# Patient Record
Sex: FEMALE | Race: WHITE | NOT HISPANIC OR LATINO | Employment: FULL TIME | URBAN - METROPOLITAN AREA
[De-identification: names, ages, dates, MRNs, and addresses within clinical notes are randomized per-mention and may not be internally consistent; named-entity substitution may affect disease eponyms.]

---

## 2017-01-04 ENCOUNTER — ANTICOAGULATION MONITORING (OUTPATIENT)
Dept: VASCULAR LAB | Facility: MEDICAL CENTER | Age: 52
End: 2017-01-04

## 2017-01-04 DIAGNOSIS — I82.401 ACUTE DEEP VEIN THROMBOSIS (DVT) OF RIGHT LOWER EXTREMITY, UNSPECIFIED VEIN (HCC): ICD-10-CM

## 2017-01-04 LAB — INR PPP: 4.2 (ref 2–3.5)

## 2017-01-04 NOTE — PROGRESS NOTES
OP Anticoagulation Service Note    Date: 1/4/2017    Anticoagulation Summary as of 1/4/2017     INR goal 2.0-3.0   Selected INR 4.2! (1/4/2017)   Maintenance plan 5 mg (5 mg x 1) on Wed; 7.5 mg (5 mg x 1.5) all other days   Weekly total 50 mg   Plan last modified Babita ALEGRIA Torsten (10/18/2016)   Next INR check 1/11/2017   Priority Maintenance   Target end date     Indications   Deep vein thrombosis [453.40] [I82.409]         Anticoagulation Episode Summary     INR check location Home Draw    Preferred lab     Send INR reminders to     Nuria MYRICK      Anticoagulation Care Providers     Provider Role Specialty Phone number    Bruna Ureña M.D. Referring Cardiology [phone]    LEON RhodesD Responsible          Anticoagulation Patient Findings      Plan:  INR is supratherapeutic. Left message on patient's answering machine/voicemail. Instructed patient to call back with any concerns regarding any unusual bleeding or bruising, an medication or diet changes or any signs or symptoms of thrombosis. Instructed patient to HOLD x 1 dose then resume medication as outlined above. Patient to follow up in 1 week.         Esther Cottrell, Pharm D

## 2017-01-12 ENCOUNTER — ANTICOAGULATION MONITORING (OUTPATIENT)
Dept: VASCULAR LAB | Facility: MEDICAL CENTER | Age: 52
End: 2017-01-12

## 2017-01-12 DIAGNOSIS — I82.401 ACUTE DEEP VEIN THROMBOSIS (DVT) OF RIGHT LOWER EXTREMITY, UNSPECIFIED VEIN (HCC): ICD-10-CM

## 2017-01-12 LAB — INR PPP: 1.4 (ref 2–3.5)

## 2017-01-12 NOTE — PROGRESS NOTES
Anticoagulation Summary as of 1/12/2017     INR goal 2.0-3.0   Selected INR 1.4! (1/12/2017)   Maintenance plan 5 mg (5 mg x 1) on Wed; 7.5 mg (5 mg x 1.5) all other days   Weekly total 50 mg   Plan last modified Babita Sarmiento (10/18/2016)   Next INR check 1/19/2017   Priority Maintenance   Target end date     Indications   Deep vein thrombosis [453.40] [I82.409]         Anticoagulation Episode Summary     INR check location Home Draw    Preferred lab     Send INR reminders to     Nuria MYRICK      Anticoagulation Care Providers     Provider Role Specialty Phone number    Bruna Ureña M.D. Referring Cardiology [phone]    Kiesha Barr, LEOND Responsible          Anticoagulation Patient Findings    Left voicemail message to report a sub therapeutic INR of 1.4.  Pt to Bolus dose with 10 mg tonight continue with current warfarin dosing regimen. Requested pt contact the clinic for any s/s of unusual bleeding, bruising, clotting or any changes to diet or medication. FU 1 weeks.  Kiesha Barr, PHARMD

## 2017-01-20 ENCOUNTER — ANTICOAGULATION MONITORING (OUTPATIENT)
Dept: VASCULAR LAB | Facility: MEDICAL CENTER | Age: 52
End: 2017-01-20

## 2017-01-20 LAB — INR PPP: 2.4 (ref 2–3.5)

## 2017-01-20 NOTE — PROGRESS NOTES
Anticoagulation Summary as of 1/20/2017     INR goal 2.0-3.0   Selected INR 2.4 (1/18/2017)   Maintenance plan 5 mg (5 mg x 1) on Wed; 7.5 mg (5 mg x 1.5) all other days   Weekly total 50 mg   Plan last modified Babita Gonzalesadiliamonae COLLAZODennise Sarmiento (10/18/2016)   Next INR check 1/25/2017   Priority Maintenance   Target end date     Indications   Deep vein thrombosis [453.40] [I82.409]         Anticoagulation Episode Summary     INR check location Home Draw    Preferred lab     Send INR reminders to     Nuria MYRICK      Anticoagulation Care Providers     Provider Role Specialty Phone number    Bruna Ureña M.D. Referring Cardiology [phone]    LEON RhodesD Responsible          Anticoagulation Patient Findings    Left voicemail message to report a therapeutic INR of 2.4.  Pt to continue with current warfarin dosing regimen. Requested pt contact the clinic for any s/s of unusual bleeding, bruising, clotting or any changes to diet or medication. FU 1 week.    Osiel Pete, PHARMD

## 2017-01-25 LAB — INR PPP: 3.8 (ref 2–3.5)

## 2017-01-26 ENCOUNTER — ANTICOAGULATION MONITORING (OUTPATIENT)
Dept: VASCULAR LAB | Facility: MEDICAL CENTER | Age: 52
End: 2017-01-26

## 2017-01-26 DIAGNOSIS — I82.401 ACUTE DEEP VEIN THROMBOSIS (DVT) OF RIGHT LOWER EXTREMITY, UNSPECIFIED VEIN (HCC): ICD-10-CM

## 2017-01-26 NOTE — PROGRESS NOTES
Anticoagulation Summary as of 1/26/2017     INR goal 2.0-3.0   Selected INR 3.8! (1/25/2017)   Maintenance plan 5 mg (5 mg x 1) on Wed; 7.5 mg (5 mg x 1.5) all other days   Weekly total 50 mg   Plan last modified Babita Sarmiento (10/18/2016)   Next INR check    Priority Maintenance   Target end date     Indications   Deep vein thrombosis [453.40] [I82.409]         Anticoagulation Episode Summary     INR check location Home Draw    Preferred lab     Send INR reminders to     Nuria MYRICK      Anticoagulation Care Providers     Provider Role Specialty Phone number    Bruna Ureña M.D. Referring Cardiology [phone]    Kiesha Barr, LEOND Responsible          Left voicemail message to report a supra therapeutic INR of 3.8.  Pt to HOLD DOSE TODAY, THEN continue with current warfarin dosing regimen. Requested pt contact the clinic for any s/s of unusual bleeding, bruising, clotting or any changes to diet or medication. FU 1 weeks.  Kiesha Barr, PHARMD

## 2017-02-07 ENCOUNTER — TELEPHONE (OUTPATIENT)
Dept: VASCULAR LAB | Facility: MEDICAL CENTER | Age: 52
End: 2017-02-07

## 2017-02-08 LAB — INR PPP: 2.5 (ref 2–3.5)

## 2017-02-09 ENCOUNTER — ANTICOAGULATION MONITORING (OUTPATIENT)
Dept: VASCULAR LAB | Facility: MEDICAL CENTER | Age: 52
End: 2017-02-09

## 2017-02-09 DIAGNOSIS — I82.401 ACUTE DEEP VEIN THROMBOSIS (DVT) OF RIGHT LOWER EXTREMITY, UNSPECIFIED VEIN (HCC): ICD-10-CM

## 2017-02-09 NOTE — PROGRESS NOTES
Anticoagulation Summary as of 2/9/2017     INR goal 2.0-3.0   Selected INR 2.5 (2/8/2017)   Maintenance plan 5 mg (5 mg x 1) on Wed; 7.5 mg (5 mg x 1.5) all other days   Weekly total 50 mg   Plan last modified Babita Sarmiento (10/18/2016)   Next INR check 2/22/2017   Priority Maintenance   Target end date     Indications   Deep vein thrombosis [453.40] [I82.409]         Anticoagulation Episode Summary     INR check location Home Draw    Preferred lab     Send INR reminders to     Nuria MYRICK      Anticoagulation Care Providers     Provider Role Specialty Phone number    Bruna Ureña M.D. Referring Cardiology [phone]    Kiesha Barr, LEOND Responsible          Anticoagulation Patient Findings    Left voicemail message to report a therapeutic INR of 2.5.  Pt to continue with current warfarin dosing regimen. Requested pt contact the clinic for any s/s of unusual bleeding, bruising, clotting or any changes to diet or medication. FU 2 weeks.  Kiesha Barr, PHARMD

## 2017-02-17 ENCOUNTER — TELEPHONE (OUTPATIENT)
Dept: VASCULAR LAB | Facility: MEDICAL CENTER | Age: 52
End: 2017-02-17

## 2017-02-18 NOTE — TELEPHONE ENCOUNTER
matteo called to report that she is unable to acquire testing supplies from [employer]  .aif

## 2017-03-14 ENCOUNTER — TELEPHONE (OUTPATIENT)
Dept: VASCULAR LAB | Facility: MEDICAL CENTER | Age: 52
End: 2017-03-14

## 2017-03-29 ENCOUNTER — ANTICOAGULATION MONITORING (OUTPATIENT)
Dept: VASCULAR LAB | Facility: MEDICAL CENTER | Age: 52
End: 2017-03-29

## 2017-03-29 DIAGNOSIS — I82.401 ACUTE DEEP VEIN THROMBOSIS (DVT) OF RIGHT LOWER EXTREMITY, UNSPECIFIED VEIN (HCC): ICD-10-CM

## 2017-03-29 LAB — INR PPP: 3.2 (ref 2–3.5)

## 2017-03-30 NOTE — PROGRESS NOTES
Anticoagulation Summary as of 3/29/2017     INR goal 2.0-3.0   Selected INR 3.2! (3/29/2017)   Maintenance plan 5 mg (5 mg x 1) on Wed; 7.5 mg (5 mg x 1.5) all other days   Weekly total 50 mg   Plan last modified Babita Margarita VALEDennise Sarmiento (10/18/2016)   Next INR check 4/12/2017   Priority Maintenance   Target end date     Indications   Deep vein thrombosis [453.40] [I82.409]         Anticoagulation Episode Summary     INR check location Home Draw    Preferred lab     Send INR reminders to     Nuria MYRICK      Anticoagulation Care Providers     Provider Role Specialty Phone number    Bruna Ureña M.D. Referring Cardiology [phone]    Kiesha Barr, LEOND Responsible          Anticoagulation Patient Findings    Left voicemail message to report a supratherapeutic INR of 3.2.  Requested pt contact the clinic for any s/s of unusual bleeding, bruising, clotting or any changes to diet or medication.   Instructed patient to take 2.5mg X 1, then resume current warfarin regimen.  FU 2 weeks.  Alexis Garcia, LEOND

## 2017-04-13 LAB — INR PPP: 2.3 (ref 2–3.5)

## 2017-04-14 ENCOUNTER — ANTICOAGULATION MONITORING (OUTPATIENT)
Dept: VASCULAR LAB | Facility: MEDICAL CENTER | Age: 52
End: 2017-04-14

## 2017-04-14 DIAGNOSIS — I82.401 ACUTE DEEP VEIN THROMBOSIS (DVT) OF RIGHT LOWER EXTREMITY, UNSPECIFIED VEIN (HCC): ICD-10-CM

## 2017-04-14 NOTE — PROGRESS NOTES
Anticoagulation Summary as of 4/14/2017     INR goal 2.0-3.0   Selected INR 2.3 (4/13/2017)   Maintenance plan 5 mg (5 mg x 1) on Wed; 7.5 mg (5 mg x 1.5) all other days   Weekly total 50 mg   Plan last modified Babita Sarmiento (10/18/2016)   Next INR check 4/27/2017   Priority Maintenance   Target end date     Indications   Deep vein thrombosis [453.40] [I82.409]         Anticoagulation Episode Summary     INR check location Home Draw    Preferred lab     Send INR reminders to     Nuria MYRICK      Anticoagulation Care Providers     Provider Role Specialty Phone number    Bruna Ureña M.D. Referring Cardiology [phone]    Kiesha Barr, LEOND Responsible          Left voicemail message to report a therapeutic INR of 2.3.  Pt to continue with current warfarin dosing regimen. Requested pt contact the clinic for any s/s of unusual bleeding, bruising, clotting or any changes to diet or medication. FU 2 weeks.  Kiesha Barr, PHARMD

## 2017-04-27 LAB — INR PPP: 2.6 (ref 2–3.5)

## 2017-04-28 ENCOUNTER — ANTICOAGULATION MONITORING (OUTPATIENT)
Dept: VASCULAR LAB | Facility: MEDICAL CENTER | Age: 52
End: 2017-04-28

## 2017-04-28 DIAGNOSIS — I82.401 ACUTE DEEP VEIN THROMBOSIS (DVT) OF RIGHT LOWER EXTREMITY, UNSPECIFIED VEIN (HCC): ICD-10-CM

## 2017-04-28 NOTE — PROGRESS NOTES
Anticoagulation Summary as of 4/28/2017     INR goal 2.0-3.0   Selected INR 2.6 (4/27/2017)   Maintenance plan 5 mg (5 mg x 1) on Wed; 7.5 mg (5 mg x 1.5) all other days   Weekly total 50 mg   Plan last modified Babita Sarmiento (10/18/2016)   Next INR check 5/11/2017   Priority Maintenance   Target end date     Indications   Deep vein thrombosis [453.40] [I82.409]         Anticoagulation Episode Summary     INR check location Home Draw    Preferred lab     Send INR reminders to     Nuria MYRICK      Anticoagulation Care Providers     Provider Role Specialty Phone number    Bruna Ureña M.D. Referring Cardiology [phone]    Kiesha Barr, LEOND Responsible          Left voicemail message to report a therapeutic INR of 2.6.  Pt to continue with current warfarin dosing regimen. Requested pt contact the clinic for any s/s of unusual bleeding, bruising, clotting or any changes to diet or medication. FU 2 weeks.  Kiesha Barr, PHARMD

## 2017-05-11 LAB — INR PPP: 2.1 (ref 2–3.5)

## 2017-05-12 ENCOUNTER — ANTICOAGULATION MONITORING (OUTPATIENT)
Dept: VASCULAR LAB | Facility: MEDICAL CENTER | Age: 52
End: 2017-05-12

## 2017-05-12 DIAGNOSIS — I82.401 ACUTE DEEP VEIN THROMBOSIS (DVT) OF RIGHT LOWER EXTREMITY, UNSPECIFIED VEIN (HCC): ICD-10-CM

## 2017-05-12 NOTE — PROGRESS NOTES
Anticoagulation Summary as of 5/12/2017     INR goal 2.0-3.0   Selected INR 2.1 (5/11/2017)   Maintenance plan 5 mg (5 mg x 1) on Wed; 7.5 mg (5 mg x 1.5) all other days   Weekly total 50 mg   Plan last modified Babita Margarita VALEDennise Sarmiento (10/18/2016)   Next INR check 5/25/2017   Priority Maintenance   Target end date     Indications   Deep vein thrombosis [453.40] [I82.409]         Anticoagulation Episode Summary     INR check location Home Draw    Preferred lab     Send INR reminders to     Nuria MYRICK      Anticoagulation Care Providers     Provider Role Specialty Phone number    Bruna Ureña M.D. Referring Cardiology [phone]    LEON RhodesD Responsible          Anticoagulation Patient Findings    Left voicemail message to report a therapeutic INR of 2.1.  Pt to continue with current warfarin dosing regimen. Requested pt contact the clinic for any s/s of unusual bleeding, bruising, clotting or any changes to diet or medication. FU 2 weeks.  Alexis Garcia, PHARMD

## 2017-05-25 LAB — INR PPP: 2 (ref 2–3.5)

## 2017-05-26 ENCOUNTER — ANTICOAGULATION MONITORING (OUTPATIENT)
Dept: VASCULAR LAB | Facility: MEDICAL CENTER | Age: 52
End: 2017-05-26

## 2017-05-26 DIAGNOSIS — I82.401 ACUTE DEEP VEIN THROMBOSIS (DVT) OF RIGHT LOWER EXTREMITY, UNSPECIFIED VEIN (HCC): ICD-10-CM

## 2017-05-26 NOTE — PROGRESS NOTES
Anticoagulation Summary as of 5/26/2017     INR goal 2.0-3.0   Selected INR    Maintenance plan 5 mg (5 mg x 1) on Wed; 7.5 mg (5 mg x 1.5) all other days   Weekly total 50 mg   Plan last modified Babita Sarmiento (10/18/2016)   Next INR check 6/8/2017   Priority Maintenance   Target end date     Indications   Deep vein thrombosis [453.40] [I82.409]         Anticoagulation Episode Summary     INR check location Home Draw    Preferred lab     Send INR reminders to     Nuria MYRICK      Anticoagulation Care Providers     Provider Role Specialty Phone number    Bruna Ureña M.D. Referring Cardiology [phone]    Kiesha Barr, LEOND Responsible          Anticoagulation Patient Findings    Left voicemail message to report a therapeutic INR of 2.0.  Pt to continue with current warfarin dosing regimen. Requested pt contact the clinic for any s/s of unusual bleeding, bruising, clotting or any changes to diet or medication. FU 2 weeks.  Kiesha Barr, PHARMD

## 2017-06-08 LAB — INR PPP: 3.2 (ref 2–3.5)

## 2017-06-09 ENCOUNTER — ANTICOAGULATION MONITORING (OUTPATIENT)
Dept: VASCULAR LAB | Facility: MEDICAL CENTER | Age: 52
End: 2017-06-09

## 2017-06-09 DIAGNOSIS — I82.401 ACUTE DEEP VEIN THROMBOSIS (DVT) OF RIGHT LOWER EXTREMITY, UNSPECIFIED VEIN (HCC): ICD-10-CM

## 2017-06-21 LAB — INR PPP: 3.9 (ref 2–3.5)

## 2017-06-22 ENCOUNTER — ANTICOAGULATION MONITORING (OUTPATIENT)
Dept: VASCULAR LAB | Facility: MEDICAL CENTER | Age: 52
End: 2017-06-22

## 2017-06-22 DIAGNOSIS — I82.401 ACUTE DEEP VEIN THROMBOSIS (DVT) OF RIGHT LOWER EXTREMITY, UNSPECIFIED VEIN (HCC): ICD-10-CM

## 2017-06-22 NOTE — PROGRESS NOTES
OP Anticoagulation Service Note    Date: 6/22/2017    Anticoagulation Summary as of 6/22/2017     INR goal 2.0-3.0   Selected INR 3.9! (6/21/2017)   Maintenance plan 5 mg (5 mg x 1) on Sun, Wed; 7.5 mg (5 mg x 1.5) all other days   Weekly total 47.5 mg   Plan last modified Esther Cottrell PHARMD (6/22/2017)   Next INR check 6/29/2017   Priority Maintenance   Target end date     Indications   Deep vein thrombosis [453.40] [I82.409]         Anticoagulation Episode Summary     INR check location Home Draw    Preferred lab     Send INR reminders to     Nuria MYRICK      Anticoagulation Care Providers     Provider Role Specialty Phone number    Bruna Ureña M.D. Referring Cardiology [phone]    LEON RhodesD Responsible          Anticoagulation Patient Findings      Plan:  INR is supratherapeutic. Left message on patient's answering machine/voicemail. Instructed patient to call back with any concerns regarding any unusual bleeding or bruising, any medication or diet changes or any signs or symptoms of thrombosis. Instructed patient to HOLD x 1 dose then begin decrease regimen as outlined.  Patient to follow up in 1 week(s).         Esther Cottrell, TODD

## 2017-06-26 DIAGNOSIS — I71.03 DISSECTION OF THORACOABDOMINAL AORTA (HCC): ICD-10-CM

## 2017-06-29 ENCOUNTER — ANTICOAGULATION MONITORING (OUTPATIENT)
Dept: VASCULAR LAB | Facility: MEDICAL CENTER | Age: 52
End: 2017-06-29

## 2017-06-29 DIAGNOSIS — I82.409 DEEP VEIN THROMBOSIS (DVT) OF LOWER EXTREMITY, UNSPECIFIED CHRONICITY, UNSPECIFIED LATERALITY, UNSPECIFIED VEIN (HCC): ICD-10-CM

## 2017-06-29 LAB — INR PPP: 2 (ref 2–3.5)

## 2017-06-29 NOTE — PROGRESS NOTES
Anticoagulation Summary as of 6/29/2017     INR goal 2.0-3.0   Selected INR 2.0 (6/29/2017)   Maintenance plan 5 mg (5 mg x 1) on Sun, Wed; 7.5 mg (5 mg x 1.5) all other days   Weekly total 47.5 mg   Plan last modified Esther Cottrell PHARMD (6/22/2017)   Next INR check 7/13/2017   Priority Maintenance   Target end date     Indications   Deep vein thrombosis [453.40] [I82.409]         Anticoagulation Episode Summary     INR check location Home Draw    Preferred lab     Send INR reminders to     Nuria MYRICK      Anticoagulation Care Providers     Provider Role Specialty Phone number    Bruna Ureña M.D. Referring Cardiology [phone]    Kiesha Barr PHARMD Responsible          Anticoagulation Patient Findings    Left voicemail message to report a therapeutic INR of 2.0.  Pt to continue with current warfarin dosing regimen. Requested pt contact the clinic for any s/s of unusual bleeding, bruising, clotting or any changes to diet or medication. FU 2 weeks.    Kiesha Barr, PHARMD

## 2017-07-13 LAB — INR PPP: 3.2 (ref 2–3.5)

## 2017-07-14 ENCOUNTER — ANTICOAGULATION MONITORING (OUTPATIENT)
Dept: VASCULAR LAB | Facility: MEDICAL CENTER | Age: 52
End: 2017-07-14

## 2017-07-14 DIAGNOSIS — I82.409 DEEP VEIN THROMBOSIS (DVT) OF LOWER EXTREMITY, UNSPECIFIED CHRONICITY, UNSPECIFIED LATERALITY, UNSPECIFIED VEIN (HCC): ICD-10-CM

## 2017-07-14 NOTE — PROGRESS NOTES
Anticoagulation Summary as of 7/14/2017     INR goal 2.0-3.0   Selected INR 3.2! (7/13/2017)   Maintenance plan 5 mg (5 mg x 1) on Sun, Wed; 7.5 mg (5 mg x 1.5) all other days   Weekly total 47.5 mg   Plan last modified Esther Cottrell PHARMD (6/22/2017)   Next INR check 7/27/2017   Priority Maintenance   Target end date     Indications   Deep vein thrombosis [453.40] [I82.409]         Anticoagulation Episode Summary     INR check location Home Draw    Preferred lab     Send INR reminders to     Nuria MYRICK      Anticoagulation Care Providers     Provider Role Specialty Phone number    Bruna Ureña M.D. Referring Cardiology [phone]    LEON RhodesD Responsible          Spoke with Oumar to report a supra therapeutic INR of 3.2.  Will reduce dose tonight to 5mg then resume current dosing regimen.  Pt denies any unusual s/s of bleeding, bruising, clotting or any changes to diet or medications.  Follow up in 2 weeks.    LEON RhodesD

## 2017-07-28 LAB — INR PPP: 2.8 (ref 2–3.5)

## 2017-07-31 ENCOUNTER — ANTICOAGULATION MONITORING (OUTPATIENT)
Dept: VASCULAR LAB | Facility: MEDICAL CENTER | Age: 52
End: 2017-07-31

## 2017-07-31 NOTE — PROGRESS NOTES
Anticoagulation Summary as of 7/31/2017     INR goal 2.0-3.0   Selected INR 2.8 (7/28/2017)   Maintenance plan 5 mg (5 mg x 1) on Sun, Wed; 7.5 mg (5 mg x 1.5) all other days   Weekly total 47.5 mg   Plan last modified Esther Cottrell PHARMD (6/22/2017)   Next INR check 8/11/2017   Priority Maintenance   Target end date Indefinite    Indications   Deep vein thrombosis [453.40] [I82.409]         Anticoagulation Episode Summary     INR check location Home Draw    Preferred lab     Send INR reminders to     Nuria MYRICK      Anticoagulation Care Providers     Provider Role Specialty Phone number    Bruna Ureña M.D. Referring Cardiology [phone]    Kiesha Barr PHARMD Responsible          Anticoagulation Patient Findings    Left VM on home phone.  Pt is therapeutic.  Left instructions to call back if any concerns regarding bleeding, bruising or changes in diet/medications.  Will continue current regimen.  FU in 2 weeks.    Eduardo Hanley, Pharm.D.    I have reviewed and agree with the plan above on 08/10/2017      Kiesha Barr PHARMD

## 2017-08-12 LAB — INR PPP: 2.1 (ref 2–3.5)

## 2017-08-14 ENCOUNTER — ANTICOAGULATION MONITORING (OUTPATIENT)
Dept: VASCULAR LAB | Facility: MEDICAL CENTER | Age: 52
End: 2017-08-14

## 2017-08-14 NOTE — PROGRESS NOTES
Anticoagulation Summary as of 8/14/2017     INR goal 2.0-3.0   Selected INR 2.1 (8/12/2017)   Maintenance plan 5 mg (5 mg x 1) on Sun, Wed; 7.5 mg (5 mg x 1.5) all other days   Weekly total 47.5 mg   Plan last modified LEON AkersD (6/22/2017)   Next INR check 8/28/2017   Priority Maintenance   Target end date Indefinite    Indications   Deep vein thrombosis [453.40] [I82.409]         Anticoagulation Episode Summary     INR check location Home Draw    Preferred lab     Send INR reminders to     Nuria MYRICK      Anticoagulation Care Providers     Provider Role Specialty Phone number    Bruna Ureña M.D. Referring Cardiology [phone]    Kiesha Barr PHARMD Responsible          Anticoagulation Patient Findings    Left VM on home phone.  Pt is therapeutic.  Left instructions to call back if any concerns regarding bleeding, bruising or changes in diet/medications.  Will continue current regimen.  FU in 2 weeks.    Eduardo Hanley, Pharm.D.  Pharmacy Practice Resident, PGY1    I have reviewed and agree with the plan above on 08/15/2017      Kiesha Barr PHARMD

## 2017-08-22 ENCOUNTER — TELEPHONE (OUTPATIENT)
Dept: VASCULAR LAB | Facility: MEDICAL CENTER | Age: 52
End: 2017-08-22

## 2017-08-22 NOTE — TELEPHONE ENCOUNTER
LM on VM for pt to have her CTA of thoracic aorta completed for surveillance. Order sent to [facility]   MAINOR Trimble.

## 2017-08-28 LAB — INR PPP: 2.5 (ref 2–3.5)

## 2017-08-29 ENCOUNTER — ANTICOAGULATION MONITORING (OUTPATIENT)
Dept: VASCULAR LAB | Facility: MEDICAL CENTER | Age: 52
End: 2017-08-29

## 2017-08-29 DIAGNOSIS — I82.401 ACUTE DEEP VEIN THROMBOSIS (DVT) OF RIGHT LOWER EXTREMITY, UNSPECIFIED VEIN (HCC): ICD-10-CM

## 2017-08-29 NOTE — PROGRESS NOTES
Anticoagulation Summary  As of 8/29/2017    INR goal:   2.0-3.0   TTR:   62.1 % (2.3 y)   Today's INR:   2.5 (8/28/2017)   Maintenance plan:   5 mg (5 mg x 1) on Sun, Wed; 7.5 mg (5 mg x 1.5) all other days   Weekly total:   47.5 mg   Plan last modified:   Esther Cottrell PharmD (6/22/2017)   Next INR check:   9/11/2017   Priority:   Maintenance   Target end date:   Indefinite    Indications    Deep vein thrombosis [453.40] [I82.409]             Anticoagulation Episode Summary     INR check location:   Home Draw    Preferred lab:       Send INR reminders to:       Comments:   Eloy MYRICK      Anticoagulation Care Providers     Provider Role Specialty Phone number    Bruna Ureña M.D. Referring Cardiology [phone]    Kiesha Barr, PharmD Responsible          Left voicemail message to report a therapeutic INR of 2.5.  Pt to continue with current warfarin dosing regimen. Requested pt contact the clinic for any s/s of unusual bleeding, bruising, clotting or any changes to diet or medication. FU 2 weeks.  Kiesha Barr, PharmD

## 2017-09-06 ENCOUNTER — TELEPHONE (OUTPATIENT)
Dept: VASCULAR LAB | Facility: MEDICAL CENTER | Age: 52
End: 2017-09-06

## 2017-09-06 NOTE — TELEPHONE ENCOUNTER
LM on  for pt to have CTA thoracic aorta with scheduling and office phone number provided. Looks like she might get things done at [facility]'s order sent to pt's home in case she needs it done elsewhere.   MAINOR Trimble.

## 2017-09-12 ENCOUNTER — ANTICOAGULATION MONITORING (OUTPATIENT)
Dept: VASCULAR LAB | Facility: MEDICAL CENTER | Age: 52
End: 2017-09-12

## 2017-09-12 DIAGNOSIS — I82.401 ACUTE DEEP VEIN THROMBOSIS (DVT) OF RIGHT LOWER EXTREMITY, UNSPECIFIED VEIN (HCC): ICD-10-CM

## 2017-09-12 LAB — INR PPP: 4.2 (ref 2–3.5)

## 2017-09-12 NOTE — PROGRESS NOTES
OP Anticoagulation Service Note    Date: 9/12/2017  There were no vitals filed for this visit.    Anticoagulation Summary  As of 9/12/2017    INR goal:   2.0-3.0   TTR:   61.5 % (2.3 y)   Today's INR:   4.2!   Maintenance plan:   5 mg (5 mg x 1) on Sun, Wed; 7.5 mg (5 mg x 1.5) all other days   Weekly total:   47.5 mg   Plan last modified:   Esther Cottrell PharmD (6/22/2017)   Next INR check:   9/19/2017   Priority:   Maintenance   Target end date:   Indefinite    Indications    Deep vein thrombosis [453.40] [I82.409]             Anticoagulation Episode Summary     INR check location:   Home Draw    Preferred lab:       Send INR reminders to:       Comments:   Eloy MYRICK      Anticoagulation Care Providers     Provider Role Specialty Phone number    Bruna Ureña M.D. Referring Cardiology [phone]    Kiesha Barr PharmD Responsible          Anticoagulation Patient Findings      Plan:  INR is therapeutic. Left message on patient's answering machine/voicemail. Instructed patient to call back with any concerns regarding any unusual bleeding or bruising, any medication or diet changes or any signs or symptoms of thrombosis. Instructed patient to HOLD x 1 dose then resume medication as outlined above. Patient to follow up in 1 week(s).       Esther Cottrell, Claudia D

## 2017-09-19 LAB — INR PPP: 3.1 (ref 2–3.5)

## 2017-09-20 ENCOUNTER — ANTICOAGULATION MONITORING (OUTPATIENT)
Dept: VASCULAR LAB | Facility: MEDICAL CENTER | Age: 52
End: 2017-09-20

## 2017-09-20 DIAGNOSIS — I82.401 ACUTE DEEP VEIN THROMBOSIS (DVT) OF RIGHT LOWER EXTREMITY, UNSPECIFIED VEIN (HCC): ICD-10-CM

## 2017-09-20 NOTE — PROGRESS NOTES
Anticoagulation Summary  As of 9/20/2017    INR goal:   2.0-3.0   TTR:   61.0 % (2.3 y)   Today's INR:   3.1! (9/19/2017)   Maintenance plan:   5 mg (5 mg x 1) on Mon, Wed, Fri; 7.5 mg (5 mg x 1.5) all other days   Weekly total:   45 mg   Plan last modified:   Alexis Garcia PharmD (9/20/2017)   Next INR check:   9/26/2017   Priority:   Maintenance   Target end date:   Indefinite    Indications    Deep vein thrombosis [453.40] [I82.409]             Anticoagulation Episode Summary     INR check location:   Home Draw    Preferred lab:       Send INR reminders to:       Comments:   Eloy MYRICK      Anticoagulation Care Providers     Provider Role Specialty Phone number    Bruna Ureña M.D. Referring Cardiology [phone]    Kiesha Barr PharmD Responsible          Anticoagulation Patient Findings    Left voicemail message to report a supratherapeutic INR of 3.1.  Requested pt contact the clinic for any s/s of unusual bleeding, bruising, clotting or any changes to diet or medication.   Instructed patient to decrease weekly warfarin regimen by ~5% as detailed above.  FU 1 weeks.  Alexis Garcia PharmD

## 2017-09-27 LAB — INR PPP: 3.1 (ref 2–3.5)

## 2017-09-28 ENCOUNTER — TELEPHONE (OUTPATIENT)
Dept: VASCULAR LAB | Facility: MEDICAL CENTER | Age: 52
End: 2017-09-28

## 2017-09-28 ENCOUNTER — ANTICOAGULATION MONITORING (OUTPATIENT)
Dept: VASCULAR LAB | Facility: MEDICAL CENTER | Age: 52
End: 2017-09-28

## 2017-09-28 NOTE — TELEPHONE ENCOUNTER
LM on VM to contact [facility] scheduling for radiology to schedule the CTA thor aorta. Phone number to [facility] and to out office provided. Order fx to [facility] on 9/6. MAINOR Trimble.

## 2017-09-28 NOTE — PROGRESS NOTES
Anticoagulation Summary  As of 9/28/2017    INR goal:   2.0-3.0   TTR:   60.4 % (2.4 y)   Today's INR:   3.1! (9/27/2017)   Maintenance plan:   7.5 mg (5 mg x 1.5) on Mon, Wed, Fri; 5 mg (5 mg x 1) all other days   Weekly total:   42.5 mg   Plan last modified:   Karen Ignacio PharmD (9/28/2017)   Next INR check:   10/4/2017   Priority:   Maintenance   Target end date:   Indefinite    Indications    Deep vein thrombosis [453.40] [I82.409]             Anticoagulation Episode Summary     INR check location:   Home Draw    Preferred lab:       Send INR reminders to:       Comments:   Eloy MYRICK      Anticoagulation Care Providers     Provider Role Specialty Phone number    Bruna Ureña M.D. Referring Cardiology [phone]    Kiesha Barr PharmD Responsible          Anticoagulation Patient Findings      Left voicemail message to report a SUPRA therapeutic INR of 3.1.    Will have pt start a 5% reduced weekly dose.  Requested pt contact the clinic for any s/s of unusual bleeding, bruising, clotting or any changes to diet or medication.    FU INR in 1 weeks.    Karen Ignacio, ClaudiaD

## 2017-10-04 LAB — INR PPP: 3.4 (ref 2–3.5)

## 2017-10-05 ENCOUNTER — ANTICOAGULATION MONITORING (OUTPATIENT)
Dept: VASCULAR LAB | Facility: MEDICAL CENTER | Age: 52
End: 2017-10-05

## 2017-10-05 NOTE — PROGRESS NOTES
Anticoagulation Summary  As of 10/5/2017    INR goal:   2.0-3.0   TTR:   59.9 % (2.4 y)   Today's INR:   3.4! (10/4/2017)   Maintenance plan:   7.5 mg (5 mg x 1.5) on Mon, Fri; 5 mg (5 mg x 1) all other days   Weekly total:   40 mg   Plan last modified:   Osiel Pete, PharmD (10/5/2017)   Next INR check:   10/12/2017   Priority:   Maintenance   Target end date:   Indefinite    Indications    Deep vein thrombosis [453.40] [I82.409]             Anticoagulation Episode Summary     INR check location:   Home Draw    Preferred lab:       Send INR reminders to:       Comments:   Eloy MYRICK      Anticoagulation Care Providers     Provider Role Specialty Phone number    Bruna Ureña M.D. Referring Cardiology [phone]    Claudia RhodesD Responsible          Anticoagulation Patient Findings      HPI:  Oumar Caba, on anticoagulation therapy with warfarin for DVT.  Changes to current medical/health status since last appt: None  Denies signs/symptoms of bleeding and/or thrombosis since the last appt.    Denies any interval changes to diet  Denies any interval changes to medications since last appt.   Denies any complications or cost restrictions with current therapy.     A/P   INR  SUPRA-therapeutic.   Reduce today then begin 5% reduced regimen.     Next INR in 1 week(s).    Osiel Pete, PharmD

## 2017-10-09 ENCOUNTER — TELEPHONE (OUTPATIENT)
Dept: VASCULAR LAB | Facility: MEDICAL CENTER | Age: 52
End: 2017-10-09

## 2017-10-09 NOTE — TELEPHONE ENCOUNTER
LM on VM that CTA order was fx to [facility]'s  Phone number to [facility] scheduling and to our office provided. MAINOR Trimble.

## 2017-10-13 ENCOUNTER — ANTICOAGULATION MONITORING (OUTPATIENT)
Dept: VASCULAR LAB | Facility: MEDICAL CENTER | Age: 52
End: 2017-10-13

## 2017-10-13 DIAGNOSIS — I82.4Y9 DEEP VEIN THROMBOSIS (DVT) OF PROXIMAL LOWER EXTREMITY, UNSPECIFIED CHRONICITY, UNSPECIFIED LATERALITY (HCC): ICD-10-CM

## 2017-10-13 LAB — INR PPP: 2.3 (ref 2–3.5)

## 2017-10-13 NOTE — PROGRESS NOTES
Anticoagulation Summary  As of 10/13/2017    INR goal:   2.0-3.0   TTR:   60.0 % (2.4 y)   Today's INR:   2.3   Maintenance plan:   7.5 mg (5 mg x 1.5) on Mon, Fri; 5 mg (5 mg x 1) all other days   Weekly total:   40 mg   No change documented:   Chauncey Barber, Med Ass't   Plan last modified:   Claudia LozadaD (10/5/2017)   Next INR check:   10/27/2017   Priority:   Maintenance   Target end date:   Indefinite    Indications    Deep vein thrombosis [453.40] [I82.409]             Anticoagulation Episode Summary     INR check location:   Home Draw    Preferred lab:       Send INR reminders to:       Comments:   Eloy MYRICK      Anticoagulation Care Providers     Provider Role Specialty Phone number    Bruna Ureña M.D. Referring Cardiology [phone]    Kiesha Barr PharmD Responsible          Anticoagulation Patient Findings  Patient Findings     Negatives:   Signs/symptoms of thrombosis, Signs/symptoms of bleeding, Laboratory test error suspected, Change in health, Change in alcohol use, Change in activity, Upcoming invasive procedure, Emergency department visit, Upcoming dental procedure, Missed doses, Extra doses, Change in medications, Change in diet/appetite, Hospital admission, Bruising, Other complaints        Left voicemail message to report therapeutic INR of 2.3.  Patient to continue with current warfarin dosing regimen. Requested pt contact the clinic for any change to diet or medication, and to report any signs or symptoms of bleeding, bruising or clotting.  Pt to follow up in 2 weeks.    Chauncey Barber, Med Ass't      I have reviewed and agree with the plan above on 10/20/2017      Kiesha Barr PharmD

## 2017-10-25 ENCOUNTER — TELEPHONE (OUTPATIENT)
Dept: VASCULAR LAB | Facility: MEDICAL CENTER | Age: 52
End: 2017-10-25

## 2017-10-25 NOTE — TELEPHONE ENCOUNTER
LM on pt's VM that CTA thor aorta was ordered and fax to [place]. Please call and schedule the scan. MAINOR Trimble.

## 2017-10-30 ENCOUNTER — TELEPHONE (OUTPATIENT)
Dept: VASCULAR LAB | Facility: MEDICAL CENTER | Age: 52
End: 2017-10-30

## 2017-10-30 NOTE — TELEPHONE ENCOUNTER
CTA from [facility] October 2017 reviewed    Descending thoracic aortic stent graft appears stable  No evidence of residual dissection or dilation    We'll continue with medical management. Patient should follow-up with me as scheduled at [facility]    Anticipate repeat CTA thoracic aorta in 2 years    Michael J. Bloch, MD  Vascular Care    CC:  [facility]

## 2017-10-30 NOTE — TELEPHONE ENCOUNTER
LM on pt's VM that her CTA was fine, she should continue to follow up with Dr. Bloch at the [facility] and her next CTA is due oct 2019. MAINOR Trimble.

## 2017-11-01 ENCOUNTER — ANTICOAGULATION MONITORING (OUTPATIENT)
Dept: VASCULAR LAB | Facility: MEDICAL CENTER | Age: 52
End: 2017-11-01

## 2017-11-01 DIAGNOSIS — I82.4Y9 DEEP VEIN THROMBOSIS (DVT) OF PROXIMAL LOWER EXTREMITY, UNSPECIFIED CHRONICITY, UNSPECIFIED LATERALITY (HCC): ICD-10-CM

## 2017-11-01 LAB — INR PPP: 2.1 (ref 2–3.5)

## 2017-11-01 NOTE — PROGRESS NOTES
OP Anticoagulation Telephone Note    Date: 11/1/2017  Anticoagulation Summary  As of 11/1/2017    INR goal:   2.0-3.0   TTR:   60.5 % (2.4 y)   Today's INR:   2.1 (10/26/2017)   Maintenance plan:   7.5 mg (5 mg x 1.5) on Mon, Fri; 5 mg (5 mg x 1) all other days   Weekly total:   40 mg   No change documented:   Sophia Alonso, Med Ass't   Plan last modified:   Osiel Pete, Josie (10/5/2017)   Next INR check:   11/9/2017   Priority:   Maintenance   Target end date:   Indefinite    Indications    Deep vein thrombosis [453.40] [I82.409]             Anticoagulation Episode Summary     INR check location:   Home Draw    Preferred lab:       Send INR reminders to:       Comments:   Eloy MYRICK      Anticoagulation Care Providers     Provider Role Specialty Phone number    Bruna Ureña M.D. Referring Cardiology [phone]    Claudia RhodesD Responsible          Anticoagulation Patient Findings  Patient Findings     Negatives:   Signs/symptoms of thrombosis, Signs/symptoms of bleeding, Laboratory test error suspected, Change in health, Change in alcohol use, Change in activity, Upcoming invasive procedure, Emergency department visit, Upcoming dental procedure, Missed doses, Extra doses, Change in medications, Change in diet/appetite, Hospital admission, Bruising, Other complaints      Plan:  Left message on patient's answering machine/ voicemail. Instructed patient to call back with any concerns regarding any unusual bleeding or bruising, any medication or diet changes, or any signs or symptoms of thrombosis. Instructed patient to resume medication as outlined above. Patient to follow up in 2 weeks.     Sophia Alonso, Medical Assistant    I have reviewed and agree with the plan above on 11/09/2017      Kiesha Barr, PharmD

## 2017-11-15 ENCOUNTER — ANTICOAGULATION MONITORING (OUTPATIENT)
Dept: VASCULAR LAB | Facility: MEDICAL CENTER | Age: 52
End: 2017-11-15

## 2017-11-15 DIAGNOSIS — I82.4Y9 DEEP VEIN THROMBOSIS (DVT) OF PROXIMAL LOWER EXTREMITY, UNSPECIFIED CHRONICITY, UNSPECIFIED LATERALITY (HCC): ICD-10-CM

## 2017-11-15 LAB — INR PPP: 2 (ref 2–3.5)

## 2017-11-15 NOTE — PROGRESS NOTES
OP Anticoagulation Telephone Note    Date: 11/15/2017  Anticoagulation Summary  As of 11/15/2017    INR goal:   2.0-3.0   TTR:   61.4 % (2.5 y)   Today's INR:   2.0 (11/14/2017)   Maintenance plan:   7.5 mg (5 mg x 1.5) on Mon, Fri; 5 mg (5 mg x 1) all other days   Weekly total:   40 mg   No change documented:   Sophia Alonso, Med Ass't   Plan last modified:   Osiel Pete PharmD (10/5/2017)   Next INR check:   11/28/2017   Priority:   Maintenance   Target end date:   Indefinite    Indications    Deep vein thrombosis [453.40] [I82.409]             Anticoagulation Episode Summary     INR check location:   Home Draw    Preferred lab:       Send INR reminders to:       Comments:   Eloy MYRICK      Anticoagulation Care Providers     Provider Role Specialty Phone number    Bruna Ureña M.D. Referring Cardiology [phone]    Claudia RhodesD Responsible          Anticoagulation Patient Findings  Patient Findings     Negatives:   Signs/symptoms of thrombosis, Signs/symptoms of bleeding, Laboratory test error suspected, Change in health, Change in alcohol use, Change in activity, Upcoming invasive procedure, Emergency department visit, Upcoming dental procedure, Missed doses, Extra doses, Change in medications, Change in diet/appetite, Hospital admission, Bruising, Other complaints      Plan:  Left message on patient's answering machine/ voicemail. Instructed patient to call back with any concerns regarding any unusual bleeding or bruising, any medication or diet changes, or any signs or symptoms of thrombosis. Instructed patient to resume medication as outlined above. Patient to follow up in 2 weeks.     Sophia Alonso, Medical Assistant    I have reviewed and agree with the plan above on 11/15/2017        Kiesha Barr, PharmD

## 2017-12-12 ENCOUNTER — TELEPHONE (OUTPATIENT)
Dept: VASCULAR LAB | Facility: MEDICAL CENTER | Age: 52
End: 2017-12-12

## 2017-12-13 LAB — INR PPP: 1.3 (ref 2–3.5)

## 2017-12-14 ENCOUNTER — ANTICOAGULATION MONITORING (OUTPATIENT)
Dept: VASCULAR LAB | Facility: MEDICAL CENTER | Age: 52
End: 2017-12-14

## 2017-12-14 DIAGNOSIS — I82.401 ACUTE DEEP VEIN THROMBOSIS (DVT) OF RIGHT LOWER EXTREMITY, UNSPECIFIED VEIN (HCC): ICD-10-CM

## 2017-12-14 NOTE — PROGRESS NOTES
Anticoagulation Summary  As of 12/14/2017    INR goal:   2.0-3.0   TTR:   59.5 % (2.6 y)   Today's INR:   1.3! (12/13/2017)   Maintenance plan:   7.5 mg (5 mg x 1.5) on Mon, Fri; 5 mg (5 mg x 1) all other days   Weekly total:   40 mg   Plan last modified:   Claudia LozadaD (10/5/2017)   Next INR check:   12/18/2017   Priority:   Maintenance   Target end date:   Indefinite    Indications    Deep vein thrombosis [453.40] [I82.409]             Anticoagulation Episode Summary     INR check location:   Home Draw    Preferred lab:       Send INR reminders to:       Comments:   Eloy MYRICK      Anticoagulation Care Providers     Provider Role Specialty Phone number    Bruna Ureña M.D. Referring Cardiology [phone]    Kiesha Barr, ClaudiaD Responsible          Anticoagulation Patient Findings    Left voicemail message to report a sub therapeutic INR of 1.3.  Pt to  BOLUS DOSE WITH 10MG PO QHS X2 THEN continue with current warfarin dosing regimen. Requested pt contact the clinic for any s/s of unusual bleeding, bruising, clotting or any changes to diet or medication. FU 4 DAYS.  Claudia RhodesD

## 2017-12-18 LAB — INR PPP: 2 (ref 2–3.5)

## 2017-12-19 ENCOUNTER — ANTICOAGULATION MONITORING (OUTPATIENT)
Dept: VASCULAR LAB | Facility: MEDICAL CENTER | Age: 52
End: 2017-12-19

## 2017-12-19 DIAGNOSIS — I82.401 ACUTE DEEP VEIN THROMBOSIS (DVT) OF RIGHT LOWER EXTREMITY, UNSPECIFIED VEIN (HCC): ICD-10-CM

## 2017-12-19 NOTE — PROGRESS NOTES
Anticoagulation Summary  As of 12/19/2017    INR goal:   2.0-3.0   TTR:   59.2 % (2.6 y)   Today's INR:   2.0 (12/18/2017)   Maintenance plan:   7.5 mg (5 mg x 1.5) on Mon, Fri; 5 mg (5 mg x 1) all other days   Weekly total:   40 mg   Plan last modified:   Osiel Pete, PharmD (10/5/2017)   Next INR check:   1/2/2018   Priority:   Maintenance   Target end date:   Indefinite    Indications    Deep vein thrombosis [453.40] [I82.409]             Anticoagulation Episode Summary     INR check location:   Home Draw    Preferred lab:       Send INR reminders to:       Comments:   Eloy MYRICK      Anticoagulation Care Providers     Provider Role Specialty Phone number    Bruna Ureña M.D. Referring Cardiology [phone]    Kiesha Barr, ClaudiaD Responsible          Anticoagulation Patient Findings    Left voicemail message to report a therapeutic INR of 2.0.  Pt to continue with current warfarin dosing regimen. Requested pt contact the clinic for any s/s of unusual bleeding, bruising, clotting or any changes to diet or medication. FU 2 weeks.  Kiesha Barr, PharmD

## 2018-01-09 LAB — INR PPP: 1.5 (ref 2–3.5)

## 2018-01-10 ENCOUNTER — ANTICOAGULATION MONITORING (OUTPATIENT)
Dept: VASCULAR LAB | Facility: MEDICAL CENTER | Age: 53
End: 2018-01-10

## 2018-01-10 DIAGNOSIS — I82.401 ACUTE DEEP VEIN THROMBOSIS (DVT) OF RIGHT LOWER EXTREMITY, UNSPECIFIED VEIN (HCC): ICD-10-CM

## 2018-01-10 NOTE — PROGRESS NOTES
Anticoagulation Summary  As of 1/10/2018    INR goal:   2.0-3.0   TTR:   57.8 % (2.6 y)   Today's INR:   1.5! (1/9/2018)   Maintenance plan:   7.5 mg (5 mg x 1.5) on Tue, Thu, Sat; 5 mg (5 mg x 1) all other days   Weekly total:   42.5 mg   Plan last modified:   Alexis Garcia PharmD (1/10/2018)   Next INR check:   1/17/2018   Priority:   Maintenance   Target end date:   Indefinite    Indications    Deep vein thrombosis [453.40] [I82.409]             Anticoagulation Episode Summary     INR check location:   Home Draw    Preferred lab:       Send INR reminders to:       Comments:   Eloy MYRICK      Anticoagulation Care Providers     Provider Role Specialty Phone number    Bruna Ureña M.D. Referring Cardiology [phone]    Kiesha Barr PharmD Responsible          Anticoagulation Patient Findings    Left voicemail message to report a subtherapeutic INR of 1.5.  Requested pt contact the clinic for any s/s of unusual bleeding, bruising, clotting or any changes to diet or medication.   Instructed patient to bolus with 10mg X 1, then increase weekly warfarin regimen by ~7% as detailed above.  FU 1 weeks.  Alexis Garcia, Josie

## 2018-01-17 ENCOUNTER — ANTICOAGULATION MONITORING (OUTPATIENT)
Dept: VASCULAR LAB | Facility: MEDICAL CENTER | Age: 53
End: 2018-01-17

## 2018-01-17 LAB — INR PPP: 2.1 (ref 2–3.5)

## 2018-01-17 NOTE — PROGRESS NOTES
Anticoagulation Summary  As of 1/17/2018    INR goal:   2.0-3.0   TTR:   57.5 % (2.7 y)   Today's INR:   2.1   Maintenance plan:   5 mg (5 mg x 1) on Mon, Fri; 7.5 mg (5 mg x 1.5) all other days   Weekly total:   47.5 mg   Plan last modified:   Karen Ignacio, PharmD (1/17/2018)   Next INR check:   1/31/2018   Priority:   Maintenance   Target end date:   Indefinite    Indications    Deep vein thrombosis [453.40] [I82.409]             Anticoagulation Episode Summary     INR check location:   Home Draw    Preferred lab:       Send INR reminders to:       Comments:   Eloy MYRICK      Anticoagulation Care Providers     Provider Role Specialty Phone number    Bruna Ureña M.D. Referring Cardiology [phone]    Kiesha Barr PharmD Responsible          Anticoagulation Patient Findings      Left voicemail message to report a therapeutic INR of 2.1.    Will have pt start a new weekly dose of 7.5mg daily except 5mg MF.   Requested pt contact the clinic for any s/s of unusual bleeding, bruising, clotting or any changes to diet or medication.    FU INR in 2 weeks.    Karen Ignacio, PharmD

## 2018-01-31 LAB — INR PPP: 3.4 (ref 2–3.5)

## 2018-02-01 ENCOUNTER — ANTICOAGULATION MONITORING (OUTPATIENT)
Dept: VASCULAR LAB | Facility: MEDICAL CENTER | Age: 53
End: 2018-02-01

## 2018-02-01 NOTE — PROGRESS NOTES
Anticoagulation Summary  As of 2/1/2018    INR goal:   2.0-3.0   TTR:   57.6 % (2.7 y)   Today's INR:   3.4! (1/31/2018)   Maintenance plan:   5 mg (5 mg x 1) on Mon, Fri; 7.5 mg (5 mg x 1.5) all other days   Weekly total:   47.5 mg   Plan last modified:   Karen Ignacio, PharmD (1/17/2018)   Next INR check:   2/14/2018   Priority:   Maintenance   Target end date:   Indefinite    Indications    Deep vein thrombosis [453.40] [I82.409]             Anticoagulation Episode Summary     INR check location:   Home Draw    Preferred lab:       Send INR reminders to:       Comments:   Eloy MYRICK      Anticoagulation Care Providers     Provider Role Specialty Phone number    Bruna Ureña M.D. Referring Cardiology [phone]    Kiesha Barr, ClaudiaD Responsible          Anticoagulation Patient Findings    Left voicemail message to report a SUPRA therapeutic INR of 3.4.  Pt to hold today then continue with current warfarin dosing regimen. Requested pt contact the clinic for any s/s of unusual bleeding, bruising, clotting or any changes to diet or medication. FU 2 weeks.    Osiel Pete, PharmD

## 2018-02-14 LAB — INR PPP: 2.7 (ref 2–3.5)

## 2018-02-15 ENCOUNTER — ANTICOAGULATION MONITORING (OUTPATIENT)
Dept: VASCULAR LAB | Facility: MEDICAL CENTER | Age: 53
End: 2018-02-15

## 2018-02-15 DIAGNOSIS — I82.401 ACUTE DEEP VEIN THROMBOSIS (DVT) OF RIGHT LOWER EXTREMITY, UNSPECIFIED VEIN (HCC): ICD-10-CM

## 2018-02-15 NOTE — PROGRESS NOTES
OP Anticoagulation Telephone Note    Date: 2/15/2018  Anticoagulation Summary  As of 2/15/2018    INR goal:   2.0-3.0   TTR:   57.4 % (2.7 y)   Today's INR:   2.7 (2/14/2018)   Maintenance plan:   5 mg (5 mg x 1) on Mon, Fri; 7.5 mg (5 mg x 1.5) all other days   Weekly total:   47.5 mg   No change documented:   Sophia Alonso, Med Ass't   Plan last modified:   Karen Ignacio, PharmD (1/17/2018)   Next INR check:   2/28/2018   Priority:   Maintenance   Target end date:   Indefinite    Indications    Deep vein thrombosis [453.40] [I82.409]             Anticoagulation Episode Summary     INR check location:   Home Draw    Preferred lab:       Send INR reminders to:       Comments:   Eloy MYRICK      Anticoagulation Care Providers     Provider Role Specialty Phone number    Bruna Ureña M.D. Referring Cardiology [phone]    Kiesha Barr PharmD Responsible          Anticoagulation Patient Findings  Patient Findings     Negatives:   Signs/symptoms of thrombosis, Signs/symptoms of bleeding, Laboratory test error suspected, Change in health, Change in alcohol use, Change in activity, Upcoming invasive procedure, Emergency department visit, Upcoming dental procedure, Missed doses, Extra doses, Change in medications, Change in diet/appetite, Hospital admission, Bruising, Other complaints      Plan:  Left message on patient's answering machine/ voicemail. Instructed patient to call back with any concerns regarding any unusual bleeding or bruising, any medication or diet changes, or any signs or symptoms of thrombosis. Instructed patient to resume medication as outlined above. Patient to follow up in 2 weeks.     Sophia Alonso, Medical Assistant    I have reviewed and agree with the plan above on 02/15/2018      Kiesha Barr, ClaudiaD

## 2018-03-01 LAB — INR PPP: 3.5 (ref 2–3.5)

## 2018-03-02 ENCOUNTER — ANTICOAGULATION MONITORING (OUTPATIENT)
Dept: VASCULAR LAB | Facility: MEDICAL CENTER | Age: 53
End: 2018-03-02

## 2018-03-02 DIAGNOSIS — I82.499 DEEP VEIN THROMBOSIS (DVT) OF OTHER VEIN OF LOWER EXTREMITY, UNSPECIFIED CHRONICITY, UNSPECIFIED LATERALITY (HCC): ICD-10-CM

## 2018-03-02 NOTE — PROGRESS NOTES
Anticoagulation Summary  As of 3/2/2018    INR goal:   2.0-3.0   TTR:   57.1 % (2.8 y)   Today's INR:   3.5! (3/1/2018)   Maintenance plan:   5 mg (5 mg x 1) on Mon, Wed, Fri; 7.5 mg (5 mg x 1.5) all other days   Weekly total:   45 mg   Plan last modified:   Babita Sarmiento (3/2/2018)   Next INR check:   3/15/2018   Priority:   Maintenance   Target end date:   Indefinite    Indications    Deep vein thrombosis [453.40] [I82.409]             Anticoagulation Episode Summary     INR check location:   Home Draw    Preferred lab:       Send INR reminders to:       Comments:   Eloy MYRICK      Anticoagulation Care Providers     Provider Role Specialty Phone number    Bruna Ureña M.D. Referring Cardiology [phone]    Kiesha Barr, ClaudiaD Responsible          Anticoagulation Patient Findings    Left a message on the patient's voicemail today, informing the patient of a SUPRA-therapeutic INR of 3.5.  Instructed patient to call the clinic with any changes to diet or medications, with any questions/concerns, or with any signs/sx of bleeding or clotting.  Instructed the patient to decrease dose to 2.5mg (TONIGHT ONLY), then to begin decreased weekly regimen of 5mg M,W,F and 7.5mg ROW. Patient asked to follow up again in 2 weeks.    dE TaylorD

## 2018-03-16 LAB — INR PPP: 2.5 (ref 2–3.5)

## 2018-03-19 ENCOUNTER — ANTICOAGULATION MONITORING (OUTPATIENT)
Dept: VASCULAR LAB | Facility: MEDICAL CENTER | Age: 53
End: 2018-03-19

## 2018-03-19 NOTE — PROGRESS NOTES
OP Anticoagulation Telephone Note    Date: 3/19/2018  Anticoagulation Summary  As of 3/19/2018    INR goal:   2.0-3.0   TTR:   57.0 % (2.8 y)   Today's INR:   2.5 (3/16/2018)   Maintenance plan:   5 mg (5 mg x 1) on Mon, Wed, Fri; 7.5 mg (5 mg x 1.5) all other days   Weekly total:   45 mg   No change documented:   Sophia Alonso, Med Ass't   Plan last modified:   Babita Sarmiento (3/2/2018)   Next INR check:   4/2/2018   Priority:   Maintenance   Target end date:   Indefinite    Indications    Deep vein thrombosis [453.40] [I82.409]             Anticoagulation Episode Summary     INR check location:   Home Draw    Preferred lab:       Send INR reminders to:       Comments:   Eloy MYRICK      Anticoagulation Care Providers     Provider Role Specialty Phone number    Bruna Ureña M.D. Referring Cardiology [phone]    Claudia RhodesD Responsible          Anticoagulation Patient Findings  Patient Findings     Negatives:   Signs/symptoms of thrombosis, Signs/symptoms of bleeding, Laboratory test error suspected, Change in health, Change in alcohol use, Change in activity, Upcoming invasive procedure, Emergency department visit, Upcoming dental procedure, Missed doses, Extra doses, Change in medications, Change in diet/appetite, Hospital admission, Bruising, Other complaints      Plan:  Left message on patient's answering machine/ voicemail. Instructed patient to call back with any concerns regarding any unusual bleeding or bruising, any medication or diet changes, or any signs or symptoms of thrombosis. Instructed patient to resume medication as outlined above. Patient to follow up in 2 weeks.     Sophia Alonso, Medical Assistant  I have reviewed and agree with the plan above on  3/19/2018    Esther Cottrell, Claudia D

## 2018-04-03 LAB — INR PPP: 3.5 (ref 2–3.5)

## 2018-04-04 ENCOUNTER — ANTICOAGULATION MONITORING (OUTPATIENT)
Dept: VASCULAR LAB | Facility: MEDICAL CENTER | Age: 53
End: 2018-04-04

## 2018-04-04 DIAGNOSIS — I82.401 ACUTE DEEP VEIN THROMBOSIS (DVT) OF RIGHT LOWER EXTREMITY, UNSPECIFIED VEIN (HCC): ICD-10-CM

## 2018-04-04 NOTE — PROGRESS NOTES
Anticoagulation Summary  As of 4/4/2018    INR goal:   2.0-3.0   TTR:   56.9 % (2.9 y)   Today's INR:   3.5! (4/3/2018)   Maintenance plan:   5 mg (5 mg x 1) on Mon, Wed, Fri; 7.5 mg (5 mg x 1.5) all other days   Weekly total:   45 mg   Plan last modified:   Babita Sarmiento (3/2/2018)   Next INR check:   4/18/2018   Priority:   Maintenance   Target end date:   Indefinite    Indications    Deep vein thrombosis [453.40] [I82.409]             Anticoagulation Episode Summary     INR check location:   Home Draw    Preferred lab:       Send INR reminders to:       Comments:   Eloy MYRICK      Anticoagulation Care Providers     Provider Role Specialty Phone number    Bruna Ureña M.D. Referring Cardiology [phone]    Claudia RhodesD Responsible          Anticoagulation Patient Findings    Left voicemail message to report a supratherapeutic INR of 3.5.  Requested pt contact the clinic for any s/s of unusual bleeding, bruising, clotting or any changes to diet or medication.   Instructed patient to HOLD X 1, then resume current warfarin regimen.  FU 2 weeks.  Alexis Garcia, PharmD

## 2018-04-14 LAB — INR PPP: 3 (ref 2–3.5)

## 2018-04-16 ENCOUNTER — ANTICOAGULATION MONITORING (OUTPATIENT)
Dept: VASCULAR LAB | Facility: MEDICAL CENTER | Age: 53
End: 2018-04-16

## 2018-04-16 DIAGNOSIS — I82.401 ACUTE DEEP VEIN THROMBOSIS (DVT) OF RIGHT LOWER EXTREMITY, UNSPECIFIED VEIN (HCC): ICD-10-CM

## 2018-04-16 NOTE — PROGRESS NOTES
Anticoagulation Summary  As of 4/16/2018    INR goal:   2.0-3.0   TTR:   56.3 % (2.9 y)   Today's INR:   3.0 (4/14/2018)   Maintenance plan:   5 mg (5 mg x 1) on Mon, Wed, Fri; 7.5 mg (5 mg x 1.5) all other days   Weekly total:   45 mg   Plan last modified:   Babita Sarmiento (3/2/2018)   Next INR check:   4/28/2018   Priority:   Maintenance   Target end date:   Indefinite    Indications    Deep vein thrombosis [453.40] [I82.409]             Anticoagulation Episode Summary     INR check location:   Home Draw    Preferred lab:       Send INR reminders to:       Comments:   Eloy MYRICK      Anticoagulation Care Providers     Provider Role Specialty Phone number    Bruna Ureña M.D. Referring Cardiology [phone]    Kiesha Barr, ClaudiaD Responsible          Anticoagulation Patient Findings      Left voicemail message to report a therapeutic INR of 3.0.    Pt to continue with current warfarin dosing regimen. Requested pt contact the clinic for any s/s of unusual bleeding, bruising, clotting or any changes to diet or medication.    FU INR in 2 week(s).    Karen Ignacio, PharmD

## 2018-05-14 ENCOUNTER — ANTICOAGULATION MONITORING (OUTPATIENT)
Dept: MEDICAL GROUP | Facility: PHYSICIAN GROUP | Age: 53
End: 2018-05-14

## 2018-05-14 DIAGNOSIS — I82.401 ACUTE DEEP VEIN THROMBOSIS (DVT) OF RIGHT LOWER EXTREMITY, UNSPECIFIED VEIN (HCC): ICD-10-CM

## 2018-05-14 LAB — INR PPP: 3.2 (ref 2–3.5)

## 2018-05-15 NOTE — PROGRESS NOTES
Anticoagulation Summary  As of 5/14/2018    INR goal:   2.0-3.0   TTR:   54.8 % (3 y)   Today's INR:   3.2!   Warfarin maintenance plan:   5 mg (5 mg x 1) on Mon, Wed, Fri; 7.5 mg (5 mg x 1.5) all other days   Weekly warfarin total:   45 mg   Plan last modified:   Babita Sarmiento (3/2/2018)   Next INR check:   5/28/2018   Priority:   Maintenance   Target end date:   Indefinite    Indications    Deep vein thrombosis [453.40] [I82.409]             Anticoagulation Episode Summary     INR check location:   Home Draw    Preferred lab:       Send INR reminders to:       Comments:   Eloy MYRICK      Anticoagulation Care Providers     Provider Role Specialty Phone number    Bruna Ureña M.D. Referring Cardiology [phone]    Kiesha Barr, PharmD Responsible          Anticoagulation Patient Findings        Spoke with patient by phone. Patient is supra-therapeutic. [place] Val[place] time. Did not eat as many green veg.       Changes to current medical/health status since last appt: none.   Denies signs/symptoms of bleeding and/or thrombosis since the last appt.   Denies any interval changes to diet  Denies any interval changes to medications since last appt.   Denies any complications or cost restrictions with current therapy  Follow up appointment in 2 week(s).      Take 2.5 mg tonight then back to usual dose, increase green veg to 3 days a week.  The patient is on a high risk medication and is supra-therapeudic. This increases the patients risk of bleeding and therefore requires frequent monitoring and follow up.      The patient will go to the ER for falls with a head injury,  blood in urine or stool or any bleeding that last longer than 20 min.           MAINOR Trimble.

## 2018-05-29 ENCOUNTER — ANTICOAGULATION MONITORING (OUTPATIENT)
Dept: VASCULAR LAB | Facility: MEDICAL CENTER | Age: 53
End: 2018-05-29

## 2018-05-29 DIAGNOSIS — I82.401 ACUTE DEEP VEIN THROMBOSIS (DVT) OF RIGHT LOWER EXTREMITY, UNSPECIFIED VEIN (HCC): ICD-10-CM

## 2018-05-29 LAB — INR PPP: 2.2 (ref 2–3.5)

## 2018-05-30 NOTE — PROGRESS NOTES
Anticoagulation Summary  As of 5/29/2018    INR goal:   2.0-3.0   TTR:   55.1 % (3 y)   Today's INR:   2.2   Warfarin maintenance plan:   5 mg (5 mg x 1) on Mon, Wed, Fri; 7.5 mg (5 mg x 1.5) all other days   Weekly warfarin total:   45 mg   Plan last modified:   Babita Sarmiento (3/2/2018)   Next INR check:   6/12/2018   Priority:   Maintenance   Target end date:   Indefinite    Indications    Deep vein thrombosis [453.40] [I82.409]             Anticoagulation Episode Summary     INR check location:   Home Draw    Preferred lab:       Send INR reminders to:       Comments:   Eloy MYRICK      Anticoagulation Care Providers     Provider Role Specialty Phone number    Bruna Ureña M.D. Referring Cardiology [phone]    Kiesha Barr, ClaudiaD Responsible          Anticoagulation Patient Findings    Left voicemail message to report a therapeutic INR of 2.2.  Pt to continue with current warfarin dosing regimen. Requested pt contact the clinic for any s/s of unusual bleeding, bruising, clotting or any changes to diet or medication. FU 2 weeks.  Kiesha Barr, PharmD

## 2018-06-11 LAB — INR PPP: 3.9 (ref 2–3.5)

## 2018-06-12 ENCOUNTER — ANTICOAGULATION MONITORING (OUTPATIENT)
Dept: VASCULAR LAB | Facility: MEDICAL CENTER | Age: 53
End: 2018-06-12

## 2018-06-12 NOTE — PROGRESS NOTES
Anticoagulation Summary  As of 6/12/2018    INR goal:   2.0-3.0   TTR:   55.0 % (3.1 y)   Today's INR:   3.9! (6/11/2018)   Warfarin maintenance plan:   5 mg (5 mg x 1) on Mon, Wed, Fri; 7.5 mg (5 mg x 1.5) all other days   Weekly warfarin total:   45 mg   Plan last modified:   Babita Sarmiento (3/2/2018)   Next INR check:   6/26/2018   Priority:   Maintenance   Target end date:   Indefinite    Indications    Deep vein thrombosis [453.40] [I82.409]             Anticoagulation Episode Summary     INR check location:   Home Draw    Preferred lab:       Send INR reminders to:       Comments:   Eloy MYRICK      Anticoagulation Care Providers     Provider Role Specialty Phone number    Bruna Ureña M.D. Referring Cardiology [phone]    Claudia RhodesD Responsible          Anticoagulation Patient Findings    Left voicemail message to report a SUPRA therapeutic INR of 3.9.  Pt to hold today then continue with current warfarin dosing regimen. Requested pt contact the clinic for any s/s of unusual bleeding, bruising, clotting or any changes to diet or medication. FU 2 weeks.    Osiel Pete, PharmD

## 2018-06-27 LAB — INR PPP: 2.4 (ref 2–3.5)

## 2018-06-28 ENCOUNTER — ANTICOAGULATION MONITORING (OUTPATIENT)
Dept: VASCULAR LAB | Facility: MEDICAL CENTER | Age: 53
End: 2018-06-28

## 2018-06-30 NOTE — PROGRESS NOTES
Anticoagulation Summary  As of 6/28/2018    INR goal:   2.0-3.0   TTR:   54.8 % (3.1 y)   Today's INR:   2.4 (6/27/2018)   Warfarin maintenance plan:   5 mg (5 mg x 1) on Mon, Wed, Fri; 7.5 mg (5 mg x 1.5) all other days   Weekly warfarin total:   45 mg   Plan last modified:   Babita Sarmiento (3/2/2018)   Next INR check:   7/11/2018   Priority:   Maintenance   Target end date:   Indefinite    Indications    Deep vein thrombosis [453.40] [I82.409]             Anticoagulation Episode Summary     INR check location:   Home Draw    Preferred lab:       Send INR reminders to:       Comments:   Eloy MYRICK      Anticoagulation Care Providers     Provider Role Specialty Phone number    Bruna Ureña M.D. Referring Cardiology [phone]    Claudia RhodesD Responsible          Anticoagulation Patient Findings    Left voicemail message to report a therapeutic INR of 2.4.  Pt to continue with current warfarin dosing regimen. Requested pt contact the clinic for any s/s of unusual bleeding, bruising, clotting or any changes to diet or medication. FU 2 weeks.    Osiel Pete, PharmD

## 2018-07-11 LAB — INR PPP: 2.2 (ref 2–3.5)

## 2018-07-12 ENCOUNTER — ANTICOAGULATION MONITORING (OUTPATIENT)
Dept: VASCULAR LAB | Facility: MEDICAL CENTER | Age: 53
End: 2018-07-12

## 2018-07-12 NOTE — PROGRESS NOTES
OP Anticoagulation Telephone Note    Date: 7/12/2018  Anticoagulation Summary  As of 7/12/2018    INR goal:   2.0-3.0   TTR:   55.4 % (3.1 y)   Today's INR:   2.2 (7/11/2018)   Warfarin maintenance plan:   5 mg (5 mg x 1) on Mon, Wed, Fri; 7.5 mg (5 mg x 1.5) all other days   Weekly warfarin total:   45 mg   No change documented:   Sophia Alonso, Med Ass't   Plan last modified:   Babita Sarmiento (3/2/2018)   Next INR check:   7/26/2018   Priority:   Maintenance   Target end date:   Indefinite    Indications    Deep vein thrombosis [453.40] [I82.409]             Anticoagulation Episode Summary     INR check location:   Home Draw    Preferred lab:       Send INR reminders to:       Comments:   Eloy MYRICK      Anticoagulation Care Providers     Provider Role Specialty Phone number    Bruna Ureña M.D. Referring Cardiology [phone]    Claudia RhodesD Responsible          Anticoagulation Patient Findings  Patient Findings     Negatives:   Signs/symptoms of thrombosis, Signs/symptoms of bleeding, Laboratory test error suspected, Change in health, Change in alcohol use, Change in activity, Upcoming invasive procedure, Emergency department visit, Upcoming dental procedure, Missed doses, Extra doses, Change in medications, Change in diet/appetite, Hospital admission, Bruising, Other complaints      Plan:  Spoke with patient on the phone. Patient is therapeutic today. Patient denies any changes in medications or diet. Patient denies any signs or symptoms of bleeding or clotting. Instructed patient to call clinic if any unusual bleeding or bruising occurs. Will continue dosing as outlined above. Will follow-up with patient in 2 weeks.      Sophia Alonso, Medical Assistant    I have reviewed and agree with the plan above on 07/12/2018      Kiesha Barr, ClaudiaD

## 2018-07-30 LAB — INR PPP: 3.1 (ref 2–3.5)

## 2018-07-31 RX ORDER — LEVOTHYROXINE SODIUM 112 UG/1
TABLET ORAL
Qty: 30 TAB | Refills: 5 | Status: SHIPPED | OUTPATIENT
Start: 2018-07-31 | End: 2019-11-20 | Stop reason: SDUPTHER

## 2018-08-02 ENCOUNTER — ANTICOAGULATION MONITORING (OUTPATIENT)
Dept: VASCULAR LAB | Facility: MEDICAL CENTER | Age: 53
End: 2018-08-02

## 2018-08-02 NOTE — PROGRESS NOTES
Anticoagulation Summary  As of 8/2/2018    INR goal:   2.0-3.0   TTR:   55.9 % (3.2 y)   Today's INR:   3.1! (7/30/2018)   Warfarin maintenance plan:   5 mg (5 mg x 1) on Mon, Wed, Fri; 7.5 mg (5 mg x 1.5) all other days   Weekly warfarin total:   45 mg   Plan last modified:   Babita Sarmiento (3/2/2018)   Next INR check:   8/13/2018   Priority:   Maintenance   Target end date:   Indefinite    Indications    Deep vein thrombosis [453.40] [I82.409]             Anticoagulation Episode Summary     INR check location:   Home Draw    Preferred lab:       Send INR reminders to:       Comments:   Eloy MYRICK      Anticoagulation Care Providers     Provider Role Specialty Phone number    Bruna Ureña M.D. Referring Cardiology [phone]    Claudia RhodesD Responsible          Anticoagulation Patient Findings      Spoke with patient.  INR was slightly supra therapeutic on Monday  Pt denies any unusual s/s of bleeding, bruising, clotting or any changes to diet or medications. Denies any etoh, cranberries, supplements, or illness.   Pt verifies warfarin weekly dosing.     Will have pt continue with her current dose at this time as INR is from Monday on which day she takes only 5mg.    Repeat INR in 2 week(s).     Karen Ignacio, PharmD

## 2018-08-16 ENCOUNTER — ANTICOAGULATION MONITORING (OUTPATIENT)
Dept: VASCULAR LAB | Facility: MEDICAL CENTER | Age: 53
End: 2018-08-16

## 2018-08-16 LAB — INR PPP: 3.8 (ref 2–3.5)

## 2018-08-16 NOTE — PROGRESS NOTES
Anticoagulation Summary  As of 8/16/2018    INR goal:   2.0-3.0   TTR:   55.1 % (3.2 y)   Today's INR:   3.8!   Warfarin maintenance plan:   7.5 mg (5 mg x 1.5) on Mon, Wed, Fri; 5 mg (5 mg x 1) all other days   Weekly warfarin total:   42.5 mg   Plan last modified:   Karen Ignacio, PharmD (8/16/2018)   Next INR check:   8/30/2018   Priority:   Maintenance   Target end date:   Indefinite    Indications    Deep vein thrombosis [453.40] [I82.409]             Anticoagulation Episode Summary     INR check location:   Home Draw    Preferred lab:       Send INR reminders to:       Comments:   Eloy MYRICK      Anticoagulation Care Providers     Provider Role Specialty Phone number    Bruna Ureña M.D. Referring Cardiology [phone]    Claudia RhodesD Responsible          Anticoagulation Patient Findings      Left voicemail message to report a SUPRA therapeutic INR of 3.8.    Will have pt HOLD her dose of warfarin today and then have pt start a reduced warfarin dosing regimen. Requested pt contact the clinic for any s/s of unusual bleeding, bruising, clotting or any changes to diet or medication.    FU INR in 2 week(s).    Karen Ignacio, PharmD

## 2018-08-30 ENCOUNTER — ANTICOAGULATION MONITORING (OUTPATIENT)
Dept: VASCULAR LAB | Facility: MEDICAL CENTER | Age: 53
End: 2018-08-30

## 2018-08-30 LAB — INR PPP: 4.7 (ref 2–3.5)

## 2018-08-30 NOTE — PROGRESS NOTES
Anticoagulation Summary  As of 8/30/2018    INR goal:   2.0-3.0   TTR:   54.5 % (3.3 y)   Today's INR:   4.7!   Warfarin maintenance plan:   5 mg (5 mg x 1) every day   Weekly warfarin total:   35 mg   Plan last modified:   Karen Ignacio, PharmD (8/30/2018)   Next INR check:   9/6/2018   Priority:   Maintenance   Target end date:   Indefinite    Indications    Deep vein thrombosis [453.40] [I82.409]             Anticoagulation Episode Summary     INR check location:   Home Draw    Preferred lab:       Send INR reminders to:       Comments:   Eloy MYIRCK      Anticoagulation Care Providers     Provider Role Specialty Phone number    Bruna Ureña M.D. Referring Cardiology [phone]    Babita Sarmiento Responsible      [facility] Responsible  [phone]        Anticoagulation Patient Findings    Left voicemail message to report a SUPRA therapeutic INR of 4.7.    Will have pt HOLD her warfarin dose for 2 days and then start a reduced dose of 5mg.   Requested pt contact the clinic for any s/s of unusual bleeding, bruising, clotting or any changes to diet or medication.    FU INR in 1 week(s).    Karen Ignacio, PharmD

## 2018-09-07 LAB — INR PPP: 3.3 (ref 2–3.5)

## 2018-09-10 ENCOUNTER — ANTICOAGULATION MONITORING (OUTPATIENT)
Dept: VASCULAR LAB | Facility: MEDICAL CENTER | Age: 53
End: 2018-09-10

## 2018-09-10 NOTE — PROGRESS NOTES
Anticoagulation Summary  As of 9/10/2018    INR goal:   2.0-3.0   TTR:   54.1 % (3.3 y)   Today's INR:   3.3! (9/7/2018)   Warfarin maintenance plan:   5 mg (5 mg x 1) every day   Weekly warfarin total:   35 mg   Plan last modified:   Karen Ignacio, PharmD (8/30/2018)   Next INR check:   9/17/2018   Priority:   Maintenance   Target end date:   Indefinite    Indications    Deep vein thrombosis [453.40] [I82.409]             Anticoagulation Episode Summary     INR check location:   Home Draw    Preferred lab:       Send INR reminders to:       Comments:   Eloy MYRICK      Anticoagulation Care Providers     Provider Role Specialty Phone number    Bruna Ureña M.D. Referring Cardiology [phone]    Babita Sarmiento Responsible      [facility] Responsible  [phone]        Anticoagulation Patient Findings    Left a message on the patient's voicemail today, informing the patient of a SUPRA-therapeutic INR of 3.3. Could not confirm current dosing, but instructed patient to call the clinic with any changes to diet or medications, with any signs/sx of bleeding or clotting or with any questions or concerns.     Patient was instructed to decrease dose to 2.5mg TONIGHT ONLY, then to resume her current regimen of 5mg QD. Patient asked to follow up again in 1 week.    Ed TaylorD

## 2018-09-17 LAB — INR PPP: 1.8 (ref 2–3.5)

## 2018-09-18 ENCOUNTER — ANTICOAGULATION MONITORING (OUTPATIENT)
Dept: VASCULAR LAB | Facility: MEDICAL CENTER | Age: 53
End: 2018-09-18

## 2018-09-18 NOTE — PROGRESS NOTES
OP Telephone Anticoagulation Service Note    Date: 9/18/2018      Anticoagulation Summary  As of 9/18/2018    INR goal:   2.0-3.0   TTR:   54.2 % (3.3 y)   Today's INR:   1.8! (9/17/2018)   Warfarin maintenance plan:   5 mg (5 mg x 1) every day   Weekly warfarin total:   35 mg   Plan last modified:   Claudia NguyenD (8/30/2018)   Next INR check:   9/24/2018   Priority:   Maintenance   Target end date:   Indefinite    Indications    Deep vein thrombosis [453.40] [I82.409]             Anticoagulation Episode Summary     INR check location:   Home Draw    Preferred lab:       Send INR reminders to:       Comments:   Eloy MYRICK      Anticoagulation Care Providers     Provider Role Specialty Phone number    Bruna Ureña M.D. Referring Cardiology [phone]    Babita Sarmiento Responsible      [facility] Responsible  [phone]        Anticoagulation Patient Findings        Plan: INR is low. Left message on patient's answering machine/voicemail. Instructed patient to call back with any concerns regarding any unusual bleeding or bruising, any medication or diet changes or any signs or symptoms of thrombosis. Instructed patient to take 7.5 mg then resume medication as outlined above. Patient to follow up in 1 week(s).         Esther Cottrell, PharmD

## 2018-09-25 ENCOUNTER — ANTICOAGULATION MONITORING (OUTPATIENT)
Dept: VASCULAR LAB | Facility: MEDICAL CENTER | Age: 53
End: 2018-09-25

## 2018-09-25 LAB — INR PPP: 3 (ref 2–3.5)

## 2018-09-26 NOTE — PROGRESS NOTES
Anticoagulation Summary  As of 9/25/2018    INR goal:   2.0-3.0   TTR:   54.4 % (3.4 y)   Today's INR:   3.0   Warfarin maintenance plan:   5 mg (5 mg x 1) every day   Weekly warfarin total:   35 mg   Plan last modified:   Karen Ignacio, PharmD (8/30/2018)   Next INR check:      Priority:   Maintenance   Target end date:   Indefinite    Indications    Deep vein thrombosis [453.40] [I82.409]             Anticoagulation Episode Summary     INR check location:   Home Draw    Preferred lab:       Send INR reminders to:       Comments:   Eloy MYRICK      Anticoagulation Care Providers     Provider Role Specialty Phone number    rBuna Ureña M.D. Referring Cardiology [phone]    Babita Sarmiento Responsible      [facility] Responsible  [phone]        Anticoagulation Patient Findings    INR therapeutic at 3.0.  Left message on phone.  Continue current regimen. Read regimen (date, day, strength, tabs) taking.  Check INR in 1 week.  Instructed pt to call clinic if any s/s of bleeding or changes in medications or diet. Instructed to call w/ any upcoming surgeries or procedures.  Instructed pt call clinic at [phone] if there are any questions.    Papo Abraham  2019 Doctor of Pharmacy Candidate

## 2018-10-16 LAB — INR PPP: 2 (ref 2–3.5)

## 2018-10-17 ENCOUNTER — ANTICOAGULATION MONITORING (OUTPATIENT)
Dept: VASCULAR LAB | Facility: MEDICAL CENTER | Age: 53
End: 2018-10-17

## 2018-10-17 NOTE — PROGRESS NOTES
Anticoagulation Summary  As of 10/17/2018    INR goal:   2.0-3.0   TTR:   55.2 % (3.4 y)   Today's INR:   2.0 (10/16/2018)   Warfarin maintenance plan:   5 mg (5 mg x 1) every day   Weekly warfarin total:   35 mg   Plan last modified:   Karen Ignacio, PharmD (8/30/2018)   Next INR check:   10/31/2018   Priority:   Maintenance   Target end date:   Indefinite    Indications    Deep vein thrombosis [453.40] [I82.409]             Anticoagulation Episode Summary     INR check location:   Home Draw    Preferred lab:       Send INR reminders to:       Comments:   Eloy MYRICK      Anticoagulation Care Providers     Provider Role Specialty Phone number    Bruna Ureña M.D. Referring Cardiology [phone]    Babita Sarmiento Responsible      [facility] Responsible  [phone]        Anticoagulation Patient Findings    Left voicemail message to report a therapeutic INR of 2.0.  Pt to continue with current warfarin dosing regimen. Requested pt contact the clinic for any s/s of unusual bleeding, bruising, clotting or any changes to diet or medication. FU 2 weeks.  Alexis Garcia, PharmD

## 2018-10-31 LAB — INR PPP: 2.7 (ref 2–3.5)

## 2018-11-01 ENCOUNTER — ANTICOAGULATION MONITORING (OUTPATIENT)
Dept: VASCULAR LAB | Facility: MEDICAL CENTER | Age: 53
End: 2018-11-01

## 2018-11-17 LAB — INR PPP: 2.5 (ref 2–3.5)

## 2018-11-19 ENCOUNTER — ANTICOAGULATION MONITORING (OUTPATIENT)
Dept: VASCULAR LAB | Facility: MEDICAL CENTER | Age: 53
End: 2018-11-19

## 2018-11-20 NOTE — PROGRESS NOTES
Anticoagulation Summary  As of 11/19/2018    INR goal:   2.0-3.0   TTR:   56.3 % (3.5 y)   Today's INR:   2.5 (11/17/2018)   Warfarin maintenance plan:   5 mg (5 mg x 1) every day   Weekly warfarin total:   35 mg   Plan last modified:   Karen Ignacio, PharmD (8/30/2018)   Next INR check:   12/1/2018   Priority:   Maintenance   Target end date:   Indefinite    Indications    Deep vein thrombosis [453.40] [I82.409]             Anticoagulation Episode Summary     INR check location:   Home Draw    Preferred lab:       Send INR reminders to:       Comments:   Eloy MYRICK      Anticoagulation Care Providers     Provider Role Specialty Phone number    Bruna Ureña M.D. Referring Cardiology [phone]    Babita Sarmiento Responsible      [facility] Responsible  [phone]        Anticoagulation Patient Findings    Left voicemail message to report a therapeutic INR of 2.5.    Pt to continue with current warfarin dosing regimen. Requested pt contact the clinic for any s/s of unusual bleeding, bruising, clotting or any changes to diet or medication.    FU INR in 2 week(s).    Karen Ignacio, PharmD

## 2018-12-03 LAB — INR PPP: 2.5 (ref 2–3.5)

## 2018-12-04 ENCOUNTER — ANTICOAGULATION MONITORING (OUTPATIENT)
Dept: VASCULAR LAB | Facility: MEDICAL CENTER | Age: 53
End: 2018-12-04

## 2018-12-04 NOTE — PROGRESS NOTES
Anticoagulation Summary  As of 12/4/2018    INR goal:   2.0-3.0   TTR:   56.8 % (3.5 y)   Today's INR:   2.5 (12/3/2018)   Warfarin maintenance plan:   5 mg (5 mg x 1) every day   Weekly warfarin total:   35 mg   Plan last modified:   Karen Ignacio, PharmD (8/30/2018)   Next INR check:   1/15/2019   Priority:   Maintenance   Target end date:   Indefinite    Indications    Deep vein thrombosis [453.40] [I82.409]             Anticoagulation Episode Summary     INR check location:   Home Draw    Preferred lab:       Send INR reminders to:       Comments:   Eloy MYRICK      Anticoagulation Care Providers     Provider Role Specialty Phone number    Bruna Ureña M.D. Referring Cardiology [phone]    Babita Sarmiento Responsible      [facility] Responsible  [phone]        Anticoagulation Patient Findings      INR  therapeutic.   Left a voice message for the patient, asked patient to please call the anticoagulation clinic if they have any signs/symptoms of bleeding and/or thrombosis or any changes to diet or medications.    Follow up appointment in 6 week(s)    Continue weekly warfarin dose as noted      Abhi Carlos, PharmD

## 2019-01-12 LAB — INR PPP: 1.8 (ref 2–3.5)

## 2019-01-14 ENCOUNTER — ANTICOAGULATION MONITORING (OUTPATIENT)
Dept: MEDICAL GROUP | Facility: PHYSICIAN GROUP | Age: 54
End: 2019-01-14

## 2019-01-14 NOTE — PROGRESS NOTES
Anticoagulation Summary  As of 1/14/2019    INR goal:   2.0-3.0   TTR:   57.3 % (3.6 y)   Today's INR:   1.8! (1/12/2019)   Warfarin maintenance plan:   5 mg (5 mg x 1) every day   Weekly warfarin total:   35 mg   Plan last modified:   Karen Ignacio, PharmD (8/30/2018)   Next INR check:      Priority:   Maintenance   Target end date:   Indefinite    Indications    Deep vein thrombosis [453.40] [I82.409]             Anticoagulation Episode Summary     INR check location:   Home Draw    Preferred lab:       Send INR reminders to:       Comments:   Eloy MYRICK      Anticoagulation Care Providers     Provider Role Specialty Phone number    Bruna Ureña M.D. Referring Cardiology [phone]    Babita Sarmiento Responsible      [facility] Responsible  [phone]        Anticoagulation Patient Findings      Spoke with patient to report a SUBtherapeutic INR of 1.8.    Pt instructed to take bolus dose today 1/14/19 of 7.5 mg and then continue with current warfarin dosing regimen, confirms dosing.   Pt denies any s/s of bleeding, bruising, clotting or any changes to diet.  Pt reports taking APAP PRN q 3-4 days for back pain, and has been taking Nyquil the last couple days for congestion.    Discussed with Osiel Pete, Pharm D.  Will follow up in 2 weeks.     José Gupta, Pharmacy Intern     I have reviewed and concur with the above plan     Abhi Carlos, PharmD

## 2019-01-29 LAB — INR PPP: 1.6 (ref 2–3.5)

## 2019-01-30 ENCOUNTER — ANTICOAGULATION MONITORING (OUTPATIENT)
Dept: VASCULAR LAB | Facility: MEDICAL CENTER | Age: 54
End: 2019-01-30

## 2019-01-30 NOTE — PROGRESS NOTES
Anticoagulation Summary  As of 2019    INR goal:   2.0-3.0   TTR:   56.5 % (3.7 y)   INR used for dosin.6! (2019)   Warfarin maintenance plan:   7.5 mg (5 mg x 1.5) every Wed; 5 mg (5 mg x 1) all other days   Weekly warfarin total:   37.5 mg   Plan last modified:   Alexis Garcia, PharmD (2019)   Next INR check:   2019   Priority:   Maintenance   Target end date:   Indefinite    Indications    Deep vein thrombosis [453.40] [I82.409]             Anticoagulation Episode Summary     INR check location:   Home Draw    Preferred lab:       Send INR reminders to:       Comments:   Eloy MYRICK      Anticoagulation Care Providers     Provider Role Specialty Phone number    Bruna Ureña M.D. Referring Cardiology [phone]    Babita Sarmiento Responsible      [facility] Responsible  [phone]        Anticoagulation Patient Findings    Left voicemail message to report a subtherapeutic INR of 1.6.  Requested pt contact the clinic for any s/s of unusual bleeding, bruising, clotting or any changes to diet or medication.   Instructed patient to increase weekly warfarin regimen by ~7% as detailed above.  FU 2 weeks.  Alexis Garcia, PharmD

## 2019-02-13 ENCOUNTER — ANTICOAGULATION MONITORING (OUTPATIENT)
Dept: VASCULAR LAB | Facility: MEDICAL CENTER | Age: 54
End: 2019-02-13

## 2019-02-13 LAB — INR PPP: 2.3 (ref 2–3.5)

## 2019-02-13 NOTE — PROGRESS NOTES
Anticoagulation Summary  As of 2019    INR goal:   2.0-3.0   TTR:   56.4 % (3.7 y)   INR used for dosin.3 (2019)   Warfarin maintenance plan:   7.5 mg (5 mg x 1.5) every Wed; 5 mg (5 mg x 1) all other days   Weekly warfarin total:   37.5 mg   Plan last modified:   Alexis Garcia, PharmD (2019)   Next INR check:   2019   Priority:   Maintenance   Target end date:   Indefinite    Indications    Deep vein thrombosis [453.40] [I82.409]             Anticoagulation Episode Summary     INR check location:   Home Draw    Preferred lab:       Send INR reminders to:       Comments:   Eloy MYRICK      Anticoagulation Care Providers     Provider Role Specialty Phone number    Bruna Ureña M.D. Referring Cardiology [phone]    Babita Sarmiento Responsible      [facility] Responsible  [phone]        Anticoagulation Patient Findings      Left voicemail message to patient's inbox to report a therapeutic INR of 2.3.      Patient instructed to continue with current warfarin dosing regimen.     Requested pt contact the clinic for any s/s of unusual bleeding, bruising, clotting or any changes to diet or medication.    Will follow up INR in 2 week(s) on Wednesday,      Varun Scherer, Pharmacy Intern

## 2019-02-19 DIAGNOSIS — I82.409 DVT (DEEP VENOUS THROMBOSIS) (HCC): Chronic | ICD-10-CM

## 2019-02-19 DIAGNOSIS — I08.0 MITRAL VALVE INSUFFICIENCY AND AORTIC VALVE INSUFFICIENCY: Chronic | ICD-10-CM

## 2019-02-19 RX ORDER — WARFARIN SODIUM 5 MG/1
TABLET ORAL
Qty: 60 TAB | Refills: 4 | Status: SHIPPED | OUTPATIENT
Start: 2019-02-19 | End: 2019-05-20 | Stop reason: SDUPTHER

## 2019-02-28 LAB — INR PPP: 1.9 (ref 2–3.5)

## 2019-03-01 ENCOUNTER — ANTICOAGULATION MONITORING (OUTPATIENT)
Dept: VASCULAR LAB | Facility: MEDICAL CENTER | Age: 54
End: 2019-03-01

## 2019-03-01 NOTE — PROGRESS NOTES
Anticoagulation Summary  As of 3/1/2019    INR goal:   2.0-3.0   TTR:   56.6 % (3.8 y)   INR used for dosin.9! (2019)   Warfarin maintenance plan:   7.5 mg (5 mg x 1.5) every Wed; 5 mg (5 mg x 1) all other days   Weekly warfarin total:   37.5 mg   Plan last modified:   Alexis Garcia, PharmD (2019)   Next INR check:   3/15/2019   Priority:   Maintenance   Target end date:   Indefinite    Indications    Deep vein thrombosis [453.40] [I82.409]             Anticoagulation Episode Summary     INR check location:   Home Draw    Preferred lab:       Send INR reminders to:       Comments:   Eloy MYRICK      Anticoagulation Care Providers     Provider Role Specialty Phone number    Bruna Ureña M.D. Referring Cardiology [phone]    Babita Sarmiento Responsible      [facility] Responsible  [phone]        Anticoagulation Patient Findings    Tried calling patient but no response. Left voicemail instructing the patient to call us if any signs of bleeding or bruising or any recent changes in diet or medications. Patient's INR is sub- therapeutic at 1.9.  Patient instructed to continue the current warfarin dosing as shown above. Follow-up in 2 week(s).    Claudia Rosa.D

## 2019-03-28 LAB — INR PPP: 1.8 (ref 2–3.5)

## 2019-03-29 ENCOUNTER — ANTICOAGULATION MONITORING (OUTPATIENT)
Dept: VASCULAR LAB | Facility: MEDICAL CENTER | Age: 54
End: 2019-03-29

## 2019-03-29 NOTE — PROGRESS NOTES
Anticoagulation Summary  As of 3/29/2019    INR goal:   2.0-3.0   TTR:   55.5 % (3.9 y)   INR used for dosin.8! (3/28/2019)   Warfarin maintenance plan:   7.5 mg (5 mg x 1.5) every Wed, Sat; 5 mg (5 mg x 1) all other days   Weekly warfarin total:   40 mg   Plan last modified:   Karen Ignacio, PharmD (3/29/2019)   Next INR check:   2019   Priority:   Maintenance   Target end date:   Indefinite    Indications    Deep vein thrombosis [453.40] [I82.409]             Anticoagulation Episode Summary     INR check location:   Home Draw    Preferred lab:       Send INR reminders to:       Comments:   Eloy MYRICK      Anticoagulation Care Providers     Provider Role Specialty Phone number    Bruna Ureña M.D. Referring Cardiology [phone]    Babita Sarmiento Responsible      [facility] Responsible  [phone]        Anticoagulation Patient Findings      Left voicemail message to report a SUB therapeutic INR of 1.8.    Pt to start a 7% increased warfarin dosing regimen.  Requested pt contact the clinic for any s/s of unusual bleeding, bruising, clotting or any changes to diet or medication.    FU INR in 2 week(s).    Karen Ignacio, PharmD

## 2019-04-12 ENCOUNTER — ANTICOAGULATION MONITORING (OUTPATIENT)
Dept: VASCULAR LAB | Facility: MEDICAL CENTER | Age: 54
End: 2019-04-12

## 2019-04-12 LAB — INR PPP: 2.4 (ref 2–3.5)

## 2019-04-13 NOTE — PROGRESS NOTES
Anticoagulation Summary  As of 2019    INR goal:   2.0-3.0   TTR:   55.6 % (3.9 y)   INR used for dosin.4 (2019)   Warfarin maintenance plan:   7.5 mg (5 mg x 1.5) every Wed, Sat; 5 mg (5 mg x 1) all other days   Weekly warfarin total:   40 mg   Plan last modified:   Karen Ignacio, PharmD (3/29/2019)   Next INR check:   2019   Priority:   Maintenance   Target end date:   Indefinite    Indications    Deep vein thrombosis [453.40] [I82.409]             Anticoagulation Episode Summary     INR check location:   Home Draw    Preferred lab:       Send INR reminders to:       Comments:   Eloy MYRICK      Anticoagulation Care Providers     Provider Role Specialty Phone number    Bruna Ureña M.D. Referring Cardiology [phone]    Babita Sarmiento Responsible      [facility] Responsible  [phone]        Anticoagulation Patient Findings  Patient Findings     Negatives:   Signs/symptoms of thrombosis, Signs/symptoms of bleeding, Laboratory test error suspected, Change in health, Change in alcohol use, Change in activity, Upcoming invasive procedure, Emergency department visit, Upcoming dental procedure, Missed doses, Extra doses, Change in medications, Change in diet/appetite, Hospital admission, Bruising, Other complaints        Spoke with the patient on the phone today, reporting a therapeutic INR of 2.4.  Confirmed the current warfarin dosing regimen and patient compliance. Patient denies any interval changes to diet and/or medications. Patient denies any signs/symptoms of bleeding or clotting.  Patient instructed to continue with the current warfarin dosing regimen, and asked to follow up again in 2 weeks.     Ed TaylorD

## 2019-05-06 ENCOUNTER — ANTICOAGULATION MONITORING (OUTPATIENT)
Dept: MEDICAL GROUP | Facility: MEDICAL CENTER | Age: 54
End: 2019-05-06

## 2019-05-06 DIAGNOSIS — I82.4Y9 DEEP VEIN THROMBOSIS (DVT) OF PROXIMAL LOWER EXTREMITY, UNSPECIFIED CHRONICITY, UNSPECIFIED LATERALITY (HCC): ICD-10-CM

## 2019-05-06 LAB — INR PPP: 2.1 (ref 2–3.5)

## 2019-05-06 NOTE — PROGRESS NOTES
Anticoagulation Summary  As of 2019    INR goal:   2.0-3.0   TTR:   56.3 % (4 y)   INR used for dosin.10 (2019)   Warfarin maintenance plan:   7.5 mg (5 mg x 1.5) every Wed, Sat; 5 mg (5 mg x 1) all other days   Weekly warfarin total:   40 mg   No change documented:   Chauncey HERNANDEZ'Abisai, Med Ass't   Plan last modified:   Karen Ignacio, PharmD (3/29/2019)   Next INR check:   2019   Priority:   Maintenance   Target end date:   Indefinite    Indications    Deep vein thrombosis [453.40] [I82.409]             Anticoagulation Episode Summary     INR check location:   Home Draw    Preferred lab:       Send INR reminders to:       Comments:   Eloy MYRICK      Anticoagulation Care Providers     Provider Role Specialty Phone number    Bruna Ureña M.D. Referring Cardiology [phone]    Babita Sarmiento Responsible      [facility] Responsible  [phone]        Anticoagulation Patient Findings  Patient Findings     Negatives:   Signs/symptoms of thrombosis, Signs/symptoms of bleeding, Laboratory test error suspected, Change in health, Change in alcohol use, Change in activity, Upcoming invasive procedure, Emergency department visit, Upcoming dental procedure, Missed doses, Extra doses, Change in medications, Change in diet/appetite, Hospital admission, Bruising, Other complaints        Left voicemail message to report therapeutic INR of 2.1.  Patient to continue with current warfarin dosing regimen. Requested pt contact the clinic for any change to diet or medication, and to report any signs or symptoms of bleeding, bruising or clotting.  Pt to follow up in 2 weeks.    Chauncey HERNANDEZ'Rayarmand, Med Ass't    I have reviewed and am in agreement with the above stated plan on 19.  Alexis Garcia, PharmD

## 2019-05-20 DIAGNOSIS — I82.409 DVT (DEEP VENOUS THROMBOSIS) (HCC): Chronic | ICD-10-CM

## 2019-05-20 DIAGNOSIS — I08.0 MITRAL VALVE INSUFFICIENCY AND AORTIC VALVE INSUFFICIENCY: Chronic | ICD-10-CM

## 2019-05-20 RX ORDER — WARFARIN SODIUM 5 MG/1
TABLET ORAL
Qty: 135 TAB | Refills: 1 | Status: SHIPPED | OUTPATIENT
Start: 2019-05-20 | End: 2019-11-12 | Stop reason: SDUPTHER

## 2019-05-28 ENCOUNTER — ANTICOAGULATION MONITORING (OUTPATIENT)
Dept: VASCULAR LAB | Facility: MEDICAL CENTER | Age: 54
End: 2019-05-28

## 2019-05-28 DIAGNOSIS — I82.4Y9 DEEP VEIN THROMBOSIS (DVT) OF PROXIMAL LOWER EXTREMITY, UNSPECIFIED CHRONICITY, UNSPECIFIED LATERALITY (HCC): ICD-10-CM

## 2019-05-28 LAB — INR PPP: 2.2 (ref 2–3.5)

## 2019-05-29 NOTE — PROGRESS NOTES
Anticoagulation Summary  As of 2019    INR goal:   2.0-3.0   TTR:   57.0 % (4 y)   INR used for dosin.20 (2019)   Warfarin maintenance plan:   7.5 mg (5 mg x 1.5) every Wed, Sat; 5 mg (5 mg x 1) all other days   Weekly warfarin total:   40 mg   Plan last modified:   Karen Ignacio, PharmD (3/29/2019)   Next INR check:   2019   Priority:   Maintenance   Target end date:   Indefinite    Indications    Deep vein thrombosis [453.40] [I82.409]             Anticoagulation Episode Summary     INR check location:   Home Draw    Preferred lab:       Send INR reminders to:       Comments:   Eloy MYRICK      Anticoagulation Care Providers     Provider Role Specialty Phone number    Bruna Ureña M.D. Referring Cardiology [phone]    Babita Sarmiento Responsible      [facility] Anticoagulation Services Responsible  [phone]        Anticoagulation Patient Findings       Left voicemail message to report a   therapeutic INR of 2.2.  Pt to continue with current warfarin dosing regimen. Requested pt contact the clinic for any s/s of unusual bleeding, bruising, clotting or any changes to diet or medication.  Follow up in 4 weeks, to reduce the risk of adverse events related to this high risk medication, warfarin.    Kiesha Barr, Clinical Pharmacist

## 2019-06-25 LAB — INR PPP: 2.2 (ref 2–3.5)

## 2019-06-26 ENCOUNTER — ANTICOAGULATION MONITORING (OUTPATIENT)
Dept: VASCULAR LAB | Facility: MEDICAL CENTER | Age: 54
End: 2019-06-26

## 2019-06-26 DIAGNOSIS — I82.4Y9 DEEP VEIN THROMBOSIS (DVT) OF PROXIMAL LOWER EXTREMITY, UNSPECIFIED CHRONICITY, UNSPECIFIED LATERALITY (HCC): ICD-10-CM

## 2019-06-26 NOTE — PROGRESS NOTES
Anticoagulation Summary  As of 2019    INR goal:   2.0-3.0   TTR:   57.8 % (4.1 y)   INR used for dosin.20 (2019)   Warfarin maintenance plan:   7.5 mg (5 mg x 1.5) every Wed, Sat; 5 mg (5 mg x 1) all other days   Weekly warfarin total:   40 mg   Plan last modified:   Karen Ignacio, PharmD (3/29/2019)   Next INR check:      Priority:   Maintenance   Target end date:   Indefinite    Indications    Deep vein thrombosis [453.40] [I82.409]             Anticoagulation Episode Summary     INR check location:   Home Draw    Preferred lab:       Send INR reminders to:       Comments:   Eloy MYRICK      Anticoagulation Care Providers     Provider Role Specialty Phone number    Bruna Ureña M.D. Referring Cardiology [phone]    Babita Sarmiento Responsible      [facility] Responsible  [phone]        Anticoagulation Patient Findings    Left message for patient to inform the last INR reading of 2.2. Patient is within goal range of 2.0-3.0 and was told to continue current regimen of 7.5mg on Wednesday and Saturday. Told patient to call back if patient experiences any signs or symptoms of bleeding or starting or stopping any medications. Patient will follow up in 2 weeks for INR and monitoring.     Krystal Jeffrey, Pharmacy Intern

## 2019-07-10 ENCOUNTER — ANTICOAGULATION MONITORING (OUTPATIENT)
Dept: VASCULAR LAB | Facility: MEDICAL CENTER | Age: 54
End: 2019-07-10

## 2019-07-10 DIAGNOSIS — I82.4Y9 DEEP VEIN THROMBOSIS (DVT) OF PROXIMAL LOWER EXTREMITY, UNSPECIFIED CHRONICITY, UNSPECIFIED LATERALITY (HCC): ICD-10-CM

## 2019-07-10 LAB — INR PPP: 3.1 (ref 2–3.5)

## 2019-07-10 NOTE — PROGRESS NOTES
Anticoagulation Summary  As of 7/10/2019    INR goal:   2.0-3.0   TTR:   58.1 % (4.1 y)   INR used for dosing:   3.10! (7/10/2019)   Warfarin maintenance plan:   7.5 mg (5 mg x 1.5) every Wed, Sat; 5 mg (5 mg x 1) all other days   Weekly warfarin total:   40 mg   Plan last modified:   Karen Ignacio, PharmD (3/29/2019)   Next INR check:   7/24/2019   Priority:   Maintenance   Target end date:   Indefinite    Indications    Deep vein thrombosis [453.40] [I82.409]             Anticoagulation Episode Summary     INR check location:   Home Draw    Preferred lab:       Send INR reminders to:       Comments:   Eloy MYRICK      Anticoagulation Care Providers     Provider Role Specialty Phone number    Bruna Ureña M.D. Referring Cardiology [phone]    Babita Sarmiento Responsible      [facility] Anticoagulation Services Responsible  [phone]        Anticoagulation Patient Findings    Left voicemail message to report a supratherapeutic INR of 3.1.  Requested pt contact the clinic for any s/s of unusual bleeding, bruising, clotting or any changes to diet or medication.   Instructed patient to decrease today's dose to 5mg, then resume current warfarin regimen.  FU 2 weeks.  Alexis Garcia, ClaudiaD, BCACP

## 2019-07-30 LAB — INR PPP: 1.8 (ref 2–3.5)

## 2019-07-31 ENCOUNTER — ANTICOAGULATION MONITORING (OUTPATIENT)
Dept: VASCULAR LAB | Facility: MEDICAL CENTER | Age: 54
End: 2019-07-31

## 2019-07-31 DIAGNOSIS — I82.4Y9 DEEP VEIN THROMBOSIS (DVT) OF PROXIMAL LOWER EXTREMITY, UNSPECIFIED CHRONICITY, UNSPECIFIED LATERALITY (HCC): ICD-10-CM

## 2019-07-31 NOTE — PROGRESS NOTES
Anticoagulation Summary  As of 2019    INR goal:   2.0-3.0   TTR:   58.3 % (4.2 y)   INR used for dosin.80! (2019)   Warfarin maintenance plan:   7.5 mg (5 mg x 1.5) every Wed, Sat; 5 mg (5 mg x 1) all other days   Weekly warfarin total:   40 mg   Plan last modified:   Karen Ignacio, PharmD (3/29/2019)   Next INR check:   2019   Priority:   Maintenance   Target end date:   Indefinite    Indications    Deep vein thrombosis [453.40] [I82.409]             Anticoagulation Episode Summary     INR check location:   Home Draw    Preferred lab:       Send INR reminders to:       Comments:   Eloy MYRICK      Anticoagulation Care Providers     Provider Role Specialty Phone number    Bruna Ureña M.D. Referring Cardiology [phone]    Babita Sarmiento Responsible      [facility] Anticoagulation Services Responsible  [phone]        Anticoagulation Patient Findings      Left voicemail message to report a SUB therapeutic INR of 1.8.    Will have pt take boost dose of warfarin today of 10 mg and then   Pt to continue with current warfarin dosing regimen. Requested pt contact the clinic for any s/s of unusual bleeding, bruising, clotting or any changes to diet or medication.    FU INR in 2 week(s).    Karen Ignacio, PharmD

## 2019-08-15 ENCOUNTER — ANTICOAGULATION MONITORING (OUTPATIENT)
Dept: VASCULAR LAB | Facility: MEDICAL CENTER | Age: 54
End: 2019-08-15

## 2019-08-15 DIAGNOSIS — I82.4Y9 DEEP VEIN THROMBOSIS (DVT) OF PROXIMAL LOWER EXTREMITY, UNSPECIFIED CHRONICITY, UNSPECIFIED LATERALITY (HCC): ICD-10-CM

## 2019-08-15 LAB — INR PPP: 2.3 (ref 2–3.5)

## 2019-08-16 NOTE — PROGRESS NOTES
Anticoagulation Summary  As of 8/15/2019    INR goal:   2.0-3.0   TTR:   58.4 % (4.2 y)   INR used for dosin.30 (8/15/2019)   Warfarin maintenance plan:   7.5 mg (5 mg x 1.5) every Wed, Sat; 5 mg (5 mg x 1) all other days   Weekly warfarin total:   40 mg   Plan last modified:   Karen Ignacio, PharmD (3/29/2019)   Next INR check:   2019   Priority:   Maintenance   Target end date:   Indefinite    Indications    Deep vein thrombosis [453.40] [I82.409]             Anticoagulation Episode Summary     INR check location:   Home Draw    Preferred lab:       Send INR reminders to:       Comments:   Eloy MYRICK      Anticoagulation Care Providers     Provider Role Specialty Phone number    Bruna Ureña M.D. Referring Cardiology [phone]    Babita Sarmiento Responsible      [facility] Anticoagulation Services Responsible  [phone]        Anticoagulation Patient Findings      Left voicemail message to report a therapeutic INR of 2.3.    Pt to continue with current warfarin dosing regimen. Requested pt contact the clinic for any s/s of unusual bleeding, bruising, clotting or any changes to diet or medication.    FU INR in 2 week(s).    Karen Ignacio, PharmD

## 2019-08-21 ENCOUNTER — TELEPHONE (OUTPATIENT)
Dept: VASCULAR LAB | Facility: MEDICAL CENTER | Age: 54
End: 2019-08-21

## 2019-08-21 DIAGNOSIS — I71.00 DISSECTING ANEURYSM OF AORTA (HCC): ICD-10-CM

## 2019-09-04 ENCOUNTER — ANTICOAGULATION MONITORING (OUTPATIENT)
Dept: VASCULAR LAB | Facility: MEDICAL CENTER | Age: 54
End: 2019-09-04

## 2019-09-04 DIAGNOSIS — I82.4Y9 DEEP VEIN THROMBOSIS (DVT) OF PROXIMAL LOWER EXTREMITY, UNSPECIFIED CHRONICITY, UNSPECIFIED LATERALITY (HCC): ICD-10-CM

## 2019-09-04 LAB — INR PPP: 2.5 (ref 2–3.5)

## 2019-09-25 ENCOUNTER — ANTICOAGULATION MONITORING (OUTPATIENT)
Dept: VASCULAR LAB | Facility: MEDICAL CENTER | Age: 54
End: 2019-09-25

## 2019-09-25 DIAGNOSIS — I82.4Y9 DEEP VEIN THROMBOSIS (DVT) OF PROXIMAL LOWER EXTREMITY, UNSPECIFIED CHRONICITY, UNSPECIFIED LATERALITY (HCC): ICD-10-CM

## 2019-09-25 LAB — INR PPP: 2.1 (ref 2–3.5)

## 2019-09-25 NOTE — PROGRESS NOTES
Anticoagulation Summary  As of 2019    INR goal:   2.0-3.0   TTR:   59.4 % (4.4 y)   INR used for dosin.10 (2019)   Warfarin maintenance plan:   7.5 mg (5 mg x 1.5) every Wed, Sat; 5 mg (5 mg x 1) all other days   Weekly warfarin total:   40 mg   Plan last modified:   Karen Ignacio, PharmD (3/29/2019)   Next INR check:   10/9/2019   Priority:   Maintenance   Target end date:   Indefinite    Indications    Deep vein thrombosis [453.40] [I82.409]             Anticoagulation Episode Summary     INR check location:   Home Draw    Preferred lab:       Send INR reminders to:       Comments:   Eloy MYRICK      Anticoagulation Care Providers     Provider Role Specialty Phone number    Bruna Ureña M.D. Referring Cardiology [phone]    Babita Sarmiento Responsible      [facility] Anticoagulation Services Responsible  [phone]        Anticoagulation Patient Findings      Left voicemail message to report a therapeutic INR of 2.10.    Pt to continue with current warfarin dosing regimen. Requested pt contact the clinic for any s/s of unusual bleeding, bruising, clotting or any changes to diet or medication.    FU INR in 2 week(s).    Chet Monroe, Pharmacy Intern

## 2019-10-09 ENCOUNTER — TELEPHONE (OUTPATIENT)
Dept: VASCULAR LAB | Facility: MEDICAL CENTER | Age: 54
End: 2019-10-09

## 2019-10-10 NOTE — TELEPHONE ENCOUNTER
LM on  regarding results of the CT of thoracic aorta. Informed that it looks stable. Informed that Dr. Bloch would like to see  Her at either the [place] office or this office. Phone number provided.      Put on [facility] for repeat CTA thoraic aorta in 2 years.     MAINOR Trimble.

## 2019-10-23 ENCOUNTER — ANTICOAGULATION MONITORING (OUTPATIENT)
Dept: VASCULAR LAB | Facility: MEDICAL CENTER | Age: 54
End: 2019-10-23

## 2019-10-23 DIAGNOSIS — I82.4Y9 DEEP VEIN THROMBOSIS (DVT) OF PROXIMAL LOWER EXTREMITY, UNSPECIFIED CHRONICITY, UNSPECIFIED LATERALITY (HCC): ICD-10-CM

## 2019-10-23 LAB — INR PPP: 2.3 (ref 2–3.5)

## 2019-10-24 NOTE — PROGRESS NOTES
Anticoagulation Summary  As of 10/23/2019    INR goal:   2.0-3.0   TTR:   60.1 % (4.4 y)   INR used for dosin.30 (10/23/2019)   Warfarin maintenance plan:   7.5 mg (5 mg x 1.5) every Wed, Sat; 5 mg (5 mg x 1) all other days   Weekly warfarin total:   40 mg   Plan last modified:   Karen Ignacio, PharmD (3/29/2019)   Next INR check:   2019   Priority:   Maintenance   Target end date:   Indefinite    Indications    Deep vein thrombosis [453.40] [I82.409]             Anticoagulation Episode Summary     INR check location:   Home Draw    Preferred lab:       Send INR reminders to:       Comments:   Eloy MYRICK      Anticoagulation Care Providers     Provider Role Specialty Phone number    Bruna Ureña M.D. Referring Cardiology [phone]    Babita Sarmiento Responsible      [facility] Responsible  [phone]        Anticoagulation Patient Findings    Left voicemail message to report a therapeutic INR of 2.3.  Pt to continue with current warfarin dosing regimen. Requested pt contact the clinic for any s/s of unusual bleeding, bruising, clotting or any changes to diet or medication. FU 2 weeks.  Alexis Garcia, ClaudiaD, BCACP

## 2019-11-12 DIAGNOSIS — I82.409 DVT (DEEP VENOUS THROMBOSIS) (HCC): Chronic | ICD-10-CM

## 2019-11-12 DIAGNOSIS — I08.0 MITRAL VALVE INSUFFICIENCY AND AORTIC VALVE INSUFFICIENCY: Chronic | ICD-10-CM

## 2019-11-13 RX ORDER — WARFARIN SODIUM 5 MG/1
TABLET ORAL
Qty: 135 TAB | Refills: 0 | Status: SHIPPED | OUTPATIENT
Start: 2019-11-13 | End: 2020-02-11

## 2019-11-13 NOTE — TELEPHONE ENCOUNTER
[facility]    Left VM with pt to obtain next INR ASAP.  Given how well pt has regularly had follow up with our clinic, will provide warfarin refill today.    Osiel Pete, ClaudiaD

## 2019-11-14 ENCOUNTER — ANTICOAGULATION MONITORING (OUTPATIENT)
Dept: VASCULAR LAB | Facility: MEDICAL CENTER | Age: 54
End: 2019-11-14

## 2019-11-14 DIAGNOSIS — I82.4Y9 DEEP VEIN THROMBOSIS (DVT) OF PROXIMAL LOWER EXTREMITY, UNSPECIFIED CHRONICITY, UNSPECIFIED LATERALITY (HCC): ICD-10-CM

## 2019-11-14 LAB — INR PPP: 2.2 (ref 2–3.5)

## 2019-11-14 NOTE — PROGRESS NOTES
Anticoagulation Summary  As of 2019    INR goal:   2.0-3.0   TTR:   60.7 % (4.5 y)   INR used for dosin.20 (2019)   Warfarin maintenance plan:   7.5 mg (5 mg x 1.5) every Wed, Sat; 5 mg (5 mg x 1) all other days   Weekly warfarin total:   40 mg   Plan last modified:   Karen Ignacio, PharmD (3/29/2019)   Next INR check:   2019   Priority:   Maintenance   Target end date:   Indefinite    Indications    Deep vein thrombosis [453.40] [I82.409]             Anticoagulation Episode Summary     INR check location:   Home Draw    Preferred lab:       Send INR reminders to:       Comments:   Eloy MYRICK      Anticoagulation Care Providers     Provider Role Specialty Phone number    Bruna Ureña M.D. Referring Cardiology [phone]    Babita Sarmiento Responsible      [facility] Responsible  [phone]        Anticoagulation Patient Findings        Left voicemail message to report a therapeutic INR of 2.2.  Pt to continue with current warfarin dosing regimen. Requested pt contact the clinic for any s/s of unusual bleeding, bruising, clotting or any changes to diet or medication. Will follow up in 2 weeks.    Kiley Valencia, Pharmacy Intern

## 2019-12-03 ENCOUNTER — ANTICOAGULATION MONITORING (OUTPATIENT)
Dept: VASCULAR LAB | Facility: MEDICAL CENTER | Age: 54
End: 2019-12-03

## 2019-12-03 DIAGNOSIS — I82.4Y9 DEEP VEIN THROMBOSIS (DVT) OF PROXIMAL LOWER EXTREMITY, UNSPECIFIED CHRONICITY, UNSPECIFIED LATERALITY (HCC): ICD-10-CM

## 2019-12-03 LAB — INR PPP: 1.5 (ref 2–3.5)

## 2019-12-19 LAB — INR PPP: 2.5 (ref 2–3.5)

## 2019-12-20 ENCOUNTER — ANTICOAGULATION MONITORING (OUTPATIENT)
Dept: VASCULAR LAB | Facility: MEDICAL CENTER | Age: 54
End: 2019-12-20

## 2019-12-20 DIAGNOSIS — I82.4Y9 DEEP VEIN THROMBOSIS (DVT) OF PROXIMAL LOWER EXTREMITY, UNSPECIFIED CHRONICITY, UNSPECIFIED LATERALITY (HCC): ICD-10-CM

## 2019-12-20 NOTE — PROGRESS NOTES
Anticoagulation Summary  As of 2019    INR goal:   2.0-3.0   TTR:   60.2 % (4.6 y)   INR used for dosin.50 (2019)   Warfarin maintenance plan:   7.5 mg (5 mg x 1.5) every Wed, Sat; 5 mg (5 mg x 1) all other days   Weekly warfarin total:   40 mg   No change documented:   Chauncey HERNANDEZ'Rayarmand, Med Ass't   Plan last modified:   Karen Ignacio, PharmD (3/29/2019)   Next INR check:   2020   Priority:   Maintenance   Target end date:   Indefinite    Indications    Deep vein thrombosis [453.40] [I82.409]             Anticoagulation Episode Summary     INR check location:   Home Draw    Preferred lab:       Send INR reminders to:       Comments:   Eloy MYRICK      Anticoagulation Care Providers     Provider Role Specialty Phone number    Bruna Ureña M.D. Referring Cardiology [phone]    Babita Sarmiento Responsible      [facility]ticoagulation [facility] Responsible  [phone]        Anticoagulation Patient Findings  Patient Findings     Negatives:   Signs/symptoms of thrombosis, Signs/symptoms of bleeding, Laboratory test error suspected, Change in health, Change in alcohol use, Change in activity, Upcoming invasive procedure, Emergency department visit, Upcoming dental procedure, Missed doses, Extra doses, Change in medications, Change in diet/appetite, Hospital admission, Bruising, Other complaints        Left voicemail message to report therapeutic INR of 2.5.  Patient to continue with current warfarin dosing regimen. Requested pt contact the clinic for any change to diet or medication, and to report any signs or symptoms of bleeding, bruising or clotting.  Pt to follow up in 2 weeks.    Chauncey HERNANDEZ'Rayarmand, Med Ass't     I have reviewed and concur with the above plan on 2019.  Kiesha Barr, Clinical Pharmacist, CDE, CACP

## 2020-01-08 LAB — INR PPP: 2.9 (ref 2–3.5)

## 2020-01-09 ENCOUNTER — ANTICOAGULATION MONITORING (OUTPATIENT)
Dept: VASCULAR LAB | Facility: MEDICAL CENTER | Age: 55
End: 2020-01-09

## 2020-01-09 NOTE — PROGRESS NOTES
OP Telephone Anticoagulation Service Note    Date: 2020      Anticoagulation Summary  As of 2020    INR goal:   2.0-3.0   TTR:   60.7 % (4.6 y)   INR used for dosin.90 (2020)   Warfarin maintenance plan:   7.5 mg (5 mg x 1.5) every Wed, Sat; 5 mg (5 mg x 1) all other days   Weekly warfarin total:   40 mg   Plan last modified:   Karen Ignacio, PharmD (3/29/2019)   Next INR check:   2020   Priority:   Maintenance   Target end date:   Indefinite    Indications    Deep vein thrombosis [453.40] [I82.409]             Anticoagulation Episode Summary     INR check location:   Home Draw    Preferred lab:       Send INR reminders to:       Comments:   Eloy MYRICK      Anticoagulation Care Providers     Provider Role Specialty Phone number    Bruna Ureña M.D. Referring Cardiology [phone]    Babita Sarmiento Responsible      [facility] Responsible  [phone]        Anticoagulation Patient Findings        Plan: In range: 2.9. Left message on patient's answering machine/voicemail. Instructed patient to call back with any concerns regarding any unusual bleeding or bruising, any medication or diet changes or any signs or symptoms of thrombosis. Instructed patient to resume medication as outlined above. Patient to follow up in 2 week(s).         Ashley Kathleen, Pharmacy Intern

## 2020-01-10 ENCOUNTER — TELEPHONE (OUTPATIENT)
Dept: VASCULAR LAB | Facility: MEDICAL CENTER | Age: 55
End: 2020-01-10

## 2020-01-11 NOTE — TELEPHONE ENCOUNTER
[facility]    Pt has upcoming EGD and would like to discuss benefits vs risks of bridging.  Appt made for 1/15/20.    Osiel Pete, PharmD

## 2020-01-13 ENCOUNTER — TELEPHONE (OUTPATIENT)
Dept: VASCULAR LAB | Facility: MEDICAL CENTER | Age: 55
End: 2020-01-13

## 2020-01-13 NOTE — TELEPHONE ENCOUNTER
OLIVIA Liang advising appt with pt on 1/15/20 to discuss upcoming procedure.     Karen Ignacio, PharmD

## 2020-01-13 NOTE — TELEPHONE ENCOUNTER
----- Message from Sarah Camejo sent at 1/13/2020 11:33 AM PST -----  Regarding: Andrea Liang with [facility] lft vm regarding Coumadin Clarification, Patient is scheduled for Surgery on 1/21 please contact Azul at Ph.[phone] Thank you

## 2020-01-15 ENCOUNTER — ANTICOAGULATION VISIT (OUTPATIENT)
Dept: VASCULAR LAB | Facility: MEDICAL CENTER | Age: 55
End: 2020-01-15
Attending: INTERNAL MEDICINE
Payer: COMMERCIAL

## 2020-01-15 DIAGNOSIS — I82.90 DEEP VEIN THROMBOSIS (DVT) OF NON-EXTREMITY VEIN, UNSPECIFIED CHRONICITY: ICD-10-CM

## 2020-01-15 LAB
INR BLD: 2.4 (ref 0.9–1.2)
INR PPP: 2.4 (ref 2–3.5)

## 2020-01-15 PROCEDURE — 85610 PROTHROMBIN TIME: CPT

## 2020-01-15 PROCEDURE — 99212 OFFICE O/P EST SF 10 MIN: CPT | Performed by: NURSE PRACTITIONER

## 2020-01-15 NOTE — TELEPHONE ENCOUNTER
Pt to have procedure on 1/21/20. Due to Pt history lovenox bridging is indicated.   Pt to follow instructions as below, after procedure to ask operating MD when safe to resume anticoagulation, if no instructions given, pt will follow as below.     1.5mg/kg lovenox injected every 24 hours, Current weight 60.8kg, round up to lovenox 100mg.        Date  Warfarin dosing PM Lovenox   5 Days before procedure 1/16/20 0mg NONE   4 Days before procedure 1/17/20 0mg Lovenox injection   3 Days before procedure 1/18/20 0mg Lovenox injection   2 Days before procedure 1/19/20 0mg Lovenox injection   1 Days before procedure 1/20/20 0mg NONE   PROCEDURE 1/21/20 10mg NONE   1 Day after procedure 1/22/20 10mg Restart Lovenox injections      2 Days after procedure 1/23/20 5mg Lovenox injection   3 Days after procedure 1/24/20 5mg GET INR checked     Karen Ignacio, ClaudiaD

## 2020-01-15 NOTE — PROGRESS NOTES
Anticoagulation Summary  As of 1/15/2020    INR goal:   2.0-3.0   TTR:   60.8 % (4.7 y)   INR used for dosin.40 (1/15/2020)   Warfarin maintenance plan:   7.5 mg (5 mg x 1.5) every Wed, Sat; 5 mg (5 mg x 1) all other days   Weekly warfarin total:   40 mg   Plan last modified:   Karen Ignacio, PharmD (3/29/2019)   Next INR check:   2020   Priority:   Maintenance   Target end date:   Indefinite    Indications    Deep vein thrombosis [453.40] [I82.409]             Anticoagulation Episode Summary     INR check location:   Home Draw    Preferred lab:       Send INR reminders to:       Comments:   Eloy MYRICK      Anticoagulation Care Providers     Provider Role Specialty Phone number    Bruna Ureña M.D. Referring Cardiology [phone]    Babita Sarmiento Responsible      [facility] Responsible  [phone]        Anticoagulation Patient Findings      HPI:  Oumar Fisher seen in clinic today for follow up on anticoagulation therapy in the presence of DVT hx.   Pt scheduled for endoscopy and colonoscopy on 20 with Dr Amaro. She states she needs to stop warfarin 7 days prior. She has used Lovenox injection in the past and feels comfortable administering them to her abd.  Denies any medication or diet changes.   No current symptoms of bleeding or thrombosis reported.  Confirmed her dose. No missed doses.    A/P:   INR therapeutic. 1.5mg/kg lovenox injected every 24 hours, Current weight 60.8kg, round up to lovenox 100mg. Rx for Lovenox 100 mg (#10) sent to [facility].  Bridging instructions as follows:        Date  Warfarin dosing PM Lovenox   6 Days before procedure 1/15/20 0mg NONE   5 Days before procedure 20 0mg Lovenox injection   4 Days before procedure 20 0mg Lovenox injection   3 Days before procedure 20 0mg Lovenox injection   2 Days before procedure 20 0mg Lovenox injection   1 Days before procedure 20 0mg NONE   PROCEDURE 20 10mg  NONE   1 Day after procedure 1/22/20 10mg Restart Lovenox injections      2 Days after procedure 1/23/20 5mg Lovenox injection   3 Days after procedure 1/24/20 5mg GET INR checked     BP recorded in vitals.    Follow up appointment on Monday for pre-procedure INR.    Next Appointment: Monday, Jan 20, 2020 at 8:45 am.    Caroline SHEIKH

## 2020-01-15 NOTE — PATIENT INSTRUCTIONS
Date  Warfarin dosing PM Lovenox   6 Days before procedure 1/15/20 0mg NONE   5 Days before procedure 1/16/20 0mg Lovenox injection   4 Days before procedure 1/17/20 0mg Lovenox injection   3 Days before procedure 1/18/20 0mg Lovenox injection   2 Days before procedure 1/19/20 0mg Lovenox injection   1 Days before procedure 1/20/20 0mg NONE   PROCEDURE 1/21/20 10mg NONE   1 Day after procedure 1/22/20 10mg Restart Lovenox injections      2 Days after procedure 1/23/20 5mg Lovenox injection   3 Days after procedure 1/24/20 5mg GET INR checked

## 2020-01-29 ENCOUNTER — TELEPHONE (OUTPATIENT)
Dept: VASCULAR LAB | Facility: MEDICAL CENTER | Age: 55
End: 2020-01-29

## 2020-01-31 ENCOUNTER — ANTICOAGULATION MONITORING (OUTPATIENT)
Dept: VASCULAR LAB | Facility: MEDICAL CENTER | Age: 55
End: 2020-01-31

## 2020-01-31 DIAGNOSIS — I82.90 DEEP VEIN THROMBOSIS (DVT) OF NON-EXTREMITY VEIN, UNSPECIFIED CHRONICITY: ICD-10-CM

## 2020-01-31 LAB — INR PPP: 1.4 (ref 2–3.5)

## 2020-02-01 NOTE — PROGRESS NOTES
Anticoagulation Summary  As of 2020    INR goal:   2.0-3.0   TTR:   60.6 % (4.7 y)   INR used for dosin.40! (2020)   Warfarin maintenance plan:   7.5 mg (5 mg x 1.5) every Wed, Sat; 5 mg (5 mg x 1) all other days   Weekly warfarin total:   40 mg   Plan last modified:   Karen Ignacio, PharmD (3/29/2019)   Next INR check:   2020   Priority:   Maintenance   Target end date:   Indefinite    Indications    Deep vein thrombosis [453.40] [I82.409]             Anticoagulation Episode Summary     INR check location:   Home Draw    Preferred lab:       Send INR reminders to:       Comments:   Eloy MYRICK      Anticoagulation Care Providers     Provider Role Specialty Phone number    Bruna Ureña M.D. Referring Cardiology [phone]    Babita Sarmiento Responsible      [facility] Anticoagulation Services Responsible  [phone]        Anticoagulation Patient Findings    Left a message on the patient's voicemail today, informing the patient of a SUB-therapeutic INR of 1.4.  Instructed patient to call the clinic with any changes to diet or medications, with any signs/sx of bleeding or clotting or with any questions or concerns.     Patient instructed to bolus with 10mg x 2 days, then to resume her current dosing regimen. Patient asked to retest again in 1 week.     Ed Sarmiento  PharmD

## 2020-02-07 LAB
INR PPP: 2.6 (ref 2–3.5)
INR PPP: 2.6 (ref 2–3.5)

## 2020-02-10 ENCOUNTER — ANTICOAGULATION MONITORING (OUTPATIENT)
Dept: VASCULAR LAB | Facility: MEDICAL CENTER | Age: 55
End: 2020-02-10

## 2020-02-10 DIAGNOSIS — I82.90 DEEP VEIN THROMBOSIS (DVT) OF NON-EXTREMITY VEIN, UNSPECIFIED CHRONICITY: ICD-10-CM

## 2020-02-10 NOTE — PROGRESS NOTES
Anticoagulation Summary  As of 2/10/2020    INR goal:   2.0-3.0   TTR:   60.6 % (4.7 y)   INR used for dosing:      Warfarin maintenance plan:   7.5 mg (5 mg x 1.5) every Wed, Sat; 5 mg (5 mg x 1) all other days   Weekly warfarin total:   40 mg   Plan last modified:   Karen Ignacio, PharmD (3/29/2019)   Next INR check:   2/21/2020   Priority:   Maintenance   Target end date:   Indefinite    Indications    Deep vein thrombosis [453.40] [I82.409]             Anticoagulation Episode Summary     INR check location:   Home Draw    Preferred lab:       Send INR reminders to:       Comments:   Eloy MYRICK      Anticoagulation Care Providers     Provider Role Specialty Phone number    Bruna Ureña M.D. Referring Cardiology [phone]    Babita Sarmiento Responsible      [facility] Anticoagulation Services Responsible  [phone]        Anticoagulation Patient Findings    Left a message on the patient's voicemail today, informing the patient of a therapeutic INR of 2.6.  Instructed patient to call the clinic with any changes to diet or medications, with any signs/sx of bleeding or clotting or with any questions or concerns.     Patient instructed to continue with the current warfarin dosing regimen, and asked to follow up again in 2 weeks.     Ed Sarmiento  PharmD

## 2020-02-11 DIAGNOSIS — I08.0 MITRAL VALVE INSUFFICIENCY AND AORTIC VALVE INSUFFICIENCY: Chronic | ICD-10-CM

## 2020-02-11 DIAGNOSIS — I82.409 DVT (DEEP VENOUS THROMBOSIS) (HCC): Chronic | ICD-10-CM

## 2020-02-11 RX ORDER — WARFARIN SODIUM 5 MG/1
TABLET ORAL
Qty: 135 TAB | Refills: 1 | Status: SHIPPED | OUTPATIENT
Start: 2020-02-11 | End: 2020-08-11

## 2020-02-22 LAB — INR PPP: 2 (ref 2–3.5)

## 2020-02-24 ENCOUNTER — ANTICOAGULATION MONITORING (OUTPATIENT)
Dept: VASCULAR LAB | Facility: MEDICAL CENTER | Age: 55
End: 2020-02-24

## 2020-02-24 DIAGNOSIS — I82.90 DEEP VEIN THROMBOSIS (DVT) OF NON-EXTREMITY VEIN, UNSPECIFIED CHRONICITY: ICD-10-CM

## 2020-02-24 NOTE — PROGRESS NOTES
OP Telephone Anticoagulation Service Note    Date: 2020      Anticoagulation Summary  As of 2020    INR goal:   2.0-3.0   TTR:   60.9 % (4.8 y)   INR used for dosin.00 (2020)   Warfarin maintenance plan:   7.5 mg (5 mg x 1.5) every Wed, Sat; 5 mg (5 mg x 1) all other days   Weekly warfarin total:   40 mg   Plan last modified:   Karen Ignacio, PharmD (3/29/2019)   Next INR check:   3/7/2020   Priority:   Maintenance   Target end date:   Indefinite    Indications    Deep vein thrombosis [453.40] [I82.409]             Anticoagulation Episode Summary     INR check location:   Home Draw    Preferred lab:       Send INR reminders to:       Comments:   Eloy MYRICK      Anticoagulation Care Providers     Provider Role Specialty Phone number    Bruna Ureña M.D. Referring Cardiology [phone]    Babita Sarmiento Responsible      [facility] Responsible  [phone]        Anticoagulation Patient Findings        Plan: INR is in range. Left message on patient's answering machine/voicemail. Instructed patient to call back with any concerns regarding any unusual bleeding or bruising, any medication or diet changes or any signs or symptoms of thrombosis. Instructed patient to resume medication as outlined above. Patient to follow up in 2 week(s).             Esther A Gurries, PharmD

## 2020-03-23 ENCOUNTER — ANTICOAGULATION MONITORING (OUTPATIENT)
Dept: VASCULAR LAB | Facility: MEDICAL CENTER | Age: 55
End: 2020-03-23

## 2020-03-23 DIAGNOSIS — I82.90 DEEP VEIN THROMBOSIS (DVT) OF NON-EXTREMITY VEIN, UNSPECIFIED CHRONICITY: ICD-10-CM

## 2020-03-23 LAB — INR PPP: 2.4 (ref 2–3.5)

## 2020-03-23 NOTE — PROGRESS NOTES
Anticoagulation Summary  As of 3/23/2020    INR goal:   2.0-3.0   TTR:   61.6 % (4.8 y)   INR used for dosin.40 (3/23/2020)   Warfarin maintenance plan:   7.5 mg (5 mg x 1.5) every Wed, Sat; 5 mg (5 mg x 1) all other days   Weekly warfarin total:   40 mg   Plan last modified:   Karen Ignacio, PharmD (3/29/2019)   Next INR check:   2020   Priority:   Maintenance   Target end date:   Indefinite    Indications    Deep vein thrombosis [453.40] [I82.409]             Anticoagulation Episode Summary     INR check location:   Home Draw    Preferred lab:       Send INR reminders to:       Comments:   Eloy MYRICK      Anticoagulation Care Providers     Provider Role Specialty Phone number    Bruna Ureña M.D. Referring Cardiology [phone]    Babita Sarmiento Responsible      [facility] Anticoagulation Services Responsible  [phone]        Anticoagulation Patient Findings      INR  therapeutic.   Left a voice message for the patient, asked patient to please call the anticoagulation clinic if they have any signs/symptoms of bleeding and/or thrombosis or any changes to diet or medications.    Follow up appointment in 2 week(s)    Continue weekly warfarin dose as noted      Abhi Carlos, PharmD, MS, BCACP, LCC    This note was created using voice recognition software (Dragon). The accuracy of the dictation is limited by the abilities of the software. I have reviewed the note prior to signing, however some errors in grammar and context are still possible. If you have any questions related to this note please do not hesitate to contact our office.

## 2020-04-06 ENCOUNTER — ANTICOAGULATION MONITORING (OUTPATIENT)
Dept: VASCULAR LAB | Facility: MEDICAL CENTER | Age: 55
End: 2020-04-06

## 2020-04-06 DIAGNOSIS — I82.90 DEEP VEIN THROMBOSIS (DVT) OF NON-EXTREMITY VEIN, UNSPECIFIED CHRONICITY: ICD-10-CM

## 2020-04-06 LAB — INR PPP: 2.3 (ref 2–3.5)

## 2020-04-06 NOTE — PROGRESS NOTES
Anticoagulation Summary  As of 2020    INR goal:   2.0-3.0   TTR:   61.9 % (4.9 y)   INR used for dosin.30 (2020)   Warfarin maintenance plan:   7.5 mg (5 mg x 1.5) every Wed, Sat; 5 mg (5 mg x 1) all other days   Weekly warfarin total:   40 mg   Plan last modified:   Karen Ignacio, PharmD (3/29/2019)   Next INR check:   2020   Priority:   Maintenance   Target end date:   Indefinite    Indications    Deep vein thrombosis [453.40] [I82.409]             Anticoagulation Episode Summary     INR check location:   Home Draw    Preferred lab:       Send INR reminders to:       Comments:   Eloy MYRICK      Anticoagulation Care Providers     Provider Role Specialty Phone number    Bruna Ureña M.D. Referring Cardiology [phone]    Babita Sarmiento Responsible      [facility]ticoagulation Services Responsible  [phone]        Anticoagulation Patient Findings      INR  therapeutic.   Left a voice message for the patient, asked patient to please call the anticoagulation clinic if they have any signs/symptoms of bleeding and/or thrombosis or any changes to diet or medications.    Follow up appointment in 2 week(s)    Continue weekly warfarin dose as noted      Abhi Carlos, PharmD, MS, BCACP, LCC    This note was created using voice recognition software (Dragon). The accuracy of the dictation is limited by the abilities of the software. I have reviewed the note prior to signing, however some errors in grammar and context are still possible. If you have any questions related to this note please do not hesitate to contact our office.

## 2020-05-04 ENCOUNTER — ANTICOAGULATION MONITORING (OUTPATIENT)
Dept: VASCULAR LAB | Facility: MEDICAL CENTER | Age: 55
End: 2020-05-04

## 2020-05-04 DIAGNOSIS — I82.90 DEEP VEIN THROMBOSIS (DVT) OF NON-EXTREMITY VEIN, UNSPECIFIED CHRONICITY: ICD-10-CM

## 2020-05-04 LAB — INR PPP: 2.1 (ref 2–3.5)

## 2020-05-04 NOTE — PROGRESS NOTES
Anticoagulation Summary  As of 2020    INR goal:   2.0-3.0   TTR:   62.5 % (5 y)   INR used for dosin.10 (2020)   Warfarin maintenance plan:   7.5 mg (5 mg x 1.5) every Wed, Sat; 5 mg (5 mg x 1) all other days   Weekly warfarin total:   40 mg   Plan last modified:   Karen Ignacio, PharmD (3/29/2019)   Next INR check:   2020   Priority:   Maintenance   Target end date:   Indefinite    Indications    Deep vein thrombosis [453.40] [I82.409]             Anticoagulation Episode Summary     INR check location:   Home Draw    Preferred lab:       Send INR reminders to:       Comments:   Eloy MYRICK      Anticoagulation Care Providers     Provider Role Specialty Phone number    Bruna Ureña M.D. Referring Cardiology [phone]    Babita Sarmiento Responsible      [facility] Responsible  [phone]        Anticoagulation Patient Findings      INR  therapeutic.   Left a voice message for the patient, asked patient to please call the anticoagulation clinic if they have any signs/symptoms of bleeding and/or thrombosis or any changes to diet or medications.    Follow up appointment in 2 week(s)    Continue weekly warfarin dose as noted      Abhi Carlos, PharmD, MS, BCACP, LCC    This note was created using voice recognition software (Dragon). The accuracy of the dictation is limited by the abilities of the software. I have reviewed the note prior to signing, however some errors in grammar and context are still possible. If you have any questions related to this note please do not hesitate to contact our office.

## 2020-06-02 ENCOUNTER — ANTICOAGULATION MONITORING (OUTPATIENT)
Dept: VASCULAR LAB | Facility: MEDICAL CENTER | Age: 55
End: 2020-06-02

## 2020-06-02 DIAGNOSIS — I82.90 DEEP VEIN THROMBOSIS (DVT) OF NON-EXTREMITY VEIN, UNSPECIFIED CHRONICITY: ICD-10-CM

## 2020-06-02 LAB — INR PPP: 1.8 (ref 2–3.5)

## 2020-06-02 NOTE — PROGRESS NOTES
OP   Telephone Anticoagulation Service Note      Anticoagulation Summary  As of 2020    INR goal:   2.0-3.0   TTR:   62.0 % (5 y)   INR used for dosin.80! (2020)   Warfarin maintenance plan:   7.5 mg (5 mg x 1.5) every Wed, Sat; 5 mg (5 mg x 1) all other days   Weekly warfarin total:   40 mg   Plan last modified:   Karen Ignacio, PharmD (3/29/2019)   Next INR check:   2020   Priority:   Maintenance   Target end date:   Indefinite    Indications    Deep vein thrombosis [453.40] [I82.409]             Anticoagulation Episode Summary     INR check location:   Home Draw    Preferred lab:       Send INR reminders to:       Comments:   Eloy MYRICK      Anticoagulation Care Providers     Provider Role Specialty Phone number    Bruna Ureña M.D. Referring Cardiology [phone]    Babita Sarmiento Responsible      [facility] Responsible  [phone]        Anticoagulation Patient Findings    Left a message on the patient's voicemail today, informing the patient of a SUB-therapeutic INR of 1.8. Instructed the patient to call the clinic with any changes to diet or medications, with any signs/sx of bleeding or clotting or with any questions or concerns.     Patient instructed to bolus with 7.5mg TONIGHT ONLY, as a one time boost dose, then to continue with the current dosing regimen. Patient asked to retest again in 2 weeks.     Ed TaylorD

## 2020-06-10 ENCOUNTER — OFFICE VISIT (OUTPATIENT)
Dept: VASCULAR LAB | Facility: MEDICAL CENTER | Age: 55
End: 2020-06-10
Attending: INTERNAL MEDICINE
Payer: COMMERCIAL

## 2020-06-10 VITALS
SYSTOLIC BLOOD PRESSURE: 128 MMHG | WEIGHT: 147.6 LBS | HEIGHT: 67 IN | BODY MASS INDEX: 23.17 KG/M2 | DIASTOLIC BLOOD PRESSURE: 75 MMHG | HEART RATE: 81 BPM

## 2020-06-10 DIAGNOSIS — E03.9 HYPOTHYROIDISM, UNSPECIFIED TYPE: ICD-10-CM

## 2020-06-10 DIAGNOSIS — I10 ESSENTIAL HYPERTENSION: ICD-10-CM

## 2020-06-10 DIAGNOSIS — E78.5 DYSLIPIDEMIA: ICD-10-CM

## 2020-06-10 DIAGNOSIS — I71.03 DISSECTION OF THORACOABDOMINAL AORTA (HCC): ICD-10-CM

## 2020-06-10 DIAGNOSIS — F17.200 SMOKING: ICD-10-CM

## 2020-06-10 DIAGNOSIS — I82.90 DEEP VEIN THROMBOSIS (DVT) OF NON-EXTREMITY VEIN, UNSPECIFIED CHRONICITY: ICD-10-CM

## 2020-06-10 DIAGNOSIS — I38 VALVULAR HEART DISEASE: ICD-10-CM

## 2020-06-10 PROCEDURE — 99214 OFFICE O/P EST MOD 30 MIN: CPT | Performed by: INTERNAL MEDICINE

## 2020-06-10 PROCEDURE — 99212 OFFICE O/P EST SF 10 MIN: CPT

## 2020-06-10 RX ORDER — ROSUVASTATIN CALCIUM 20 MG/1
20 TABLET, COATED ORAL EVERY EVENING
Qty: 30 TAB | Refills: 11 | Status: SHIPPED
Start: 2020-06-10 | End: 2021-08-30

## 2020-06-10 ASSESSMENT — ENCOUNTER SYMPTOMS
FOCAL WEAKNESS: 0
SENSORY CHANGE: 0
DIZZINESS: 0
WEIGHT LOSS: 0
MYALGIAS: 0
FALLS: 0
COUGH: 0
DEPRESSION: 0
SHORTNESS OF BREATH: 0
SPEECH CHANGE: 0
PALPITATIONS: 0
NERVOUS/ANXIOUS: 1

## 2020-06-10 NOTE — PROGRESS NOTES
"VASCULAR FOLLOW UP VISIT  Subjective:   Oumar Fisher is a 55 y.o. female who presents today 6/10/2020 for   Chief Complaint   Patient presents with   • Follow-Up       HPI:  Here for f/u of aortic dissection, dyslipidemia, htn.   BPs in 130s/70s  Daughter was diagnosed with moyamoya - had brain surgery in [place]  1/2 ppd - plans on continuing to taper down.  Doesn't want quit date  Doesn't want ntg or meds  No change in bp meds  No myalgias on statin  No chest, back or abd pain.   Has missed medications on occasion    Social History     Tobacco Use   • Smoking status: Current Every Day Smoker   Substance Use Topics   • Alcohol use: Not on file   • Drug use: Not on file     DIET AND EXERCISE:  Weight Change: up about 8 pounds  Diet: common adult  Exercise: minimal exercise   Smoking - 1/2 ppd     Review of Systems   Constitutional: Negative for malaise/fatigue and weight loss.   Respiratory: Negative for cough and shortness of breath.    Cardiovascular: Negative for chest pain, palpitations and leg swelling.   Musculoskeletal: Negative for falls and myalgias.   Neurological: Negative for dizziness, sensory change, speech change and focal weakness.   Psychiatric/Behavioral: Negative for depression. The patient is nervous/anxious.       Objective:     Vitals:    06/10/20 1353 06/10/20 1359   BP: 134/64 128/75   BP Location: Left arm Left arm   Patient Position: Sitting Sitting   BP Cuff Size: Small adult Small adult   Pulse: 81 81   Weight: 67 kg (147 lb 9.6 oz)    Height: 1.69 m (5' 6.54\")       Body mass index is 23.44 kg/m².  Physical Exam  Vitals signs reviewed.   Constitutional:       General: She is not in acute distress.     Appearance: She is well-developed. She is not diaphoretic.   Cardiovascular:      Rate and Rhythm: Normal rate and regular rhythm.      Heart sounds: Normal heart sounds. No murmur. No friction rub. No gallop.    Pulmonary:      Effort: No respiratory distress.      Breath sounds: No " wheezing or rales.   Neurological:      General: No focal deficit present.      Mental Status: She is oriented to person, place, and time.      Cranial Nerves: No cranial nerve deficit.      Gait: Gait normal.   Psychiatric:         Mood and Affect: Mood normal.         Behavior: Behavior normal.     Data review      Lab Results   Component Value Date    INR 1.80 06/02/2020         Blood work May 2020 from lab core  Glucose 90  GFR 70  Total cholesterol 183, triglycerides 259, HDL 60, LDL 71,  TSH 5.29  Urine for albumin less than 3    CTA October 2019  Endovascular intervention stable  No endoleak             Medical Decision Making:  Today's Assessment / Status / Plan:     1. Deep vein thrombosis (DVT) of non-extremity vein, unspecified chronicity     2. Essential hypertension     3. Dyslipidemia     4. Dissection of thoracoabdominal aorta (HCC)     5. Smoking       Patient Type: Secondary Prevention    Etiology of Established CVD if Present:   1) Aortic dissection status post graft  2) valvular heart disease without previous intervention  3) DVT    Lipid Management: Qualifies for Statin Therapy   Currently on Statin: Yes  Would like to see LDL less than 79 HDL less than 100  Above goal  She has a lot of pravastatin at home  Plan:  -Increase pravastatin to 80 mg daily  -Fill prescription for rosuvastatin 20 mg today once she runs out of pravastatin  -Repeat lipid panel prior to next visit    Blood Pressure Management:  ACC-AHA blood pressure goal less than 130/80  Blood pressure bit above goal at times at home  At goal in office today  Has gained some weight which may have led to BP increase  GFR normal and no albuminuria  Plan:  -Intensify therapy lifestyle change  -Continue metoprolol at current dose  -Continue losartan at current dose  -Continue spironolactone at current dose  -Continue to follow blood pressures at home  -Intensify therapy if above goal next visit    Glycemic Status: Normal  -Recheck fasting  glucose prior to next visit    Anti-Platelet/Anti-Coagulant Tx: yes  -Continue indefinite anticoagulation  -Follow-up in anticoagulation clinic    Smoking: Continues smoke half pack per day  Not interested in picking quit date  Uninterested medications or NRT  -Recommended complete cessation prior to next visit   -continue to taper down  -Continue to address at every visit    Physical Activity: Recommended more walking    Weight Management and Nutrition: More attention to diet with slow steady weight loss    Other:     1) Aortic dissection status post graft -stable on follow-up imaging.  Continue medical management.  Recheck CTA at 2-year intervals    2) valvular heart disease without previous intervention -remains asymptomatic.  Recheck echocardiogram    3) DVT -continue indefinite anticoagulation as above.  Quit smoking    4) hypothyroidism -asymptomatic currently.  TSH elevated consistent with under treatment.  Has not been completely adherent with medications.  For now continue with same dosage but take every day.  Recheck TSH and T4 and adjust as needed    Instructed to follow-up with PCP for remainder of adult medical needs: yes  We will partner with other providers in the management of established vascular disease and cardiometabolic risk factors.    Studies to Be Obtained:   1) echocardiogram ordered today  2) CTA thoracoabdominal aorta November 2021    Labs to Be Obtained: As above prior to next visit    Follow up in: 3 months    Michael J Bloch, M.D.     CC: Jaquan Horne

## 2020-06-15 ENCOUNTER — ANTICOAGULATION MONITORING (OUTPATIENT)
Dept: VASCULAR LAB | Facility: MEDICAL CENTER | Age: 55
End: 2020-06-15

## 2020-06-15 DIAGNOSIS — I82.90 DEEP VEIN THROMBOSIS (DVT) OF NON-EXTREMITY VEIN, UNSPECIFIED CHRONICITY: ICD-10-CM

## 2020-06-15 LAB — INR PPP: 2 (ref 2–3.5)

## 2020-06-15 NOTE — PROGRESS NOTES
Anticoagulation Summary  As of 6/15/2020    INR goal:   2.0-3.0   TTR:   61.6 % (5.1 y)   INR used for dosin.00 (6/15/2020)   Warfarin maintenance plan:   7.5 mg (5 mg x 1.5) every Wed, Sat; 5 mg (5 mg x 1) all other days   Weekly warfarin total:   40 mg   Plan last modified:   Karen Ignacio, PharmD (3/29/2019)   Next INR check:   2020   Priority:   Maintenance   Target end date:   Indefinite    Indications    Deep vein thrombosis [453.40] [I82.409]             Anticoagulation Episode Summary     INR check location:   Home Draw    Preferred lab:       Send INR reminders to:       Comments:   Eloy MYRICK      Anticoagulation Care Providers     Provider Role Specialty Phone number    Bruna Ureña M.D. Referring Cardiology [phone]    Babita Sarmiento Responsible      [facility] Responsible  [phone]        Anticoagulation Patient Findings    Left voicemail message to report a therapeutic INR of 2.0.  Pt to continue with current warfarin dosing regimen. Requested pt contact the clinic for any s/s of unusual bleeding, bruising, clotting or any changes to diet or medication. FU 2 weeks.  Alexis Garcia, PharmD, BCACP

## 2020-06-30 ENCOUNTER — ANTICOAGULATION MONITORING (OUTPATIENT)
Dept: VASCULAR LAB | Facility: MEDICAL CENTER | Age: 55
End: 2020-06-30

## 2020-06-30 DIAGNOSIS — I82.90 DEEP VEIN THROMBOSIS (DVT) OF NON-EXTREMITY VEIN, UNSPECIFIED CHRONICITY: ICD-10-CM

## 2020-06-30 LAB — INR PPP: 2.2 (ref 2–3.5)

## 2020-06-30 NOTE — PROGRESS NOTES
OP   Telephone Anticoagulation Service Note      Anticoagulation Summary  As of 2020    INR goal:   2.0-3.0   TTR:   61.9 % (5.1 y)   INR used for dosin.20 (2020)   Warfarin maintenance plan:   7.5 mg (5 mg x 1.5) every Wed, Sat; 5 mg (5 mg x 1) all other days   Weekly warfarin total:   40 mg   No change documented:   Babita Sarmiento   Plan last modified:   Karen Ignacio, PharmD (3/29/2019)   Next INR check:   2020   Priority:   Maintenance   Target end date:   Indefinite    Indications    Deep vein thrombosis [453.40] [I82.409]             Anticoagulation Episode Summary     INR check location:   Home Draw    Preferred lab:       Send INR reminders to:       Comments:   Eloy MYRICK      Anticoagulation Care Providers     Provider Role Specialty Phone number    Bruna Ureña M.D. Referring Cardiology [phone]    Babita Sarmiento Responsible      [facility] Responsible  [phone]        Anticoagulation Patient Findings     Left a message on the patient's voicemail today, informing the patient of a therapeutic INR of 2.2. Instructed the patient to call the clinic with any changes to diet or medications, with any signs/sx of bleeding or clotting or with any questions or concerns.     Patient instructed to continue with the current warfarin dosing regimen, and asked to follow up again in 2 weeks.     Ed TaylorD

## 2020-07-02 ENCOUNTER — TELEPHONE (OUTPATIENT)
Dept: VASCULAR LAB | Facility: MEDICAL CENTER | Age: 55
End: 2020-07-02

## 2020-07-02 NOTE — TELEPHONE ENCOUNTER
Called pt to remind that the  echo  is due for surveillance. Scheduling office and our office phone numbers provided. MA[place]OR [place].

## 2020-07-14 ENCOUNTER — ANTICOAGULATION MONITORING (OUTPATIENT)
Dept: VASCULAR LAB | Facility: MEDICAL CENTER | Age: 55
End: 2020-07-14

## 2020-07-14 ENCOUNTER — TELEPHONE (OUTPATIENT)
Dept: VASCULAR LAB | Facility: MEDICAL CENTER | Age: 55
End: 2020-07-14

## 2020-07-14 DIAGNOSIS — I82.90 DEEP VEIN THROMBOSIS (DVT) OF NON-EXTREMITY VEIN, UNSPECIFIED CHRONICITY: ICD-10-CM

## 2020-07-14 LAB — INR PPP: 2.4 (ref 2–3.5)

## 2020-07-14 NOTE — PROGRESS NOTES
OP   Telephone Anticoagulation Service Note      Anticoagulation Summary  As of 2020    INR goal:   2.0-3.0   TTR:   62.2 % (5.2 y)   INR used for dosin.40 (2020)   Warfarin maintenance plan:   7.5 mg (5 mg x 1.5) every Wed, Sat; 5 mg (5 mg x 1) all other days   Weekly warfarin total:   40 mg   No change documented:   Babita Sarmiento   Plan last modified:   Karen Ignacio, PharmD (3/29/2019)   Next INR check:   2020   Priority:   Maintenance   Target end date:   Indefinite    Indications    Deep vein thrombosis [453.40] [I82.409]             Anticoagulation Episode Summary     INR check location:   Home Draw    Preferred lab:       Send INR reminders to:       Comments:   Eloy MYRICK      Anticoagulation Care Providers     Provider Role Specialty Phone number    Bruna Ureña M.D. Referring Cardiology [phone]    Babita Sarmiento Responsible      [facility] Responsible  [phone]        Anticoagulation Patient Findings     Left a message on the patient's voicemail today, informing the patient of a therapeutic INR of 2.4. Instructed the patient to call the clinic with any changes to diet or medications, with any signs/sx of bleeding or clotting or with any questions or concerns.     Patient instructed to continue with the current warfarin dosing regimen, and asked to follow up again in 2 weeks.     Ed Sarmiento  PharmD

## 2020-07-29 ENCOUNTER — ANTICOAGULATION MONITORING (OUTPATIENT)
Dept: VASCULAR LAB | Facility: MEDICAL CENTER | Age: 55
End: 2020-07-29

## 2020-07-29 DIAGNOSIS — I82.90 DEEP VEIN THROMBOSIS (DVT) OF NON-EXTREMITY VEIN, UNSPECIFIED CHRONICITY: ICD-10-CM

## 2020-07-29 LAB — INR PPP: 2.7 (ref 2–3.5)

## 2020-07-29 NOTE — PROGRESS NOTES
Anticoagulation Summary  As of 2020    INR goal:   2.0-3.0   TTR:   62.5 % (5.2 y)   INR used for dosin.70 (2020)   Warfarin maintenance plan:   7.5 mg (5 mg x 1.5) every Wed, Sat; 5 mg (5 mg x 1) all other days   Weekly warfarin total:   40 mg   Plan last modified:   Karen Ignacio, PharmD (3/29/2019)   Next INR check:   2020   Priority:   Maintenance   Target end date:   Indefinite    Indications    Deep vein thrombosis [453.40] [I82.409]             Anticoagulation Episode Summary     INR check location:   Home Draw    Preferred lab:       Send INR reminders to:       Comments:   Eloy MYRICK      Anticoagulation Care Providers     Provider Role Specialty Phone number    Bruna Ureña M.D. Referring Cardiology [phone]    Babita Sarmiento Responsible      [facility] Anticoagulation Services Responsible  [phone]        Anticoagulation Patient Findings      Left voicemail message to report a therapeutic INR of 2.70.    Pt to continue with current warfarin dosing regimen. Requested pt contact the clinic for any s/s of unusual bleeding, bruising, clotting or any changes to diet or medication.    FU INR in 2 week(s).    Jorge Huang, Pharmacy Intern

## 2020-08-05 ENCOUNTER — TELEPHONE (OUTPATIENT)
Dept: VASCULAR LAB | Facility: MEDICAL CENTER | Age: 55
End: 2020-08-05

## 2020-08-05 NOTE — TELEPHONE ENCOUNTER
Pt called requesting that her echo be sent to [facility] due to insurance coverage. MA[place]OR [place].

## 2020-08-11 DIAGNOSIS — I08.0 MITRAL VALVE INSUFFICIENCY AND AORTIC VALVE INSUFFICIENCY: Chronic | ICD-10-CM

## 2020-08-11 DIAGNOSIS — I82.409 DVT (DEEP VENOUS THROMBOSIS) (HCC): Chronic | ICD-10-CM

## 2020-08-11 RX ORDER — WARFARIN SODIUM 5 MG/1
TABLET ORAL
Qty: 135 TAB | Refills: 1 | Status: SHIPPED | OUTPATIENT
Start: 2020-08-11 | End: 2021-02-12

## 2020-08-12 ENCOUNTER — ANTICOAGULATION MONITORING (OUTPATIENT)
Dept: VASCULAR LAB | Facility: MEDICAL CENTER | Age: 55
End: 2020-08-12

## 2020-08-12 DIAGNOSIS — I82.90 DEEP VEIN THROMBOSIS (DVT) OF NON-EXTREMITY VEIN, UNSPECIFIED CHRONICITY: ICD-10-CM

## 2020-08-12 LAB — INR PPP: 1.7 (ref 2–3.5)

## 2020-08-12 NOTE — PROGRESS NOTES
Anticoagulation Summary  As of 2020    INR goal:  2.0-3.0   TTR:  62.5 % (5.2 y)   INR used for dosin.70 (2020)   Warfarin maintenance plan:  7.5 mg (5 mg x 1.5) every Wed, Sat; 5 mg (5 mg x 1) all other days   Weekly warfarin total:  40 mg   Plan last modified:  Karen Ignacio, PharmD (3/29/2019)   Next INR check:  2020   Priority:  Maintenance   Target end date:  Indefinite    Indications    Deep vein thrombosis [453.40] [I82.409]             Anticoagulation Episode Summary     INR check location:  Home Draw    Preferred lab:      Send INR reminders to:      Comments:  Eloy MYRICK      Anticoagulation Care Providers     Provider Role Specialty Phone number    Bruna Ureña M.D. Referring Cardiology [phone]    Babita Sarmiento Responsible      [facility] Anticoagulation Services Responsible  [phone]        Anticoagulation Patient Findings    Left voicemail message to report a subtherapeutic INR of 1.7.  Requested pt contact the clinic for any s/s of unusual bleeding, bruising, clotting or any changes to diet or medication.   Instructed patient to bolus with 10mg X 1, then resume current warfarin regimen.  FU 2 weeks.  Alexis Garcia, PharmD, BCACP

## 2020-08-20 ENCOUNTER — TELEPHONE (OUTPATIENT)
Dept: VASCULAR LAB | Facility: MEDICAL CENTER | Age: 55
End: 2020-08-20

## 2020-08-21 NOTE — TELEPHONE ENCOUNTER
Echo reviewed.   Mild valvular disease - probably does not need f/u echo for a few years  Will d/w patient at follow up.  Repeat CTA as per previous.    Michael Bloch, MD  [facility]

## 2020-08-26 ENCOUNTER — ANTICOAGULATION MONITORING (OUTPATIENT)
Dept: VASCULAR LAB | Facility: MEDICAL CENTER | Age: 55
End: 2020-08-26

## 2020-08-26 DIAGNOSIS — I82.90 DEEP VEIN THROMBOSIS (DVT) OF NON-EXTREMITY VEIN, UNSPECIFIED CHRONICITY: ICD-10-CM

## 2020-08-26 LAB — INR PPP: 2.1 (ref 2–3.5)

## 2020-08-27 NOTE — PROGRESS NOTES
Anticoagulation Summary  As of 2020    INR goal:  2.0-3.0   TTR:  62.3 % (5.3 y)   INR used for dosin.10 (2020)   Warfarin maintenance plan:  7.5 mg (5 mg x 1.5) every Wed, Sat; 5 mg (5 mg x 1) all other days   Weekly warfarin total:  40 mg   Plan last modified:  Karen Ignacio, PharmD (3/29/2019)   Next INR check:  9/10/2020   Priority:  Maintenance   Target end date:  Indefinite    Indications    Deep vein thrombosis [453.40] [I82.409]             Anticoagulation Episode Summary     INR check location:  Home Draw    Preferred lab:      Send INR reminders to:      Comments:  Eloy MYRICK      Anticoagulation Care Providers     Provider Role Specialty Phone number    Bruna Ureña M.D. Referring Cardiology [phone]    Babita Sarmiento Responsible      [facility] Responsible  [phone]        Anticoagulation Patient Findings      INR  therapeutic.   Left a voice message for the patient, asked patient to please call the anticoagulation clinic if they have any signs/symptoms of bleeding and/or thrombosis or any changes to diet or medications.    Follow up appointment in 2 week(s)    Continue weekly warfarin dose as noted      Abhi Carlos, PharmD, MS, BCACP, LCC    This note was created using voice recognition software (Dragon). The accuracy of the dictation is limited by the abilities of the software. I have reviewed the note prior to signing, however some errors in grammar and context are still possible. If you have any questions related to this note please do not hesitate to contact our office.

## 2020-09-11 ENCOUNTER — ANTICOAGULATION MONITORING (OUTPATIENT)
Dept: VASCULAR LAB | Facility: MEDICAL CENTER | Age: 55
End: 2020-09-11

## 2020-09-11 DIAGNOSIS — I82.90 DEEP VEIN THROMBOSIS (DVT) OF NON-EXTREMITY VEIN, UNSPECIFIED CHRONICITY: ICD-10-CM

## 2020-09-11 LAB — INR PPP: 2.9 (ref 2–3.5)

## 2020-09-11 NOTE — PROGRESS NOTES
OP   Telephone Anticoagulation Service Note      Anticoagulation Summary  As of 2020    INR goal:  2.0-3.0   TTR:  62.6 % (5.3 y)   INR used for dosin.90 (2020)   Warfarin maintenance plan:  7.5 mg (5 mg x 1.5) every Wed, Sat; 5 mg (5 mg x 1) all other days   Weekly warfarin total:  40 mg   No change documented:  Babita Sarmiento   Plan last modified:  Karen Ignacio, PharmD (3/29/2019)   Next INR check:  2020   Priority:  Maintenance   Target end date:  Indefinite    Indications    Deep vein thrombosis [453.40] [I82.409]             Anticoagulation Episode Summary     INR check location:  Home Draw    Preferred lab:      Send INR reminders to:      Comments:  Eloy MYRICK      Anticoagulation Care Providers     Provider Role Specialty Phone number    Bruna Ureña M.D. Referring Cardiology [phone]    Babita Sarmiento Responsible      [facility]tico[facility] Responsible  [phone]        Anticoagulation Patient Findings     Left a message on the patient's voicemail today, informing the patient of a therapeutic INR of 2.9. Instructed the patient to call the clinic with any changes to diet or medications, with any signs/sx of bleeding or clotting or with any questions or concerns.     Patient instructed to continue with the current warfarin dosing regimen, and asked to follow up again in 2 weeks.     Ed TaylorD

## 2020-09-14 LAB
25(OH)D3+25(OH)D2 SERPL-MCNC: 62.5 NG/ML (ref 30–100)
ALBUMIN SERPL-MCNC: 4.6 G/DL (ref 3.8–4.9)
ALBUMIN/GLOB SERPL: 1.5 {RATIO} (ref 1.2–2.2)
ALP SERPL-CCNC: 63 IU/L (ref 39–117)
ALT SERPL-CCNC: 22 IU/L (ref 0–32)
AMBIG ABBREV CMP14 DFLT   977206: NORMAL
AMBIG ABBREV LP  977212: NORMAL
AST SERPL-CCNC: 30 IU/L (ref 0–40)
BASOPHILS # BLD AUTO: 0.1 X10E3/UL (ref 0–0.2)
BASOPHILS NFR BLD AUTO: 1 %
BILIRUB SERPL-MCNC: 0.3 MG/DL (ref 0–1.2)
BUN SERPL-MCNC: 15 MG/DL (ref 6–24)
BUN/CREAT SERPL: 16 (ref 9–23)
CALCIUM SERPL-MCNC: 9.5 MG/DL (ref 8.7–10.2)
CHLORIDE SERPL-SCNC: 101 MMOL/L (ref 96–106)
CHOLEST SERPL-MCNC: 137 MG/DL (ref 100–199)
CO2 SERPL-SCNC: 21 MMOL/L (ref 20–29)
CREAT SERPL-MCNC: 0.92 MG/DL (ref 0.57–1)
EOSINOPHIL # BLD AUTO: 0.2 X10E3/UL (ref 0–0.4)
EOSINOPHIL NFR BLD AUTO: 2 %
ERYTHROCYTE [DISTWIDTH] IN BLOOD BY AUTOMATED COUNT: 13.1 % (ref 11.7–15.4)
GLOBULIN SER CALC-MCNC: 3 G/DL (ref 1.5–4.5)
GLUCOSE SERPL-MCNC: 90 MG/DL (ref 65–99)
HCT VFR BLD AUTO: 44.9 % (ref 34–46.6)
HDLC SERPL-MCNC: 56 MG/DL
HGB BLD-MCNC: 15.4 G/DL (ref 11.1–15.9)
IMM GRANULOCYTES # BLD AUTO: 0 X10E3/UL (ref 0–0.1)
IMM GRANULOCYTES NFR BLD AUTO: 0 %
IMMATURE CELLS  115398: NORMAL
LABORATORY COMMENT REPORT: ABNORMAL
LDLC SERPL CALC-MCNC: 51 MG/DL (ref 0–99)
LYMPHOCYTES # BLD AUTO: 3.1 X10E3/UL (ref 0.7–3.1)
LYMPHOCYTES NFR BLD AUTO: 36 %
MCH RBC QN AUTO: 32.7 PG (ref 26.6–33)
MCHC RBC AUTO-ENTMCNC: 34.3 G/DL (ref 31.5–35.7)
MCV RBC AUTO: 95 FL (ref 79–97)
MONOCYTES # BLD AUTO: 0.6 X10E3/UL (ref 0.1–0.9)
MONOCYTES NFR BLD AUTO: 7 %
MORPHOLOGY BLD-IMP: NORMAL
NEUTROPHILS # BLD AUTO: 4.6 X10E3/UL (ref 1.4–7)
NEUTROPHILS NFR BLD AUTO: 54 %
NRBC BLD AUTO-RTO: NORMAL %
PLATELET # BLD AUTO: 265 X10E3/UL (ref 150–450)
POTASSIUM SERPL-SCNC: 4.6 MMOL/L (ref 3.5–5.2)
PROT SERPL-MCNC: 7.6 G/DL (ref 6–8.5)
RBC # BLD AUTO: 4.71 X10E6/UL (ref 3.77–5.28)
SODIUM SERPL-SCNC: 137 MMOL/L (ref 134–144)
T3FREE SERPL-MCNC: 2.8 PG/ML (ref 2–4.4)
T4 SERPL-MCNC: 8.5 UG/DL (ref 4.5–12)
THYROGLOB AB SERPL-ACNC: 113.8 IU/ML (ref 0–0.9)
THYROPEROXIDASE AB SERPL-ACNC: 275 IU/ML (ref 0–34)
TRIGL SERPL-MCNC: 186 MG/DL (ref 0–149)
TSH SERPL DL<=0.005 MIU/L-ACNC: 3.31 UIU/ML (ref 0.45–4.5)
VLDLC SERPL CALC-MCNC: 30 MG/DL (ref 5–40)
WBC # BLD AUTO: 8.7 X10E3/UL (ref 3.4–10.8)

## 2020-09-15 ENCOUNTER — OFFICE VISIT (OUTPATIENT)
Dept: VASCULAR LAB | Facility: MEDICAL CENTER | Age: 55
End: 2020-09-15
Attending: INTERNAL MEDICINE
Payer: COMMERCIAL

## 2020-09-15 VITALS
SYSTOLIC BLOOD PRESSURE: 137 MMHG | BODY MASS INDEX: 23.1 KG/M2 | HEART RATE: 68 BPM | HEIGHT: 67 IN | WEIGHT: 147.2 LBS | DIASTOLIC BLOOD PRESSURE: 66 MMHG

## 2020-09-15 DIAGNOSIS — E78.5 DYSLIPIDEMIA: ICD-10-CM

## 2020-09-15 DIAGNOSIS — E03.9 HYPOTHYROIDISM, UNSPECIFIED TYPE: ICD-10-CM

## 2020-09-15 DIAGNOSIS — F17.200 SMOKING: ICD-10-CM

## 2020-09-15 DIAGNOSIS — I10 ESSENTIAL HYPERTENSION: ICD-10-CM

## 2020-09-15 DIAGNOSIS — I82.90 DEEP VEIN THROMBOSIS (DVT) OF NON-EXTREMITY VEIN, UNSPECIFIED CHRONICITY: ICD-10-CM

## 2020-09-15 DIAGNOSIS — I71.03 DISSECTION OF THORACOABDOMINAL AORTA (HCC): ICD-10-CM

## 2020-09-15 PROCEDURE — 99212 OFFICE O/P EST SF 10 MIN: CPT

## 2020-09-15 PROCEDURE — 99214 OFFICE O/P EST MOD 30 MIN: CPT | Performed by: INTERNAL MEDICINE

## 2020-09-15 RX ORDER — LEVOTHYROXINE SODIUM 112 UG/1
112 TABLET ORAL
Qty: 90 TAB | Refills: 0
Start: 2020-09-15 | End: 2021-02-18 | Stop reason: SDUPTHER

## 2020-09-15 ASSESSMENT — ENCOUNTER SYMPTOMS
FOCAL WEAKNESS: 0
PALPITATIONS: 0
MYALGIAS: 0
DIZZINESS: 0
SPEECH CHANGE: 0
NERVOUS/ANXIOUS: 1
BACK PAIN: 1
FALLS: 0
COUGH: 0
SENSORY CHANGE: 0
DEPRESSION: 0
WEIGHT LOSS: 0
SHORTNESS OF BREATH: 0

## 2020-09-15 ASSESSMENT — FIBROSIS 4 INDEX: FIB4 SCORE: 1.279204298133662606

## 2020-09-15 NOTE — PROGRESS NOTES
"VASCULAR FOLLOW UP VISIT  Subjective:   Oumar Fisher is a 55 y.o. female who presents today 9/15/2020 for   Chief Complaint   Patient presents with   • Follow-Up     HPI:  Here for f/u of aortic dissection, dyslipidemia, htn.   BPs in 120s/70s  Daughter was diagnosed with moyamoya - had brain surgery in [place]  Quit aug 1 - no tobacco since  Doesn't want quit date  Doesn't want ntg or meds  No change in bp meds  No myalgias on statin - changed to rosuvastatin  No chest, back or abd pain.   Has missed medications on occasion  No bleeding on warfarin    Social History     Tobacco Use   • Smoking status: Current Every Day Smoker   Substance Use Topics   • Alcohol use: Not on file   • Drug use: Not on file     DIET AND EXERCISE:  Weight Change: up about 8 pounds and stable  Diet: common adult  Exercise: minimal exercise   Smoking - quit     Review of Systems   Constitutional: Negative for malaise/fatigue and weight loss.   Respiratory: Negative for cough and shortness of breath.    Cardiovascular: Negative for chest pain, palpitations and leg swelling.   Musculoskeletal: Positive for back pain. Negative for falls and myalgias.   Neurological: Negative for dizziness, sensory change, speech change and focal weakness.   Psychiatric/Behavioral: Negative for depression. The patient is nervous/anxious.       Objective:     Vitals:    09/15/20 1042 09/15/20 1053   BP: 131/65 137/66   BP Location: Left arm Left arm   Patient Position: Sitting Sitting   BP Cuff Size: Adult Small adult   Pulse: 70 68   Weight: 66.8 kg (147 lb 3.2 oz)    Height: 1.69 m (5' 6.54\")       Body mass index is 23.38 kg/m².  Physical Exam  Vitals signs reviewed.   Constitutional:       General: She is not in acute distress.     Appearance: She is well-developed. She is not diaphoretic.   Cardiovascular:      Rate and Rhythm: Normal rate and regular rhythm.      Heart sounds: Normal heart sounds. No murmur. No friction rub. No gallop.  "   Pulmonary:      Effort: No respiratory distress.      Breath sounds: No wheezing or rales.   Neurological:      General: No focal deficit present.      Mental Status: She is oriented to person, place, and time.      Cranial Nerves: No cranial nerve deficit.      Gait: Gait normal.   Psychiatric:         Mood and Affect: Mood normal.         Behavior: Behavior normal.     Data review  Lab Results   Component Value Date    CHOLSTRLTOT 137 09/10/2020    HDL 56 09/10/2020    TRIGLYCERIDE 186 (H) 09/10/2020      Lab Results   Component Value Date    INR 2.90 09/11/2020         Blood work May 2020 from lab core  Glucose 90  GFR 70  Total cholesterol 183, triglycerides 259, HDL 60, LDL 71,  TSH 5.29  Urine for albumin less than 3    CTA October 2019  Endovascular intervention stable  No endoleak     Echo RDC aug 2020  Normal ef  Mild AI and MR    Lab Results   Component Value Date    SODIUM 137 09/10/2020    POTASSIUM 4.6 09/10/2020    CHLORIDE 101 09/10/2020    CO2 21 09/10/2020    GLUCOSE 90 09/10/2020    BUN 15 09/10/2020    CREATININE 0.92 09/10/2020    BUNCREATRAT 16 09/10/2020    IFAFRICA 82 09/10/2020    IFNOTAFR 71 09/10/2020      Lab Results   Component Value Date    TSH 3.310 09/10/2020      Lab Results   Component Value Date    WBC 8.7 09/10/2020    RBC 4.71 09/10/2020    HEMOGLOBIN 15.4 09/10/2020    HEMATOCRIT 44.9 09/10/2020    MCV 95 09/10/2020    MCH 32.7 09/10/2020    MCHC 34.3 09/10/2020      Medical Decision Making:  Today's Assessment / Status / Plan:     1. Deep vein thrombosis (DVT) of non-extremity vein, unspecified chronicity     2. Essential hypertension  Comp Metabolic Panel    MICROALBUMIN CREAT RATIO URINE   3. Dyslipidemia  Comp Metabolic Panel    Lipid Profile   4. Dissection of thoracoabdominal aorta (HCC)     5. Smoking     6. Hypothyroidism, unspecified type  levothyroxine (SYNTHROID) 112 MCG Tab    TSH     Patient Type: Secondary Prevention    Etiology of Established CVD if Present:   1)  Aortic dissection status post graft  2) valvular heart disease without previous intervention  3) DVT    Lipid Management: Qualifies for Statin Therapy   Currently on Statin: Yes  Would like to see LDL less than 70 and non HDL less than 100  At goal since change to rosuvastatic  Plan:  -continue rosuvastatin 20 mg today once she runs out of pravastatin  -Repeat lipid panel 6-12 mo    Blood Pressure Management:  ACC-AHA blood pressure goal less than 130/80  Home bp under reasonable control per her report  A bit elevated in office today  Has gained some weight which may have led to BP increase  GFR normal and no albuminuria  Plan:  -Intensify therapy lifestyle change  -Continue metoprolol at current dose  -Continue losartan at current dose  -Continue spironolactone at current dose  -Continue to follow blood pressures at home  -Intensify therapy if above goal next visit    Glycemic Status: Normal  -Recheck fasting glucose prior to next visit    Anti-Platelet/Anti-Coagulant Tx: yes  -Continue indefinite anticoagulation  -Follow-up in anticoagulation clinic    Smoking: Quit recently  Uninterested medications or NRT  -continue  Complete cessation  -Continue to address at every visit    Physical Activity: Recommended more walking    Weight Management and Nutrition: More attention to diet with slow steady weight loss    Other:     1) Aortic dissection status post graft -stable on follow-up imaging.  Continue medical management.  Recheck CTA at 2-year intervals    2) valvular heart disease without previous intervention -remains asymptomatic. Limited on echo. Does not need regular follow up echo    3) DVT -continue indefinite anticoagulation as above.  Quit smoking    4) hypothyroidism - perhaps a bit symptomatic..  TSH elevated consistent with under treatment.  Has not been completely adherent with medications. Increase levo to 8 pills per day. Recheck tsh before next visit and continue to adjust as needed. F/u with  pcp    Instructed to follow-up with PCP for remainder of adult medical needs: yes  We will partner with other providers in the management of established vascular disease and cardiometabolic risk factors.    Studies to Be Obtained: CTA thoracoabdominal aorta November 2021    Labs to Be Obtained: As above prior to next visit    Follow up in: 6 months    Michael J Bloch, M.D.     CC: Jaquan Horne

## 2020-09-25 ENCOUNTER — ANTICOAGULATION MONITORING (OUTPATIENT)
Dept: VASCULAR LAB | Facility: MEDICAL CENTER | Age: 55
End: 2020-09-25

## 2020-09-25 DIAGNOSIS — I82.90 DEEP VEIN THROMBOSIS (DVT) OF NON-EXTREMITY VEIN, UNSPECIFIED CHRONICITY: ICD-10-CM

## 2020-09-25 LAB — INR PPP: 1.7 (ref 2–3.5)

## 2020-09-25 NOTE — PROGRESS NOTES
Anticoagulation Summary  As of 2020    INR goal:  2.0-3.0   TTR:  62.7 % (5.4 y)   INR used for dosin.70 (2020)   Warfarin maintenance plan:  7.5 mg (5 mg x 1.5) every Wed, Sat; 5 mg (5 mg x 1) all other days   Weekly warfarin total:  40 mg   Plan last modified:  Karen Ignacio, PharmD (3/29/2019)   Next INR check:     Priority:  Maintenance   Target end date:  Indefinite    Indications    Deep vein thrombosis [453.40] [I82.409]             Anticoagulation Episode Summary     INR check location:  Home Draw    Preferred lab:      Send INR reminders to:      Comments:  Eloy MYRICK      Anticoagulation Care Providers     Provider Role Specialty Phone number    Bruna Ureña M.D. Referring Cardiology [phone]    Babita Sarmiento Responsible      [facility] Anticoagulation Services Responsible  [phone]        Anticoagulation Patient Findings          Left voicemail message to report a sub  therapeutic INR of 1.7.  Pt to BOLUS WITH 7.5mg tonight, then continue with current warfarin dosing regimen. Requested pt contact the clinic for any s/s of unusual bleeding, bruising, clotting or any changes to diet or medication.  Follow up in 2 weeks, to reduce the risk of adverse events related to this high risk medication, warfarin.    Kiesha Barr, Clinical Pharmacist

## 2020-10-09 ENCOUNTER — TELEPHONE (OUTPATIENT)
Dept: VASCULAR LAB | Facility: MEDICAL CENTER | Age: 55
End: 2020-10-09

## 2020-10-09 ENCOUNTER — ANTICOAGULATION MONITORING (OUTPATIENT)
Dept: VASCULAR LAB | Facility: MEDICAL CENTER | Age: 55
End: 2020-10-09

## 2020-10-09 DIAGNOSIS — I82.90 DEEP VEIN THROMBOSIS (DVT) OF NON-EXTREMITY VEIN, UNSPECIFIED CHRONICITY: ICD-10-CM

## 2020-10-09 LAB — INR PPP: 1.8 (ref 2–3.5)

## 2020-10-09 NOTE — PROGRESS NOTES
Anticoagulation Summary  As of 10/9/2020    INR goal:  2.0-3.0   TTR:  62.2 % (5.4 y)   INR used for dosin.80 (10/9/2020)   Warfarin maintenance plan:  7.5 mg (5 mg x 1.5) every Mon, Wed, Fri; 5 mg (5 mg x 1) all other days   Weekly warfarin total:  42.5 mg   Plan last modified:  Abhi Carlos PharmD (10/9/2020)   Next INR check:  10/23/2020   Priority:  Maintenance   Target end date:  Indefinite    Indications    Deep vein thrombosis [453.40] [I82.409]             Anticoagulation Episode Summary     INR check location:  Home Draw    Preferred lab:      Send INR reminders to:      Comments:  Eloy MYRICK      Anticoagulation Care Providers     Provider Role Specialty Phone number    Bruna Ureña M.D. Referring Cardiology [phone]    [facility] Responsible  [phone]    Jaquan Horne M.D.  Family Medicine [phone]        Anticoagulation Patient Findings      INR  sub-therapeutic.   Left a voice message for the patient, asked patient to please call the anticoagulation clinic if they have any signs/symptoms of bleeding and/or thrombosis or any changes to diet or medications.    Follow up appointment in 2 week(s)    Increase weekly warfarin dose as noted      Abhi Carlos PharmD, MS, BCACP, LCC    This note was created using voice recognition software (Dragon). The accuracy of the dictation is limited by the abilities of the software. I have reviewed the note prior to signing, however some errors in grammar and context are still possible. If you have any questions related to this note please do not hesitate to contact our office.

## 2020-10-26 ENCOUNTER — ANTICOAGULATION MONITORING (OUTPATIENT)
Dept: VASCULAR LAB | Facility: MEDICAL CENTER | Age: 55
End: 2020-10-26

## 2020-10-26 DIAGNOSIS — I82.90 DEEP VEIN THROMBOSIS (DVT) OF NON-EXTREMITY VEIN, UNSPECIFIED CHRONICITY: ICD-10-CM

## 2020-10-26 LAB — INR PPP: 2.7 (ref 2–3.5)

## 2020-10-27 NOTE — PROGRESS NOTES
OP Telephone Anticoagulation Service Note    Date: 10/26/2020      Anticoagulation Summary  As of 10/26/2020    INR goal:  2.0-3.0   TTR:  62.4 % (5.4 y)   INR used for dosin.70 (10/26/2020)   Warfarin maintenance plan:  7.5 mg (5 mg x 1.5) every Mon, Wed, Fri; 5 mg (5 mg x 1) all other days   Weekly warfarin total:  42.5 mg   Plan last modified:  Claudia EagleD (10/9/2020)   Next INR check:  2020   Priority:  Maintenance   Target end date:  Indefinite    Indications    Deep vein thrombosis [453.40] [I82.409]             Anticoagulation Episode Summary     INR check location:  Home Draw    Preferred lab:      Send INR reminders to:      Comments:  Eloy MYRICK      Anticoagulation Care Providers     Provider Role Specialty Phone number    Bruna Ureña M.D. [facility] Cardiology [phone]    [facility] Anticoagulation Services Responsible  [phone]    Jaquan Horne M.D.  Family Medicine [phone]        Anticoagulation Patient Findings        Plan: INR is in range. Left message on patient's answering machine/voicemail. Instructed patient to call back with any concerns regarding any unusual bleeding or bruising, any medication or diet changes or any signs or symptoms of thrombosis. Instructed patient to resume medication as outlined above. Patient to follow up in 2 week(s).             Esther Cottrell, PharmD

## 2020-11-09 ENCOUNTER — ANTICOAGULATION MONITORING (OUTPATIENT)
Dept: VASCULAR LAB | Facility: MEDICAL CENTER | Age: 55
End: 2020-11-09

## 2020-11-09 DIAGNOSIS — I82.409 DEEP VEIN THROMBOSIS (DVT) OF LOWER EXTREMITY, UNSPECIFIED CHRONICITY, UNSPECIFIED LATERALITY, UNSPECIFIED VEIN (HCC): ICD-10-CM

## 2020-11-09 LAB — INR PPP: 2.9 (ref 2–3.5)

## 2020-11-10 NOTE — PROGRESS NOTES
OP   Telephone Anticoagulation Service Note      Anticoagulation Summary  As of 2020    INR goal:  2.0-3.0   TTR:  62.6 % (5.5 y)   INR used for dosin.90 (2020)   Warfarin maintenance plan:  7.5 mg (5 mg x 1.5) every Mon, Wed, Fri; 5 mg (5 mg x 1) all other days   Weekly warfarin total:  42.5 mg   Plan last modified:  Claudia EagleD (10/9/2020)   Next INR check:  2020   Priority:  Maintenance   Target end date:  Indefinite    Indications    Deep vein thrombosis [453.40] [I82.409]             Anticoagulation Episode Summary     INR check location:  Home Draw    Preferred lab:      Send INR reminders to:      Comments:  Eloy MYRICK      Anticoagulation Care Providers     Provider Role Specialty Phone number    Bruna Ureña M.D. Referring Cardiology [phone]    [facility] Anticoagulation Services Responsible  [phone]    Jaquan Horne M.D.  Family Medicine [phone]        Anticoagulation Patient Findings    Left a message on the patient's voicemail today, informing the patient of a therapeutic INR of 2.9.   Instructed the patient to call the clinic with any changes to diet or medications, with any signs/sx of bleeding or clotting or with any questions or concerns.     Patient instructed to continue with the current warfarin dosing regimen, and asked to follow up again in 2 weeks.     Ed TaylorD

## 2020-11-15 DIAGNOSIS — I71.20 ANEURYSM OF THORACIC AORTA (HCC): ICD-10-CM

## 2020-11-15 DIAGNOSIS — I71.00 DISSECTING ANEURYSM OF AORTA (HCC): ICD-10-CM

## 2020-11-15 DIAGNOSIS — I10 ESSENTIAL HYPERTENSION: ICD-10-CM

## 2020-11-16 DIAGNOSIS — I71.00 DISSECTING ANEURYSM OF AORTA (HCC): ICD-10-CM

## 2020-11-16 DIAGNOSIS — I10 ESSENTIAL HYPERTENSION: ICD-10-CM

## 2020-11-16 DIAGNOSIS — I71.20 ANEURYSM OF THORACIC AORTA (HCC): ICD-10-CM

## 2020-11-16 RX ORDER — SPIRONOLACTONE 25 MG/1
TABLET ORAL
Qty: 90 TAB | Refills: 3 | OUTPATIENT
Start: 2020-11-16

## 2020-11-16 RX ORDER — SPIRONOLACTONE 25 MG/1
25 TABLET ORAL DAILY
Qty: 90 TAB | Refills: 3 | Status: SHIPPED | OUTPATIENT
Start: 2020-11-16 | End: 2021-08-25 | Stop reason: SDUPTHER

## 2020-11-16 RX ORDER — LOSARTAN POTASSIUM 100 MG/1
TABLET ORAL
Qty: 90 TAB | Refills: 3 | OUTPATIENT
Start: 2020-11-16

## 2020-11-16 RX ORDER — LOSARTAN POTASSIUM 100 MG/1
100 TABLET ORAL DAILY
Qty: 90 TAB | Refills: 3 | Status: SHIPPED | OUTPATIENT
Start: 2020-11-16 | End: 2021-08-25 | Stop reason: SDUPTHER

## 2020-11-16 RX ORDER — METOPROLOL SUCCINATE 100 MG/1
TABLET, EXTENDED RELEASE ORAL
Qty: 90 TAB | Refills: 3 | OUTPATIENT
Start: 2020-11-16

## 2020-11-16 RX ORDER — METOPROLOL SUCCINATE 100 MG/1
100 TABLET, EXTENDED RELEASE ORAL DAILY
Qty: 90 TAB | Refills: 3 | Status: SHIPPED | OUTPATIENT
Start: 2020-11-16 | End: 2021-08-25 | Stop reason: SDUPTHER

## 2020-11-17 DIAGNOSIS — I71.20 ANEURYSM OF THORACIC AORTA (HCC): ICD-10-CM

## 2020-11-17 DIAGNOSIS — I71.00 DISSECTING ANEURYSM OF AORTA (HCC): ICD-10-CM

## 2020-11-17 DIAGNOSIS — I10 ESSENTIAL HYPERTENSION: ICD-10-CM

## 2020-11-17 RX ORDER — SPIRONOLACTONE 25 MG/1
25 TABLET ORAL DAILY
Qty: 90 TAB | Refills: 3 | OUTPATIENT
Start: 2020-11-17

## 2020-11-17 RX ORDER — LOSARTAN POTASSIUM 100 MG/1
100 TABLET ORAL DAILY
Qty: 90 TAB | Refills: 3 | OUTPATIENT
Start: 2020-11-17

## 2020-11-17 RX ORDER — METOPROLOL SUCCINATE 100 MG/1
100 TABLET, EXTENDED RELEASE ORAL DAILY
Qty: 90 TAB | Refills: 3 | OUTPATIENT
Start: 2020-11-17

## 2020-11-24 ENCOUNTER — ANTICOAGULATION MONITORING (OUTPATIENT)
Dept: CARDIOLOGY | Facility: MEDICAL CENTER | Age: 55
End: 2020-11-24

## 2020-11-24 LAB — INR PPP: 2.7 (ref 2–3.5)

## 2020-11-25 NOTE — PROGRESS NOTES
Anticoagulation Summary  As of 2020    INR goal:  2.0-3.0   TTR:  62.9 % (5.5 y)   INR used for dosin.70 (2020)   Warfarin maintenance plan:  7.5 mg (5 mg x 1.5) every Mon, Wed, Fri; 5 mg (5 mg x 1) all other days   Weekly warfarin total:  42.5 mg   Plan last modified:  Abhi Carlos PharmD (10/9/2020)   Next INR check:  2020   Priority:  Maintenance   Target end date:  Indefinite    Indications    Deep vein thrombosis [453.40] [I82.409]             Anticoagulation Episode Summary     INR check location:  Home Draw    Preferred lab:      Send INR reminders to:      Comments:  Eloy MYRICK      Anticoagulation Care Providers     Provider Role Specialty Phone number    Bruna Ureña M.D. Referring Cardiology [phone]    [facility] Responsible  [phone]    Jaquan Horne M.D.  Family Medicine [phone]        Anticoagulation Patient Findings      INR  therapeutic.   Left a voice message for the patient, asked patient to please call the anticoagulation clinic if they have any signs/symptoms of bleeding and/or thrombosis or any changes to diet or medications.    Follow up appointment in 2 week(s)    Continue the same warfarin dose, as noted above.       Abhi Carlos PharmD, MS, BCACP, LCC    This note was created using voice recognition software (Dragon). The accuracy of the dictation is limited by the abilities of the software. I have reviewed the note prior to signing, however some errors in grammar and context are still possible. If you have any questions related to this note please do not hesitate to contact our office.

## 2020-12-10 ENCOUNTER — ANTICOAGULATION MONITORING (OUTPATIENT)
Dept: VASCULAR LAB | Facility: MEDICAL CENTER | Age: 55
End: 2020-12-10

## 2020-12-10 LAB — INR PPP: 3.1 (ref 2–3.5)

## 2020-12-10 NOTE — PROGRESS NOTES
Anticoagulation Summary  As of 12/10/2020    INR goal:  2.0-3.0   TTR:  63.0 % (5.6 y)   INR used for dosing:  3.10 (12/10/2020)   Warfarin maintenance plan:  7.5 mg (5 mg x 1.5) every Mon, Wed, Fri; 5 mg (5 mg x 1) all other days   Weekly warfarin total:  42.5 mg   Plan last modified:  Abhi Carlos PharmD (10/9/2020)   Next INR check:  12/24/2020   Priority:  Maintenance   Target end date:  Indefinite    Indications    Deep vein thrombosis [453.40] [I82.409]             Anticoagulation Episode Summary     INR check location:  Home Draw    Preferred lab:      Send INR reminders to:      Comments:  Eloy MYRICK      Anticoagulation Care Providers     Provider Role Specialty Phone number    Bruna Ureña M.D. Referring Cardiology [phone]    [facility] Anticoagulation [facility] Responsible  [phone]    Jaquan Horne M.D.  Family Medicine [phone]        Anticoagulation Patient Findings        Spoke to patient on the phone.   INR  supra-therapeutic.   Denies signs/symptoms of bleeding and/or thrombosis.   Denies changes to diet or medications.   Follow up appointment in 2 week(s).    2.5mg today then continue the same warfarin dose, as noted above.       Abhi Carlos PharmD, MS, BCACP, LCC    This note was created using voice recognition software (Dragon). The accuracy of the dictation is limited by the abilities of the software. I have reviewed the note prior to signing, however some errors in grammar and context are still possible. If you have any questions related to this note please do not hesitate to contact our office.

## 2020-12-28 LAB — INR PPP: 1.5 (ref 2–3.5)

## 2020-12-29 ENCOUNTER — ANTICOAGULATION MONITORING (OUTPATIENT)
Dept: VASCULAR LAB | Facility: MEDICAL CENTER | Age: 55
End: 2020-12-29

## 2020-12-29 DIAGNOSIS — I82.409 DEEP VEIN THROMBOSIS (DVT) OF LOWER EXTREMITY, UNSPECIFIED CHRONICITY, UNSPECIFIED LATERALITY, UNSPECIFIED VEIN (HCC): ICD-10-CM

## 2020-12-29 NOTE — PROGRESS NOTES
OP   Telephone Anticoagulation Service Note      Anticoagulation Summary  As of 2020    INR goal:  2.0-3.0   TTR:  63.0 % (5.6 y)   INR used for dosin.50 (2020)   Warfarin maintenance plan:  7.5 mg (5 mg x 1.5) every Mon, Wed, Fri; 5 mg (5 mg x 1) all other days   Weekly warfarin total:  42.5 mg   Plan last modified:  Abhi Carlos PharmD (10/9/2020)   Next INR check:  2021   Priority:  Maintenance   Target end date:  Indefinite    Indications    Deep vein thrombosis [453.40] [I82.409]             Anticoagulation Episode Summary     INR check location:  Home Draw    Preferred lab:      Send INR reminders to:      Comments:  Eloy MYRICK      Anticoagulation Care Providers     Provider Role Specialty Phone number    Bruna Ureña M.D. Referring Cardiology [phone]    [facility] Anticoagulation Services Responsible  [phone]    Jaquan Horne M.D.  Family Medicine [phone]        Anticoagulation Patient Findings    Left a message on the patient's voicemail today, informing the patient of a SUB-therapeutic INR of 1.5.  Unable to verify dosing or any interval changes, but instructed the patient to call the clinic with any changes to diet or medications, with any signs/sx of bleeding or clotting or with any questions or concerns.     Patient instructed to bolus with 10mg TONIGHT ONLY, as a one time boost dose, then to resume her current dosing regimen.   Patient asked to retest again in 2 weeks.     Ed TaylorD

## 2021-01-12 DIAGNOSIS — Z79.01 CHRONIC ANTICOAGULATION: ICD-10-CM

## 2021-01-21 ENCOUNTER — TELEPHONE (OUTPATIENT)
Dept: VASCULAR LAB | Facility: MEDICAL CENTER | Age: 56
End: 2021-01-21

## 2021-01-21 NOTE — TELEPHONE ENCOUNTER
Pt called to report that she will NOT test for the month of January.  Pt does not have insurance.  She does have test strips and a home meter.  Kiesha Barr, Clinical Pharmacist, CDE, CACP

## 2021-02-06 ENCOUNTER — APPOINTMENT (OUTPATIENT)
Dept: RADIOLOGY | Facility: MEDICAL CENTER | Age: 56
DRG: 543 | End: 2021-02-06
Attending: EMERGENCY MEDICINE
Payer: COMMERCIAL

## 2021-02-06 ENCOUNTER — OFFICE VISIT (OUTPATIENT)
Dept: URGENT CARE | Facility: CLINIC | Age: 56
End: 2021-02-06
Payer: COMMERCIAL

## 2021-02-06 ENCOUNTER — HOSPITAL ENCOUNTER (INPATIENT)
Facility: MEDICAL CENTER | Age: 56
LOS: 4 days | DRG: 543 | End: 2021-02-10
Attending: EMERGENCY MEDICINE | Admitting: STUDENT IN AN ORGANIZED HEALTH CARE EDUCATION/TRAINING PROGRAM
Payer: COMMERCIAL

## 2021-02-06 VITALS
BODY MASS INDEX: 21.97 KG/M2 | RESPIRATION RATE: 18 BRPM | TEMPERATURE: 98.5 F | HEART RATE: 79 BPM | HEIGHT: 67 IN | WEIGHT: 140 LBS | SYSTOLIC BLOOD PRESSURE: 168 MMHG | DIASTOLIC BLOOD PRESSURE: 82 MMHG | OXYGEN SATURATION: 98 %

## 2021-02-06 DIAGNOSIS — M48.54XA NONTRAUMATIC COMPRESSION FRACTURE OF T11 VERTEBRA, INITIAL ENCOUNTER (HCC): ICD-10-CM

## 2021-02-06 DIAGNOSIS — M54.9 SEVERE BACK PAIN: ICD-10-CM

## 2021-02-06 DIAGNOSIS — S32.10XA CLOSED FRACTURE OF SACRUM, UNSPECIFIED PORTION OF SACRUM, INITIAL ENCOUNTER (HCC): ICD-10-CM

## 2021-02-06 DIAGNOSIS — S22.080A CLOSED WEDGE COMPRESSION FRACTURE OF T11 VERTEBRA, INITIAL ENCOUNTER (HCC): ICD-10-CM

## 2021-02-06 DIAGNOSIS — I71.20 THORACIC AORTIC ANEURYSM WITHOUT RUPTURE (HCC): ICD-10-CM

## 2021-02-06 DIAGNOSIS — T14.8XXD WOUND HEALING, DELAYED: ICD-10-CM

## 2021-02-06 DIAGNOSIS — R79.1 SUPRATHERAPEUTIC INR: ICD-10-CM

## 2021-02-06 DIAGNOSIS — S32.10XA CLOSED FRACTURE OF SACRUM, UNSPECIFIED FRACTURE MORPHOLOGY, INITIAL ENCOUNTER (HCC): ICD-10-CM

## 2021-02-06 DIAGNOSIS — I10 HYPERTENSION, UNSPECIFIED TYPE: ICD-10-CM

## 2021-02-06 DIAGNOSIS — R15.9 FULL INCONTINENCE OF FECES: ICD-10-CM

## 2021-02-06 DIAGNOSIS — R20.0 NUMBNESS OF ANTERIOR THIGH: ICD-10-CM

## 2021-02-06 PROBLEM — G89.29 CHRONIC LOW BACK PAIN: Status: ACTIVE | Noted: 2021-02-06

## 2021-02-06 PROBLEM — M54.50 CHRONIC LOW BACK PAIN: Status: ACTIVE | Noted: 2021-02-06

## 2021-02-06 LAB
25(OH)D3 SERPL-MCNC: 70 NG/ML (ref 30–100)
ALBUMIN SERPL BCP-MCNC: 3.9 G/DL (ref 3.2–4.9)
ALBUMIN/GLOB SERPL: 1.1 G/DL
ALP SERPL-CCNC: 92 U/L (ref 30–99)
ALT SERPL-CCNC: 12 U/L (ref 2–50)
ANION GAP SERPL CALC-SCNC: 14 MMOL/L (ref 7–16)
APAP SERPL-MCNC: <5 UG/ML (ref 10–30)
APPEARANCE UR: CLEAR
APTT PPP: 66.2 SEC (ref 24.7–36)
AST SERPL-CCNC: 19 U/L (ref 12–45)
BACTERIA #/AREA URNS HPF: ABNORMAL /HPF
BASOPHILS # BLD AUTO: 0.6 % (ref 0–1.8)
BASOPHILS # BLD: 0.04 K/UL (ref 0–0.12)
BILIRUB SERPL-MCNC: 0.5 MG/DL (ref 0.1–1.5)
BILIRUB UR QL STRIP.AUTO: NEGATIVE
BUN SERPL-MCNC: 14 MG/DL (ref 8–22)
CALCIUM SERPL-MCNC: 9.7 MG/DL (ref 8.5–10.5)
CHLORIDE SERPL-SCNC: 106 MMOL/L (ref 96–112)
CO2 SERPL-SCNC: 18 MMOL/L (ref 20–33)
COLOR UR: YELLOW
CREAT SERPL-MCNC: 0.74 MG/DL (ref 0.5–1.4)
EOSINOPHIL # BLD AUTO: 0.13 K/UL (ref 0–0.51)
EOSINOPHIL NFR BLD: 1.9 % (ref 0–6.9)
EPI CELLS #/AREA URNS HPF: ABNORMAL /HPF
ERYTHROCYTE [DISTWIDTH] IN BLOOD BY AUTOMATED COUNT: 46.2 FL (ref 35.9–50)
EST. AVERAGE GLUCOSE BLD GHB EST-MCNC: 114 MG/DL
GLOBULIN SER CALC-MCNC: 3.7 G/DL (ref 1.9–3.5)
GLUCOSE SERPL-MCNC: 70 MG/DL (ref 65–99)
GLUCOSE UR STRIP.AUTO-MCNC: NEGATIVE MG/DL
HBA1C MFR BLD: 5.6 % (ref 0–5.6)
HCT VFR BLD AUTO: 40.2 % (ref 37–47)
HGB BLD-MCNC: 13.6 G/DL (ref 12–16)
HYALINE CASTS #/AREA URNS LPF: ABNORMAL /LPF
IMM GRANULOCYTES # BLD AUTO: 0.02 K/UL (ref 0–0.11)
IMM GRANULOCYTES NFR BLD AUTO: 0.3 % (ref 0–0.9)
INR PPP: 3.75 (ref 0.87–1.13)
KETONES UR STRIP.AUTO-MCNC: ABNORMAL MG/DL
LACTATE BLD-SCNC: 1.4 MMOL/L (ref 0.5–2)
LEUKOCYTE ESTERASE UR QL STRIP.AUTO: NEGATIVE
LYMPHOCYTES # BLD AUTO: 2.28 K/UL (ref 1–4.8)
LYMPHOCYTES NFR BLD: 32.5 % (ref 22–41)
MCH RBC QN AUTO: 31.6 PG (ref 27–33)
MCHC RBC AUTO-ENTMCNC: 33.8 G/DL (ref 33.6–35)
MCV RBC AUTO: 93.5 FL (ref 81.4–97.8)
MICRO URNS: ABNORMAL
MONOCYTES # BLD AUTO: 0.47 K/UL (ref 0–0.85)
MONOCYTES NFR BLD AUTO: 6.7 % (ref 0–13.4)
NEUTROPHILS # BLD AUTO: 4.07 K/UL (ref 2–7.15)
NEUTROPHILS NFR BLD: 58 % (ref 44–72)
NITRITE UR QL STRIP.AUTO: NEGATIVE
NRBC # BLD AUTO: 0 K/UL
NRBC BLD-RTO: 0 /100 WBC
PH UR STRIP.AUTO: 5 [PH] (ref 5–8)
PLATELET # BLD AUTO: 306 K/UL (ref 164–446)
PMV BLD AUTO: 10.4 FL (ref 9–12.9)
POTASSIUM SERPL-SCNC: 4.3 MMOL/L (ref 3.6–5.5)
PROT SERPL-MCNC: 7.6 G/DL (ref 6–8.2)
PROT UR QL STRIP: NEGATIVE MG/DL
PROTHROMBIN TIME: 38.3 SEC (ref 12–14.6)
RBC # BLD AUTO: 4.3 M/UL (ref 4.2–5.4)
RBC # URNS HPF: ABNORMAL /HPF
RBC UR QL AUTO: ABNORMAL
SARS-COV-2 RNA RESP QL NAA+PROBE: NOTDETECTED
SODIUM SERPL-SCNC: 138 MMOL/L (ref 135–145)
SP GR UR STRIP.AUTO: >=1.045
SPECIMEN SOURCE: NORMAL
UROBILINOGEN UR STRIP.AUTO-MCNC: 0.2 MG/DL
WBC # BLD AUTO: 7 K/UL (ref 4.8–10.8)
WBC #/AREA URNS HPF: ABNORMAL /HPF

## 2021-02-06 PROCEDURE — 87040 BLOOD CULTURE FOR BACTERIA: CPT

## 2021-02-06 PROCEDURE — 99221 1ST HOSP IP/OBS SF/LOW 40: CPT | Performed by: STUDENT IN AN ORGANIZED HEALTH CARE EDUCATION/TRAINING PROGRAM

## 2021-02-06 PROCEDURE — 80143 DRUG ASSAY ACETAMINOPHEN: CPT

## 2021-02-06 PROCEDURE — A9270 NON-COVERED ITEM OR SERVICE: HCPCS | Performed by: STUDENT IN AN ORGANIZED HEALTH CARE EDUCATION/TRAINING PROGRAM

## 2021-02-06 PROCEDURE — 83036 HEMOGLOBIN GLYCOSYLATED A1C: CPT

## 2021-02-06 PROCEDURE — U0003 INFECTIOUS AGENT DETECTION BY NUCLEIC ACID (DNA OR RNA); SEVERE ACUTE RESPIRATORY SYNDROME CORONAVIRUS 2 (SARS-COV-2) (CORONAVIRUS DISEASE [COVID-19]), AMPLIFIED PROBE TECHNIQUE, MAKING USE OF HIGH THROUGHPUT TECHNOLOGIES AS DESCRIBED BY CMS-2020-01-R: HCPCS

## 2021-02-06 PROCEDURE — L0464 TLSO 4MOD SACRO-SCAP PRE: HCPCS

## 2021-02-06 PROCEDURE — 82306 VITAMIN D 25 HYDROXY: CPT

## 2021-02-06 PROCEDURE — 85730 THROMBOPLASTIN TIME PARTIAL: CPT

## 2021-02-06 PROCEDURE — 96375 TX/PRO/DX INJ NEW DRUG ADDON: CPT

## 2021-02-06 PROCEDURE — 2W35X3Z IMMOBILIZATION OF BACK USING BRACE: ICD-10-PCS | Performed by: EMERGENCY MEDICINE

## 2021-02-06 PROCEDURE — 700117 HCHG RX CONTRAST REV CODE 255: Performed by: EMERGENCY MEDICINE

## 2021-02-06 PROCEDURE — 700111 HCHG RX REV CODE 636 W/ 250 OVERRIDE (IP): Performed by: EMERGENCY MEDICINE

## 2021-02-06 PROCEDURE — 700111 HCHG RX REV CODE 636 W/ 250 OVERRIDE (IP): Performed by: STUDENT IN AN ORGANIZED HEALTH CARE EDUCATION/TRAINING PROGRAM

## 2021-02-06 PROCEDURE — 85610 PROTHROMBIN TIME: CPT

## 2021-02-06 PROCEDURE — 36415 COLL VENOUS BLD VENIPUNCTURE: CPT

## 2021-02-06 PROCEDURE — 96376 TX/PRO/DX INJ SAME DRUG ADON: CPT

## 2021-02-06 PROCEDURE — 74175 CTA ABDOMEN W/CONTRAST: CPT

## 2021-02-06 PROCEDURE — 700102 HCHG RX REV CODE 250 W/ 637 OVERRIDE(OP): Performed by: STUDENT IN AN ORGANIZED HEALTH CARE EDUCATION/TRAINING PROGRAM

## 2021-02-06 PROCEDURE — 85025 COMPLETE CBC W/AUTO DIFF WBC: CPT

## 2021-02-06 PROCEDURE — 83605 ASSAY OF LACTIC ACID: CPT

## 2021-02-06 PROCEDURE — 99205 OFFICE O/P NEW HI 60 MIN: CPT | Performed by: PHYSICIAN ASSISTANT

## 2021-02-06 PROCEDURE — 81001 URINALYSIS AUTO W/SCOPE: CPT

## 2021-02-06 PROCEDURE — 700111 HCHG RX REV CODE 636 W/ 250 OVERRIDE (IP)

## 2021-02-06 PROCEDURE — 96374 THER/PROPH/DIAG INJ IV PUSH: CPT

## 2021-02-06 PROCEDURE — U0005 INFEC AGEN DETEC AMPLI PROBE: HCPCS

## 2021-02-06 PROCEDURE — 72148 MRI LUMBAR SPINE W/O DYE: CPT

## 2021-02-06 PROCEDURE — 99285 EMERGENCY DEPT VISIT HI MDM: CPT

## 2021-02-06 PROCEDURE — L0458 TLSO 2MOD SYMPHIS-XIPHO PRE: HCPCS

## 2021-02-06 PROCEDURE — 770006 HCHG ROOM/CARE - MED/SURG/GYN SEMI*

## 2021-02-06 PROCEDURE — 80053 COMPREHEN METABOLIC PANEL: CPT

## 2021-02-06 RX ORDER — ACETAMINOPHEN 325 MG/1
650 TABLET ORAL EVERY 4 HOURS PRN
Status: DISCONTINUED | OUTPATIENT
Start: 2021-02-06 | End: 2021-02-06

## 2021-02-06 RX ORDER — LEVOTHYROXINE SODIUM 112 UG/1
112 TABLET ORAL
Status: DISCONTINUED | OUTPATIENT
Start: 2021-02-07 | End: 2021-02-10 | Stop reason: HOSPADM

## 2021-02-06 RX ORDER — METOPROLOL SUCCINATE 100 MG/1
100 TABLET, EXTENDED RELEASE ORAL
Status: DISCONTINUED | OUTPATIENT
Start: 2021-02-07 | End: 2021-02-10 | Stop reason: HOSPADM

## 2021-02-06 RX ORDER — HYDROMORPHONE HYDROCHLORIDE 1 MG/ML
0.5 INJECTION, SOLUTION INTRAMUSCULAR; INTRAVENOUS; SUBCUTANEOUS ONCE
Status: DISCONTINUED | OUTPATIENT
Start: 2021-02-06 | End: 2021-02-06

## 2021-02-06 RX ORDER — ROSUVASTATIN CALCIUM 20 MG/1
20 TABLET, COATED ORAL ONCE
Status: COMPLETED | OUTPATIENT
Start: 2021-02-06 | End: 2021-02-06

## 2021-02-06 RX ORDER — AMOXICILLIN 250 MG
1 CAPSULE ORAL DAILY
Status: DISCONTINUED | OUTPATIENT
Start: 2021-02-07 | End: 2021-02-10 | Stop reason: HOSPADM

## 2021-02-06 RX ORDER — MORPHINE SULFATE 4 MG/ML
4 INJECTION, SOLUTION INTRAMUSCULAR; INTRAVENOUS ONCE
Status: COMPLETED | OUTPATIENT
Start: 2021-02-06 | End: 2021-02-06

## 2021-02-06 RX ORDER — ROSUVASTATIN CALCIUM 20 MG/1
20 TABLET, COATED ORAL EVERY EVENING
Status: DISCONTINUED | OUTPATIENT
Start: 2021-02-07 | End: 2021-02-10 | Stop reason: HOSPADM

## 2021-02-06 RX ORDER — LOSARTAN POTASSIUM 50 MG/1
100 TABLET ORAL
Status: DISCONTINUED | OUTPATIENT
Start: 2021-02-07 | End: 2021-02-10 | Stop reason: HOSPADM

## 2021-02-06 RX ORDER — HYDROMORPHONE HYDROCHLORIDE 1 MG/ML
INJECTION, SOLUTION INTRAMUSCULAR; INTRAVENOUS; SUBCUTANEOUS
Status: COMPLETED
Start: 2021-02-06 | End: 2021-02-06

## 2021-02-06 RX ORDER — SPIRONOLACTONE 25 MG/1
25 TABLET ORAL
Status: DISCONTINUED | OUTPATIENT
Start: 2021-02-07 | End: 2021-02-10 | Stop reason: HOSPADM

## 2021-02-06 RX ORDER — WARFARIN SODIUM 7.5 MG/1
7.5 TABLET ORAL
Status: DISCONTINUED | OUTPATIENT
Start: 2021-02-08 | End: 2021-02-07

## 2021-02-06 RX ORDER — ONDANSETRON 2 MG/ML
4 INJECTION INTRAMUSCULAR; INTRAVENOUS ONCE
Status: COMPLETED | OUTPATIENT
Start: 2021-02-06 | End: 2021-02-06

## 2021-02-06 RX ORDER — MORPHINE SULFATE 4 MG/ML
2 INJECTION, SOLUTION INTRAMUSCULAR; INTRAVENOUS EVERY 4 HOURS PRN
Status: DISCONTINUED | OUTPATIENT
Start: 2021-02-06 | End: 2021-02-06

## 2021-02-06 RX ORDER — OXYCODONE HYDROCHLORIDE AND ACETAMINOPHEN 5; 325 MG/1; MG/1
1 TABLET ORAL EVERY 4 HOURS PRN
Status: DISCONTINUED | OUTPATIENT
Start: 2021-02-06 | End: 2021-02-07

## 2021-02-06 RX ORDER — WARFARIN SODIUM 5 MG/1
5 TABLET ORAL
Status: DISCONTINUED | OUTPATIENT
Start: 2021-02-07 | End: 2021-02-07

## 2021-02-06 RX ORDER — ONDANSETRON 2 MG/ML
4 INJECTION INTRAMUSCULAR; INTRAVENOUS EVERY 4 HOURS PRN
Status: DISCONTINUED | OUTPATIENT
Start: 2021-02-06 | End: 2021-02-10 | Stop reason: HOSPADM

## 2021-02-06 RX ORDER — ACETAMINOPHEN 500 MG
2000 TABLET ORAL EVERY 4 HOURS PRN
Status: ON HOLD | COMMUNITY
End: 2021-02-10

## 2021-02-06 RX ORDER — HYDROMORPHONE HYDROCHLORIDE 1 MG/ML
1 INJECTION, SOLUTION INTRAMUSCULAR; INTRAVENOUS; SUBCUTANEOUS
Status: DISCONTINUED | OUTPATIENT
Start: 2021-02-06 | End: 2021-02-07

## 2021-02-06 RX ADMIN — HYDROMORPHONE HYDROCHLORIDE 1 MG: 1 INJECTION, SOLUTION INTRAMUSCULAR; INTRAVENOUS; SUBCUTANEOUS at 18:32

## 2021-02-06 RX ADMIN — ROSUVASTATIN CALCIUM 20 MG: 20 TABLET, FILM COATED ORAL at 22:31

## 2021-02-06 RX ADMIN — OXYCODONE HYDROCHLORIDE AND ACETAMINOPHEN 1 TABLET: 5; 325 TABLET ORAL at 21:48

## 2021-02-06 RX ADMIN — MORPHINE SULFATE 4 MG: 4 INJECTION INTRAVENOUS at 15:47

## 2021-02-06 RX ADMIN — MORPHINE SULFATE 4 MG: 4 INJECTION INTRAVENOUS at 13:45

## 2021-02-06 RX ADMIN — FAMOTIDINE 20 MG: 10 INJECTION INTRAVENOUS at 21:48

## 2021-02-06 RX ADMIN — IOHEXOL 100 ML: 350 INJECTION, SOLUTION INTRAVENOUS at 14:58

## 2021-02-06 RX ADMIN — ONDANSETRON 4 MG: 2 INJECTION INTRAMUSCULAR; INTRAVENOUS at 13:32

## 2021-02-06 RX ADMIN — HYDROMORPHONE HYDROCHLORIDE 1 MG: 1 INJECTION, SOLUTION INTRAMUSCULAR; INTRAVENOUS; SUBCUTANEOUS at 23:10

## 2021-02-06 RX ADMIN — MORPHINE SULFATE 4 MG: 4 INJECTION INTRAVENOUS at 14:45

## 2021-02-06 SDOH — ECONOMIC STABILITY: FOOD INSECURITY: WITHIN THE PAST 12 MONTHS, THE FOOD YOU BOUGHT JUST DIDN'T LAST AND YOU DIDN'T HAVE MONEY TO GET MORE.: NEVER TRUE

## 2021-02-06 SDOH — ECONOMIC STABILITY: FOOD INSECURITY: WITHIN THE PAST 12 MONTHS, YOU WORRIED THAT YOUR FOOD WOULD RUN OUT BEFORE YOU GOT MONEY TO BUY MORE.: NEVER TRUE

## 2021-02-06 SDOH — ECONOMIC STABILITY: TRANSPORTATION INSECURITY
IN THE PAST 12 MONTHS, HAS THE LACK OF TRANSPORTATION KEPT YOU FROM MEDICAL APPOINTMENTS OR FROM GETTING MEDICATIONS?: NO

## 2021-02-06 SDOH — ECONOMIC STABILITY: TRANSPORTATION INSECURITY
IN THE PAST 12 MONTHS, HAS LACK OF TRANSPORTATION KEPT YOU FROM MEETINGS, WORK, OR FROM GETTING THINGS NEEDED FOR DAILY LIVING?: NO

## 2021-02-06 SDOH — HEALTH STABILITY: MENTAL HEALTH: HOW OFTEN DO YOU HAVE A DRINK CONTAINING ALCOHOL?: NEVER

## 2021-02-06 ASSESSMENT — FIBROSIS 4 INDEX
FIB4 SCORE: 1.279204298133662606
FIB4 SCORE: 1.279204298133662606

## 2021-02-06 ASSESSMENT — COGNITIVE AND FUNCTIONAL STATUS - GENERAL
SUGGESTED CMS G CODE MODIFIER DAILY ACTIVITY: CH
SUGGESTED CMS G CODE MODIFIER MOBILITY: CJ
MOVING FROM LYING ON BACK TO SITTING ON SIDE OF FLAT BED: A LITTLE
CLIMB 3 TO 5 STEPS WITH RAILING: A LITTLE
MOBILITY SCORE: 21
WALKING IN HOSPITAL ROOM: A LITTLE
DAILY ACTIVITIY SCORE: 24

## 2021-02-06 ASSESSMENT — ENCOUNTER SYMPTOMS
SHORTNESS OF BREATH: 0
DIARRHEA: 0
NEUROLOGICAL NEGATIVE: 1
TINGLING: 1
BACK PAIN: 1
WEAKNESS: 0
FEVER: 0
COUGH: 0
RESPIRATORY NEGATIVE: 1
GASTROINTESTINAL NEGATIVE: 1
EYES NEGATIVE: 1
NAUSEA: 0
PALPITATIONS: 0
ABDOMINAL PAIN: 0
MYALGIAS: 1
VOMITING: 0
FOCAL WEAKNESS: 0
CHILLS: 0
BACK PAIN: 1
SENSORY CHANGE: 1
PSYCHIATRIC NEGATIVE: 1
CARDIOVASCULAR NEGATIVE: 1

## 2021-02-06 ASSESSMENT — LIFESTYLE VARIABLES
CONSUMPTION TOTAL: NEGATIVE
HAVE PEOPLE ANNOYED YOU BY CRITICIZING YOUR DRINKING: NO
TOTAL SCORE: 0
AVERAGE NUMBER OF DAYS PER WEEK YOU HAVE A DRINK CONTAINING ALCOHOL: 0
HAVE YOU EVER FELT YOU SHOULD CUT DOWN ON YOUR DRINKING: NO
TOTAL SCORE: 0
ON A TYPICAL DAY WHEN YOU DRINK ALCOHOL HOW MANY DRINKS DO YOU HAVE: 0
ALCOHOL_USE: NO
HOW MANY TIMES IN THE PAST YEAR HAVE YOU HAD 5 OR MORE DRINKS IN A DAY: 0
TOTAL SCORE: 0
EVER FELT BAD OR GUILTY ABOUT YOUR DRINKING: NO
EVER HAD A DRINK FIRST THING IN THE MORNING TO STEADY YOUR NERVES TO GET RID OF A HANGOVER: NO

## 2021-02-06 ASSESSMENT — PATIENT HEALTH QUESTIONNAIRE - PHQ9
SUM OF ALL RESPONSES TO PHQ9 QUESTIONS 1 AND 2: 0
2. FEELING DOWN, DEPRESSED, IRRITABLE, OR HOPELESS: NOT AT ALL
1. LITTLE INTEREST OR PLEASURE IN DOING THINGS: NOT AT ALL

## 2021-02-06 ASSESSMENT — PAIN DESCRIPTION - PAIN TYPE
TYPE: ACUTE PAIN;CHRONIC PAIN
TYPE: ACUTE PAIN;CHRONIC PAIN

## 2021-02-06 NOTE — PROGRESS NOTES
Subjective:      Oumar Fisher is a 55 y.o. female who presents with Low Back Pain (x2 days )            The patient is here today with acute onset of severe 10/10 lumbar back pain. Her pain started abruptly 3 days ago. She denies any specific incident or injury. She complains of right thigh numbness bilaterally and had an episode of fecal incontinence yesterday--- She states she sat on the toilet and thought she was urinating but had diarrhea and did not even feel the bowel movement. She denies anal numbness. She has been taking Tylenol 4 tabs every 4 hours. No known history of back issues. She also has a history of Thoracic Aortic Aneurysm and stats she has not been able to keep her BP well controlled. She denies chest pain or shortness of breath. She states she has intermittent severe back spasms.       Past Medical History:   Diagnosis Date   • Anticoagulated 9/21/2010   • Anxiety 9/21/2010   • ASTHMA 9/21/2010   • Dissecting aneurysm of aorta ([facility]) 9/21/2010   • Dissection of aorta, thoracic ([facility]) 3/10/2010   • DVT (deep venous thrombosis) ([facility]) 11/2/2009   • Grave's disease 9/21/2010   • HTN (hypertension) 8/12/2010   • Leiomyoma of uterus, unspecified 9/30/2007   • Long term (current) use of anticoagulants 8/19/2011   • Mitral valve insufficiency and aortic valve insufficiency 6/25/2010   • Murmur 2/18/2009   • Status post insertion of inferior vena caval filter 9/21/2010   • Thoracic aneurysm without mention of rupture 8/12/2010       History reviewed. No pertinent surgical history.    History reviewed. No pertinent family history.    No Known Allergies    Medications, Allergies, and current problem list reviewed today in Epic        Review of Systems   Constitutional: Negative for chills, fever and malaise/fatigue.   Respiratory: Negative for cough and shortness of breath.    Cardiovascular: Negative for chest pain, palpitations and leg swelling.   Gastrointestinal: Negative for abdominal pain,  "diarrhea, nausea and vomiting.   Genitourinary: Negative for dysuria, frequency and urgency.        Episode of bowel incontinence    Musculoskeletal: Positive for back pain and myalgias.   Neurological: Positive for tingling and sensory change. Negative for focal weakness and weakness.     All other systems reviewed and are negative.        Objective:     BP (!) 168/82   Pulse 79   Temp 36.9 °C (98.5 °F) (Temporal)   Resp 18   Ht 1.702 m (5' 7\")   Wt 63.5 kg (140 lb)   LMP 06/27/2011   SpO2 98%   Breastfeeding No   BMI 21.93 kg/m²      Physical Exam  Constitutional:       General: She is in acute distress.      Appearance: She is not ill-appearing.   HENT:      Head: Normocephalic and atraumatic.   Eyes:      Conjunctiva/sclera: Conjunctivae normal.   Cardiovascular:      Rate and Rhythm: Normal rate.   Pulmonary:      Effort: Pulmonary effort is normal. No respiratory distress.      Breath sounds: No stridor. No wheezing.   Musculoskeletal:      Lumbar back: She exhibits decreased range of motion, tenderness and bony tenderness. She exhibits no swelling and no edema.        Back:    Skin:     General: Skin is warm and dry.   Neurological:      General: No focal deficit present.      Mental Status: She is alert and oriented to person, place, and time.      Sensory: Sensory deficit (decreased sensation of anterior thighs bilaterally ) present.   Psychiatric:         Mood and Affect: Mood normal.         Behavior: Behavior normal.         Thought Content: Thought content normal.         Judgment: Judgment normal.                 Assessment/Plan:        1. Severe back pain     2. Numbness of anterior thigh     3. Full incontinence of feces     4. Hypertension, unspecified type     5. Thoracic aortic aneurysm without rupture (HCC)         At this time, I feel the patient requires a higher level of care including closer monitoring, stat lab work and/or imaging for further evaluation for complaints of Severe back " pain with neurological deficits mentioned above.  This has been discussed with the patient and they state agreement and understanding.  I offered the patient an ambulance ride and  the patient is declining at this time. The patient is stable upon clinic departure and will go directly to ED without delay.   Partner will drive her.    The patient demonstrated a good understanding and agreed with the treatment plan.    Yvette Devine P.A.-C.

## 2021-02-06 NOTE — ED PROVIDER NOTES
"ED Provider Note    Scribed for Babita Merino M.D. by Zuleyma Squires. 2/6/2021  12:54 PM    Primary care provider: Jaquan Horne M.D.  Means of arrival: Walk In  History obtained from: Patient  History limited by: None    CHIEF COMPLAINT  Chief Complaint   Patient presents with   • Low Back Pain     x 3 weeks.has hx w/herniated disc and compressed disc.   • Difficulty Walking     x 3 weeks.    • Buttocks Pain     x 3 weeks.       HPI  Oumar Fisher is a 55 y.o. female with a history of deep vein thrombosis, hypertension, and aortic dissection who presents to the [facility] ED for left buttock pain and tailbone pain onset 3 weeks ago. The patient states she developed a \"sharp\" pain in her left buttock 3 weeks ago while walking down a hallway. She reports palpating her left buttock after the sensation started and says she felt a \"knot the size of my finger\" and noticed a bruise. The patient says she started to experience difficulty walking after the pain worsened, and reports her tailbone started seizing and producing a \"sharp\" pain that \"takes my breath away\" whenever she coughed or moved her arm. She reports she has also developed numbness and tingling from her knees up to her hips over the last 3 days. Patient says the last time she was in this much pain was when she had herniated and compressed discs. She reports the pain she is experiencing currently feels different than her chronic back pain, and denies her back pain radiating elsewhere in her body. Patient adds that she is currently following up with wound care due to developing a wound on her left ankle. After the patient's symptoms failed to resolve she was prompted to go to Urgent Care to have her symptoms evaluated. The patient was then encouraged to come into the [facility] ED during her visit for MRI's and further evaluation. Four tablets of Tylenol were taken every 4 hours to alleviate the patient, and no exacerbating factors were noted. Patient has " associated lower back pain, difficulty walking, thigh tingling,  leg weakness due to pain, but denies urinary retention, fecal retention, fecal incontinence, urinary incontinence, abdominal pain, fever, chills, cough, or shortness of breath. She also denies coming into contact with anyone who has tested positive for Covid-19 or taking any recent travels outside of the country. Patient smokes cigarettes daily, but does not drink alcohol or use drugs. She has no known allergies and takes Coumadin and Metoprolol. Patient does not have a history of diabetes. Her PCP is Dr. Horne.     REVIEW OF SYSTEMS  See HPI for further details. All other systems are negative.    Pertinent positives include: Left buttock pain, lower back pain, difficulty walking, leg numbness, leg tingling, and leg weakness  Pertinent negatives include: urinary retention, fecal retention, fecal incontinence, urinary incontinence, abdominal pain, fever, chills, cough, or shortness of breath    PAST MEDICAL HISTORY  Past Medical History:   Diagnosis Date   • Anticoagulated 9/21/2010   • Anxiety 9/21/2010   • ASTHMA 9/21/2010   • Dissecting aneurysm of aorta ([facility]) 9/21/2010   • Dissection of aorta, thoracic ([facility]) 3/10/2010   • DVT (deep venous thrombosis) ([facility]) 11/2/2009   • Grave's disease 9/21/2010   • HTN (hypertension) 8/12/2010   • Leiomyoma of uterus, unspecified 9/30/2007   • Long term (current) use of anticoagulants 8/19/2011   • Mitral valve insufficiency and aortic valve insufficiency 6/25/2010   • Murmur 2/18/2009   • Status post insertion of inferior vena caval filter 9/21/2010   • Thoracic aneurysm without mention of rupture 8/12/2010       FAMILY HISTORY  No family history noted    SOCIAL HISTORY  Social History     Socioeconomic History   • Marital status:      Spouse name: None noted   • Number of children: None noted   • Years of education: None noted   • Highest education level: None noted   Occupational History   • None noted  "  Social Needs   • Financial resource strain: None noted   • Food insecurity     Worry: None noted     Inability: None noted   • Transportation needs     Medical: None noted     Non-medical: None noted   Tobacco Use   • Smoking status: Current Every Day Smoker   • Smokeless tobacco: Never Used   Substance and Sexual Activity   • Alcohol use: Never     Frequency: Never   • Drug use: Never   • Sexual activity: None noted   Lifestyle   • Physical activity     Days per week: None noted     Minutes per session: None noted   • Stress: None noted   Relationships   • Social connections     Talks on phone: None noted     Gets together: None noted     Attends [employer] service: None noted     Active member of club or organization: None noted     Attends meetings of clubs or organizations: None noted     Relationship status: None noted   • Intimate partner violence     Fear of current or ex partner: None noted     Emotionally abused: None noted     Physically abused: None noted     Forced sexual activity: None noted   Other Topics Concern   • None noted   Social History Narrative   • None noted       SURGICAL HISTORY  No past surgical history noted.    CURRENT MEDICATIONS  Home Medications     Reviewed by Yoselin Rivero PhT (Pharmacy Tech) on 02/06/21 at 1822  Med List Status: Complete   Medication Last Dose Status   acetaminophen (TYLENOL) 500 MG Tab 2/5/2021 Active   levothyroxine (SYNTHROID) 112 MCG Tab 2/6/2021 Active   losartan (COZAAR) 100 MG Tab 2/5/2021 Active   metoprolol SR (TOPROL XL) 100 MG TABLET SR 24 HR 2/5/2021 Active   NON SPECIFIED 2/6/2021 Active   rosuvastatin (CRESTOR) 20 MG Tab 2/5/2021 Active   spironolactone (ALDACTONE) 25 MG Tab 2/5/2021 Active   VITAMIN D PO 2/6/2021 Active   warfarin (COUMADIN) 5 MG Tab 2/5/2021 Active                ALLERGIES  No Known Allergies    PHYSICAL EXAM  VITAL SIGNS: /75   Pulse 75   Temp 36.5 °C (97.7 °F) (Temporal)   Resp 20   Ht 1.702 m (5' 7\")   " Wt 63.5 kg (140 lb)   LMP 06/27/2011   SpO2 97%   BMI 21.93 kg/m²      Constitutional: Any attempts to change position on husseinrney causes pain, Tearful and crying, Well developed, well nourished; Moderate to severe distress, Non-toxic appearance.   HENT: Normocephalic, atraumatic; Bilateral external ears normal; oropharyngeal examination deferred due to COVID-19 outbreak and lack of oral pharyngeal complaints  Eyes: PERRL, EOMI, Conjunctiva normal. No discharge.   Neck:  Supple, nontender midline; No stridor; No nuchal rigidity.   Lymphatic: No cervical lymphadenopathy noted.   Cardiovascular: Regular rate and rhythm without murmurs, rubs, or gallop.   Thorax & Lungs: Scattered high pitched wheezes throughout, No respiratory distress, rales or rhonchi. Nontender chest wall. No crepitus or subcutaneous air  Abdomen: Slight distended, Soft, nontender, bowel sounds normal. No obvious masses;  no rebound, guarding, or peritoneal signs.  No bruit or pulsatile masses noted  Skin: Good color; warm and dry without rash or petechia.  Back: There is some tenderness in the left sacrum as well as the low lumbar spine midline.  Extremities: Has changes of chronic venous statis with some healing wounds and healed wounds to the ankles bilaterally, Has 2+ PT pulses equal bilaterally, Faint DP pulses, No swelling to the bilateral lower extremities, Distal radial, dorsalis pedis, posterior tibial pulses are equal bilaterally; No edema; Nontender calves or saphenous, No cyanosis, No clubbing.   Musculoskeletal: Good range of motion in all major joints. No tenderness to palpation or major deformities noted.   Neurologic:  Alert & oriented x 4, clear speech, difficulty elevating bilateral legs of both of the bed secondary to pain versus weakness.  Patient able to raise the right leg above the bed more so than the left.  Patient has 4-5 strength with testing of dorsiflexors and plantar flexors, again possibly limited by pain.  Patient  describes a decrease sensation to light touch over bilateral thighs.    LABS/RADIOLOGY/PROCEDURES  Results for orders placed or performed during the hospital encounter of 02/06/21   CBC WITH DIFFERENTIAL   Result Value Ref Range    WBC 7.0 4.8 - 10.8 K/uL    RBC 4.30 4.20 - 5.40 M/uL    Hemoglobin 13.6 12.0 - 16.0 g/dL    Hematocrit 40.2 37.0 - 47.0 %    MCV 93.5 81.4 - 97.8 fL    MCH 31.6 27.0 - 33.0 pg    MCHC 33.8 33.6 - 35.0 g/dL    RDW 46.2 35.9 - 50.0 fL    Platelet Count 306 164 - 446 K/uL    MPV 10.4 9.0 - 12.9 fL    Neutrophils-Polys 58.00 44.00 - 72.00 %    Lymphocytes 32.50 22.00 - 41.00 %    Monocytes 6.70 0.00 - 13.40 %    Eosinophils 1.90 0.00 - 6.90 %    Basophils 0.60 0.00 - 1.80 %    Immature Granulocytes 0.30 0.00 - 0.90 %    Nucleated RBC 0.00 /100 WBC    Neutrophils (Absolute) 4.07 2.00 - 7.15 K/uL    Lymphs (Absolute) 2.28 1.00 - 4.80 K/uL    Monos (Absolute) 0.47 0.00 - 0.85 K/uL    Eos (Absolute) 0.13 0.00 - 0.51 K/uL    Baso (Absolute) 0.04 0.00 - 0.12 K/uL    Immature Granulocytes (abs) 0.02 0.00 - 0.11 K/uL    NRBC (Absolute) 0.00 K/uL   COMP METABOLIC PANEL   Result Value Ref Range    Sodium 138 135 - 145 mmol/L    Potassium 4.3 3.6 - 5.5 mmol/L    Chloride 106 96 - 112 mmol/L    Co2 18 (L) 20 - 33 mmol/L    Anion Gap 14.0 7.0 - 16.0    Glucose 70 65 - 99 mg/dL    Bun 14 8 - 22 mg/dL    Creatinine 0.74 0.50 - 1.40 mg/dL    Calcium 9.7 8.5 - 10.5 mg/dL    AST(SGOT) 19 12 - 45 U/L    ALT(SGPT) 12 2 - 50 U/L    Alkaline Phosphatase 92 30 - 99 U/L    Total Bilirubin 0.5 0.1 - 1.5 mg/dL    Albumin 3.9 3.2 - 4.9 g/dL    Total Protein 7.6 6.0 - 8.2 g/dL    Globulin 3.7 (H) 1.9 - 3.5 g/dL    A-G Ratio 1.1 g/dL   URINALYSIS (UA)    Specimen: Urine, Clean Catch   Result Value Ref Range    Color Yellow     Character Clear     Specific Gravity >=1.045 (A) <1.035    Ph 5.0 5.0 - 8.0    Glucose Negative Negative mg/dL    Ketones Trace (A) Negative mg/dL    Protein Negative Negative mg/dL    Bilirubin  Negative Negative    Urobilinogen, Urine 0.2 Negative    Nitrite Negative Negative    Leukocyte Esterase Negative Negative    Occult Blood Trace (A) Negative    Micro Urine Req Microscopic    PROTHROMBIN TIME (INR)   Result Value Ref Range    PT 38.3 (H) 12.0 - 14.6 sec    INR 3.75 (H) 0.87 - 1.13   APTT   Result Value Ref Range    APTT 66.2 (H) 24.7 - 36.0 sec   LACTIC ACID   Result Value Ref Range    Lactic Acid 1.4 0.5 - 2.0 mmol/L   ESTIMATED GFR   Result Value Ref Range    GFR If African American >60 >60 mL/min/1.73 m 2    GFR If Non African American >60 >60 mL/min/1.73 m 2   URINE MICROSCOPIC (W/UA)   Result Value Ref Range    WBC 2-5 /hpf    RBC 2-5 (A) /hpf    Bacteria Few (A) None /hpf    Epithelial Cells Moderate (A) /hpf    Hyaline Cast 3-5 (A) /lpf   SARS-CoV-2 PCR (24 hour In-House): Collect NP swab in VTM    Specimen: Respirate   Result Value Ref Range    SARS-CoV-2 Source NP Swab           MR-LUMBAR SPINE-W/O   Final Result      1.  Marrow edema with incompletely visualized bilateral sacral insufficiency fractures which are acute or subacute in nature.      2.  T11 compression fracture with 50% height loss superiorly. No significant posterior bony retropulsion. No evidence of marrow edema consistent with chronic process.      3.  Minimal chronic compression fracture of T12.      4.  Minimal multilevel degenerative disc disease and facet degeneration. No significant central canal or neuroforaminal narrowing.      CT-CTA COMPLETE THORACOABDOMINAL AORTA   Final Result   Addendum 1 of 1   There are incompletely visualized nondisplaced bilateral sacral    insufficiency fractures.      Final          COURSE & MEDICAL DECISION MAKING  Pertinent Labs & Imaging studies reviewed. (See chart for details)    Reviewed patient's old medical records which showed that the patient has a surgical history of aortic dissection that was performed in 2009 where the patient had a filter and two stents placed in her aorta.  Records show that the patient's hypertension doctor recommends evaluation of the patient's graft with a CTA every 2 years. Patient was placed on Coumadin for DVT and did not have her INR checked this month due to lack of insurance. Patient's first DVT was in 1988 and her second DVT was in 1991.     12:54 PM - Patient seen and examined at bedside. Discussed plan of care with patient. I informed them that labs and imaging will be ordered to evaluate symptoms. The patient is understanding and agreeable with plan of care. The patient will be medicated with 4 mg Morphine and 4 mg Zofran.     2:17 PM Patient will be treated with 4 mg Morphine.     3:45 PM Patient will be treated with 4 mg Morphine.     4:55 PM Patient was reevaluated at bedside. She reports that while in the CT bay she experienced sacral seizing and lost control of her bladder. Patient denies developing any genital numbness. Discussed lab and radiology results with the patient and informed them that there were abnormal findings as noted above. The plan of care was discussed with the patient. She was told that she will need to be monitored over night due to the amount of pain she is in and to check for a spinal bleed. The patient is understanding and agreeable to the plan of care. The patient had the opportunity to ask any questions. The plan for hospitalization was discussed with the patient due to their current condition. Patient is understanding and agreeable to the plan for hospitalization.      5:57 PM Paged Hospitalist.     6:02 PM I discussed the patient's case and the above findings with Dr. Luciano (Spine) who states the patient should get an off the shelf TLSO and says he will see the patient in his office as an outpatient. Dr. Luciano is unsure how to treat the patient's sacral abnormalities, but recommends consulting ortho for the sacral fractures    6:11 PM I discussed the patient's case and the above findings with Dr. Yan (Hospitalist) who  "agrees to the plan of care and agrees to hospitalize the patient due to her current condition.      6:14 PM I discussed the patient's case and the above findings with Dr. Hickey (Ortho) who agrees to evaluate the patient and states the only way to treat the sacral fractures is through pain control.        Patient presents to the ER with complaint of a sudden onset of pain in her left buttock area and low back which occurred several weeks ago after she \"stepped wrong.\"  Movements definitely exacerbate her pain.  Over the last 3 days she has noticed some numbness and tingling into her bilateral thighs.  The tingling does not extend below the knees.  She is not sure if she feels weak.  She definitely has some limitation on strength testing, but I think this may be somewhat pain relating as every time the patient attempts to lift her legs, perform strength tests, etc., she cries out in pain.  She denies any saddle anesthesia.  She says she has not had any incontinence of bowel or bladder at home.  However, after getting back from the MRI scanner, she said she did wet herself.  She says every time she coughs or sneezes the pain in her buttock and low back gets significantly worse.  She is having a hard time finding comfortable position on the gurney.  Patient has a history of aortic dissection over 10 years ago.  She also has history of protein S deficiency and multiple DVTs.  She is on lifelong anticoagulation with Coumadin.  Her INR is supratherapeutic today at 3.75.  Given her complaint of back pain, tingling in her legs, and history of dissection, I sent her to the CAT scanner for evaluation of her aorta.  There is no aortic dissection.  Her graft is in good position.  No retroperitoneal bleeding.  Patient has not had fevers or chills.  White count is normal.  Lactic acid levels normal.  At this time I do not have any significant concerns for infection.  Urine is negative.  There is no CVA tenderness.  I do not think " this is pyelonephritis.  I was concerned about the possibility of epidural hematoma as well given patient's supratherapeutic INR on Coumadin.  For this reason she underwent an MRI scan of her back.  There is no epidural hematoma.  No bulging disc.  No evidence of cord compression or cauda equina syndrome.  She does, however, have sacral fractures as well as a T11 compression fracture of 50% without retropulsion.  I spoke with Dr. Luciano, neurosurgeon on call regarding the T11 compression fracture.  He will place a note in the chart but says the patient can have a off-the-shelf TLSO and follow-up with him in the clinic.  With respect to the sacral fractures, I spoke with Dr. Hickey orthopedic surgeon on-call.  He will kindly evaluate the patient for her sacral fractures.  She has no saddle anesthesia.  It is possible that the bit of urinary incontinence she had when getting back for MRI scan today could have been some stress incontinence from the pain.  She did cough a few times here in the ER and had significant pain in her tailbone and sacral area when she coughed.  She is required multiple doses of pain medicine here in the ER.  I think she is going to need to be admitted for intractable pain secondary to sacral fractures and a T11 compression fracture.  The question is how she got these fractures.  There is not been any falls or injury.  I spoke with Dr. Negrete, hospitalist on-call, he will kindly evaluate the patient for hospitalization, try to figure out why she has nontraumatic fractures, and treat her for her intractable pain.    PPE Note: I personally donned full PPE for all patient encounters during this visit, including wearing an N95 respirator mask, gloves, and eye protection. Scribe remained outside the patient's room and did not have any contact with the patient for the duration of patient encounter.       DISPOSITION:  Patient will be hospitalized by Dr. Yan in guarded condition.     FINAL  IMPRESSION  1. Closed fracture of sacrum, unspecified portion of sacrum, initial encounter (HCC) Acute   2. Closed wedge compression fracture of T11 vertebra, initial encounter (HCC) Acute   3. Supratherapeutic INR Acute        This dictation has been created using voice recognition software. The accuracy of the dictation is limited by the abilities of the software. I expect there may be some errors of grammar and possibly content. I made every attempt to manually correct the errors within my dictation. However, errors related to voice recognition software may still exist and should be interpreted within the appropriate context.     Zuleyma PEREIRA (Eboniibgeno), am scribing for, and in the presence of, Babita Merino M.D..    Electronically signed by: Zuleyma Squires (Javi), 2/6/2021    Babita PEREIRA M.D. personally performed the services described in this documentation, as scribed by Zuleyma Squires in my presence, and it is both accurate and complete. C    The note accurately reflects work and decisions made by me.  Babita Merino M.D.  2/6/2021  7:36 PM

## 2021-02-06 NOTE — ED TRIAGE NOTES
Chief Complaint   Patient presents with   • Low Back Pain     x 3 weeks.has hx w/herniated disc and compressed disc.   • Difficulty Walking     x 3 weeks.    • Buttocks Pain     x 3 weeks.     Has intermittent bowel incontinence x 1 month. Has numbness in hip all the way down to knee x3 weeks. Educated on triage process. Instructed to notify staff for any worsening symptoms. Denies any recent travel. Denies exposure to known covid positive patients. Denies any respiratory symptoms. Sent here from Urgent care for MRI and further testing.

## 2021-02-07 ENCOUNTER — APPOINTMENT (OUTPATIENT)
Dept: RADIOLOGY | Facility: MEDICAL CENTER | Age: 56
DRG: 543 | End: 2021-02-07
Attending: PHYSICIAN ASSISTANT
Payer: COMMERCIAL

## 2021-02-07 PROBLEM — E03.9 HYPOTHYROID: Status: ACTIVE | Noted: 2021-02-07

## 2021-02-07 PROBLEM — R79.1 SUPRATHERAPEUTIC INR: Status: ACTIVE | Noted: 2021-02-07

## 2021-02-07 PROBLEM — S32.10XA SACRAL FRACTURE, CLOSED (HCC): Status: ACTIVE | Noted: 2021-02-07

## 2021-02-07 PROBLEM — M48.54XA COMPRESSION FRACTURE OF THORACIC SPINE, NON-TRAUMATIC (HCC): Status: ACTIVE | Noted: 2021-02-06

## 2021-02-07 LAB
ALBUMIN SERPL BCP-MCNC: 3.7 G/DL (ref 3.2–4.9)
ALBUMIN/GLOB SERPL: 1 G/DL
ALP SERPL-CCNC: 94 U/L (ref 30–99)
ALT SERPL-CCNC: 11 U/L (ref 2–50)
ANION GAP SERPL CALC-SCNC: 10 MMOL/L (ref 7–16)
AST SERPL-CCNC: 18 U/L (ref 12–45)
BILIRUB SERPL-MCNC: 0.4 MG/DL (ref 0.1–1.5)
BUN SERPL-MCNC: 16 MG/DL (ref 8–22)
CALCIUM SERPL-MCNC: 9 MG/DL (ref 8.5–10.5)
CHLORIDE SERPL-SCNC: 100 MMOL/L (ref 96–112)
CHOLEST SERPL-MCNC: 94 MG/DL (ref 100–199)
CO2 SERPL-SCNC: 22 MMOL/L (ref 20–33)
CREAT SERPL-MCNC: 0.83 MG/DL (ref 0.5–1.4)
ERYTHROCYTE [DISTWIDTH] IN BLOOD BY AUTOMATED COUNT: 50 FL (ref 35.9–50)
GLOBULIN SER CALC-MCNC: 3.7 G/DL (ref 1.9–3.5)
GLUCOSE SERPL-MCNC: 87 MG/DL (ref 65–99)
HCT VFR BLD AUTO: 42.1 % (ref 37–47)
HDLC SERPL-MCNC: 39 MG/DL
HGB BLD-MCNC: 13.4 G/DL (ref 12–16)
INR PPP: 3.59 (ref 0.87–1.13)
LDLC SERPL CALC-MCNC: 26 MG/DL
MCH RBC QN AUTO: 31.3 PG (ref 27–33)
MCHC RBC AUTO-ENTMCNC: 31.8 G/DL (ref 33.6–35)
MCV RBC AUTO: 98.4 FL (ref 81.4–97.8)
PLATELET # BLD AUTO: 252 K/UL (ref 164–446)
PMV BLD AUTO: 10.4 FL (ref 9–12.9)
POTASSIUM SERPL-SCNC: 3.6 MMOL/L (ref 3.6–5.5)
PROT SERPL-MCNC: 7.4 G/DL (ref 6–8.2)
PROTHROMBIN TIME: 37 SEC (ref 12–14.6)
RBC # BLD AUTO: 4.28 M/UL (ref 4.2–5.4)
SODIUM SERPL-SCNC: 132 MMOL/L (ref 135–145)
TRIGL SERPL-MCNC: 147 MG/DL (ref 0–149)
WBC # BLD AUTO: 6.2 K/UL (ref 4.8–10.8)

## 2021-02-07 PROCEDURE — 85027 COMPLETE CBC AUTOMATED: CPT

## 2021-02-07 PROCEDURE — 80061 LIPID PANEL: CPT

## 2021-02-07 PROCEDURE — 85610 PROTHROMBIN TIME: CPT

## 2021-02-07 PROCEDURE — 700102 HCHG RX REV CODE 250 W/ 637 OVERRIDE(OP): Performed by: FAMILY MEDICINE

## 2021-02-07 PROCEDURE — A9270 NON-COVERED ITEM OR SERVICE: HCPCS | Performed by: FAMILY MEDICINE

## 2021-02-07 PROCEDURE — A9270 NON-COVERED ITEM OR SERVICE: HCPCS | Performed by: STUDENT IN AN ORGANIZED HEALTH CARE EDUCATION/TRAINING PROGRAM

## 2021-02-07 PROCEDURE — 36415 COLL VENOUS BLD VENIPUNCTURE: CPT

## 2021-02-07 PROCEDURE — 99232 SBSQ HOSP IP/OBS MODERATE 35: CPT | Performed by: FAMILY MEDICINE

## 2021-02-07 PROCEDURE — 770006 HCHG ROOM/CARE - MED/SURG/GYN SEMI*

## 2021-02-07 PROCEDURE — 72080 X-RAY EXAM THORACOLMB 2/> VW: CPT

## 2021-02-07 PROCEDURE — 700111 HCHG RX REV CODE 636 W/ 250 OVERRIDE (IP): Performed by: STUDENT IN AN ORGANIZED HEALTH CARE EDUCATION/TRAINING PROGRAM

## 2021-02-07 PROCEDURE — 700102 HCHG RX REV CODE 250 W/ 637 OVERRIDE(OP): Performed by: STUDENT IN AN ORGANIZED HEALTH CARE EDUCATION/TRAINING PROGRAM

## 2021-02-07 PROCEDURE — 80053 COMPREHEN METABOLIC PANEL: CPT

## 2021-02-07 PROCEDURE — 700111 HCHG RX REV CODE 636 W/ 250 OVERRIDE (IP): Performed by: FAMILY MEDICINE

## 2021-02-07 RX ORDER — OXYCODONE HYDROCHLORIDE 5 MG/1
5-10 TABLET ORAL EVERY 4 HOURS PRN
Status: DISCONTINUED | OUTPATIENT
Start: 2021-02-07 | End: 2021-02-10 | Stop reason: HOSPADM

## 2021-02-07 RX ORDER — WARFARIN SODIUM 5 MG/1
5 TABLET ORAL
Status: DISCONTINUED | OUTPATIENT
Start: 2021-02-08 | End: 2021-02-08

## 2021-02-07 RX ORDER — WARFARIN SODIUM 7.5 MG/1
7.5 TABLET ORAL
Status: DISCONTINUED | OUTPATIENT
Start: 2021-02-08 | End: 2021-02-08

## 2021-02-07 RX ORDER — HYDROMORPHONE HYDROCHLORIDE 1 MG/ML
1 INJECTION, SOLUTION INTRAMUSCULAR; INTRAVENOUS; SUBCUTANEOUS
Status: DISCONTINUED | OUTPATIENT
Start: 2021-02-07 | End: 2021-02-10 | Stop reason: HOSPADM

## 2021-02-07 RX ADMIN — HYDROMORPHONE HYDROCHLORIDE 1 MG: 1 INJECTION, SOLUTION INTRAMUSCULAR; INTRAVENOUS; SUBCUTANEOUS at 17:14

## 2021-02-07 RX ADMIN — HYDROMORPHONE HYDROCHLORIDE 1 MG: 1 INJECTION, SOLUTION INTRAMUSCULAR; INTRAVENOUS; SUBCUTANEOUS at 11:12

## 2021-02-07 RX ADMIN — ROSUVASTATIN CALCIUM 20 MG: 20 TABLET, FILM COATED ORAL at 17:14

## 2021-02-07 RX ADMIN — OXYCODONE 10 MG: 5 TABLET ORAL at 14:12

## 2021-02-07 RX ADMIN — METOPROLOL SUCCINATE 100 MG: 100 TABLET, EXTENDED RELEASE ORAL at 05:58

## 2021-02-07 RX ADMIN — HYDROMORPHONE HYDROCHLORIDE 1 MG: 1 INJECTION, SOLUTION INTRAMUSCULAR; INTRAVENOUS; SUBCUTANEOUS at 08:06

## 2021-02-07 RX ADMIN — HYDROMORPHONE HYDROCHLORIDE 1 MG: 1 INJECTION, SOLUTION INTRAMUSCULAR; INTRAVENOUS; SUBCUTANEOUS at 23:57

## 2021-02-07 RX ADMIN — LEVOTHYROXINE SODIUM 112 MCG: 0.11 TABLET ORAL at 05:59

## 2021-02-07 RX ADMIN — HYDROMORPHONE HYDROCHLORIDE 1 MG: 1 INJECTION, SOLUTION INTRAMUSCULAR; INTRAVENOUS; SUBCUTANEOUS at 05:58

## 2021-02-07 RX ADMIN — FAMOTIDINE 20 MG: 10 INJECTION INTRAVENOUS at 05:58

## 2021-02-07 RX ADMIN — OXYCODONE 10 MG: 5 TABLET ORAL at 22:39

## 2021-02-07 RX ADMIN — OXYCODONE HYDROCHLORIDE AND ACETAMINOPHEN 1 TABLET: 5; 325 TABLET ORAL at 01:47

## 2021-02-07 ASSESSMENT — PAIN DESCRIPTION - PAIN TYPE
TYPE: ACUTE PAIN;CHRONIC PAIN

## 2021-02-07 ASSESSMENT — ENCOUNTER SYMPTOMS
SPEECH CHANGE: 0
NERVOUS/ANXIOUS: 0
ABDOMINAL PAIN: 0
HEADACHES: 0
FOCAL WEAKNESS: 0
DIAPHORESIS: 0
NECK PAIN: 0
FLANK PAIN: 0
HEARTBURN: 0
COUGH: 0
WEAKNESS: 1
BACK PAIN: 1
SENSORY CHANGE: 0
PALPITATIONS: 0
MYALGIAS: 0
VOMITING: 0
DIZZINESS: 0
NAUSEA: 0
WHEEZING: 0
CHILLS: 0
BLURRED VISION: 0
SHORTNESS OF BREATH: 0
FEVER: 0
SORE THROAT: 0

## 2021-02-07 NOTE — PROGRESS NOTES
[facility] Medicine Daily Progress Note    Date of Service  2/7/2021    Chief Complaint  55 y.o. female admitted 2/6/2021 with sacral fractures.    Hospital Course  Admitted with sacral fractures, compression fracture of thoracic spine.  Neurosurgery and orthopedic surgery were consulted on the case, plan is conservative management and nonoperative intervention.    Interval Problem Update  Sacral fracture -pain mostly controlled  Compression fracture -pain controlled  HTN - sbp 100-150    Consultants/Specialty  Neurosurgery  Orthopedic surgery    Code Status  Full Code    Disposition  TBD    Review of Systems  Review of Systems   Constitutional: Negative for chills, diaphoresis, fever and malaise/fatigue.   HENT: Negative for congestion, hearing loss and sore throat.    Eyes: Negative for blurred vision.   Respiratory: Negative for cough, shortness of breath and wheezing.    Cardiovascular: Negative for chest pain, palpitations and leg swelling.   Gastrointestinal: Negative for abdominal pain, heartburn, nausea and vomiting.   Genitourinary: Negative for dysuria, flank pain and hematuria.   Musculoskeletal: Positive for back pain. Negative for joint pain, myalgias and neck pain.   Skin: Negative for rash.   Neurological: Positive for weakness. Negative for dizziness, sensory change, speech change, focal weakness and headaches.   Psychiatric/Behavioral: The patient is not nervous/anxious.         Physical Exam  Temp:  [35.6 °C (96.1 °F)-36.5 °C (97.7 °F)] 36.3 °C (97.4 °F)  Pulse:  [60-81] 74  Resp:  [12-52] 18  BP: ()/(50-78) 101/58  SpO2:  [92 %-100 %] 99 %    Physical Exam  Vitals signs and nursing note reviewed.   HENT:      Head: Normocephalic and atraumatic.      Nose: No congestion.      Mouth/Throat:      Mouth: Mucous membranes are moist.   Eyes:      Extraocular Movements: Extraocular movements intact.      Conjunctiva/sclera: Conjunctivae normal.      Pupils: Pupils are equal, round, and reactive to  light.   Neck:      Musculoskeletal: No muscular tenderness.   Cardiovascular:      Rate and Rhythm: Normal rate and regular rhythm.   Pulmonary:      Effort: Pulmonary effort is normal.      Breath sounds: Normal breath sounds.   Abdominal:      General: Bowel sounds are normal. There is no distension.      Palpations: Abdomen is soft.      Tenderness: There is no abdominal tenderness. There is no guarding or rebound.   Musculoskeletal:      Right lower leg: No edema.      Left lower leg: No edema.   Skin:     General: Skin is warm and dry.   Neurological:      Mental Status: She is alert and oriented to person, place, and time.      Cranial Nerves: No cranial nerve deficit.      Motor: Weakness (BUE 5/5, BLE 4/5, limited by pain) present.         Fluids  No intake or output data in the 24 hours ending 02/07/21 0941    Laboratory  Recent Labs     02/06/21  1339 02/07/21  0711   WBC 7.0 6.2   RBC 4.30 4.28   HEMOGLOBIN 13.6 13.4   HEMATOCRIT 40.2 42.1   MCV 93.5 98.4*   MCH 31.6 31.3   MCHC 33.8 31.8*   RDW 46.2 50.0   PLATELETCT 306 252   MPV 10.4 10.4     Recent Labs     02/06/21  1339 02/07/21  0711   SODIUM 138 132*   POTASSIUM 4.3 3.6   CHLORIDE 106 100   CO2 18* 22   GLUCOSE 70 87   BUN 14 16   CREATININE 0.74 0.83   CALCIUM 9.7 9.0     Recent Labs     02/06/21  1339 02/07/21  0711   APTT 66.2*  --    INR 3.75* 3.59*         Recent Labs     02/07/21  0711   TRIGLYCERIDE 147   HDL 39*   LDL 26       Imaging  MR-LUMBAR SPINE-W/O   Final Result      1.  Marrow edema with incompletely visualized bilateral sacral insufficiency fractures which are acute or subacute in nature.      2.  T11 compression fracture with 50% height loss superiorly. No significant posterior bony retropulsion. No evidence of marrow edema consistent with chronic process.      3.  Minimal chronic compression fracture of T12.      4.  Minimal multilevel degenerative disc disease and facet degeneration. No significant central canal or  neuroforaminal narrowing.      CT-CTA COMPLETE THORACOABDOMINAL AORTA   Final Result   Addendum 1 of 1   There are incompletely visualized nondisplaced bilateral sacral    insufficiency fractures.      Final           Assessment/Plan  * Sacral fracture, closed (HCC)- (present on admission)  Assessment & Plan  Pain control  Bowel protocol  PT and OT  Bone density scan as outpatient    Supratherapeutic INR- (present on admission)  Assessment & Plan  Coumadin per pharmacy    Compression fracture of thoracic spine, non-traumatic (HCC)- (present on admission)  Assessment & Plan  TLSO  Pain control  Bone density scan as outpatient  PT and OT    HTN (hypertension)- (present on admission)  Assessment & Plan  Toprol, losartan, Aldactone    History of DVT (deep vein thrombosis)- (present on admission)  Assessment & Plan  Coumadin per pharmacy      History of aortic aneurysm- (present on admission)  Assessment & Plan  History of thoracic aortic stent placement  Control HTN    Hypothyroid- (present on admission)  Assessment & Plan  Levothyroxine    Dyslipidemia- (present on admission)  Assessment & Plan  Crestor        VTE prophylaxis: Coumadin

## 2021-02-07 NOTE — PROGRESS NOTES
TLSO was delivered to Pt's bedside. Pt was talked through basic use of the brace and she expressed understanding. Nursing staff notified that Pt has brace

## 2021-02-07 NOTE — PROGRESS NOTES
Inpatient Anticoagulation Service Note    Date: 2/7/2021    Reason for Anticoagulation: Deep Vein Thrombosis(Protein S deficiency)   Target INR: 2.0 to 3.0         Hemoglobin Value: 13.6  Hematocrit Value: 40.2  Lab Platelet Value: 306    INR from last 7 days     Date/Time INR Value    02/07/21 0711  (!) 3.59    02/06/21 1339  (!) 3.75        Dose from last 7 days     Date/Time Dose (mg)    02/07/21 0832  0    02/06/21 2129  0        Average Dose (mg): 6(7.5 mg MWF, 5 mg AODs)  Significant Interactions: Statin, Thyroid Medications  Reversal Agent Administered: Not Applicable  Comments: INR remains supratherapeutic. No bleeding in chart review, no concerning H/H lab trends at this time. DDI maintained from home. Will hold dose again today.    Plan:  Warfarin held. INR in AM.  Education Material Provided?: No  Pharmacist suggested discharge dosing: Warfarin 7.5mg MWF and 5mg all other days with follow up within 5 days of discharge.     Eduardo Hanley, ClaudiaD

## 2021-02-07 NOTE — CARE PLAN
Problem: Safety  Goal: Will remain free from falls  Outcome: PROGRESSING AS EXPECTED  Note: Bed locked and in lowest position, side rails up x2, call light within reach, patient educated to call for assistance      Problem: Pain Management  Goal: Pain level will decrease to patient's comfort goal  Outcome: PROGRESSING AS EXPECTED  Note: Assess pain Q2-4H, administer pain medication when indicated, encourage patient to voice pain to staff, offer heat and ice for additional pain relief

## 2021-02-07 NOTE — ED NOTES
Med Rec completed per patient at bedside and verified RX strengths with the 24 hr [facility].  Preferred Pharmacy:[place] [id number]  Allergies reviewed:NKDA  No ORAL antibiotics in last 14 days    Patient reports that she has been taking tylenol 500 mg - 4 tabs every 4 hours for the last week for pain scheduled.    Patient is on warfarin. Her current schedule is:  Warfarin 5 mg tablet-Take 1 1/2 tablet (7.5 mg) every Mon, Wens, Fri  Warfarin 5 mg tablet-Take 1 tablet (5 mg) every Tues, Thurs, Sat, Sun

## 2021-02-07 NOTE — CONSULTS
DATE OF SERVICE:  02/06/2021        NEUROSURGERY CONSULTATION     REASON FOR CONSULTATION:  T11 fracture.  She also has a sacral fracture, which   is being evaluated by another department.     HISTORY OF PRESENT ILLNESS:  This is a woman born in 1965, who has significant   comorbidities including treatment for aortic aneurysm and on Coumadin with an   INR of 4, when she presented. Originally there was concern that she possibly   was having some sort of incontinence issue due to epidural hematoma or other   underlying issues, but due to incontinence she has been having back pain for   several weeks and then began having extreme pain in her buttock, down by her   gluteal cleft.  She said once this she started having episodes of   incontinence.  They happen when she bears down because the pain is severe and   is likely stress incontinence.     Neurosurgery was consulted for the found T11 compression fracture, which looks   subacute in nature.  On evaluation, the patient has no neurologic deficit.    She has significant pain in her gluteal region and she was having significant   pain in her back until the gluteal pain surpassed that over the last 24 hours.    Aside from this, she has no other significant findings and she is in her   normal state of health, which is significant vasculopathy and other   comorbidities.     A 12-point review of systems as per HPI.  She denies any chest pain, shortness   of breath.  She has back pain, no weakness or numbness in her arms or her   legs.     IMAGING:  She has an MRI of the lumbar spine, which shows edema within the T11   and T12 vertebral body consistent with a compression fracture at T11 with no   substantial height loss.  Supposedly, it was measured about 50%.  It is an   anterior wedge fracture with no significant kyphosis.  There was also some   edema in T12 consistent with likely endplate fracture with minimal height   loss.  There is no retropulsion of fragments.  There  is no canal compromise or   neural foraminal compromise.     ASSESSMENT AND PLAN:  At this time, this is a woman with multiple   comorbidities who presents with stress incontinence due to pain in her glute   from a sacral fracture as well as axial back pain from chronic compression   fracture with no signs of structural instability. At this time, we have gone   over extensively with her treatments for conservative management for her spine   fracture.  At this time, I think that she would need to have surgical   intervention as long as she does not have progressive kyphosis across the   junction.  We have requested a TLSO brace for her.  This is at the bedside.    We have also stated that she should wear the TLSO when up out of bed, at work   or being in the car. She does not need to wear it when she is sleeping or when   she is in the shower.  She should refrain from smoking, which we told her   will impede her fusion as well as from any anti-inflammatories such as NSAIDs,   ibuprofen, Motrin, naproxen, which will all impede her fusion process.  We   would like her to have AP, lateral upright x-rays in the TLSO before she gets   discharged and then we will have her back in six weeks in clinic for serial   imaging to ensure that she is not having progressive kyphosis requiring her to   have an intervention for corrective surgery. At this time that seems very   unlikely.  She is in agreement with the plan, would further be managed   conservatively at this time.  We will follow her remotely until we see the   x-rays and then sign off on the patient if the x-rays look acceptable in the   TLSO brace prior to discharge.     A total of 50 minutes was spent in direct patient care, coordination and   consultation.              ______________________________  MD POPPY Packer/SUP    DD:  02/06/2021 20:01  DT:  02/06/2021 20:56    Job#:  [id number]

## 2021-02-07 NOTE — H&P
"[facility] Medicine History & Physical Note    Date of Service  2/6/2021    Primary Care Physician  Jaquan Horne M.D.    Consultants  Neurosurgery  Orthopedic Surgery     Code Status  Full Code    Chief Complaint  Chief Complaint   Patient presents with   • Low Back Pain     x 3 weeks.has hx w/herniated disc and compressed disc.   • Difficulty Walking     x 3 weeks.    • Buttocks Pain     x 3 weeks.       History of Presenting Illness  55 y.o. female with PMHx HTN, HLD, Aortic Dissection and Hypothyroidism who presented 2/6/2021 with Lower Back Pain and Inability to Ambulate. As per the patient, she states she developed a \"sharp\" pain in her left buttock 3 weeks ago while walking down a hallway. She reports palpating her left buttock after the sensation started and says she felt a \"knot the size of my finger\" and noticed a bruise. The patient says she started to experience difficulty walking after the pain worsened, and reports her tailbone started seizing and producing a \"sharp\" pain that \"takes my breath away\" whenever she coughed or moved her arm. She reports she has also developed numbness and tingling from her knees up to her hips. Patient says the last time she was in this much pain was when she had herniated and compressed discs. She reports the pain she is experiencing currently feels different than her chronic back pain, and denies her back pain radiating elsewhere in her body. Patient adds that she is currently following up with wound care due to developing a wound on her left ankle. After the patient's symptoms failed to resolve she was prompted to go to Urgent Care to have her symptoms evaluated. The patient was then encouraged to come into the [facility] ED during her visit for MRI's and further evaluation.Patient has associated lower back pain, difficulty walking, leg numbness, leg tingling, and leg weakness, but denies urinary retention, fecal retention, fecal incontinence, urinary incontinence, " abdominal pain, fever, chills, cough, or shortness of breath. She also denies coming into contact with anyone who has tested positive for Covid-19 or taking any recent travels outside of the country. Patient smokes cigarettes daily, but does not drink alcohol or use drugs. She has no known allergies and takes Coumadin and Metoprolol. Patient does not have a history of diabetes. Upon admission to ED VS 97.7/71/52/132/63/100% on RA. CBC/BMP were unremarkable. LA 1.4 and UA was negative. INR was found to be supratherapeutic to 3.75. CTA Thoracolumbar Aorta was performed and was negative for any acute aortic dissection. MRI of Lumbar Spine was performed and showed marrow edema with incompletely visualized bilateral sacral insufficiency fractures which are acute or subacute in nature. In addition, a T11 compression fracture with 50% height loss superiorly. No significant posterior bony retropulsion. No evidence of marrow edema consistent with chronic process. Minimal chronic compression fracture of T12. Minimal multilevel degenerative disc disease and facet degeneration. No significant central canal or neuroforaminal narrowing. Dr. Luciano from Neurosurgery Consulted and Orhtopedic Surgery Consulted as well by ERP. ROS is otherwise negative. Will admit for chronic Thoracic and Lumbar Compression Fractures and Orthopedic/Neurosurgical Evaluation and Pain Control.     Review of Systems  Review of Systems   Constitutional: Positive for malaise/fatigue.   HENT: Negative.    Eyes: Negative.    Respiratory: Negative.    Cardiovascular: Negative.    Gastrointestinal: Negative.    Genitourinary: Negative.    Musculoskeletal: Positive for back pain.   Skin: Negative.    Neurological: Negative.    Endo/Heme/Allergies: Negative.    Psychiatric/Behavioral: Negative.        Past Medical History   has a past medical history of Anticoagulated (9/21/2010), Anxiety (9/21/2010), ASTHMA (9/21/2010), Dissecting aneurysm of aorta (HCC)  (9/21/2010), Dissection of aorta, thoracic (HCC) (3/10/2010), DVT (deep venous thrombosis) (HCC) (11/2/2009), Grave's disease (9/21/2010), HTN (hypertension) (8/12/2010), Leiomyoma of uterus, unspecified (9/30/2007), Long term (current) use of anticoagulants (8/19/2011), Mitral valve insufficiency and aortic valve insufficiency (6/25/2010), Murmur (2/18/2009), Status post insertion of inferior vena caval filter (9/21/2010), and Thoracic aneurysm without mention of rupture (8/12/2010).    Surgical History   has no past surgical history on file.     Family History  family history is not on file.     Social History   reports that she has been smoking. She has never used smokeless tobacco. She reports that she does not drink alcohol or use drugs.    Allergies  No Known Allergies    Medications  Prior to Admission Medications   Prescriptions Last Dose Informant Patient Reported? Taking?   levothyroxine (SYNTHROID) 112 MCG Tab   No No   Sig: Take 1 Tab by mouth every day. 2 tab on sunday and 1 the rest of week   losartan (COZAAR) 100 MG Tab   No No   Sig: Take 1 Tab by mouth every day.   metoprolol SR (TOPROL XL) 100 MG TABLET SR 24 HR   No No   Sig: Take 1 Tab by mouth every day.   rosuvastatin (CRESTOR) 20 MG Tab   No No   Sig: Take 1 Tab by mouth every evening.   spironolactone (ALDACTONE) 25 MG Tab   No No   Sig: Take 1 Tab by mouth every day.   vitamin D (CHOLECALCIFEROL) 1000 UNIT Tab   Yes No   Sig: Take 6,000 Units by mouth every day.   warfarin (COUMADIN) 5 MG Tab   No No   Sig: TAKE 1 TO 1.5 TABLETS BY MOUTH DAILY AS DIRECTED BY THE COUMADIN CLINIC      Facility-Administered Medications: None       Physical Exam  Temp:  [36.5 °C (97.7 °F)] 36.5 °C (97.7 °F)  Pulse:  [67-75] 71  Resp:  [20-52] 52  BP: (118-151)/(63-78) 132/63  SpO2:  [93 %-100 %] 100 %    Physical Exam  Vitals signs reviewed.   Constitutional:       General: She is in acute distress.   HENT:      Head: Normocephalic and atraumatic.      Right  Ear: External ear normal.      Left Ear: External ear normal.      Nose: Nose normal.      Mouth/Throat:      Mouth: Mucous membranes are dry.   Eyes:      Pupils: Pupils are equal, round, and reactive to light.   Neck:      Musculoskeletal: Neck supple.   Cardiovascular:      Rate and Rhythm: Normal rate and regular rhythm.      Pulses: Normal pulses.      Heart sounds: Normal heart sounds.   Pulmonary:      Effort: Pulmonary effort is normal.   Abdominal:      General: Abdomen is flat.   Musculoskeletal:      Comments: Thoracolumbar Back Pain  Inability to ambulate and move her back   Pain is 10/10 in intensity and stabbing nature with radiation to sacrum and bilateral LE   Skin:     General: Skin is warm.      Capillary Refill: Capillary refill takes less than 2 seconds.         Laboratory:  Recent Labs     02/06/21  1339   WBC 7.0   RBC 4.30   HEMOGLOBIN 13.6   HEMATOCRIT 40.2   MCV 93.5   MCH 31.6   MCHC 33.8   RDW 46.2   PLATELETCT 306   MPV 10.4     Recent Labs     02/06/21  1339   SODIUM 138   POTASSIUM 4.3   CHLORIDE 106   CO2 18*   GLUCOSE 70   BUN 14   CREATININE 0.74   CALCIUM 9.7     Recent Labs     02/06/21  1339   ALTSGPT 12   ASTSGOT 19   ALKPHOSPHAT 92   TBILIRUBIN 0.5   GLUCOSE 70     Recent Labs     02/06/21  1339   APTT 66.2*   INR 3.75*     No results for input(s): NTPROBNP in the last 72 hours.      No results for input(s): TROPONINT in the last 72 hours.    Imaging:  MR-LUMBAR SPINE-W/O   Final Result      1.  Marrow edema with incompletely visualized bilateral sacral insufficiency fractures which are acute or subacute in nature.      2.  T11 compression fracture with 50% height loss superiorly. No significant posterior bony retropulsion. No evidence of marrow edema consistent with chronic process.      3.  Minimal chronic compression fracture of T12.      4.  Minimal multilevel degenerative disc disease and facet degeneration. No significant central canal or neuroforaminal narrowing.     "  CT-CTA COMPLETE THORACOABDOMINAL AORTA   Final Result   Addendum 1 of 1   There are incompletely visualized nondisplaced bilateral sacral    insufficiency fractures.      Final            Assessment/Plan:  I anticipate this patient will require at least two midnights for appropriate medical management, necessitating inpatient admission.    * Chronic low back pain  Assessment & Plan  55 y.o. female with PMHx HTN, HLD, Aortic Dissection and Hypothyroidism who presented 2/6/2021 with Lower Back Pain and Inability to Ambulate. As per the patient, she states she developed a \"sharp\" pain in her left buttock 3 weeks ago while walking down a hallway. She reports palpating her left buttock after the sensation started and says she felt a \"knot the size of my finger\" and noticed a bruise. The patient says she started to experience difficulty walking after the pain worsened, and reports her tailbone started seizing and producing a \"sharp\" pain that \"takes my breath away\" whenever she coughed or moved her arm.     Upon admission to ED VS 97.7/71/52/132/63/100% on RA.     CBC/BMP were unremarkable. LA 1.4 and UA was negative.     INR was found to be supratherapeutic to 3.75.    CTA Thoracolumbar Aorta was performed and was negative for any acute aortic dissection.     MRI of Lumbar Spine was performed and showed marrow edema with incompletely visualized bilateral sacral insufficiency fractures which are acute or subacute in nature. In addition, a T11 compression fracture with 50% height loss superiorly. No significant posterior bony retropulsion. Minimal chronic compression fracture of T12. Minimal multilevel degenerative disc disease and facet degeneration. No significant central canal or neuroforaminal narrowing.     Dr. Luciano from Neurosurgery Consulted by ERP-No acute neurosurgical intervention at this time. Will follow as an OP    Orhtopedic Surgery Consulted as well by ERP.     Admit to Medical Floor for Pain Control and " Warfarin management  Trend CBC/CMP  Obtain Lipid Profile  Obtain HbA1C  Obtain Vitamin D-Level  Obtain DEXA Scan in AM to rule out osteopenia/osteoporosis  Diet-Cardiac    Pain Control-Tylenol, Percocet, Morphine, Dilaudid  DVT Prophylaxis-Lovenox  GI Prophylaxis-Famotidine  N/V-Zofran  Senna/Colace for OIC  Labtealol 10mg IVP Q4H PRN for SBP>180mmHg  PT/OT Evaluation     Long term current use of anticoagulant therapy- (present on admission)  Assessment & Plan  Patinents INR Supratherapeutic on Admission to 3.75  PK Consulted for Warfarin Dosing with Target INR of 2.0-3.0      Aneurysm of thoracic aorta (HCC)- (present on admission)  Assessment & Plan  CTA Thoracolumbar Aorta performed and showed no evidence of acute Type A or Type B Aortic Dissection  Continue with Anti-hypertensives  BP Monitoring   Continue with Spironolactone 25mg Daily with Holding Paramters     HTN (hypertension)- (present on admission)  Assessment & Plan  BP on admission normotensive at 132/63  Continue with Losartan 100mg Daily with Holding Parameters  Continue with Metoprolol XL 100mg Daily with Holding Parameters    Dyslipidemia- (present on admission)  Assessment & Plan  Continue with Crestor 20mg QHS  Obtain Lipid Panel  Obtain HbA1C

## 2021-02-07 NOTE — PROGRESS NOTES
Neurosurgery Progress Note    Subjective:  Back Pain controlled, no radicular pain or paresthesias  TLSO fitted  Mobilized to BR    Exam:  A&O  BLE 4+/5 with pain, improved with coaching, sensation grossly intact  Tenderness over fracture level with light palpation  Facial symmetry     BP  Min: 99/52  Max: 151/75  Pulse  Av.7  Min: 60  Max: 81  Resp  Av.8  Min: 12  Max: 52  Temp  Av.1 °C (97 °F)  Min: 35.6 °C (96.1 °F)  Max: 36.5 °C (97.7 °F)  SpO2  Av.4 %  Min: 92 %  Max: 100 %    No data recorded    Recent Labs     21  1339 21  0711   WBC 7.0 6.2   RBC 4.30 4.28   HEMOGLOBIN 13.6 13.4   HEMATOCRIT 40.2 42.1   MCV 93.5 98.4*   MCH 31.6 31.3   MCHC 33.8 31.8*   RDW 46.2 50.0   PLATELETCT 306 252   MPV 10.4 10.4     Recent Labs     21  1339 21  0711   SODIUM 138 132*   POTASSIUM 4.3 3.6   CHLORIDE 106 100   CO2 18* 22   GLUCOSE 70 87   BUN 14 16   CREATININE 0.74 0.83   CALCIUM 9.7 9.0     Recent Labs     21  1339 21  0711   APTT 66.2*  --    INR 3.75* 3.59*           Intake/Output       21 07 - 21 0659 21 07 - 21 59       Total [id number] Total       Intake    Total Intake -- -- -- -- -- --       Output    Urine  --  -- --  --  -- --    Number of Times Voided -- 2 x 2 x -- -- --    Stool  --  -- --  --  -- --    Number of Times Stooled -- -- -- 0 x -- 0 x    Total Output -- -- -- -- -- --       Net I/O     -- -- -- -- -- --          No intake or output data in the 24 hours ending 21 1107         • oxyCODONE immediate-release  5-10 mg Q4HRS PRN   • [START ON 2021] warfarin  5 mg Once per day on Sun Tue Thu Sat    And   • [START ON 2021] warfarin  7.5 mg Once per day on    • HYDROmorphone  1 mg Q3HRS PRN   • MD Alert...Warfarin per Pharmacy   PHARMACY TO DOSE   • rosuvastatin  20 mg Q EVENING   • levothyroxine  112 mcg AM ES   • losartan  100 mg Q DAY   • metoprolol SR  100 mg Q  DAY   • spironolactone  25 mg Q DAY   • ondansetron  4 mg Q4HRS PRN   • senna-docusate  1 Tab DAILY       Assessment and Plan:  Hospital day #2 T11-12 compression fracture   POD #NA  Prophylactic anticoagulation: no         Start date/time: tbd    Neuro stable  Mobilize as tolerated in TLSO  TLSO when OOB  Continue pain control  XR TSP standing in brace prior to discharge  F/up at [facility] neurosurgery in 6 weeks with repeat XR  No NSAIDs, no smoking  NSGY team will sign off, please call with questions or concerns

## 2021-02-07 NOTE — PROGRESS NOTES
2 RN skin check with Mayela    Hyperpigmentation to BLEs.  Venous stasis wound to L ankle, band-aid in place.  Healed wound to R ankle, SOFIA no drainage.  Bilateral fingers cold and cyanotic, baseline per patient.    All other skin intact. No signs of skin breakdown over bony prominences.    Devices in place: Glasses, PIV.
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
## 2021-02-07 NOTE — PROGRESS NOTES
Inpatient Anticoagulation Service Note    Date: 2021    Reason for Anticoagulation: Deep Vein Thrombosis(Protein S deficiency)   Target INR: 2.0 to 3.0         Hemoglobin Value: 13.6  Hematocrit Value: 40.2  Lab Platelet Value: 306    INR from last 7 days     Date/Time INR Value    21 1339  (!) 3.75        Dose from last 7 days     Date/Time Dose (mg)    219  0        Average Dose (mg): 6mg (7.5 mg MWF, 5 mg AODs)  Significant Interactions: Statin, Thyroid Medications    Comments:   Home warfarin to continue for pt admitted with low back pain, found to have T11 compression fracture. Nonoperative management per Neurosurgery. INR is supratherapeutic on admission. Per chart review, TTR appears to be ~63% over ~6 years.  DDIs are stable from home. Hgb/hct are WNL. Cardiac diet order in place.     Plan:  Will hold tonight's dose and resume home regimen tomorrow. INR in AM.    Education Material Provided?: No  Pharmacist suggested discharge dosin.5 mg MWF, 5 mg AODs.     Charis Mccann, PharmD

## 2021-02-07 NOTE — CONSULTS
DATE OF SERVICE:  02/06/2021     ORTHOPEDIC CONSULTATION     CHIEF COMPLAINT:  Sacral pain.     HISTORY OF PRESENT ILLNESS:  The patient is a 56-year-old female with multiple   medical comorbidities.  She has had some left buttock pain and tailbone pain   over the last three weeks.  It has gotten worse to the point where she can   really barely move.  She came into the emergency room.  They did an MRI of her   and they identified a sacral insufficiency fracture and thoracic fracture.  I   was consulted to evaluate bilateral sacral insufficiency fractures best seen   on that MRI.     PAST MEDICAL HISTORY:  Complicated with a dissecting aortic aneurysm, DVTs,   Graves' disease, hypertension, leiomyoma of uterus, mitral valve and aortic   valve insufficiency and asthma.     FAMILY HISTORY:  Noncontributory.     REVIEW OF SYSTEMS:  As per the HPI, otherwise negative.     SOCIAL HISTORY:  The patient is .  Does smoke.  No other significant   abnormalities.     PAST SURGICAL HISTORY:  As noted.     MEDICATIONS:  Per the medication reconciliation.     ALLERGIES:  No known allergies.     PHYSICAL EXAMINATION:    GENERAL:  The patient appears stated age.  PSYCHIATRIC:  Mood is appropriate.  She is alert and oriented.  HEENT:  Normal.  LUNGS:  Respirations unlabored.  MUSCULOSKELETAL:  Reveals tenderness in the sacrum.  EXTREMITIES:  No obvious lesions or masses.  SKIN:  Looks normal.  MUSCULOSKELETAL: Her legs are grossly neurologically intact.     LABORATORY DATA:  Demonstrates a relatively normal white blood cell and   hemoglobin.       DIAGNOSTIC STUDIES: MRI of the lumbar spine demonstrates some marrow edema and   incompletely visualized bilateral sacral insufficiency fractures with an   acute or subacute nature of T11 compression fracture with 30% loss of height,   minimal chronic compression fracture of T12 and degenerative disk disease.    CTA of the thoracoabdominal aorta demonstrates atherosclerotic change,  but no   dissection or stent at present.     IMPRESSION:  Bilateral sacral insufficiency fractures.     PLAN:  At this point, I would let her weightbear as tolerated for the sacrum.    The Spine team will determine that based on her compression fracture.  If she   is unable to mobilize or continues to have symptoms, she could possibly be a   candidate for SI screws; however, I do not think with her bone quality it is a   great idea at this time.  She was explained of this in detail.        ______________________________  MD SOSA COLLIER/CHUCKY    DD:  02/06/2021 21:08  DT:  02/06/2021 21:37    Job#:  [id number]

## 2021-02-07 NOTE — ASSESSMENT & PLAN NOTE
Sacral insufficiency fractures.  Neurosurgery/orthopedic surgery recommended conservative nonoperative management.  Pain control  Bowel protocol  PT and OT recommending home health.  Bone density scan as outpatient

## 2021-02-07 NOTE — PROGRESS NOTES
Assessment complete.  A&O x 4. Patient calls appropriately.  Patient ambulates with standby assist with a FWW. TLSO brace on when OOB. Bed alarm off.   Patient has 8/10 pain. Pain managed with prescribed medications.  Denies N&V. Tolerating cardiac diet.  Venous stasis wound to L ankle, per patient request will change dressing in AM.  + void into toilet, + flatus, - BM.  Patient denies SOB.  SCD's off.    Review plan with of care with patient. Call light and personal belongings with in reach. Hourly rounding in place. All needs met at this time.

## 2021-02-07 NOTE — PROGRESS NOTES
Bedside report received.  Assessment complete.  A&O x 4. Patient calls appropriately.  Patient ambulates with SB assist and FWW.    Patient has 7/10 pain. Pain managed with prescribed medications.  Denies N&V. Tolerating cardiac diet.  Wound to lateral left ankle, dressing in place, CDI. Healing wound to right medial ankle, SOFIA  + void, - flatus, - BM.  Patient denies SOB.  SCD's off.    Review plan with of care with patient. Call light and personal belongings with in reach. Hourly rounding in place. All needs met at this time.

## 2021-02-07 NOTE — PROGRESS NOTES
Cross Cover Progress Note    Notified by clinical pharmacist doing med reconciliation that patient has been taking 12g Acetaminophen per day. LFTs normal. No evidence of liver failure.    Discontinued patient's prn tylenol order. Will trend LFTs for next few days and will order acetaminophen level. Hold tylenol toxicity treatment for now and monitor.

## 2021-02-08 LAB
ANION GAP SERPL CALC-SCNC: 11 MMOL/L (ref 7–16)
BUN SERPL-MCNC: 12 MG/DL (ref 8–22)
CALCIUM SERPL-MCNC: 9.3 MG/DL (ref 8.5–10.5)
CHLORIDE SERPL-SCNC: 100 MMOL/L (ref 96–112)
CO2 SERPL-SCNC: 26 MMOL/L (ref 20–33)
CREAT SERPL-MCNC: 0.7 MG/DL (ref 0.5–1.4)
ERYTHROCYTE [DISTWIDTH] IN BLOOD BY AUTOMATED COUNT: 46.6 FL (ref 35.9–50)
GLUCOSE SERPL-MCNC: 81 MG/DL (ref 65–99)
HCT VFR BLD AUTO: 40.1 % (ref 37–47)
HGB BLD-MCNC: 13.4 G/DL (ref 12–16)
INR PPP: 3.04 (ref 0.87–1.13)
MCH RBC QN AUTO: 32.1 PG (ref 27–33)
MCHC RBC AUTO-ENTMCNC: 33.4 G/DL (ref 33.6–35)
MCV RBC AUTO: 96.2 FL (ref 81.4–97.8)
PLATELET # BLD AUTO: 255 K/UL (ref 164–446)
PMV BLD AUTO: 10.3 FL (ref 9–12.9)
POTASSIUM SERPL-SCNC: 4 MMOL/L (ref 3.6–5.5)
PROTHROMBIN TIME: 32.4 SEC (ref 12–14.6)
RBC # BLD AUTO: 4.17 M/UL (ref 4.2–5.4)
SODIUM SERPL-SCNC: 137 MMOL/L (ref 135–145)
WBC # BLD AUTO: 5.1 K/UL (ref 4.8–10.8)

## 2021-02-08 PROCEDURE — 80048 BASIC METABOLIC PNL TOTAL CA: CPT

## 2021-02-08 PROCEDURE — 99232 SBSQ HOSP IP/OBS MODERATE 35: CPT | Performed by: FAMILY MEDICINE

## 2021-02-08 PROCEDURE — 97166 OT EVAL MOD COMPLEX 45 MIN: CPT

## 2021-02-08 PROCEDURE — 36415 COLL VENOUS BLD VENIPUNCTURE: CPT

## 2021-02-08 PROCEDURE — 85610 PROTHROMBIN TIME: CPT

## 2021-02-08 PROCEDURE — 97162 PT EVAL MOD COMPLEX 30 MIN: CPT

## 2021-02-08 PROCEDURE — 700102 HCHG RX REV CODE 250 W/ 637 OVERRIDE(OP): Performed by: FAMILY MEDICINE

## 2021-02-08 PROCEDURE — 700102 HCHG RX REV CODE 250 W/ 637 OVERRIDE(OP): Performed by: STUDENT IN AN ORGANIZED HEALTH CARE EDUCATION/TRAINING PROGRAM

## 2021-02-08 PROCEDURE — 700111 HCHG RX REV CODE 636 W/ 250 OVERRIDE (IP): Performed by: FAMILY MEDICINE

## 2021-02-08 PROCEDURE — 85027 COMPLETE CBC AUTOMATED: CPT

## 2021-02-08 PROCEDURE — A9270 NON-COVERED ITEM OR SERVICE: HCPCS | Performed by: STUDENT IN AN ORGANIZED HEALTH CARE EDUCATION/TRAINING PROGRAM

## 2021-02-08 PROCEDURE — 770006 HCHG ROOM/CARE - MED/SURG/GYN SEMI*

## 2021-02-08 PROCEDURE — A9270 NON-COVERED ITEM OR SERVICE: HCPCS | Performed by: FAMILY MEDICINE

## 2021-02-08 RX ORDER — WARFARIN SODIUM 7.5 MG/1
7.5 TABLET ORAL
Status: DISCONTINUED | OUTPATIENT
Start: 2021-02-09 | End: 2021-02-10 | Stop reason: HOSPADM

## 2021-02-08 RX ORDER — WARFARIN SODIUM 5 MG/1
5 TABLET ORAL
Status: DISCONTINUED | OUTPATIENT
Start: 2021-02-09 | End: 2021-02-10 | Stop reason: HOSPADM

## 2021-02-08 RX ORDER — WARFARIN SODIUM 5 MG/1
5 TABLET ORAL
Status: COMPLETED | OUTPATIENT
Start: 2021-02-08 | End: 2021-02-08

## 2021-02-08 RX ADMIN — OXYCODONE 10 MG: 5 TABLET ORAL at 07:27

## 2021-02-08 RX ADMIN — SPIRONOLACTONE 25 MG: 25 TABLET ORAL at 07:27

## 2021-02-08 RX ADMIN — LEVOTHYROXINE SODIUM 112 MCG: 0.11 TABLET ORAL at 06:46

## 2021-02-08 RX ADMIN — HYDROMORPHONE HYDROCHLORIDE 1 MG: 1 INJECTION, SOLUTION INTRAMUSCULAR; INTRAVENOUS; SUBCUTANEOUS at 14:44

## 2021-02-08 RX ADMIN — DOCUSATE SODIUM 50 MG AND SENNOSIDES 8.6 MG 1 TABLET: 8.6; 5 TABLET, FILM COATED ORAL at 06:46

## 2021-02-08 RX ADMIN — OXYCODONE 10 MG: 5 TABLET ORAL at 17:28

## 2021-02-08 RX ADMIN — OXYCODONE 10 MG: 5 TABLET ORAL at 21:30

## 2021-02-08 RX ADMIN — METOPROLOL SUCCINATE 100 MG: 100 TABLET, EXTENDED RELEASE ORAL at 06:46

## 2021-02-08 RX ADMIN — LOSARTAN POTASSIUM 100 MG: 50 TABLET, FILM COATED ORAL at 06:46

## 2021-02-08 RX ADMIN — ROSUVASTATIN CALCIUM 20 MG: 20 TABLET, FILM COATED ORAL at 17:25

## 2021-02-08 RX ADMIN — WARFARIN SODIUM 5 MG: 5 TABLET ORAL at 17:26

## 2021-02-08 RX ADMIN — OXYCODONE 10 MG: 5 TABLET ORAL at 12:02

## 2021-02-08 RX ADMIN — OXYCODONE 10 MG: 5 TABLET ORAL at 02:52

## 2021-02-08 RX ADMIN — HYDROMORPHONE HYDROCHLORIDE 1 MG: 1 INJECTION, SOLUTION INTRAMUSCULAR; INTRAVENOUS; SUBCUTANEOUS at 08:49

## 2021-02-08 ASSESSMENT — PAIN DESCRIPTION - PAIN TYPE
TYPE: ACUTE PAIN;CHRONIC PAIN
TYPE: ACUTE PAIN;CHRONIC PAIN
TYPE: ACUTE PAIN
TYPE: ACUTE PAIN;CHRONIC PAIN

## 2021-02-08 ASSESSMENT — ENCOUNTER SYMPTOMS
BLURRED VISION: 0
NECK PAIN: 0
COUGH: 0
FEVER: 0
DIZZINESS: 0
VOMITING: 0
WHEEZING: 0
MYALGIAS: 0
WEAKNESS: 1
PALPITATIONS: 0
NERVOUS/ANXIOUS: 0
HEARTBURN: 0
ABDOMINAL PAIN: 0
SHORTNESS OF BREATH: 0
BACK PAIN: 1
SORE THROAT: 0
NAUSEA: 0
HEADACHES: 0
FLANK PAIN: 0
CHILLS: 0
SPEECH CHANGE: 0
FOCAL WEAKNESS: 0
SENSORY CHANGE: 0
DIAPHORESIS: 0

## 2021-02-08 ASSESSMENT — COGNITIVE AND FUNCTIONAL STATUS - GENERAL
CLIMB 3 TO 5 STEPS WITH RAILING: A LOT
SUGGESTED CMS G CODE MODIFIER DAILY ACTIVITY: CK
HELP NEEDED FOR BATHING: A LITTLE
PERSONAL GROOMING: A LITTLE
DRESSING REGULAR LOWER BODY CLOTHING: A LITTLE
SUGGESTED CMS G CODE MODIFIER MOBILITY: CK
DAILY ACTIVITIY SCORE: 18
MOVING TO AND FROM BED TO CHAIR: A LITTLE
MOBILITY SCORE: 17
MOVING FROM LYING ON BACK TO SITTING ON SIDE OF FLAT BED: A LITTLE
WALKING IN HOSPITAL ROOM: A LITTLE
STANDING UP FROM CHAIR USING ARMS: A LITTLE
DRESSING REGULAR UPPER BODY CLOTHING: A LOT
TOILETING: A LITTLE
TURNING FROM BACK TO SIDE WHILE IN FLAT BAD: A LITTLE

## 2021-02-08 ASSESSMENT — GAIT ASSESSMENTS
DEVIATION: ANTALGIC
DISTANCE (FEET): 40
ASSISTIVE DEVICE: FRONT WHEEL WALKER

## 2021-02-08 ASSESSMENT — ACTIVITIES OF DAILY LIVING (ADL): TOILETING: INDEPENDENT

## 2021-02-08 NOTE — THERAPY
Physical Therapy   Initial Evaluation     Patient Name: Oumar Fisher  Age:  55 y.o., Sex:  female  Medical Record #: [id number]  Today's Date: 2/8/2021     Precautions: Spinal / Back Precautions , TLSO (Thoracolumbosacral orthosis), Weight Bearing As Tolerated Right Lower Extremity, Weight Bearing As Tolerated Left Lower Extremity    Assessment  Patient is 55 y.o. female admitted for T11/12 compression fracture and sacral fractures without fall or traumatic event presenting to PT most limited by pain. She has been educated on spinal precautions with log roll and demonstrated with min A. Pt able to initiate sit to stand with FWW and perform transfers and x40' of gait with FWW at SBA; distance limited by pain. PT will follow to address stair management. Anticipate once pain is controlled that pt will progress functionality quickly.      Plan    Recommend Physical Therapy 3 times per week until therapy goals are met for the following treatments:  Bed Mobility, Equipment, Gait Training, Neuro Re-Education / Balance, Self Care/Home Evaluation, Sensory Integration Techniques, Stair Training, Therapeutic Activities and Therapeutic Exercises    DC Equipment Recommendations: Front-Wheel Walker  Discharge Recommendations: Other -(anticipate home once pain more controlled)        Objective       02/08/21 0923   Prior Living Situation   Prior Services None   Housing / Facility 2 Story House   Steps Into Home 2   Steps In Home   (flight)   Equipment Owned None   Lives with - Patient's Self Care Capacity Spouse   Prior Level of Functional Mobility   Bed Mobility Independent   Transfer Status Independent   Ambulation Independent   Distance Ambulation (Feet)   (community)   Assistive Devices Used None   Stairs Independent   Balance Assessment   Comments limited by pain   Gait Analysis   Gait Level Of Assist   (Stand By Assist)   Assistive Device Front Wheel Walker   Distance (Feet) 40   Deviation Antalgic   Comments pt with slow  pace due to pain   Bed Mobility    Supine to Sit Minimal Assist  (to guide log roll and from sidely to sit)   Sit to Supine Minimal Assist   Scooting Minimal Assist   Rolling Minimum Assist to Lt.;Minimal Assist to Rt.   Functional Mobility   Sit to Stand   (Stand By Assist)   Bed, Chair, Wheelchair Transfer   (Stand BY Assist)   Transfer Method Stand Step   Comments cues for sequencing sit to stand for comfort.    Short Term Goals    Short Term Goal # 1 Pt will perform bed mob via log roll at spv in 6tx to improve functional independence.    Short Term Goal # 2 Pt will perform gait x150' with FWW at spv in 6tx to improve functional mobility.    Short Term Goal # 3 Pt will ascend/descend x10 stairs at spv in 6tx to improve functional mobility.    Anticipated Discharge Equipment and Recommendations   DC Equipment Recommendations Front-Wheel Walker   Discharge Recommendations Other -  (anticipate home once pain more controlled)

## 2021-02-08 NOTE — FACE TO FACE
Face to Face Note  -  Durable Medical Equipment    Juan Manuel Blount M.D. - NPI: [id number]  I certify that this patient is under my care and that they have had a durable medical equipment(DME)face to face encounter by myself that meets the physician DME face-to-face encounter requirements with this patient on:    Date of encounter:   Patient:                    MRN:                       YOB: 2019  Oumar Telles Fisher  [id number]  1965     The encounter with the patient was in whole, or in part, for the following medical condition, which is the primary reason for durable medical equipment:  Other - T11 fracture    I certify that, based on my findings, the following durable medical equipment is medically necessary:  Walkers.    HOME O2 Saturation Measurements:(Values must be present for Home Oxygen orders)         ,     ,         My Clinical findings support the need for the above equipment due to:  Abnormal Gait    Supporting Symptoms: Pain     ------------------------------------------------------------------------------------------------------------------    Face to Face Supporting Documentation - Home Health    The encounter with this patient was in whole or in part the primary reason for home health admission.    Date of encounter:   Patient:                    MRN:                       YOB: 2019  Oumar Telles Fisher  [id number]  1965     Home health to see patient for:  Skilled Nursing care for assessment, interventions & education    Skilled need for:  New Onset Medical Diagnosis T11 fracture    Skilled nursing interventions to include:  Comment: PT/OT    Homebound evidenced status by:  Needs the assistance of another person in order to leave the home. Leaving home must require a considerable and taxing effort. There must exist a normal inability to leave the home.    Community Physician to provide follow up care: Jaquan Horne M.D.     Optional  Interventions    Wound information & treatment:    Home Infusion Therapy orders:    Line/Drain/Airway:    I certify the face to face encounter for this home care referral meets the CMS requirements and the encounter/clinical assessment with the patient was, in whole, or in part, for the medical condition(s) listed above, which is the primary reason for home health care. Based on my clinical findings: the service(s) are medically necessary, support the need for home health care, and the homebound criteria are met.  I certify that this patient has had a face to face encounter by myself.  Juan Manuel Blount M.D. - NPI: [id number]    *Debility, frailty and advanced age in the absence of an acute deterioration or exacerbation of a condition do not qualify a patient for home health.

## 2021-02-08 NOTE — PROGRESS NOTES
Received report of patient at start of shift. Patient is AOx4. PRN medications administered for complaints of pain. Assessment complete, on room air. Patient up to bathroom with SBA and FWW, TLSO brace in use during mobility. Patient encouraged to use call light for assistance. Plan of care discussed, safety education provided.

## 2021-02-08 NOTE — PROGRESS NOTES
Inpatient Anticoagulation Service Note    Date: 2/8/2021    Reason for Anticoagulation: Deep Vein Thrombosis(Protein S deficiency)   Target INR: 2.0 to 3.0         Hemoglobin Value: 13.4  Hematocrit Value: 40.1  Lab Platelet Value: 255    INR from last 7 days     Date/Time INR Value    02/08/21 0539  (!) 3.04    02/07/21 0711  (!) 3.59    02/06/21 1339  (!) 3.75        Dose from last 7 days     Date/Time Dose (mg)    02/08/21 1409  5    02/07/21 0832  0    02/06/21 2129  0        Average Dose (mg): 6(7.5 mg MWF, 5 mg AODs)  Significant Interactions: Statin, Thyroid Medications    Reversal Agent Administered: Not Applicable  Comments: INR nearly to goal. After holding for 2 days anticipate INR to continue it's decline even with a dose of warfarin tonight.    Plan:  Warfarin 5mg. INR in AM.  Education Material Provided?: No  Pharmacist suggested discharge dosing: Resume home regimen of 7.5mg MWF and 5mg all other days with follow up within 3 days of discharge.     Eduardo Hanley, PharmD

## 2021-02-08 NOTE — PROGRESS NOTES
Hospital Medicine Daily Progress Note    Date of Service  2/8/2021    Chief Complaint  55 y.o. female admitted 2/6/2021 with sacral fractures.    Hospital Course  Admitted with sacral fractures, compression fracture of thoracic spine.  Neurosurgery and orthopedic surgery were consulted on the case, plan is conservative management and nonoperative intervention.    Interval Problem Update  Sacral fracture - pain mostly controlled, PT and OT evals done  Compression fracture - pain controlled  HTN - sbp 100-120    Consultants/Specialty  Neurosurgery  Orthopedic surgery    Code Status  Full Code    Disposition  Home health    Review of Systems  Review of Systems   Constitutional: Negative for chills, diaphoresis, fever and malaise/fatigue.   HENT: Negative for congestion, hearing loss and sore throat.    Eyes: Negative for blurred vision.   Respiratory: Negative for cough, shortness of breath and wheezing.    Cardiovascular: Negative for chest pain, palpitations and leg swelling.   Gastrointestinal: Negative for abdominal pain, heartburn, nausea and vomiting.   Genitourinary: Negative for dysuria, flank pain and hematuria.   Musculoskeletal: Positive for back pain. Negative for joint pain, myalgias and neck pain.   Skin: Negative for rash.   Neurological: Positive for weakness. Negative for dizziness, sensory change, speech change, focal weakness and headaches.   Psychiatric/Behavioral: The patient is not nervous/anxious.         Physical Exam  Temp:  [35.9 °C (96.6 °F)-36.3 °C (97.4 °F)] 36.3 °C (97.4 °F)  Pulse:  [60-73] 73  Resp:  [16-18] 17  BP: (104-123)/(48-75) 104/48  SpO2:  [93 %-95 %] 95 %    Physical Exam  Vitals signs and nursing note reviewed.   HENT:      Head: Normocephalic and atraumatic.      Nose: No congestion.      Mouth/Throat:      Mouth: Mucous membranes are moist.   Eyes:      Extraocular Movements: Extraocular movements intact.      Conjunctiva/sclera: Conjunctivae normal.      Pupils: Pupils are  equal, round, and reactive to light.   Neck:      Musculoskeletal: No muscular tenderness.   Cardiovascular:      Rate and Rhythm: Normal rate and regular rhythm.   Pulmonary:      Effort: Pulmonary effort is normal.      Breath sounds: Normal breath sounds.   Abdominal:      General: Bowel sounds are normal. There is no distension.      Palpations: Abdomen is soft.      Tenderness: There is no abdominal tenderness. There is no guarding or rebound.   Musculoskeletal:      Right lower leg: No edema.      Left lower leg: No edema.   Skin:     General: Skin is warm and dry.   Neurological:      Mental Status: She is alert and oriented to person, place, and time.      Cranial Nerves: No cranial nerve deficit.      Motor: Weakness (BUE 5/5, BLE 4/5, limited by pain) present.         Fluids    Intake/Output Summary (Last 24 hours) at 2/8/2021 1454  Last data filed at 2/8/2021 0905  Gross per 24 hour   Intake 240 ml   Output --   Net 240 ml       Laboratory  Recent Labs     02/06/21  1339 02/07/21  0711 02/08/21  0539   WBC 7.0 6.2 5.1   RBC 4.30 4.28 4.17*   HEMOGLOBIN 13.6 13.4 13.4   HEMATOCRIT 40.2 42.1 40.1   MCV 93.5 98.4* 96.2   MCH 31.6 31.3 32.1   MCHC 33.8 31.8* 33.4*   RDW 46.2 50.0 46.6   PLATELETCT 306 252 255   MPV 10.4 10.4 10.3     Recent Labs     02/06/21  1339 02/07/21  0711 02/08/21  0539   SODIUM 138 132* 137   POTASSIUM 4.3 3.6 4.0   CHLORIDE 106 100 100   CO2 18* 22 26   GLUCOSE 70 87 81   BUN 14 16 12   CREATININE 0.74 0.83 0.70   CALCIUM 9.7 9.0 9.3     Recent Labs     02/06/21  1339 02/07/21  0711 02/08/21  0539   APTT 66.2*  --   --    INR 3.75* 3.59* 3.04*         Recent Labs     02/07/21  0711   TRIGLYCERIDE 147   HDL 39*   LDL 26       Imaging  DX-THORACOLUMBAR SPINE-2 VIEWS   Final Result      Moderate wedge compression fracture of T11 is unchanged.      Mild loss of height of T9 and T10 is again seen.      Mild degenerative changes in the thoracic spine.         MR-LUMBAR SPINE-W/O   Final  Result      1.  Marrow edema with incompletely visualized bilateral sacral insufficiency fractures which are acute or subacute in nature.      2.  T11 compression fracture with 50% height loss superiorly. No significant posterior bony retropulsion. No evidence of marrow edema consistent with chronic process.      3.  Minimal chronic compression fracture of T12.      4.  Minimal multilevel degenerative disc disease and facet degeneration. No significant central canal or neuroforaminal narrowing.      CT-CTA COMPLETE THORACOABDOMINAL AORTA   Final Result   Addendum 1 of 1   There are incompletely visualized nondisplaced bilateral sacral    insufficiency fractures.      Final           Assessment/Plan  * Sacral fracture, closed (HCC)- (present on admission)  Assessment & Plan  Pain control  Bowel protocol  PT and OT  Bone density scan as outpatient    Supratherapeutic INR- (present on admission)  Assessment & Plan  Coumadin per pharmacy    Compression fracture of thoracic spine, non-traumatic (HCC)- (present on admission)  Assessment & Plan  TLSO  Pain control  Bone density scan as outpatient  PT and OT    HTN (hypertension)- (present on admission)  Assessment & Plan  Toprol, losartan, Aldactone    History of DVT (deep vein thrombosis)- (present on admission)  Assessment & Plan  Coumadin per pharmacy      History of aortic aneurysm- (present on admission)  Assessment & Plan  History of thoracic aortic stent placement  Control HTN    Hypothyroid- (present on admission)  Assessment & Plan  Levothyroxine    Dyslipidemia- (present on admission)  Assessment & Plan  Crestor        VTE prophylaxis: Coumadin

## 2021-02-08 NOTE — THERAPY
Occupational Therapy   Initial Evaluation     Patient Name: Oumar Fisher  Age:  55 y.o., Sex:  female  Medical Record #: [id number]  Today's Date: 2/8/2021     Precautions  Precautions: Spinal / Back Precautions , TLSO (Thoracolumbosacral orthosis), Weight Bearing As Tolerated Right Lower Extremity, Weight Bearing As Tolerated Left Lower Extremity  Comments: TLSO for OOB, spinal precautions for comfort     Assessment  Patient is 55 y.o. female admitted d/t back pain found to have T11-12 compression fx and sacral fx (non op). PMHx of DVT, aortic aneuysm, and HTN. Educated on spinal precautions and adaptive techniques for ADLs for comfort; edu on TLSO. Reports  can assist PRN, but works during the day and pt will be alone. Currently limited by decreased functional mobility, activity tolerance, balance, adherence to precautions, and pain which are currently affecting pt's ability to complete ADLs/IADLs at baseline. Will continue to follow.     Plan    Recommend Occupational Therapy 4 times per week until therapy goals are met for the following treatments:  Adaptive Equipment, Neuro Re-Education / Balance, Self Care/Activities of Daily Living, Therapeutic Activities and Therapeutic Exercises.    DC Equipment Recommendations: Tub / Shower Seat, Unable to determine at this time  Discharge Recommendations: Recommend home health for continued occupational therapy services(likely will progress when pain more controlled)      Objective     02/08/21 0842   Prior Living Situation   Prior Services None   Housing / Facility 2 Story House   Steps Into Home 2   Steps In Home   (flight)   Bathroom Set up Walk In Shower;Shower Chair   Equipment Owned Tub / Shower Seat   Lives with - Patient's Self Care Capacity Spouse   Comments Reports  can assist PRN when home, but works during the day. Reports may have other friends who could assist intermittently   Prior Level of ADL Function   Self Feeding Independent   Grooming  / Hygiene Independent   Bathing Independent   Dressing Independent   Toileting Independent   Prior Level of IADL Function   Medication Management Independent   Laundry Independent   Kitchen Mobility Independent   Finances Independent   Home Management Independent   Shopping Independent   Prior Level Of Mobility Independent Without Device in Community;Independent Without Device in Home   Driving / Transportation Driving Independent   Occupation (Pre-Hospital Vocational) 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)   Vitals   O2 Delivery Device None - Room Air   Pain 0 - 10 Group   Location Back   Location Orientation Mid   Therapist Pain Assessment During Activity;Nurse Notified;Post Activity  (not quantified)   Cognition    Cognition / Consciousness WDL   Level of Consciousness Alert   Comments pleasant and cooperative; motivated   Passive ROM Upper Body   Passive ROM Upper Body WDL   Active ROM Upper Body   Active ROM Upper Body  WDL   Strength Upper Body   Upper Body Strength  WDL   Balance Assessment   Sitting Balance (Static) Fair +   Sitting Balance (Dynamic) Fair   Standing Balance (Static) Fair   Standing Balance (Dynamic) Fair   Weight Shift Sitting Good   Weight Shift Standing Fair   Comments with FWW   Bed Mobility    Supine to Sit Minimal Assist   Sit to Supine Minimal Assist   Scooting Minimal Assist   Comments HOB flat; edu on logroll and req assist   ADL Assessment   Grooming Supervision;Standing  (washing hands at sink)   Upper Body Dressing Moderate Assist  (TLSO)   Lower Body Dressing Minimal Assist   Toileting Supervision   Comments edu on spinal precautions and adaptive techniques    Functional Mobility   Sit to Stand Minimal Assist   Bed, Chair, Wheelchair Transfer Minimal Assist   Toilet Transfers Minimal Assist  (heavy use of GB)   Transfer Method Stand Step   Mobility w/ fww in room and hallway   Comments req v/cs to maintain spinal precautions and edu on adaptive techniques   Edema / Skin  Assessment   Edema / Skin  Not Assessed   Activity Tolerance   Sitting in Chair 5 min on toilet   Sitting Edge of Bed 12 min    Standing 15 min   Comments limited by pain   Patient / Family Goals   Patient / Family Goal #1 to go home   Short Term Goals   Short Term Goal # 1 will complete FB dressing with SPV w/ TLSO   Short Term Goal # 2 will complete standing g/h using adaptive techniques wtih SPV   Short Term Goal # 3 will complete toilet txf w/SPV   Anticipated Discharge Equipment and Recommendations   DC Equipment Recommendations Tub / Shower Seat;Unable to determine at this time   Discharge Recommendations Recommend home health for continued occupational therapy services  (likely will progress when pain more controlled)

## 2021-02-08 NOTE — PROGRESS NOTES
Assessment complete.  A&O x 4. Patient calls appropriately.  Patient ambulates with standby assist with a FWW. TLSO brace on when OOB. Bed alarm off.   Patient has 6/10 pain. Pain managed with prescribed medications.  Denies N&V. Tolerating cardiac diet.  L ankle dressing CDI.  + void into toilet, + flatus, - BM.  Patient denies SOB.  SCD's off.    Review plan with of care with patient. Call light and personal belongings with in reach. Hourly rounding in place. All needs met at this time.

## 2021-02-08 NOTE — CARE PLAN
Problem: Safety  Goal: Will remain free from falls  Outcome: PROGRESSING AS EXPECTED  Note: Bed locked in lowest position, treaded socks in place, call light/belongings within reach.  Patient encouraged to notify staff for assistance prior to mobilizing.      Problem: Pain Management  Goal: Pain level will decrease to patient's comfort goal  Outcome: PROGRESSING AS EXPECTED  Note: Pain level frequently assessed. Patient reports severe pain with movement. PRN pain medications administered per MAR. Patient reports pain relief post medication administration.

## 2021-02-09 PROBLEM — R79.1 SUPRATHERAPEUTIC INR: Status: RESOLVED | Noted: 2021-02-07 | Resolved: 2021-02-09

## 2021-02-09 LAB
INR PPP: 2.17 (ref 0.87–1.13)
PROTHROMBIN TIME: 24.9 SEC (ref 12–14.6)

## 2021-02-09 PROCEDURE — 700102 HCHG RX REV CODE 250 W/ 637 OVERRIDE(OP): Performed by: STUDENT IN AN ORGANIZED HEALTH CARE EDUCATION/TRAINING PROGRAM

## 2021-02-09 PROCEDURE — 700102 HCHG RX REV CODE 250 W/ 637 OVERRIDE(OP): Performed by: FAMILY MEDICINE

## 2021-02-09 PROCEDURE — 85610 PROTHROMBIN TIME: CPT

## 2021-02-09 PROCEDURE — 99232 SBSQ HOSP IP/OBS MODERATE 35: CPT | Performed by: STUDENT IN AN ORGANIZED HEALTH CARE EDUCATION/TRAINING PROGRAM

## 2021-02-09 PROCEDURE — A9270 NON-COVERED ITEM OR SERVICE: HCPCS | Performed by: FAMILY MEDICINE

## 2021-02-09 PROCEDURE — 770006 HCHG ROOM/CARE - MED/SURG/GYN SEMI*

## 2021-02-09 PROCEDURE — 36415 COLL VENOUS BLD VENIPUNCTURE: CPT

## 2021-02-09 PROCEDURE — A9270 NON-COVERED ITEM OR SERVICE: HCPCS | Performed by: STUDENT IN AN ORGANIZED HEALTH CARE EDUCATION/TRAINING PROGRAM

## 2021-02-09 PROCEDURE — 700111 HCHG RX REV CODE 636 W/ 250 OVERRIDE (IP): Performed by: FAMILY MEDICINE

## 2021-02-09 RX ADMIN — OXYCODONE 10 MG: 5 TABLET ORAL at 11:35

## 2021-02-09 RX ADMIN — LEVOTHYROXINE SODIUM 112 MCG: 0.11 TABLET ORAL at 06:14

## 2021-02-09 RX ADMIN — DOCUSATE SODIUM 50 MG AND SENNOSIDES 8.6 MG 1 TABLET: 8.6; 5 TABLET, FILM COATED ORAL at 06:14

## 2021-02-09 RX ADMIN — OXYCODONE 10 MG: 5 TABLET ORAL at 23:48

## 2021-02-09 RX ADMIN — OXYCODONE 10 MG: 5 TABLET ORAL at 06:14

## 2021-02-09 RX ADMIN — OXYCODONE 10 MG: 5 TABLET ORAL at 19:47

## 2021-02-09 RX ADMIN — OXYCODONE 10 MG: 5 TABLET ORAL at 01:26

## 2021-02-09 RX ADMIN — ROSUVASTATIN CALCIUM 20 MG: 20 TABLET, FILM COATED ORAL at 17:53

## 2021-02-09 RX ADMIN — WARFARIN SODIUM 5 MG: 5 TABLET ORAL at 19:48

## 2021-02-09 RX ADMIN — HYDROMORPHONE HYDROCHLORIDE 1 MG: 1 INJECTION, SOLUTION INTRAMUSCULAR; INTRAVENOUS; SUBCUTANEOUS at 12:29

## 2021-02-09 RX ADMIN — OXYCODONE 10 MG: 5 TABLET ORAL at 15:28

## 2021-02-09 ASSESSMENT — ENCOUNTER SYMPTOMS
MYALGIAS: 0
NECK PAIN: 0
HEARTBURN: 0
DIZZINESS: 0
WHEEZING: 0
DIAPHORESIS: 0
FEVER: 0
PALPITATIONS: 0
SENSORY CHANGE: 0
CHILLS: 0
NAUSEA: 0
NERVOUS/ANXIOUS: 0
SHORTNESS OF BREATH: 0
VOMITING: 0
ABDOMINAL PAIN: 0
HEADACHES: 0
FOCAL WEAKNESS: 0
SPEECH CHANGE: 0
WEAKNESS: 1
BACK PAIN: 1
BLURRED VISION: 0
SORE THROAT: 0
FLANK PAIN: 0
COUGH: 0

## 2021-02-09 ASSESSMENT — PAIN DESCRIPTION - PAIN TYPE
TYPE: ACUTE PAIN

## 2021-02-09 NOTE — DISCHARGE PLANNING
Anticipated Discharge Disposition: Home with [facility].    Action: LSW met with the patient to issue choice for [facility]. The patient's choice is as fallow:    1 - [facility]  2 - [facility]  3 - [facility]    LSW faxed the [facility] choice form to SURINDER Hansen    Barriers to Discharge: Acceptance from [facility].    Plan: Home with [facility], pending acceptance from [facility].    2:00pm - Per Barbara Silva [facility] accepted this referral, LSW notified MD via [employer].

## 2021-02-09 NOTE — PROGRESS NOTES
"Assumed care of patient from Juve Harvey.  Patient is alert and oriented times 4, states pain of 10/10, medicated per MAR.  VSS /67   Pulse 74   Temp 36.4 °C (97.5 °F) (Temporal)   Resp 17   Ht 1.702 m (5' 7\")   Wt 63.5 kg (140 lb)   LMP 06/27/2011   SpO2 99%   BMI 21.93 kg/m²   Discussed pain management for the remainder of the shift.  POC discussed for the remainder of the day, bed is locked and in the lowest position, call light is within reach.  All needs are met at this time, hourly rounding is in place.  "

## 2021-02-09 NOTE — WOUND TEAM
[facility]  Inpatient Services  Initial Wound and Skin Care Evaluation    Admission Date: 2/6/2021     Last order of IP CONSULT TO WOUND CARE was found on 2/8/2021 from Hospital Encounter on 2/6/2021     HPI, PMH, SH: Reviewed    No past surgical history on file.  Social History     Tobacco Use   • Smoking status: Current Every Day Smoker   • Smokeless tobacco: Never Used   Substance Use Topics   • Alcohol use: Never     Frequency: Never     Chief Complaint   Patient presents with   • Low Back Pain     x 3 weeks.has hx w/herniated disc and compressed disc.   • Difficulty Walking     x 3 weeks.    • Buttocks Pain     x 3 weeks.     Diagnosis: Low back pain [M54.5]    Unit where seen by Wound Team: T433/01     WOUND CONSULT/FOLLOW UP RELATED TO:  Left ankle    WOUND HISTORY:     Patient states that she has this wound for 5 years and it just recently opened up with the last couple of weeks.  Patient believe that it is related to her back injury.  Per patient she has had history of  Multiple venous ulcers and DVT's.  No venous study this admit, pulses are palpable +1.         WOUND ASSESSMENT/LDA  Wound 02/07/21 Venous Ulcer Ankle Lateral Left (Active)   Wound Image   02/07/21 0623   Site Assessment Pink    Periwound Assessment Purple;Pale    Margins Defined edges    Closure Secondary intention    Drainage Amount None    Drainage Description CALEB    Treatments Cleansed;Site care    Wound Cleansing Approved Wound Cleanser    Periwound Protectant Hydrocolloid;Not Applicable    Dressing Cleansing/Solutions Not Applicable    Dressing Options Hydrocolloid Thin;Tubigrip;Mepilex Heel    Dressing Changed New    Dressing Status Clean;Dry;Intact    Dressing Change/Treatment Frequency Every 72 hrs, and As Needed    NEXT Dressing Change/Treatment Date 02/12/21    Non-staged Wound Description Partial thickness    Wound Length (cm) 1 cm    Wound Width (cm) 1 cm    Wound Depth (cm) 0.2 cm    Wound Surface Area (cm^2) 1  cm^2    Wound Volume (cm^3) 0.2 cm^3    Shape circular    Wound Odor None    Pulses 1+    WOUND NURSE ONLY - Time Spent with Patient (mins) 60    Number of days: 2        Vascular:    FRDIA:   No results found.    Lab Values:    Lab Results   Component Value Date/Time    WBC 5.1 02/08/2021 05:39 AM    WBC 5.6 07/06/2011 02:05 PM    RBC 4.17 (L) 02/08/2021 05:39 AM    RBC 4.46 07/06/2011 02:05 PM    HEMOGLOBIN 13.4 02/08/2021 05:39 AM    HEMATOCRIT 40.1 02/08/2021 05:39 AM    HBA1C 5.6 02/06/2021 01:39 PM        Culture Results show:  No results found for this or any previous visit (from the past 720 hour(s)).    Pain Level/Medicated:  10/10, in tears, patient is agreeable to wound treatment    INTERVENTIONS BY WOUND TEAM:  Chart and images reviewed. Discussed with bedside RN. This RN in to assess patient. Performed standard wound care which includes appropriate positioning, dressing removal and non-selective debridement. Pictures and measurements obtained weekly if/when required.  Preparation for Dressing removal: Dressing removed  Cleansed with:  wound cleanser and gauze.  Sharp debridement: n/a  Liane wound: Cleansed with wound cleanser, Prepped with no sting barrier wipes  Primary Dressing: hydrocolloid thin  Secondary (Outer) Dressing: mepilex heel.  Tubi  E    Interdisciplinary consultation: Patient, Bedside RN (Wes), wound RN Caro    EVALUATION / RATIONALE FOR TREATMENT:  Most Recent Date:  2/9: wound bed is pink/red with liane wound is light purple. Place hydrocolloid       Goals: Steady decrease in wound area and depth weekly.    WOUND TEAM PLAN OF CARE ([X] for frequency of wound follow up,):   Nursing to follow orders written for wound care. Contact wound team if area fails to progress, deteriorates or with any questions/concerns  Dressing changes by wound team:                   Follow up 3 times weekly:                NPWT change 3 times weekly:     Follow up 1-2 times weekly:      Follow up  Bi-Monthly:                   Follow up as needed:   X  Other (explain):     NURSING PLAN OF CARE ORDERS (X):  Dressing changes: See Dressing Care orders: X  Skin care: See Skin Care orders:   RN Prevention Protocol:   Rectal tube care: See Rectal Tube Care orders:   Other orders:    RSKIN:   CURRENTLY IN PLACE (X), APPLIED THIS VISIT (A), ORDERED (O):   Q shift Wilfrid:  X  Q shift pressure point assessments:  X    Surface/Positioning   Pressure redistribution mattress X            Low Airloss          Bariatric foam      Bariatric CONTRERAS     Waffle cushion        Waffle Overlay          Reposition q 2 hours      TAPs Turning system     Z Denilson Pillow     Offloading/Redistribution   Sacral Mepilex (Silicone dressing)     Heel Mepilex (Silicone dressing)    A     Heel float boots (Prevalon boot)             Float Heels off Bed with Pillows           Respiratory   Silicone O2 tubing         Gray Foam Ear protectors     Cannula fixation Device (Tender )          High flow offloading Clip    Elastic head band offloading device      Anchorfast                                                         Trach with Optifoam split foam             Containment/Moisture Prevention   Rectal tube or BMS    Purwick/Condom Cath        Jones Catheter    Barrier wipes           Barrier paste       Antifungal tx      Interdry        Mobilization   Up to chair        Ambulate    X  PT/OT      Nutrition   Dietician        Diabetes Education      PO    x TF     TPN     NPO   # days     Other        Anticipated discharge plans:   LTACH:        SNF/Rehab:                  Home Health Care:         X  Outpatient Wound Center:         Will give referral    Self/Family Care:        Other:

## 2021-02-09 NOTE — PROGRESS NOTES
Hospital Medicine Daily Progress Note    Date of Service  2/9/2021    Chief Complaint  55 y.o. female admitted 2/6/2021 with sacral fractures.    Hospital Course  Admitted with sacral fractures, compression fracture of thoracic spine.  Neurosurgery and orthopedic surgery were consulted on the case, plan is conservative management and nonoperative intervention.    Interval Problem Update  Patient seen and examined at bedside this morning.  No acute events overnight.   Patient still with significant amount of sacral pain on examination this morning. We will continue to make adjustments.  Pending home health acceptance prior to discharge home.    Consultants/Specialty  Neurosurgery  Orthopedic surgery    Code Status  Full Code    Disposition  Home health    Review of Systems  Review of Systems   Constitutional: Negative for chills, diaphoresis, fever and malaise/fatigue.   HENT: Negative for congestion, hearing loss and sore throat.    Eyes: Negative for blurred vision.   Respiratory: Negative for cough, shortness of breath and wheezing.    Cardiovascular: Negative for chest pain, palpitations and leg swelling.   Gastrointestinal: Negative for abdominal pain, heartburn, nausea and vomiting.   Genitourinary: Negative for dysuria, flank pain and hematuria.   Musculoskeletal: Positive for back pain. Negative for joint pain, myalgias and neck pain.   Skin: Negative for rash.   Neurological: Positive for weakness. Negative for dizziness, sensory change, speech change, focal weakness and headaches.   Psychiatric/Behavioral: The patient is not nervous/anxious.         Physical Exam  Temp:  [35.9 °C (96.6 °F)-36.6 °C (97.8 °F)] 36.4 °C (97.5 °F)  Pulse:  [55-74] 74  Resp:  [16-17] 17  BP: ()/(51-67) 136/67  SpO2:  [93 %-99 %] 99 %    Physical Exam  Vitals signs and nursing note reviewed.   HENT:      Head: Normocephalic and atraumatic.      Nose: No congestion.      Mouth/Throat:      Mouth: Mucous membranes are moist.    Eyes:      Extraocular Movements: Extraocular movements intact.      Conjunctiva/sclera: Conjunctivae normal.      Pupils: Pupils are equal, round, and reactive to light.   Neck:      Musculoskeletal: No muscular tenderness.   Cardiovascular:      Rate and Rhythm: Normal rate and regular rhythm.   Pulmonary:      Effort: Pulmonary effort is normal.      Breath sounds: Normal breath sounds.   Abdominal:      General: Bowel sounds are normal. There is no distension.      Palpations: Abdomen is soft.      Tenderness: There is no abdominal tenderness. There is no guarding or rebound.   Musculoskeletal:      Right lower leg: No edema.      Left lower leg: No edema.   Skin:     General: Skin is warm and dry.   Neurological:      Mental Status: She is alert and oriented to person, place, and time.      Cranial Nerves: No cranial nerve deficit.      Motor: Weakness (BUE 5/5, BLE 4/5, limited by pain) present.         Fluids    Intake/Output Summary (Last 24 hours) at 2/9/2021 1222  Last data filed at 2/9/2021 0327  Gross per 24 hour   Intake 240 ml   Output --   Net 240 ml       Laboratory  Recent Labs     02/06/21  1339 02/07/21  0711 02/08/21  0539   WBC 7.0 6.2 5.1   RBC 4.30 4.28 4.17*   HEMOGLOBIN 13.6 13.4 13.4   HEMATOCRIT 40.2 42.1 40.1   MCV 93.5 98.4* 96.2   MCH 31.6 31.3 32.1   MCHC 33.8 31.8* 33.4*   RDW 46.2 50.0 46.6   PLATELETCT 306 252 255   MPV 10.4 10.4 10.3     Recent Labs     02/06/21  1339 02/07/21  0711 02/08/21  0539   SODIUM 138 132* 137   POTASSIUM 4.3 3.6 4.0   CHLORIDE 106 100 100   CO2 18* 22 26   GLUCOSE 70 87 81   BUN 14 16 12   CREATININE 0.74 0.83 0.70   CALCIUM 9.7 9.0 9.3     Recent Labs     02/06/21  1339 02/07/21  0711 02/08/21  0539 02/09/21  0636   APTT 66.2*  --   --   --    INR 3.75* 3.59* 3.04* 2.17*         Recent Labs     02/07/21  0711   TRIGLYCERIDE 147   HDL 39*   LDL 26       Imaging  DX-THORACOLUMBAR SPINE-2 VIEWS   Final Result      Moderate wedge compression fracture of  T11 is unchanged.      Mild loss of height of T9 and T10 is again seen.      Mild degenerative changes in the thoracic spine.         MR-LUMBAR SPINE-W/O   Final Result      1.  Marrow edema with incompletely visualized bilateral sacral insufficiency fractures which are acute or subacute in nature.      2.  T11 compression fracture with 50% height loss superiorly. No significant posterior bony retropulsion. No evidence of marrow edema consistent with chronic process.      3.  Minimal chronic compression fracture of T12.      4.  Minimal multilevel degenerative disc disease and facet degeneration. No significant central canal or neuroforaminal narrowing.      CT-CTA COMPLETE THORACOABDOMINAL AORTA   Final Result   Addendum 1 of 1   There are incompletely visualized nondisplaced bilateral sacral    insufficiency fractures.      Final           Assessment/Plan  * Sacral fracture, closed (HCC)- (present on admission)  Assessment & Plan  Sacral insufficiency fractures.  Neurosurgery/orthopedic surgery recommended conservative nonoperative management.  Pain control  Bowel protocol  PT and OT recommending home health.  Bone density scan as outpatient    Compression fracture of thoracic spine, non-traumatic (HCC)- (present on admission)  Assessment & Plan  TLSO  Pain control  Bone density scan as outpatient  PT and OT recommending home health.    HTN (hypertension)- (present on admission)  Assessment & Plan  Toprol, losartan, Aldactone    History of DVT (deep vein thrombosis)- (present on admission)  Assessment & Plan  Coumadin per pharmacy    History of aortic aneurysm- (present on admission)  Assessment & Plan  History of thoracic aortic stent placement  Control HTN    Hypothyroid- (present on admission)  Assessment & Plan  Levothyroxine    Dyslipidemia- (present on admission)  Assessment & Plan  Crestor        VTE prophylaxis: Coumadin

## 2021-02-09 NOTE — PROGRESS NOTES
Inpatient Anticoagulation Service Note    Date: 2/9/2021    Reason for Anticoagulation: Deep Vein Thrombosis(Protein S deficiency)   Target INR: 2.0 to 3.0         Hemoglobin Value: 13.4  Hematocrit Value: 40.1  Lab Platelet Value: 255    INR from last 7 days     Date/Time INR Value    02/09/21 0636  (!) 2.17    02/08/21 0539  (!) 3.04    02/07/21 0711  (!) 3.59    02/06/21 1339  (!) 3.75        Dose from last 7 days     Date/Time Dose (mg)    02/09/21 0858  5    02/08/21 1409  5    02/07/21 0832  0    02/06/21 2129  0        Average Dose (mg): 6(7.5 mg MWF, 5 mg AODs)  Significant Interactions: Statin, Thyroid Medications     Reversal Agent Administered: Not Applicable  Comments: INR decreased again as expected due to warfarin being held for 2 days. No s/sx of bleeding; no new DDI.  Will continue home regimen at this point; of note, patient goal INR obtainment as outpatient has been variable at best per chart review.    Plan:  Warfarin 5mg. INR in AM.  Education Material Provided?: No(chronic warfarin patient)  Pharmacist suggested discharge dosing: Warfarin 7.5mg MWF and 5mg all other days with follow up within 5 days of discharge.     Eduardo Hanley, ClaudiaD

## 2021-02-09 NOTE — PROGRESS NOTES
Assessment complete.  A&O x 4. Patient calls appropriately.  Patient ambulates with standby assist with a FWW. TLSO brace on when OOB. Bed alarm off.   Patient has 6/10 pain. Pain managed with prescribed medications.  Denies N&V. Tolerating cardiac diet.  L ankle dressing changed, CDI.  + void into toilet, + flatus, - BM.  Patient denies SOB.  SCD's off.    Review plan with of care with patient. Call light and personal belongings with in reach. Hourly rounding in place. All needs met at this time.

## 2021-02-09 NOTE — DISCHARGE PLANNING
Received Choice form at 0954  [facility]  Referral sent per Choice form @ [phone]  [facility]  Outcome: Left vmail for intake regarding referral     [phone]  [facility]  Outcome: Left vmail for intake regarding referral     [facility]  Spoke To: Rani   Outcome: PT accepted

## 2021-02-09 NOTE — PROGRESS NOTES
Bedside report received. Assessment completed.  Pt is A&O x4. Pt on room air.   Medicating for pain PRN per MAR Pain 7/10  Denies nausea.   - numbness, - tingling.  DIP to L ankle; wound consult ordered  Last BM PTA . +flatus,   +void.  Cardiac diet. Tolerates well.   Pt up SBA w/FWW.  Call light and belongings within reach. All needs met at this time. Fall Precautions and hourly rounding in place.

## 2021-02-10 ENCOUNTER — PHARMACY VISIT (OUTPATIENT)
Dept: PHARMACY | Facility: MEDICAL CENTER | Age: 56
End: 2021-02-10
Payer: COMMERCIAL

## 2021-02-10 VITALS
RESPIRATION RATE: 18 BRPM | BODY MASS INDEX: 21.97 KG/M2 | SYSTOLIC BLOOD PRESSURE: 93 MMHG | OXYGEN SATURATION: 94 % | HEIGHT: 67 IN | HEART RATE: 82 BPM | DIASTOLIC BLOOD PRESSURE: 55 MMHG | TEMPERATURE: 96.8 F | WEIGHT: 140 LBS

## 2021-02-10 LAB
INR PPP: 1.69 (ref 0.87–1.13)
PROTHROMBIN TIME: 20.4 SEC (ref 12–14.6)

## 2021-02-10 PROCEDURE — 36415 COLL VENOUS BLD VENIPUNCTURE: CPT

## 2021-02-10 PROCEDURE — 700102 HCHG RX REV CODE 250 W/ 637 OVERRIDE(OP): Performed by: STUDENT IN AN ORGANIZED HEALTH CARE EDUCATION/TRAINING PROGRAM

## 2021-02-10 PROCEDURE — 99238 HOSP IP/OBS DSCHRG MGMT 30/<: CPT | Performed by: STUDENT IN AN ORGANIZED HEALTH CARE EDUCATION/TRAINING PROGRAM

## 2021-02-10 PROCEDURE — A9270 NON-COVERED ITEM OR SERVICE: HCPCS | Performed by: STUDENT IN AN ORGANIZED HEALTH CARE EDUCATION/TRAINING PROGRAM

## 2021-02-10 PROCEDURE — 85610 PROTHROMBIN TIME: CPT

## 2021-02-10 PROCEDURE — A9270 NON-COVERED ITEM OR SERVICE: HCPCS | Performed by: FAMILY MEDICINE

## 2021-02-10 PROCEDURE — RXMED WILLOW AMBULATORY MEDICATION CHARGE: Performed by: STUDENT IN AN ORGANIZED HEALTH CARE EDUCATION/TRAINING PROGRAM

## 2021-02-10 PROCEDURE — 700102 HCHG RX REV CODE 250 W/ 637 OVERRIDE(OP): Performed by: FAMILY MEDICINE

## 2021-02-10 RX ORDER — OXYCODONE HYDROCHLORIDE 5 MG/1
5 TABLET ORAL EVERY 8 HOURS PRN
Qty: 21 TABLET | Refills: 0
Start: 2021-02-10 | End: 2021-02-17

## 2021-02-10 RX ORDER — AMOXICILLIN 250 MG
1 CAPSULE ORAL DAILY
Qty: 30 TABLET | Refills: 0
Start: 2021-02-11 | End: 2023-04-14

## 2021-02-10 RX ADMIN — OXYCODONE 10 MG: 5 TABLET ORAL at 08:49

## 2021-02-10 RX ADMIN — METOPROLOL SUCCINATE 100 MG: 100 TABLET, EXTENDED RELEASE ORAL at 05:04

## 2021-02-10 RX ADMIN — WARFARIN SODIUM 7.5 MG: 7.5 TABLET ORAL at 15:44

## 2021-02-10 RX ADMIN — DOCUSATE SODIUM 50 MG AND SENNOSIDES 8.6 MG 1 TABLET: 8.6; 5 TABLET, FILM COATED ORAL at 05:04

## 2021-02-10 RX ADMIN — SPIRONOLACTONE 25 MG: 25 TABLET ORAL at 05:04

## 2021-02-10 RX ADMIN — LEVOTHYROXINE SODIUM 112 MCG: 0.11 TABLET ORAL at 05:04

## 2021-02-10 RX ADMIN — OXYCODONE 10 MG: 5 TABLET ORAL at 03:49

## 2021-02-10 RX ADMIN — OXYCODONE 10 MG: 5 TABLET ORAL at 14:38

## 2021-02-10 RX ADMIN — LOSARTAN POTASSIUM 100 MG: 50 TABLET, FILM COATED ORAL at 05:04

## 2021-02-10 ASSESSMENT — ENCOUNTER SYMPTOMS
COUGH: 0
ABDOMINAL PAIN: 0
MYALGIAS: 0
BACK PAIN: 1
CHILLS: 0
SPEECH CHANGE: 0
DIZZINESS: 0
FLANK PAIN: 0
HEARTBURN: 0
SHORTNESS OF BREATH: 0
NERVOUS/ANXIOUS: 0
FOCAL WEAKNESS: 0
VOMITING: 0
SENSORY CHANGE: 0
HEADACHES: 0
SORE THROAT: 0
BLURRED VISION: 0
WHEEZING: 0
NECK PAIN: 0
NAUSEA: 0
FEVER: 0
WEAKNESS: 1
DIAPHORESIS: 0
PALPITATIONS: 0

## 2021-02-10 ASSESSMENT — PAIN DESCRIPTION - PAIN TYPE
TYPE: ACUTE PAIN
TYPE: ACUTE PAIN

## 2021-02-10 NOTE — DISCHARGE PLANNING
Meds-to-Beds: Discharge prescription orders listed below delivered to patient's bedside. RN Yue notified. Patient counseled. Patient elected to have co-payment billed to patient account.        Oumar Fisher   Home Medication Instructions QUIRINO:[id number]    Printed on:02/10/21 1432   Medication Information                      oxyCODONE immediate-release (ROXICODONE) 5 MG Tab  Take 1 tablet by mouth every 8 hours for Severe Pain for 7 days.               Skyla Rueda, PharmD

## 2021-02-10 NOTE — FACE TO FACE
Face to Face Supporting Documentation - Home Health    The encounter with this patient was in whole or in part the primary reason for home health admission.    Date of encounter:   Patient:                    MRN:                       YOB: 2019  Oumar Fisher  [id number]  1965     Home health to see patient for:  Skilled Nursing care for assessment, interventions & education and Wound Care     Wound care needs: left ankle ulcer  Primary Dressing: hydrocolloid thin  Secondary (Outer) Dressing: mepilex heel.  Tubi  E  Every 72 hours and prn.     Skilled need for:  New Onset Medical Diagnosis T11 fracture    Skilled nursing interventions to include:  Wound Care and Comment: PT/OT    Homebound status evidenced by:  Needs the assistance of another person in order to leave the home. Leaving home requires a considerable and taxing effort. There is a normal inability to leave the home.    Community Physician to provide follow up care: Jaquan Horne M.D.     Optional Interventions? No      I certify the face to face encounter for this home health care referral meets the CMS requirements and the encounter/clinical assessment with the patient was, in whole, or in part, for the medical condition(s) listed above, which is the primary reason for home health care. Based on my clinical findings: the service(s) are medically necessary, support the need for home health care, and the homebound criteria are met.  I certify that this patient has had a face to face encounter by myself.  Jorge Forbes D.O. - NPI: [id number]

## 2021-02-10 NOTE — PROGRESS NOTES
Inpatient Anticoagulation Service Note    Date: 2/10/2021    Reason for Anticoagulation: Deep Vein Thrombosis(Protein S deficiency)   Target INR: 2.0 to 3.0         Hemoglobin Value: 13.4  Hematocrit Value: 40.1  Lab Platelet Value: 255    INR from last 7 days     Date/Time INR Value    02/10/21 0439  (!) 1.69    02/09/21 0636  (!) 2.17    02/08/21 0539  (!) 3.04    02/07/21 0711  (!) 3.59    02/06/21 1339  (!) 3.75        Dose from last 7 days     Date/Time Dose (mg)    02/10/21 0835  7.5    02/09/21 0858  5    02/08/21 1409  5    02/07/21 0832  0    02/06/21 2129  0        Average Dose (mg): 6(7.5 mg MWF, 5 mg AODs)  Significant Interactions: Statin, Thyroid Medications  Bridge Therapy: No     Reversal Agent Administered: Not Applicable  Comments: INR now subtherapeutic. No bleeding or thrombi noted in chart. Will continue home regimen at this point with higher dose of 7.5mg; of note, patient goal INR obtainment as outpatient has been variable at best per chart review.    Plan:  Warfarin 7.5mg. INR In AM.  Education Material Provided?: No(chronic warfarin patient)  Pharmacist suggested discharge dosing: Resume 7.5 mg MWF, 5 mg AODs with follow up within 5 days of discharge.     Eduardo Hanley, PharmD

## 2021-02-10 NOTE — DISCHARGE PLANNING
Agency/Facility Name: [facility]  Outcome: Left vmail for intake regarding DR adding wound care to the  order     -[phone]  DPA faxed [facility] updated order which includes wound care     -1229  Agency/Facility Name: [facility]  Spoke To: Rani   Outcome: Per Rani, clinical staff needs to re-review the new referral     -1413  Agency/Facility Name: [facility]  Spoke To: Luci   Outcome: Per Luci PT is approved     Agency/Facility Name: [facility]  Outcome: Left vmail for intake regarding PT's d/c today 02/10

## 2021-02-10 NOTE — PROGRESS NOTES
Report received from day shift RN.   Assessment completed.   A&Ox4.   Patient rates 5/10 pain, medicated per MAR.   Tolerating cardiac diet, denies N/V.   Left ankle wound, CDI.   Right ankle wound, open to air.   +void.   LBM prior to arrival.   Ambulated stand by assist w/ FWW.     Reviewed plan of care with patient. Call light and personal belongings are within reach. All needs met at this time.

## 2021-02-10 NOTE — DISCHARGE PLANNING
Anticipated Discharge Disposition: Home with [facility].    Action: MD provided updated HHC order and F2F as this patient will need wound dressings in addition to PTOT. SURINDER Hansen faxed the updated order to [facility].    Barriers to Discharge: None.    Plan: Home with [facility], pending confirmation of acceptance from [facility].

## 2021-02-10 NOTE — PROGRESS NOTES
HH re approved. Meds to beds delivered. Ride not off of work until 5 p.m. Pt getting dressed at this time.

## 2021-02-10 NOTE — PROGRESS NOTES
Discharge instructions reviewed with pt.  at bedside. IV dc. All personal belongings packed. Pt escorted down via wheelchair with this RN.

## 2021-02-10 NOTE — PROGRESS NOTES
Pt A&O x4.  Reporting 4/10 pain. Pt declines interventions at this time.  Pt up with TLSO and FWW to bathroom.  +BM. +voiding. Tolerating cardiac diet.  Foot discoloration noted. Pt declines n/t at this time.  Left ankle dressing CDI.  Home FWW at bedside.   Discharge orders received.   Plan for pt to discharge home today.  POC discussed with pt. All needs addressed at this time.

## 2021-02-10 NOTE — DISCHARGE INSTRUCTIONS
Discharge Instructions    Discharged to home by car with relative. Discharged via wheelchair, hospital escort: Yes.  Special equipment needed: Not Applicable    Be sure to schedule a follow-up appointment with your primary care doctor or any specialists as instructed.     Discharge Plan:   Diet Plan: Discussed  Activity Level: Discussed  Confirmed Follow up Appointment: Patient to Call and Schedule Appointment  Confirmed Symptoms Management: Discussed  Medication Reconciliation Updated: Yes  Influenza Vaccine Indication: Not indicated: Previously immunized this influenza season and > 8 years of age    I understand that a diet low in cholesterol, fat, and sodium is recommended for good health. Unless I have been given specific instructions below for another diet, I accept this instruction as my diet prescription.   Other diet: Cardiac    Special Instructions: None    · Is patient discharged on Warfarin / Coumadin?   Yes    You are receiving the drug warfarin. Please understand the importance of monitoring warfarin with scheduled PT/INR blood draws.  Follow-up with a call to your personal Doctor's office in 3 days to schedule a PT/INR. .    IMPORTANT: HOW TO USE THIS INFORMATION:  This is a summary and does NOT have all possible information about this product. This information does not assure that this product is safe, effective, or appropriate for you. This information is not individual medical advice and does not substitute for the advice of your health care professional. Always ask your health care professional for complete information about this product and your specific health needs.      WARFARIN - ORAL (WARF-uh-rin)      COMMON BRAND NAME(S): Coumadin      WARNING:  Warfarin can cause very serious (possibly fatal) bleeding. This is more likely to occur when you first start taking this medication or if you take too much warfarin. To decrease your risk for bleeding, your doctor or other health care provider will  "monitor you closely and check your lab results (INR test) to make sure you are not taking too much warfarin. Keep all medical and laboratory appointments. Tell your doctor right away if you notice any signs of serious bleeding. See also Side Effects section.      USES:  This medication is used to treat blood clots (such as in deep vein thrombosis-DVT or pulmonary embolus-PE) and/or to prevent new clots from forming in your body. Preventing harmful blood clots helps to reduce the risk of a stroke or heart attack. Conditions that increase your risk of developing blood clots include a certain type of irregular heart rhythm (atrial fibrillation), heart valve replacement, recent heart attack, and certain surgeries (such as hip/knee replacement). Warfarin is commonly called a \"blood thinner,\" but the more correct term is \"anticoagulant.\" It helps to keep blood flowing smoothly in your body by decreasing the amount of certain substances (clotting proteins) in your blood.      HOW TO USE:  Read the Medication Guide provided by your pharmacist before you start taking warfarin and each time you get a refill. If you have any questions, ask your doctor or pharmacist. Take this medication by mouth with or without food as directed by your doctor or other health care professional, usually once a day. It is very important to take it exactly as directed. Do not increase the dose, take it more frequently, or stop using it unless directed by your doctor. Dosage is based on your medical condition, laboratory tests (such as INR), and response to treatment. Your doctor or other health care provider will monitor you closely while you are taking this medication to determine the right dose for you. Use this medication regularly to get the most benefit from it. To help you remember, take it at the same time each day. It is important to eat a balanced, consistent diet while taking warfarin. Some foods can affect how warfarin works in your " body and may affect your treatment and dose. Avoid sudden large increases or decreases in your intake of foods high in vitamin K (such as broccoli, cauliflower, cabbage, brussels sprouts, kale, spinach, and other green leafy vegetables, liver, green tea, certain vitamin supplements). If you are trying to lose weight, check with your doctor before you try to go on a diet. Cranberry products may also affect how your warfarin works. Limit the amount of cranberry juice (16 ounces/480 milliliters a day) or other cranberry products you may drink or eat.      SIDE EFFECTS:  Nausea, loss of appetite, or stomach/abdominal pain may occur. If any of these effects persist or worsen, tell your doctor or pharmacist promptly. Remember that your doctor has prescribed this medication because he or she has judged that the benefit to you is greater than the risk of side effects. Many people using this medication do not have serious side effects. This medication can cause serious bleeding if it affects your blood clotting proteins too much (shown by unusually high INR lab results). Even if your doctor stops your medication, this risk of bleeding can continue for up to a week. Tell your doctor right away if you have any signs of serious bleeding, including: unusual pain/swelling/discomfort, unusual/easy bruising, prolonged bleeding from cuts or gums, persistent/frequent nosebleeds, unusually heavy/prolonged menstrual flow, pink/dark urine, coughing up blood, vomit that is bloody or looks like coffee grounds, severe headache, dizziness/fainting, unusual or persistent tiredness/weakness, bloody/black/tarry stools, chest pain, shortness of breath, difficulty swallowing. Tell your doctor right away if any of these unlikely but serious side effects occur: persistent nausea/vomiting, severe stomach/abdominal pain, yellowing eyes/skin. This drug rarely has caused very serious (possibly fatal) problems if its effects lead to small blood clots  (usually at the beginning of treatment). This can lead to severe skin/tissue damage that may require surgery or amputation if left untreated. Patients with certain blood conditions (protein C or S deficiency) may be at greater risk. Get medical help right away if any of these rare but serious side effects occur: painful/red/purplish patches on the skin (such as on the toe, breast, abdomen), change in the amount of urine, vision changes, confusion, slurred speech, weakness on one side of the body. A very serious allergic reaction to this drug is rare. However, get medical help right away if you notice any symptoms of a serious allergic reaction, including: rash, itching/swelling (especially of the face/tongue/throat), severe dizziness, trouble breathing. This is not a complete list of possible side effects. If you notice other effects not listed above, contact your doctor or pharmacist. In the [place] - Call your doctor for medical advice about side effects. You may report side effects to FDA at 1-305-TEF-3908. In [place] - Call your doctor for medical advice about side effects. You may report side effects to [facility] [place] at 1-182.691.2331.      PRECAUTIONS:  Before taking warfarin, tell your doctor or pharmacist if you are allergic to it; or if you have any other allergies. This product may contain inactive ingredients, which can cause allergic reactions or other problems. Talk to your pharmacist for more details. Before using this medication, tell your doctor or pharmacist your medical history, especially of: blood disorders (such as anemia, hemophilia), bleeding problems (such as bleeding of the stomach/intestines, bleeding in the brain), blood vessel disorders (such as aneurysms), recent major injury/surgery, liver disease, alcohol use, mental/mood disorders (including memory problems), frequent falls/injuries. It is important that all your doctors and dentists know that you take warfarin. Before having surgery or any  medical/dental procedures, tell your doctor or dentist that you are taking this medication and about all the products you use (including prescription drugs, nonprescription drugs, and herbal products). Avoid getting injections into the muscles. If you must have an injection into a muscle (for example, a flu shot), it should be given in the arm. This way, it will be easier to check for bleeding and/or apply pressure bandages. This medication may cause stomach bleeding. Daily use of alcohol while using this medicine will increase your risk for stomach bleeding and may also affect how this medication works. Limit or avoid alcoholic beverages. If you have not been eating well, if you have an illness or infection that causes fever, vomiting, or diarrhea for more than 2 days, or if you start using any antibiotic medications, contact your doctor or pharmacist immediately because these conditions can affect how warfarin works. This medication can cause heavy bleeding. To lower the chance of getting cut, bruised, or injured, use great caution with sharp objects like safety razors and nail cutters. Use an electric razor when shaving and a soft toothbrush when brushing your teeth. Avoid activities such as contact sports. If you fall or injure yourself, especially if you hit your head, call your doctor immediately. Your doctor may need to check you. The Food & Drug Administration has stated that generic warfarin products are interchangeable. However, consult your doctor or pharmacist before switching warfarin products. Be careful not to take more than one medication that contains warfarin unless specifically directed by the doctor or health care provider who is monitoring your warfarin treatment. Older adults may be at greater risk for bleeding while using this drug. This medication is not recommended for use during pregnancy because of serious (possibly fatal) harm to an unborn baby. Discuss the use of reliable forms of birth  "control with your doctor. If you become pregnant or think you may be pregnant, tell your doctor immediately. If you are planning pregnancy, discuss a plan for managing your condition with your doctor before you become pregnant. Your doctor may switch the type of medication you use during pregnancy. Very small amounts of this medication may pass into breast milk but is unlikely to harm a nursing infant. Consult your doctor before breast-feeding.      DRUG INTERACTIONS:  Drug interactions may change how your medications work or increase your risk for serious side effects. This document does not contain all possible drug interactions. Keep a list of all the products you use (including prescription/nonprescription drugs and herbal products) and share it with your doctor and pharmacist. Do not start, stop, or change the dosage of any medicines without your doctor's approval. Warfarin interacts with many prescription, nonprescription, vitamin, and herbal products. This includes medications that are applied to the skin or inside the vagina or rectum. The interactions with warfarin usually result in an increase or decrease in the \"blood-thinning\" (anticoagulant) effect. Your doctor or other health care professional should closely monitor you to prevent serious bleeding or clotting problems. While taking warfarin, it is very important to tell your doctor or pharmacist of any changes in medications, vitamins, or herbal products that you are taking. Some products that may interact with this drug include: capecitabine, imatinib, mifepristone. Aspirin, aspirin-like drugs (salicylates), and nonsteroidal anti-inflammatory drugs (NSAIDs such as ibuprofen, naproxen, celecoxib) may have effects similar to warfarin. These drugs may increase the risk of bleeding problems if taken during treatment with warfarin. Carefully check all prescription/nonprescription product labels (including drugs applied to the skin such as pain-relieving " creams) since the products may contain NSAIDs or salicylates. Talk to your doctor about using a different medication (such as acetaminophen) to treat pain/fever. Low-dose aspirin and related drugs (such as clopidogrel, ticlopidine) should be continued if prescribed by your doctor for specific medical reasons such as heart attack or stroke prevention. Consult your doctor or pharmacist for more details. Many herbal products interact with warfarin. Tell your doctor before taking any herbal products, especially bromelains, coenzyme Q10, cranberry, danshen, dong quai, fenugreek, garlic, ginkgo biloba, ginseng, and [place] wort, among others. This medication may interfere with a certain laboratory test to measure theophylline levels, possibly causing false test results. Make sure laboratory personnel and all your doctors know you use this drug.      OVERDOSE:  If overdose is suspected, contact a poison control center or emergency room immediately.  residents can call the [employer] Poison Hotline at 1-989.505.8921. [place] residents can call a provincial poison control center. Symptoms of overdose may include: bloody/black/tarry stools, pink/dark urine, unusual/prolonged bleeding.      NOTES:  Do not share this medication with others. Laboratory and/or medical tests (such as INR, complete blood count) must be performed periodically to monitor your progress or check for side effects. Consult your doctor for more details.      MISSED DOSE:  For the best possible benefit, do not miss any doses. If you do miss a dose and remember on the same day, take it as soon as you remember. If you remember on the next day, skip the missed dose and resume your usual dosing schedule. Do not double the dose to catch up because this could increase your risk for bleeding. Keep a record of missed doses to give to your doctor or pharmacist. Contact your doctor or pharmacist if you miss 2 or more doses in a row.      STORAGE:  Store at room  temperature away from light and moisture. Do not store in the bathroom. Keep all medications away from children and pets. Do not flush medications down the toilet or pour them into a drain unless instructed to do so. Properly discard this product when it is  or no longer needed. Consult your pharmacist or local waste disposal company for more details about how to safely discard your product.      MEDICAL ALERT:  Your condition and medication can cause complications in a medical emergency. For information about enrolling in MedicAlert, call 1-280.286.8738 ([place]) or 1-117.307.3817 ([place]).      Information last revised 2010 Copyright(c) 2010 [employer]             Depression / Suicide Risk    As you are discharged from this [facility] facility, it is important to learn how to keep safe from harming yourself.    Recognize the warning signs:  · Abrupt changes in personality, positive or negative- including increase in energy   · Giving away possessions  · Change in eating patterns- significant weight changes-  positive or negative  · Change in sleeping patterns- unable to sleep or sleeping all the time   · Unwillingness or inability to communicate  · Depression  · Unusual sadness, discouragement and loneliness  · Talk of wanting to die  · Neglect of personal appearance   · Rebelliousness- reckless behavior  · Withdrawal from people/activities they love  · Confusion- inability to concentrate     If you or a loved one observes any of these behaviors or has concerns about self-harm, here's what you can do:  · Talk about it- your feelings and reasons for harming yourself  · Remove any means that you might use to hurt yourself (examples: pills, rope, extension cords, firearm)  · Get professional help from the community (Mental Health, Substance Abuse, psychological counseling)  · Do not be alone:Call your Safe Contact- someone whom you trust who will be there for you.  · Call your local CRISIS  HOTLINE 783-3934 or 936-010-6259  · Call your local Children's Mobile Crisis Response Team [place] (228) 422-8962 or www.[employer]  · Call the toll free National Suicide Prevention Hotlines   · National Suicide Prevention Lifeline 342-199-PXZW (7231)  · [employer] Line Network 800-SUICIDE (064-1410)        Thoracic Spine Fracture  A thoracic spine fracture is a break in one of the bones of the middle part of the back. The fracture can be mild or very bad. The most serious types cause the broken bones to:  · Move out of place (unstable).  · Damage or press on the main nerve in the spine (spinal cord).  In some cases, the bone that connects to the lower part of the back may also have a break (thoracolumbar fracture).  What are the causes?  This condition may be caused by:  · A car accident.  · A fall.  · A sports accident.  · Violent acts. These include assaults or gunshots.  What are the signs or symptoms?  Symptoms may include:  · Back pain.  · Trouble standing or walking.  · Numbness.  · Tingling.  · Weakness.  · Loss of movement.  · Being unable to control when to pee or poop (incontinence).  How is this treated?  Treatment may include:  · Medicines.  · A cast or a brace.  · Physical therapy.  · Surgery. This may be needed for very bad fractures.  Follow these instructions at home:  Medicines  · Take medicines only as told by your doctor.  · Do not drive or use heavy machinery while taking pain medicine.  · To prevent or treat trouble pooping (constipation) while you are taking prescription pain medicine, your doctor may recommend that you:  ? Drink enough fluid to keep your pee (urine) pale yellow.  ? Take over-the-counter or prescription medicines.  ? Eat foods that are high in fiber. This includes fresh fruits and vegetables, whole grains, and beans.  ? Limit foods that are high in fat and processed sugars. This includes fried or sweet foods.  If you have a brace:  · Wear the back brace as told  by your doctor. Remove it only as told by your doctor.  · Keep the brace clean.  · If the brace is not waterproof:  ? Do not let it get wet.  ? Cover it with a watertight covering when you take a bath or a shower.  Activity  · Stay in bed (on bed rest) only as told by your doctor.  · Ask your doctor what is safe for you to do.  · Return to your normal activities as told by your doctor.  · Do back exercises (physical therapy) as told by your doctor.  · Exercise often as told by your doctor.  Managing pain, stiffness, and swelling    · If told, put ice on the injured area:  ? Put ice in a plastic bag.  ? Place a towel between your skin and the bag.  ? Leave the ice on for 20 minutes, 2-3 times a day.  General instructions  · Do not use any products that contain nicotine or tobacco, such as cigarettes and e-cigarettes. If you need help quitting, ask your doctor.  · Do not drink alcohol.  · Keep all follow-up visits as told by your doctor. This is important.  Contact a doctor if:  · You have a fever.  · You have a cough that makes your pain worse.  · Your pain medicine is not helping.  · Your pain does not get better over time.  · You cannot return to your normal activities as planned.  Get help right away if:  · Your pain is bad and it suddenly gets worse.  · You are not able to move any part of your body (paralysis) that is below the level of your injury.  · You have numbness, tingling, or weakness in any part of your body that is below the level of your injury.  · You cannot control when you pee (urinate) or when you poop (pass stool).  Summary  · A thoracic spine fracture is a break in one of the bones of the middle part of the back.  · A stable fracture can be treated with a back brace, activity restrictions, pain medicine, and physical therapy. A more severe fracture may require surgery.  · Make sure you know what symptoms should cause you to get help right away.  This information is not intended to replace advice  given to you by your health care provider. Make sure you discuss any questions you have with your health care provider.  Document Released: 06/07/2011 Document Revised: 02/01/2019 Document Reviewed: 02/01/2019  [employer] Patient Education © 2020 [employer]

## 2021-02-10 NOTE — DISCHARGE SUMMARY
Discharge Summary    CHIEF COMPLAINT ON ADMISSION  Chief Complaint   Patient presents with   • Low Back Pain     x 3 weeks.has hx w/herniated disc and compressed disc.   • Difficulty Walking     x 3 weeks.    • Buttocks Pain     x 3 weeks.       Reason for Admission  Sent by      Admission Date  2/6/2021    CODE STATUS  Full Code    HPI & HOSPITAL COURSE  This is a 55 y.o. female admitted on 2/6/2021 with thoracic and sacral fractures.  On presentation, she did note a 3-week history of low back and tailbone pain. Denied any recent trauma or falls.  Imaging in the ED was consistent with bilateral sacral insufficiency fractures and T11 and T12 compression fractures. She was evaluated by both orthopedics and neurosurgical service. They both recommended nonoperative/conservative pain management. She was fitted for a TLSO brace and her pain was managed with opiate therapy. She was discharged home with a few days worth of oxycodone and advised to follow-up with her PCP for further pain medication management. Also, advised to get a DEXA scan to rule out osteopenia/osteoporosis.  She was [facility] with [facility].    Therefore, she is discharged in fair and stable condition to home with [facility] and close outpatient follow-up.    The patient met 2-midnight criteria for an inpatient stay at the time of discharge.    Discharge Date  2/10/21    FOLLOW UP ITEMS POST DISCHARGE  PCP  Dexa scan    DISCHARGE DIAGNOSES  Principal Problem:    Sacral fracture, closed (HCC) POA: Yes  Active Problems:    History of aortic aneurysm POA: Yes      Overview: ICD-10 transition    History of DVT (deep vein thrombosis) POA: Yes      Overview: ICD-10 transition    HTN (hypertension) (Chronic) POA: Yes      Overview: 1/9/09: -now refractory & symptomatic 1/09 9/30/07: -not medicated      -white coat                      Compression fracture of thoracic spine, non-traumatic (HCC) POA: Yes     Dyslipidemia POA: Yes    Hypothyroid POA: Yes  Resolved Problems:    Supratherapeutic INR POA: Yes      FOLLOW UP  Future Appointments   Date Time Provider Department Center   3/16/2021 11:00 AM VASCULAR NURSE PRACTITIONER [facility] None     [facility]  [address]  [address]  [place] [address]  [phone]        Ketan Luciano M.D.  [address]  [place] [address]  [phone]    In 5 weeks  Follow up appointment      MEDICATIONS ON DISCHARGE     Medication List      START taking these medications      Instructions   oxyCODONE immediate-release 5 MG Tabs  Commonly known as: ROXICODONE   Take 1 tablet by mouth every 8 hours for Severe Pain for 7 days.  Dose: 5 mg     senna-docusate 8.6-50 MG Tabs  Start taking on: February 11, 2021  Commonly known as: PERICOLACE or SENOKOT S   Take 1 tablet by mouth every day.  Dose: 1 tablet        CHANGE how you take these medications      Instructions   levothyroxine 112 MCG Tabs  What changed: additional instructions  Commonly known as: SYNTHROID   Doctor's comments: Pt needs appt with vascular med or Dr. Bloch for future refills of this med.  Take 1 Tab by mouth every day. 2 tab on sunday and 1 the rest of week  Dose: 112 mcg     warfarin 5 MG Tabs  What changed: See the new instructions.  Commonly known as: COUMADIN   Doctor's comments: This rx was submitted by a pharmacist working under a collaborative practice agreement.  TAKE 1 TO 1.5 TABLETS BY MOUTH DAILY AS DIRECTED BY THE COUMADIN CLINIC        CONTINUE taking these medications      Instructions   losartan 100 MG Tabs  Commonly known as: COZAAR   Take 1 Tab by mouth every day.  Dose: 100 mg     metoprolol  MG Tb24  Commonly known as: TOPROL XL   Take 1 Tab by mouth every day.  Dose: 100 mg     rosuvastatin 20 MG Tabs  Commonly known as: CRESTOR   Take 1 Tab by mouth every evening.  Dose: 20 mg     spironolactone 25 MG Tabs  Commonly known as: ALDACTONE   Take 1 Tab by mouth every  day.  Dose: 25 mg        STOP taking these medications    acetaminophen 500 MG Tabs  Commonly known as: TYLENOL     NON SPECIFIED     VITAMIN D PO            Allergies  No Known Allergies    DIET  Orders Placed This Encounter   Procedures   • Diet Order Diet: Cardiac     Standing Status:   Standing     Number of Occurrences:   1     Order Specific Question:   Diet:     Answer:   Cardiac [6]       ACTIVITY  As tolerated.  Weight bearing as tolerated    CONSULTATIONS  Orthopedics  Neurosurgery    PROCEDURES  None    LABORATORY  Lab Results   Component Value Date    SODIUM 137 02/08/2021    POTASSIUM 4.0 02/08/2021    CHLORIDE 100 02/08/2021    CO2 26 02/08/2021    GLUCOSE 81 02/08/2021    BUN 12 02/08/2021    CREATININE 0.70 02/08/2021    CREATININE 0.83 07/06/2011        Lab Results   Component Value Date    WBC 5.1 02/08/2021    WBC 5.6 07/06/2011    HEMOGLOBIN 13.4 02/08/2021    HEMATOCRIT 40.1 02/08/2021    PLATELETCT 255 02/08/2021        Total time of the discharge process exceeds 20 minutes.

## 2021-02-11 LAB
BACTERIA BLD CULT: NORMAL
BACTERIA BLD CULT: NORMAL
SIGNIFICANT IND 70042: NORMAL
SIGNIFICANT IND 70042: NORMAL
SITE SITE: NORMAL
SITE SITE: NORMAL
SOURCE SOURCE: NORMAL
SOURCE SOURCE: NORMAL

## 2021-02-12 ENCOUNTER — ANTICOAGULATION MONITORING (OUTPATIENT)
Dept: VASCULAR LAB | Facility: MEDICAL CENTER | Age: 56
End: 2021-02-12

## 2021-02-12 DIAGNOSIS — I82.409 DVT (DEEP VENOUS THROMBOSIS) (HCC): Chronic | ICD-10-CM

## 2021-02-12 DIAGNOSIS — I08.0 MITRAL VALVE INSUFFICIENCY AND AORTIC VALVE INSUFFICIENCY: Chronic | ICD-10-CM

## 2021-02-12 LAB — INR PPP: 1.8 (ref 2–3.5)

## 2021-02-12 RX ORDER — WARFARIN SODIUM 5 MG/1
TABLET ORAL
Qty: 135 TABLET | Refills: 1 | Status: SHIPPED | OUTPATIENT
Start: 2021-02-12 | End: 2021-08-30 | Stop reason: SDUPTHER

## 2021-02-12 NOTE — PROGRESS NOTES
Anticoagulation Summary  As of 2/12/2021    INR goal:  2.0-3.0   TTR:  62.6 % (5.7 y)   INR used for dosing:  No new INR was available at the time of this encounter.   Warfarin maintenance plan:  7.5 mg (5 mg x 1.5) every Mon, Wed, Fri; 5 mg (5 mg x 1) all other days   Weekly warfarin total:  42.5 mg   Plan last modified:  Abhi Carlos PharmD (10/9/2020)   Next INR check:  2/12/2021   Priority:  Maintenance   Target end date:  Indefinite    Indications    Deep vein thrombosis [453.40] [I82.409]             Anticoagulation Episode Summary     INR check location:  Home Draw    Preferred lab:      Send INR reminders to:      Comments:  Eloy MYRICK      Anticoagulation Care Providers     Provider Role Specialty Phone number    Bruna Ureña M.D. Referring Cardiology [phone]    [facility] Responsible  [phone]    Jaquan Horne M.D.  Family Medicine [phone]        Anticoagulation Patient Findings      Spoke with pt via telephone after she called asking when she should check her INR again. She did not have insurance for the past month and was not testing for the month of January. She has reestablished insurance and will resume home monitoring.    Pt was recently admitted 2/6-2/12 for sacral fracture. Her INR was elevated upon admission. Doses were held and pt was resumed on previous dosing schedule of warfarin (7.5mg MWF; 5mg ROW). Explained to pt that this schedule may have her elevated again though she was having a poor diet prior to admission. Asked pt to check INR today and will follow up with results.    Repeat INR in today    Blair Appiah, ClaudiaD

## 2021-02-12 NOTE — PROGRESS NOTES
OP Telephone Anticoagulation Service Note    Date: 2021      Anticoagulation Summary  As of 2021    INR goal:  2.0-3.0   TTR:  62.6 % (5.7 y)   INR used for dosin.80 (2021)   Warfarin maintenance plan:  7.5 mg (5 mg x 1.5) every Mon, Wed, Fri; 5 mg (5 mg x 1) all other days   Weekly warfarin total:  42.5 mg   Plan last modified:  Claudia EagleD (10/9/2020)   Next INR check:  2021   Priority:  Maintenance   Target end date:  Indefinite    Indications    Deep vein thrombosis [453.40] [I82.409]             Anticoagulation Episode Summary     INR check location:  Home Draw    Preferred lab:      Send INR reminders to:      Comments:  Eloy MYRICK      Anticoagulation Care Providers     Provider Role Specialty Phone number    Bruna Ureña M.D. Referring Cardiology [phone]    [facility] Responsible  [phone]    Jaquan Horne M.D.  Family Medicine [phone]        Anticoagulation Patient Findings      INR SUB-therapeutic at 1.8.  Spoke w/ pt on phone.  Verified regimen w/ pt.  Instructed pt to bolus x 1 dose w/ 10 mg and to then continue on with her current regimen.  NO s/s bleeding reported per pt.  NO changes in diet reported per pt.  NO changes in medications reported per pt.  Check INR in 4 day(s) as she was recently discharged.  Instructed pt to call clinic at [phone] if there are any questions.  Pt stated understanding.    Pt is not on antiplatelet therapy.    Lazaro Cerda, ClaudiaD

## 2021-02-17 LAB — INR PPP: 2.3 (ref 2–3.5)

## 2021-02-18 ENCOUNTER — ANTICOAGULATION MONITORING (OUTPATIENT)
Dept: VASCULAR LAB | Facility: MEDICAL CENTER | Age: 56
End: 2021-02-18

## 2021-02-18 ENCOUNTER — TELEPHONE (OUTPATIENT)
Dept: VASCULAR LAB | Facility: MEDICAL CENTER | Age: 56
End: 2021-02-18

## 2021-02-18 DIAGNOSIS — E03.9 HYPOTHYROIDISM, UNSPECIFIED TYPE: ICD-10-CM

## 2021-02-18 NOTE — PROGRESS NOTES
Anticoagulation Summary  As of 2021    INR goal:  2.0-3.0   TTR:  62.6 % (5.7 y)   INR used for dosin.30 (2021)   Warfarin maintenance plan:  7.5 mg (5 mg x 1.5) every Mon, Wed, Fri; 5 mg (5 mg x 1) all other days   Weekly warfarin total:  42.5 mg   Plan last modified:  Abhi Carlos, PharmD (10/9/2020)   Next INR check:  2021   Priority:  Maintenance   Target end date:  Indefinite    Indications    Deep vein thrombosis [453.40] [I82.409]             Anticoagulation Episode Summary     INR check location:  Home Draw    Preferred lab:      Send INR reminders to:      Comments:  Eloy MYRICK      Anticoagulation Care Providers     Provider Role Specialty Phone number    Bruna Ureña M.D. Referring Cardiology [phone]    [facility] Responsible  [phone]    Jaquan Horne M.D.  Family Medicine [phone]        Anticoagulation Patient Findings       Spoke with patient today regarding therapeutic INR of 2.30.  Pt confirmed current dosing regimen with no missed doses to report.    Patient denies any signs/symptoms of bruising or bleeding or any changes in diet.    Pt is seeing wound care next week, so reminded pt to let use know if she is started on any antibiotics. Pt was started on Percocet 5-325 mg but is only taking a max of 5 tablets per day.  Instructed patient to call clinic with any questions or concerns.    Pt is to continue with current warfarin dosing regimen.    Follow up in 1 week, to reduce risk of adverse events related to this high risk medication,  Warfarin.    Jude Guillaume, Pharmacy Intern

## 2021-02-19 RX ORDER — LEVOTHYROXINE SODIUM 112 UG/1
112 TABLET ORAL
Qty: 34 TABLET | Refills: 1 | Status: SHIPPED | OUTPATIENT
Start: 2021-02-19 | End: 2021-05-06 | Stop reason: SDUPTHER

## 2021-02-22 ENCOUNTER — TELEPHONE (OUTPATIENT)
Dept: VASCULAR LAB | Facility: MEDICAL CENTER | Age: 56
End: 2021-02-22

## 2021-02-22 NOTE — TELEPHONE ENCOUNTER
[facility]    Pt reports Zac will be sending a new home monitor request because she has a new insurance. We will wait until that request comes to our clinic.    Osiel Pete, ClaudiaD

## 2021-02-24 ENCOUNTER — OFFICE VISIT (OUTPATIENT)
Dept: WOUND CARE | Facility: MEDICAL CENTER | Age: 56
End: 2021-02-24
Attending: STUDENT IN AN ORGANIZED HEALTH CARE EDUCATION/TRAINING PROGRAM
Payer: COMMERCIAL

## 2021-02-24 ENCOUNTER — TELEPHONE (OUTPATIENT)
Dept: VASCULAR LAB | Facility: MEDICAL CENTER | Age: 56
End: 2021-02-24

## 2021-02-24 VITALS
SYSTOLIC BLOOD PRESSURE: 117 MMHG | HEART RATE: 72 BPM | TEMPERATURE: 98 F | RESPIRATION RATE: 20 BRPM | DIASTOLIC BLOOD PRESSURE: 59 MMHG | OXYGEN SATURATION: 98 %

## 2021-02-24 DIAGNOSIS — L97.329 ANKLE ULCER, LEFT, WITH UNSPECIFIED SEVERITY (HCC): Primary | ICD-10-CM

## 2021-02-24 DIAGNOSIS — I87.2 VENOUS INSUFFICIENCY OF BOTH LOWER EXTREMITIES: ICD-10-CM

## 2021-02-24 PROCEDURE — 11042 DBRDMT SUBQ TIS 1ST 20SQCM/<: CPT

## 2021-02-24 PROCEDURE — 11042 DBRDMT SUBQ TIS 1ST 20SQCM/<: CPT | Performed by: NURSE PRACTITIONER

## 2021-02-24 PROCEDURE — 99214 OFFICE O/P EST MOD 30 MIN: CPT | Mod: 25 | Performed by: NURSE PRACTITIONER

## 2021-02-24 PROCEDURE — 99213 OFFICE O/P EST LOW 20 MIN: CPT

## 2021-02-24 RX ORDER — OXYCODONE HYDROCHLORIDE AND ACETAMINOPHEN 5; 325 MG/1; MG/1
1 TABLET ORAL SEE ADMIN INSTRUCTIONS
COMMUNITY
Start: 2021-02-16 | End: 2022-02-28

## 2021-02-24 ASSESSMENT — ENCOUNTER SYMPTOMS
DIZZINESS: 0
DOUBLE VISION: 0
NERVOUS/ANXIOUS: 1
FEVER: 0
CHILLS: 0
SHORTNESS OF BREATH: 0
HEADACHES: 0
CONSTIPATION: 0
DIARRHEA: 0
BLURRED VISION: 0
PALPITATIONS: 0
COUGH: 0
VOMITING: 0
BACK PAIN: 1
WHEEZING: 0
NAUSEA: 0

## 2021-02-24 NOTE — PROGRESS NOTES
Provider Encounter- Full Thickness wound    HISTORY OF PRESENT ILLNESS  Wound History:    START OF CARE IN CLINIC: 2/24/2021    REFERRING PROVIDER: Jorge Forbes     WOUND- Full Thickness Wound   LOCATION: Left lateral LE   HISTORY: Ms. Fisher was recently hospitalized on 2/6/2021 at [facility].  Patient was admitted for thoracic and sacral fractures.  The patient had imaging performed in the ED which was consistent with bilateral sacral insufficiency fractures and T11 and T12 compression fractures.  During the hospital stay she was assessed by orthopedics and neurosurgery with recommendations for TLSO brace and pain management rather than surgical intervention.  Patient is to follow-up for these injuries with the DEXA scan to help rule out osteopenia or osteoporosis.  During this hospitalization it was also noticed that the patient had a wound to her left lower extremity.  Patient was referred to [facility] for care of this wound.    Pertinent Medical History: Sacral fractures, protein S deficiency, mitral valve insufficiency and aortic valve insufficiency, leiomyoma, hypothyroidism, hypertension, history of DVT, history of aortic aneurysm, Graves' disease, dyslipidemia, dissection of t thoracoabdominal aorta, compression fractures of thoracic spine, asthma, severe anxiety, chronic anticoagulation.     TOBACCO USE: Current everyday smoker.    Patient's problem list, allergies, and current medications reviewed and updated in Epic    Interval History:  2/24/2021: Clinic visit with KORI Villalobos. Patient states that they are feeling well today.  Patient denies fever, chills, nausea, vomiting, lightheadedness, dizziness, shortness of breath and chest pain.  Patient has a significant history for venous stasis wounds.  Patient has evident scarring from prior wounds as well as hemosiderin staining to bilateral lower extremities and significant varicose veins to bilateral lower extremities.   Venous ultrasound ordered in clinic today patient reports last venous ultrasounds approximately 2 to 3 years ago.  I want to rule out any possible varicose veins,  veins or superficial reflux as contributing to the patient's left lower extremity wound.       REVIEW OF SYSTEMS:   Review of Systems   Constitutional: Negative for chills and fever.   HENT: Negative for hearing loss.    Eyes: Negative for blurred vision and double vision.   Respiratory: Negative for cough, shortness of breath and wheezing.    Cardiovascular: Positive for leg swelling. Negative for chest pain and palpitations.   Gastrointestinal: Negative for constipation, diarrhea, nausea and vomiting.   Musculoskeletal: Positive for back pain and joint pain.        Patient with recent fractures see history note   Skin: Negative for itching and rash.        Hemosiderin staining bilateral lower extremities. Ulcer left lower extremity.  Varicose veins bilateral lower extremities   Neurological: Negative for dizziness and headaches.   Psychiatric/Behavioral: The patient is nervous/anxious.         Severely anxious       PHYSICAL EXAMINATION:   /59 Comment: Vitals taken by Lyssa NG 2/24/2021.  Pulse 72 Comment: Vitals taken by Lyssa NG 2/24/2021.  Temp 36.7 °C (98 °F) (Temporal) Comment: Vitals taken by Lyssa NG 2/24/2021. Comment (Src): Vitals taken by Lyssa MA 2/24/2021.  Resp 20 Comment: Vitals taken by Lyssa MA 2/24/2021.  LMP 06/27/2011   SpO2 98% Comment: Vitals taken by Lyssa MA 2/24/2021.    Physical Exam   Constitutional: She is oriented to person, place, and time. No distress.   HENT:   Head: Normocephalic and atraumatic.   Eyes: Pupils are equal, round, and reactive to light. Conjunctivae are normal.   Cardiovascular: Intact distal pulses.   Biphasic pulses heard via Doppler to DP and TP   Pulmonary/Chest: Effort normal. No respiratory distress. She has no wheezes.   Musculoskeletal:         General: Tenderness  present.      Cervical back: Normal range of motion and neck supple.      Comments: Multiple fractures to sacral and thoracic spine see history note above.   Neurological: She is alert and oriented to person, place, and time.   Skin: She is not diaphoretic. No erythema.   Patient has hemosiderin staining to bilateral lower extremities, varicose veins to bilateral lower extremities previous scarring from what the patient states as venous stasis ulcers.  Open wound to left lateral lower extremity   Psychiatric:   Patient has severe anxiety regarding light debridement       WOUND ASSESSMENT    Wound 02/07/21 Venous Ulcer Ankle Lateral Left --Left Lataral Ankle (Active)   Wound Image    02/24/21 0910   Site Assessment Red;Yellow;Brown 02/24/21 0910   Periwound Assessment Blanchable erythema;Painful;Edema 02/24/21 0910   Margins Attached edges 02/24/21 0910   Drainage Amount Small 02/24/21 0910   Drainage Description Serosanguineous 02/24/21 0910   Treatments Cleansed;Topical Lidocaine;Provider debridement 02/24/21 0910   Wound Cleansing Normal Saline Irrigation 02/24/21 0910   Periwound Protectant Barrier Paste 02/24/21 0910   Dressing Cleansing/Solutions Not Applicable 02/24/21 0910   Dressing Options Adaptic;Hydrofiber Silver;Hydrofiber;Compression Wrap Two Layer 02/24/21 0910   Dressing Changed New 02/24/21 0910   Dressing Change/Treatment Frequency Every 48 hrs, and As Needed 02/24/21 0910   Non-staged Wound Description Full thickness 02/24/21 0910   Wound Length (cm) 0.4 cm 02/24/21 0910   Wound Width (cm) 0.3 cm 02/24/21 0910   Wound Depth (cm) 0.2 cm 02/24/21 0910   Wound Surface Area (cm^2) 0.12 cm^2 02/24/21 0910   Wound Volume (cm^3) 0.02 cm^3 02/24/21 0910   Post-Procedure Length (cm) 0.8 cm 02/24/21 0910   Post-Procedure Width (cm) 1 cm 02/24/21 0910   Post-Procedure Depth (cm) 0.3 cm 02/24/21 0910   Post-Procedure Surface Area (cm^2) 0.8 cm^2 02/24/21 0910   Post-Procedure Volume (cm^3) 0.24 cm^3 02/24/21  0910   Wound Healing % 90 02/24/21 0910   Tunneling (cm) 0 cm 02/24/21 0910   Undermining (cm) 0 cm 02/24/21 0910   Wound Odor None 02/24/21 0910   Pulses Doppler 02/24/21 0910   Exposed Structures None 02/24/21 0910            PROCEDURE:   -2% viscous lidocaine applied topically to wound bed for approximately 5 minutes prior to debridement  -Curette used to debride wound bed.  Excisional debridement was performed to remove devitalized tissue until healthy, bleeding tissue was visualized.   Entire surface of wound, 0.8 cm2 debrided.  Tissue debrided into the subcutaneous layer.    -Bleeding controlled with manual pressure.    -Wound care completed by wound RN, refer to flowsheet  -Patient tolerated the procedure well, without c/o pain or discomfort.       Pertinent Labs and Diagnostics:    Labs:     A1c:   Lab Results   Component Value Date/Time    HBA1C 5.6 02/06/2021 01:39 PM          IMAGING: N/A    VASCULAR STUDIES: N/A    LAST  WOUND CULTURE:  DATE : N/A           ASSESSMENT AND PLAN:     1. Ankle ulcer, left, with unspecified severity (HCC)  -Excisional debridement of wound in clinic today, medically necessary to promote wound healing.  -Patient to return to clinic weekly for assessment and debridement  -Home health has been established with Barbara to change compression wrap 1 time a week  -Ultrasound of bilateral lower extremities ordered in clinic today  -Educated the patient on the need for compression to support venous return to the heart and decrease oxygen deprived nutrient deprived blood from surrounding the wound bed.  -Patient will need subcutaneous lidocaine at next clinic visit to allow for debridement.  Patient has severe anxiety regarding debridement I think that subcutaneous located would benefit and alleviate some of this anxiety.  Wound care: Barrier paste, Hydrofiber silver, 2 layer compression wrap    2. Venous insufficiency of both lower extremities  Comments: Hemosiderin staining, varicose  veins, scarring from previous wounds, left lateral lower extremity wound all consistent with venous stasis and venous ulcers.      PATIENT EDUCATION  - Importance of adequate nutrition for wound healing  -Advised to go to ER for any increased redness, swelling, drainage, or odor, or if patient develops fever, chills, nausea or vomiting.     >30 minutes spent reviewing the patient's recent hospital admission and medical records.  Educating the patient on the cause of venous ulcers.  Educating the patient on wound care and venous ulcer care supports such as 2 layer compression wraps, evaluating lower extremities for incompetent perforators, varicose veins and superficial reflux.  Educating the patient on the plan of care proceeding forward.      Please note that this note may have been created using voice recognition software. I have worked with technical experts from [employer] to optimize the interface.  I have made every reasonable attempt to correct obvious errors, but there may be errors of grammar and possibly content that I did not discover before finalizing the note.    N

## 2021-02-24 NOTE — PROGRESS NOTES
Venous ultrasound order faxed to [facility] per patient request (at fax #[phone]).    Wound care order faxed to [facility] at fax #[phone].

## 2021-02-24 NOTE — PATIENT INSTRUCTIONS
-Keep dressings clean and dry. Change dressings if they become over saturated, soiled or fall off. Otherwise, your home health nurse will change your dressings between wound clinic visits.    -Avoid prolonged standing or sitting without elevating your legs.    -Remove your compression garments if you have severe pain, severe swelling, numbness, color change, or temperature change in your toes. If you need to remove your compression garments, do so by unrolling them. Do not cut the compression garments off, this is to prevent cutting yourself on accident.    -Should you experience any significant changes in your wound, such as signs of infection (redness, swelling, localized heat, increased pain, fever > 101 F, chills) or have any questions regarding your home care instructions, please contact the wound center at [phone]. If after hours, contact your primary care physician or go to the hospital emergency room.

## 2021-02-25 ENCOUNTER — ANTICOAGULATION MONITORING (OUTPATIENT)
Dept: VASCULAR LAB | Facility: MEDICAL CENTER | Age: 56
End: 2021-02-25

## 2021-02-25 LAB — INR PPP: 2.4 (ref 2–3.5)

## 2021-02-25 NOTE — PROGRESS NOTES
Anticoagulation Summary  As of 2021    INR goal:  2.0-3.0   TTR:  62.8 % (5.8 y)   INR used for dosin.40 (2021)   Warfarin maintenance plan:  7.5 mg (5 mg x 1.5) every Mon, Wed, Fri; 5 mg (5 mg x 1) all other days   Weekly warfarin total:  42.5 mg   Plan last modified:  Abhi Carlos, PharmD (10/9/2020)   Next INR check:  3/10/2021   Priority:  Maintenance   Target end date:  Indefinite    Indications    Deep vein thrombosis [453.40] [I82.409]             Anticoagulation Episode Summary     INR check location:  Home Draw    Preferred lab:      Send INR reminders to:      Comments:  Eloy MYRICK      Anticoagulation Care Providers     Provider Role Specialty Phone number    Bruna Ureña M.D. [facility] Cardiology [phone]    [facility]ticoagulation [facility] Responsible  [phone]    Jaquan Horne M.D.  Family Medicine [phone]        Anticoagulation Patient Findings      Left patient a message to report a Therapeutic INR of 2.40.  Pt to continue dosing as outlined above. Requested patient to contact the clinic for any s/s of unusual bleeding, bruising or any changes to diet or medication. Follow up INR in 2 week(s).    Ashley Franco, Pharmacy Intern

## 2021-03-04 ENCOUNTER — OFFICE VISIT (OUTPATIENT)
Dept: WOUND CARE | Facility: MEDICAL CENTER | Age: 56
End: 2021-03-04
Attending: STUDENT IN AN ORGANIZED HEALTH CARE EDUCATION/TRAINING PROGRAM
Payer: COMMERCIAL

## 2021-03-04 VITALS
RESPIRATION RATE: 20 BRPM | TEMPERATURE: 97.8 F | SYSTOLIC BLOOD PRESSURE: 119 MMHG | DIASTOLIC BLOOD PRESSURE: 69 MMHG | HEART RATE: 67 BPM | OXYGEN SATURATION: 98 %

## 2021-03-04 DIAGNOSIS — I87.2 VENOUS INSUFFICIENCY OF BOTH LOWER EXTREMITIES: ICD-10-CM

## 2021-03-04 DIAGNOSIS — L97.329 ANKLE ULCER, LEFT, WITH UNSPECIFIED SEVERITY (HCC): Primary | ICD-10-CM

## 2021-03-04 PROCEDURE — 11042 DBRDMT SUBQ TIS 1ST 20SQCM/<: CPT

## 2021-03-04 PROCEDURE — 11042 DBRDMT SUBQ TIS 1ST 20SQCM/<: CPT | Performed by: NURSE PRACTITIONER

## 2021-03-04 PROCEDURE — 99214 OFFICE O/P EST MOD 30 MIN: CPT

## 2021-03-04 ASSESSMENT — ENCOUNTER SYMPTOMS
BACK PAIN: 1
SHORTNESS OF BREATH: 0
NAUSEA: 0
COUGH: 0
DIZZINESS: 0
FEVER: 0
DIARRHEA: 0
WHEEZING: 0
DOUBLE VISION: 0
VOMITING: 0
BLURRED VISION: 0
HEADACHES: 0
CHILLS: 0
PALPITATIONS: 0
NERVOUS/ANXIOUS: 1
CONSTIPATION: 0

## 2021-03-04 ASSESSMENT — PAIN SCALES - GENERAL: PAINLEVEL: 7=MODERATE-SEVERE PAIN

## 2021-03-04 NOTE — PATIENT INSTRUCTIONS
-Keep your wound dressing clean, dry, and intact.    -Change your dressing every 3 to 4 days or if it becomes soiled, soaked, or falls off.    -Remove your compression wrap if you have severe pain, severe swelling, numbness, color change, or temperature change in your toes. If you need to remove your compression wrap, do so by unrolling it. Do not cut the compression wrap off to prevent cutting yourself on accident.    -Should you experience any significant changes in your wound(s), such as infection (redness, swelling, localized heat, increased pain, fever > 101 F, chills) or have any questions regarding your home care instructions, please contact the wound center at [phone]. If after hours, contact your primary care physician or go to the hospital emergency room.

## 2021-03-04 NOTE — PROGRESS NOTES
Provider Encounter- Full Thickness wound    HISTORY OF PRESENT ILLNESS  Wound History:    START OF CARE IN CLINIC: 2/24/2021    REFERRING PROVIDER: Jorge Forbes     WOUND- Full Thickness Wound   LOCATION: Left lateral LE   HISTORY: Ms. Fisher was recently hospitalized on 2/6/2021 at [facility].  Patient was admitted for thoracic and sacral fractures.  The patient had imaging performed in the ED which was consistent with bilateral sacral insufficiency fractures and T11 and T12 compression fractures.  During the hospital stay she was assessed by orthopedics and neurosurgery with recommendations for TLSO brace and pain management rather than surgical intervention.  Patient is to follow-up for these injuries with the DEXA scan to help rule out osteopenia or osteoporosis.  During this hospitalization it was also noticed that the patient had a wound to her left lower extremity.  Patient was referred to [facility] for care of this wound.    Pertinent Medical History: Sacral fractures, protein S deficiency, mitral valve insufficiency and aortic valve insufficiency, leiomyoma, hypothyroidism, hypertension, history of DVT, history of aortic aneurysm, Graves' disease, dyslipidemia, dissection of t thoracoabdominal aorta, compression fractures of thoracic spine, asthma, severe anxiety, chronic anticoagulation.     TOBACCO USE: Current everyday smoker.    Patient's problem list, allergies, and current medications reviewed and updated in Epic    Interval History:  2/24/2021: Clinic visit with KORI Villalobos. Patient states that they are feeling well today.  Patient denies fever, chills, nausea, vomiting, lightheadedness, dizziness, shortness of breath and chest pain.  Patient has a significant history for venous stasis wounds.  Patient has evident scarring from prior wounds as well as hemosiderin staining to bilateral lower extremities and significant varicose veins to bilateral lower extremities.   Venous ultrasound ordered in clinic today patient reports last venous ultrasounds approximately 2 to 3 years ago.  I want to rule out any possible varicose veins,  veins or superficial reflux as contributing to the patient's left lower extremity wound.     3/4/2021: Clinic visit with KORI Villalobos. Patient states that they are feeling well today.  Patient denies fever, chills, nausea, vomiting, lightheadedness, dizziness, shortness of breath and chest pain.       REVIEW OF SYSTEMS:   Review of Systems   Constitutional: Negative for chills and fever.   HENT: Negative for hearing loss.    Eyes: Negative for blurred vision and double vision.   Respiratory: Negative for cough, shortness of breath and wheezing.    Cardiovascular: Positive for leg swelling. Negative for chest pain and palpitations.   Gastrointestinal: Negative for constipation, diarrhea, nausea and vomiting.   Musculoskeletal: Positive for back pain and joint pain.        Patient with recent fractures see history note   Skin: Negative for itching and rash.        Hemosiderin staining bilateral lower extremities. Ulcer left lower extremity.  Varicose veins bilateral lower extremities   Neurological: Negative for dizziness and headaches.   Psychiatric/Behavioral: The patient is nervous/anxious.         Severely anxious       PHYSICAL EXAMINATION:   [facility] 06/27/2011     Physical Exam   Constitutional: She is oriented to person, place, and time. No distress.   HENT:   Head: Normocephalic and atraumatic.   Eyes: Pupils are equal, round, and reactive to light. Conjunctivae are normal.   Cardiovascular: Intact distal pulses.   Biphasic pulses heard via Doppler to DP and TP   Pulmonary/Chest: Effort normal. No respiratory distress. She has no wheezes.   Musculoskeletal:         General: Tenderness present.      Cervical back: Normal range of motion and neck supple.      Comments: Multiple fractures to sacral and thoracic spine see history note  above.   Neurological: She is alert and oriented to person, place, and time.   Skin: She is not diaphoretic. No erythema.   Patient has hemosiderin staining to bilateral lower extremities, varicose veins to bilateral lower extremities previous scarring from what the patient states as venous stasis ulcers.  Open wound to left lateral lower extremity   Psychiatric:   Patient has severe anxiety regarding light debridement       WOUND ASSESSMENT         Wound 02/07/21 Venous Ulcer Ankle Lateral Left --Left Lataral Ankle (Active)   Wound Image    03/04/21 0800   Site Assessment Red;Yellow;Brown 03/04/21 0800   Periwound Assessment Blanchable erythema;Painful;Edema 03/04/21 0800   Margins Attached edges 03/04/21 0800   Drainage Amount Moderate 03/04/21 0800   Drainage Description Serosanguineous 03/04/21 0800   Treatments Cleansed;Injectible Lidocaine;Provider debridement 03/04/21 0800   Wound Cleansing Normal Saline Irrigation 03/04/21 0800   Periwound Protectant Skin Protectant Wipes to Periwound;Barrier Paste 03/04/21 0800   Dressing Cleansing/Solutions Not Applicable 03/04/21 0800   Dressing Options Adaptic;Hydrofiber Silver;Hydrofiber;Compression Wrap Two Layer 03/04/21 0800   Dressing Changed Changed 03/04/21 0800   Dressing Change/Treatment Frequency Every 48 hrs, and As Needed 03/04/21 0800   Non-staged Wound Description Full thickness 03/04/21 0800   Wound Length (cm) 0.8 cm 03/04/21 0800   Wound Width (cm) 1 cm 03/04/21 0800   Wound Depth (cm) 0.2 cm 03/04/21 0800   Wound Surface Area (cm^2) 0.8 cm^2 03/04/21 0800   Wound Volume (cm^3) 0.16 cm^3 03/04/21 0800   Post-Procedure Length (cm) 1 cm 03/04/21 0800   Post-Procedure Width (cm) 1 cm 03/04/21 0800   Post-Procedure Depth (cm) 0.2 cm 03/04/21 0800   Post-Procedure Surface Area (cm^2) 1 cm^2 03/04/21 0800   Post-Procedure Volume (cm^3) 0.2 cm^3 03/04/21 0800   Wound Healing % 20 03/04/21 0800   Wound Bed Slough (%) 90 % 03/04/21 0800   Tunneling (cm) 0 cm  03/04/21 0800   Undermining (cm) 0 cm 03/04/21 0800   Wound Odor None 03/04/21 0800   Pulses Doppler 02/24/21 0910   Exposed Structures None 03/04/21 0800                   PROCEDURE:   Patient was identified by full name and birthdate.  Procedure explained to the patient will be injecting subcutaneous lidocaine to anesthetize the wound bed prior to debridement with curette.  Patient has a very low pain threshold.  Subcutaneous lidocaine approximately 4 mL was injected to 3 locations to the periwound area to anesthetize the wound bed prior to debridement.  Minimal bleeding patient tolerated the procedure after the initial bee sting from the lidocaine injection.  -2% viscous lidocaine applied topically(prior to subcutaneous lidocaine) to wound bed for approximately 5 minutes prior to debridement  -Curette used to debride wound bed.  Excisional debridement was performed to remove devitalized tissue until healthy, bleeding tissue was visualized.   Entire surface of wound, 1 cm2 debrided.  Tissue debrided into the subcutaneous layer.    -Bleeding controlled with manual pressure.    -Wound care completed by wound RN, refer to flowsheet  -Patient tolerated the procedure well, without c/o pain or discomfort.       Pertinent Labs and Diagnostics:    Labs:     A1c:   Lab Results   Component Value Date/Time    HBA1C 5.6 02/06/2021 01:39 PM          IMAGING: N/A    VASCULAR STUDIES: N/A    LAST  WOUND CULTURE:  DATE : N/A           ASSESSMENT AND PLAN:     1. Ankle ulcer, left, with unspecified severity (HCC)  -Excisional debridement of wound in clinic today, medically necessary to promote wound healing.  -Patient to return to clinic weekly for assessment and debridement  -Home health has been established with Barbara to change compression wrap 1 time a week  -Ultrasound of bilateral lower extremities ordered in clinic today  -Educated the patient on the need for compression to support venous return to the heart and decrease  oxygen deprived nutrient deprived blood from surrounding the wound bed.  -Patient will need subcutaneous lidocaine at next clinic visit to allow for debridement.  Patient has severe anxiety regarding debridement patient did significantly better with subcutaneous lidocaine recommend at each provider visit for debridement.    Wound care: Adaptic barrier paste, Hydrofiber silver, 2 layer compression wrap    2. Venous insufficiency of both lower extremities  Comments: Hemosiderin staining, varicose veins, scarring from previous wounds, left lateral lower extremity wound all consistent with venous stasis and venous ulcers.      PATIENT EDUCATION  - Importance of adequate nutrition for wound healing  -Advised to go to ER for any increased redness, swelling, drainage, or odor, or if patient develops fever, chills, nausea or vomiting.       Please note that this note may have been created using voice recognition software. I have worked with technical experts from [employer] to optimize the interface.  I have made every reasonable attempt to correct obvious errors, but there may be errors of grammar and possibly content that I did not discover before finalizing the note.    N

## 2021-03-11 ENCOUNTER — OFFICE VISIT (OUTPATIENT)
Dept: WOUND CARE | Facility: MEDICAL CENTER | Age: 56
End: 2021-03-11
Attending: STUDENT IN AN ORGANIZED HEALTH CARE EDUCATION/TRAINING PROGRAM
Payer: COMMERCIAL

## 2021-03-11 VITALS
SYSTOLIC BLOOD PRESSURE: 126 MMHG | HEART RATE: 75 BPM | DIASTOLIC BLOOD PRESSURE: 61 MMHG | TEMPERATURE: 97.3 F | RESPIRATION RATE: 20 BRPM | OXYGEN SATURATION: 98 %

## 2021-03-11 DIAGNOSIS — L97.922 ULCER OF LEFT LOWER EXTREMITY WITH FAT LAYER EXPOSED (HCC): Primary | ICD-10-CM

## 2021-03-11 DIAGNOSIS — I87.2 VENOUS INSUFFICIENCY: ICD-10-CM

## 2021-03-11 PROCEDURE — 11042 DBRDMT SUBQ TIS 1ST 20SQCM/<: CPT | Performed by: NURSE PRACTITIONER

## 2021-03-11 PROCEDURE — 11042 DBRDMT SUBQ TIS 1ST 20SQCM/<: CPT

## 2021-03-11 ASSESSMENT — ENCOUNTER SYMPTOMS
BACK PAIN: 1
FEVER: 0
PALPITATIONS: 0
CONSTIPATION: 0
HEADACHES: 0
VOMITING: 0
SHORTNESS OF BREATH: 0
BLURRED VISION: 0
COUGH: 0
NERVOUS/ANXIOUS: 1
DOUBLE VISION: 0
CHILLS: 0
DIZZINESS: 0
WHEEZING: 0
DIARRHEA: 0
NAUSEA: 0

## 2021-03-11 NOTE — PROGRESS NOTES
Provider Encounter- Full Thickness wound    HISTORY OF PRESENT ILLNESS  Wound History:    START OF CARE IN CLINIC: 2/24/2021    REFERRING PROVIDER: Jorge Forbes     WOUND- Full Thickness Wound   LOCATION: Left lateral LE   HISTORY: Ms. Fisher was recently hospitalized on 2/6/2021 at [facility].  Patient was admitted for thoracic and sacral fractures.  The patient had imaging performed in the ED which was consistent with bilateral sacral insufficiency fractures and T11 and T12 compression fractures.  During the hospital stay she was assessed by orthopedics and neurosurgery with recommendations for TLSO brace and pain management rather than surgical intervention.  Patient is to follow-up for these injuries with the DEXA scan to help rule out osteopenia or osteoporosis.  During this hospitalization it was also noticed that the patient had a wound to her left lower extremity.  Patient was referred to [facility] for care of this wound.    Pertinent Medical History: Sacral fractures, protein S deficiency, mitral valve insufficiency and aortic valve insufficiency, leiomyoma, hypothyroidism, hypertension, history of DVT, history of aortic aneurysm, Graves' disease, dyslipidemia, dissection of t thoracoabdominal aorta, compression fractures of thoracic spine, asthma, severe anxiety, chronic anticoagulation.     TOBACCO USE: Current everyday smoker.    Patient's problem list, allergies, and current medications reviewed and updated in Epic    Interval History:  2/24/2021: Clinic visit with KORI Villalobos. Patient states that they are feeling well today.  Patient denies fever, chills, nausea, vomiting, lightheadedness, dizziness, shortness of breath and chest pain.  Patient has a significant history for venous stasis wounds.  Patient has evident scarring from prior wounds as well as hemosiderin staining to bilateral lower extremities and significant varicose veins to bilateral lower extremities.   Venous ultrasound ordered in clinic today patient reports last venous ultrasounds approximately 2 to 3 years ago.  I want to rule out any possible varicose veins,  veins or superficial reflux as contributing to the patient's left lower extremity wound.     3/4/2021: Clinic visit with KORI Villalobos. Patient states that they are feeling well today.  Patient denies fever, chills, nausea, vomiting, lightheadedness, dizziness, shortness of breath and chest pain.    3/11/2021: Clinic visit with KORI Villalobos. Patient states that they are feeling well today.  Patient denies fever, chills, nausea, vomiting, lightheadedness, dizziness, shortness of breath and chest pain.  Patient has not completed her venous US ordered in clinic on 2/24/2021.  Patient reports she will complete the image this week.  Patient does have some granulation tissue forming to the wound bed at this time.  Patient needs excessive pain control during debridement subcutaneous lidocaine used to anesthetized the wound bed prior to debridement.  100% debridement performed establishing a clean wound bed.           REVIEW OF SYSTEMS:   Review of Systems   Constitutional: Negative for chills and fever.   HENT: Negative for hearing loss.    Eyes: Negative for blurred vision and double vision.   Respiratory: Negative for cough, shortness of breath and wheezing.    Cardiovascular: Positive for leg swelling. Negative for chest pain and palpitations.   Gastrointestinal: Negative for constipation, diarrhea, nausea and vomiting.   Musculoskeletal: Positive for back pain and joint pain.        Patient with recent fractures see history note   Skin: Negative for itching and rash.        Hemosiderin staining bilateral lower extremities. Ulcer left lower extremity.  Varicose veins bilateral lower extremities   Neurological: Negative for dizziness and headaches.   Psychiatric/Behavioral: The patient is nervous/anxious.         Severely anxious        PHYSICAL EXAMINATION:   /61   Pulse 75   Temp 36.3 °C (97.3 °F) (Temporal)   Resp 20   LMP 06/27/2011   SpO2 98%     Physical Exam   Constitutional: She is oriented to person, place, and time. No distress.   HENT:   Head: Normocephalic and atraumatic.   Eyes: Pupils are equal, round, and reactive to light. Conjunctivae are normal.   Cardiovascular: Intact distal pulses.   Biphasic pulses heard via Doppler to DP and TP   Pulmonary/Chest: Effort normal. No respiratory distress. She has no wheezes.   Musculoskeletal:         General: Tenderness present.      Cervical back: Normal range of motion and neck supple.      Comments: Multiple fractures to sacral and thoracic spine see history note above.   Neurological: She is alert and oriented to person, place, and time.   Skin: She is not diaphoretic. No erythema.   Patient has hemosiderin staining to bilateral lower extremities, varicose veins to bilateral lower extremities previous scarring from what the patient states as venous stasis ulcers.  Open wound to left lateral lower extremity   Psychiatric:   Patient has severe anxiety regarding light debridement       WOUND ASSESSMENT     Wound 02/07/21 Venous Ulcer Ankle Lateral Left --Left Lataral Ankle (Active)   Wound Image    03/11/21 0830   Site Assessment Red;Yellow 03/11/21 0830   Periwound Assessment Blanchable erythema;Painful;Edema 03/11/21 0830   Margins Attached edges 03/11/21 0830   Drainage Amount Small 03/11/21 0830   Drainage Description Serosanguineous 03/11/21 0830   Treatments Cleansed;Topical Lidocaine;Injectible Lidocaine;Provider debridement 03/11/21 0830   Wound Cleansing Normal Saline Irrigation 03/11/21 0830   Periwound Protectant Barrier Paste 03/11/21 0830   Dressing Cleansing/Solutions Normal Saline 03/11/21 0830   Dressing Options Collagen Dressing;Adaptic;Hydrofiber Silver;Compression Wrap Two Layer 03/11/21 0830   Dressing Changed New 03/11/21 0830   Dressing Change/Treatment  Frequency Every 72 hrs, and As Needed 03/11/21 0830   Non-staged Wound Description Full thickness 03/11/21 0830   Wound Length (cm) 1.1 cm 03/11/21 0830   Wound Width (cm) 0.9 cm 03/11/21 0830   Wound Depth (cm) 0.3 cm 03/11/21 0830   Wound Surface Area (cm^2) 0.99 cm^2 03/11/21 0830   Wound Volume (cm^3) 0.3 cm^3 03/11/21 0830   Post-Procedure Length (cm) 1.2 cm 03/11/21 0830   Post-Procedure Width (cm) 1 cm 03/11/21 0830   Post-Procedure Depth (cm) 0.3 cm 03/11/21 0830   Post-Procedure Surface Area (cm^2) 1.2 cm^2 03/11/21 0830   Post-Procedure Volume (cm^3) 0.36 cm^3 03/11/21 0830   Wound Healing % -50 03/11/21 0830   Wound Bed Granulation (%) 20 % 03/11/21 0830   Wound Bed Epithelium (%) 0 % 03/11/21 0830   Wound Bed Slough (%) 80 % 03/11/21 0830   Wound Bed Eschar (%) 0 % 03/11/21 0830   Tunneling (cm) 0 cm 03/11/21 0830   Undermining (cm) 0 cm 03/11/21 0830   Wound Odor None 03/11/21 0830   Pulses Doppler 02/24/21 0910   Exposed Structures None 03/11/21 0830          PROCEDURE:   Patient was identified by full name and birthdate.  Procedure explained to the patient will be injecting subcutaneous lidocaine to anesthetize the wound bed prior to debridement with curette.  Patient has a very low pain threshold.  Subcutaneous lidocaine approximately 3 mL was injected to 2 locations to the periwound area to anesthetize the wound bed prior to debridement.  Minimal bleeding, patient tolerated the procedure after the initial bee sting from the lidocaine injection.  -2% viscous lidocaine applied topically(prior to subcutaneous lidocaine) to wound bed for approximately 5 minutes prior to debridement  -Curette used to debride wound bed.  Excisional debridement was performed to remove devitalized tissue until healthy, bleeding tissue was visualized.   Entire surface of wound, 1.2 cm2 debrided.  Tissue debrided into the subcutaneous layer.    -Bleeding controlled with manual pressure.    -Wound care completed by wound RN,  refer to flowsheet  -Patient tolerated the procedure well, without c/o pain or discomfort.       Pertinent Labs and Diagnostics:    Labs:     A1c:   Lab Results   Component Value Date/Time    HBA1C 5.6 02/06/2021 01:39 PM          IMAGING: N/A    VASCULAR STUDIES: N/A    LAST  WOUND CULTURE:  DATE : N/A           ASSESSMENT AND PLAN:     1.  Ulcer of left lower extremity with fat layer exposed (HCC)  -Excisional debridement of wound in clinic today, medically necessary to promote wound healing.  -Patient to return to clinic weekly for assessment and debridement  -Home health has been established with Barbara to change compression wrap 1 time a week  -Educated the patient on the need for compression to support venous return to the heart and decrease oxygen deprived nutrient deprived blood from surrounding the wound bed.  -Patient will need subcutaneous lidocaine at next clinic visit to allow for debridement.  Patient has severe anxiety regarding debridement patient did significantly better with subcutaneous lidocaine recommend at each provider visit for debridement.    Wound care: Collagen dressing, Adaptic, Hydrofiber silver, compression 2 layer wrap    2. Venous insufficiency of both lower extremities  Comments: Hemosiderin staining, varicose veins, scarring from previous wounds, left lateral lower extremity wound all consistent with venous stasis and venous ulcers.      PATIENT EDUCATION  - Importance of adequate nutrition for wound healing  -Advised to go to ER for any increased redness, swelling, drainage, or odor, or if patient develops fever, chills, nausea or vomiting.       Please note that this note may have been created using voice recognition software. I have worked with technical experts from [facility] to optimize the interface.  I have made every reasonable attempt to correct obvious errors, but there may be errors of grammar and possibly content that I did not discover before finalizing the  note.    N

## 2021-03-11 NOTE — PATIENT INSTRUCTIONS
-Keep dressings clean and dry. Change dressings if they become over saturated, soiled or fall off. Otherwise, your home health nurse will change your dressings between wound clinic visits.    -Avoid prolonged standing or sitting without elevating your legs.    -Remove your compression garments if you have severe pain, severe swelling, numbness, color change, or temperature change in your toes. If you need to remove your compression garments, do so by unrolling them. Do not cut the compression garments off, this is to prevent cutting yourself on accident.    -Should you experience any significant changes in your wound(s), such as signs of infection (redness, swelling, localized heat, increased pain, fever > 101 F, chills) or have any questions regarding your home care instructions, please contact the wound center at [phone]. If after hours, contact your primary care physician or go to the hospital emergency room.

## 2021-03-12 ENCOUNTER — ANTICOAGULATION MONITORING (OUTPATIENT)
Dept: VASCULAR LAB | Facility: MEDICAL CENTER | Age: 56
End: 2021-03-12

## 2021-03-12 LAB
25(OH)D3+25(OH)D2 SERPL-MCNC: 63.6 NG/ML (ref 30–100)
ALBUMIN SERPL-MCNC: 4.3 G/DL (ref 3.8–4.9)
ALBUMIN/GLOB SERPL: 1.1 {RATIO} (ref 1.2–2.2)
ALP SERPL-CCNC: 100 IU/L (ref 39–117)
ALT SERPL-CCNC: 22 IU/L (ref 0–32)
AMBIG ABBREV CMP14 DFLT   977206: NORMAL
AST SERPL-CCNC: 30 IU/L (ref 0–40)
BASOPHILS # BLD AUTO: 0.1 X10E3/UL (ref 0–0.2)
BASOPHILS NFR BLD AUTO: 1 %
BILIRUB SERPL-MCNC: 0.5 MG/DL (ref 0–1.2)
BUN SERPL-MCNC: 14 MG/DL (ref 6–24)
BUN/CREAT SERPL: 17 (ref 9–23)
CALCIUM SERPL-MCNC: 9.7 MG/DL (ref 8.7–10.2)
CHLORIDE SERPL-SCNC: 101 MMOL/L (ref 96–106)
CHOLEST SERPL-MCNC: 154 MG/DL (ref 100–199)
CO2 SERPL-SCNC: 23 MMOL/L (ref 20–29)
CREAT SERPL-MCNC: 0.81 MG/DL (ref 0.57–1)
EOSINOPHIL # BLD AUTO: 0.1 X10E3/UL (ref 0–0.4)
EOSINOPHIL NFR BLD AUTO: 2 %
ERYTHROCYTE [DISTWIDTH] IN BLOOD BY AUTOMATED COUNT: 13.1 % (ref 11.7–15.4)
FOLATE SERPL-MCNC: 5.9 NG/ML
GLOBULIN SER CALC-MCNC: 3.8 G/DL (ref 1.5–4.5)
GLUCOSE SERPL-MCNC: 83 MG/DL (ref 65–99)
HCT VFR BLD AUTO: 46.4 % (ref 34–46.6)
HDLC SERPL-MCNC: 55 MG/DL
HGB BLD-MCNC: 15.4 G/DL (ref 11.1–15.9)
IMM GRANULOCYTES # BLD AUTO: 0 X10E3/UL (ref 0–0.1)
IMM GRANULOCYTES NFR BLD AUTO: 0 %
IMMATURE CELLS  115398: NORMAL
INR PPP: 3.2 (ref 2–3.5)
LABORATORY COMMENT REPORT: ABNORMAL
LDLC SERPL CALC-MCNC: 70 MG/DL (ref 0–99)
LYMPHOCYTES # BLD AUTO: 2.8 X10E3/UL (ref 0.7–3.1)
LYMPHOCYTES NFR BLD AUTO: 46 %
MCH RBC QN AUTO: 31.6 PG (ref 26.6–33)
MCHC RBC AUTO-ENTMCNC: 33.2 G/DL (ref 31.5–35.7)
MCV RBC AUTO: 95 FL (ref 79–97)
MONOCYTES # BLD AUTO: 0.5 X10E3/UL (ref 0.1–0.9)
MONOCYTES NFR BLD AUTO: 8 %
MORPHOLOGY BLD-IMP: NORMAL
NEUTROPHILS # BLD AUTO: 2.6 X10E3/UL (ref 1.4–7)
NEUTROPHILS NFR BLD AUTO: 43 %
NRBC BLD AUTO-RTO: NORMAL %
PLATELET # BLD AUTO: 308 X10E3/UL (ref 150–450)
POTASSIUM SERPL-SCNC: 4.6 MMOL/L (ref 3.5–5.2)
PROT SERPL-MCNC: 8.1 G/DL (ref 6–8.5)
RBC # BLD AUTO: 4.88 X10E6/UL (ref 3.77–5.28)
REQUEST PROBLEM   100552: NORMAL
SODIUM SERPL-SCNC: 140 MMOL/L (ref 134–144)
T4 FREE SERPL-MCNC: 1.82 NG/DL (ref 0.82–1.77)
THYROGLOB AB SERPL-ACNC: 124.2 IU/ML (ref 0–0.9)
THYROPEROXIDASE AB SERPL-ACNC: 305 IU/ML (ref 0–34)
TRIGL SERPL-MCNC: 171 MG/DL (ref 0–149)
TSH SERPL DL<=0.005 MIU/L-ACNC: 3.23 UIU/ML (ref 0.45–4.5)
VIT B12 SERPL-MCNC: 435 PG/ML (ref 232–1245)
VLDLC SERPL CALC-MCNC: 29 MG/DL (ref 5–40)
WBC # BLD AUTO: 6.1 X10E3/UL (ref 3.4–10.8)

## 2021-03-13 NOTE — PROGRESS NOTES
OP Telephone Anticoagulation Service Note    Date: 3/12/2021      Anticoagulation Summary  As of 3/12/2021    INR goal:  2.0-3.0   TTR:  62.8 % (5.8 y)   INR used for dosing:  3.20 (3/12/2021)   Warfarin maintenance plan:  7.5 mg (5 mg x 1.5) every Mon, Wed, Fri; 5 mg (5 mg x 1) all other days   Weekly warfarin total:  42.5 mg   Plan last modified:  Abhi Carlos PharmD (10/9/2020)   Next INR check:  3/26/2021   Priority:  Maintenance   Target end date:  Indefinite    Indications    Deep vein thrombosis [453.40] [I82.409]             Anticoagulation Episode Summary     INR check location:  Home Draw    Preferred lab:      Send INR reminders to:      Comments:  Eloy MYRICK      Anticoagulation Care Providers     Provider Role Specialty Phone number    Bruna Ureña M.D. [facility] [phone]    [facility]ticoagulation Services Responsible  [phone]    Jaquan Horne M.D.  Family Medicine [phone]        Anticoagulation Patient Findings      INR SUPRA-therapeutic at 3.2.  Left voicemail message for pt.  Verified regimen.  Instructed pt to take a decreased dose of 5 mg today and to then continue on with the current regimen.  Told pt to call if any s/s of bleeding or medication changes.  Check INR in 2 week(s).  Instructed pt to call clinic at [phone] if there are any questions.    Lazaro Cerda, Josie

## 2021-03-15 LAB
ALBUMIN/CREAT UR: <12 MG/G CREAT (ref 0–29)
CREAT UR-MCNC: 25.5 MG/DL
MICROALBUMIN UR-MCNC: <3 UG/ML
WRITTEN AUTHORIZATION   977900: NORMAL

## 2021-03-16 ENCOUNTER — OFFICE VISIT (OUTPATIENT)
Dept: VASCULAR LAB | Facility: MEDICAL CENTER | Age: 56
End: 2021-03-16
Attending: NURSE PRACTITIONER
Payer: COMMERCIAL

## 2021-03-16 DIAGNOSIS — Z79.01 CHRONIC ANTICOAGULATION: ICD-10-CM

## 2021-03-16 DIAGNOSIS — Z86.718 HISTORY OF DVT (DEEP VEIN THROMBOSIS): ICD-10-CM

## 2021-03-16 DIAGNOSIS — E78.5 DYSLIPIDEMIA: ICD-10-CM

## 2021-03-16 DIAGNOSIS — D68.59 PROTEIN S DEFICIENCY (HCC): ICD-10-CM

## 2021-03-16 DIAGNOSIS — Z86.79 HISTORY OF AORTIC ANEURYSM: ICD-10-CM

## 2021-03-16 DIAGNOSIS — I10 HYPERTENSION, UNSPECIFIED TYPE: ICD-10-CM

## 2021-03-16 PROCEDURE — 99214 OFFICE O/P EST MOD 30 MIN: CPT | Mod: 95,CR | Performed by: NURSE PRACTITIONER

## 2021-03-16 ASSESSMENT — ENCOUNTER SYMPTOMS
SHORTNESS OF BREATH: 0
PALPITATIONS: 0
COUGH: 0
WEIGHT LOSS: 0
SENSORY CHANGE: 0
DEPRESSION: 0
SPEECH CHANGE: 0
WEAKNESS: 1
FOCAL WEAKNESS: 0
BRUISES/BLEEDS EASILY: 0
FALLS: 0
BLOOD IN STOOL: 0
BACK PAIN: 1
NERVOUS/ANXIOUS: 1
DIZZINESS: 0
MYALGIAS: 0

## 2021-03-16 NOTE — PROGRESS NOTES
This visit was conducted via [employer] video call using secure and encrypted video conferencing technology due to covid-19 restrictions.   The patient was in a private location in the state of [place].    The patient's identity was confirmed and verbal consent was obtained for this virtual visit.  VASCULAR FOLLOW UP VISIT  Subjective:   Oumar Fisher is a 56 y.o. female who presents today 3/15/21 for   No chief complaint on file.    HPI:  Here for f/u of aortic dissection, dyslipidemia, htn.   BPs in 112 to 130/70's HR 90  Daughter was diagnosed with moyamoya - had brain surgery in [place]  Quit aug 1 - no tobacco since  No change in bp meds  No myalgias on statin - changed to rosuvastatin  No chest, back or abd pain.   Has missed medications on occasion  No bleeding on warfarin    Social History     Tobacco Use   • Smoking status: Current Every Day Smoker   • Smokeless tobacco: Never Used   Substance Use Topics   • Alcohol use: Never   • Drug use: Never     DIET AND EXERCISE:  Weight Change: up about 8 pounds and stable  Diet: common adult  Exercise: minimal exercise   Smoking - quit     Review of Systems   Constitutional: Negative for malaise/fatigue and weight loss.   HENT: Negative for nosebleeds.    Respiratory: Negative for cough and shortness of breath.    Cardiovascular: Negative for chest pain, palpitations and leg swelling.   Gastrointestinal: Negative for blood in stool.   Genitourinary: Negative for hematuria.   Musculoskeletal: Positive for back pain. Negative for falls and myalgias.   Skin:        Wound on Lt outer ankle    Neurological: Positive for weakness. Negative for dizziness, sensory change, speech change and focal weakness.   Endo/Heme/Allergies: Does not bruise/bleed easily.   Psychiatric/Behavioral: Negative for depression. The patient is nervous/anxious.       Objective:     There were no vitals filed for this visit.   There is no height or weight on file to calculate BMI.  Physical  Exam  Vitals reviewed.   Constitutional:       General: She is not in acute distress.     Appearance: She is well-developed.   Pulmonary:      Effort: No respiratory distress.   Skin:     Coloration: Skin is not jaundiced or pale.   Neurological:      Mental Status: She is alert and oriented to person, place, and time.   Psychiatric:         Mood and Affect: Mood normal.         Behavior: Behavior normal.     Data review  Lab Results   Component Value Date    CHOLSTRLTOT 154 03/09/2021    CHOLSTRLTOT 94 (L) 02/07/2021    LDL 26 02/07/2021    HDL 55 03/09/2021    HDL 39 (A) 02/07/2021    TRIGLYCERIDE 171 (H) 03/09/2021    TRIGLYCERIDE 147 02/07/2021      Lab Results   Component Value Date    PROTHROMBTM 20.4 (H) 02/10/2021    INR 3.20 03/12/2021       Lab Results   Component Value Date    HBA1C 5.6 02/06/2021      Blood work May 2020 from lab core  Glucose 90  GFR 70  Total cholesterol 183, triglycerides 259, HDL 60, LDL 71,  TSH 5.29  Urine for albumin less than 3    CTA October 2019  Endovascular intervention stable  No endoleak     Echo RDC aug 2020  Normal ef  Mild AI and MR     CTA 2/6/21  1.  Extensive atherosclerotic change of the aorta and originating vessels. No evidence of aortic dissection.     2.  Mild ectasia of the ascending aorta measured at 3.8 cm in diameter.     3.  Descending thoracic aortic stent present.     4.  Fatty liver.     5.  Inferior vena cava filter present.     6.  Multilevel thoracic spine compression fractures which are possibly chronic in nature.  Lab Results   Component Value Date    SODIUM 140 03/09/2021    SODIUM 137 02/08/2021    POTASSIUM 4.6 03/09/2021    POTASSIUM 4.0 02/08/2021    CHLORIDE 101 03/09/2021    CHLORIDE 100 02/08/2021    CO2 23 03/09/2021    CO2 26 02/08/2021    GLUCOSE 83 03/09/2021    GLUCOSE 81 02/08/2021    BUN 14 03/09/2021    BUN 12 02/08/2021    CREATININE 0.81 03/09/2021    CREATININE 0.70 02/08/2021    BUNCREATRAT 17 03/09/2021    IFAFRICA 95 03/09/2021     IFAFRICA >60 02/08/2021    IFNOTAFR 83 03/09/2021    IFNOTAFR >60 02/08/2021      Lab Results   Component Value Date    TSH 3.230 03/09/2021      Lab Results   Component Value Date    WBC 6.1 03/09/2021    WBC 5.1 02/08/2021    RBC 4.88 03/09/2021    RBC 4.17 (L) 02/08/2021    HEMOGLOBIN 15.4 03/09/2021    HEMOGLOBIN 13.4 02/08/2021    HEMATOCRIT 46.4 03/09/2021    HEMATOCRIT 40.1 02/08/2021    MCV 95 03/09/2021    MCV 96.2 02/08/2021    MCH 31.6 03/09/2021    MCH 32.1 02/08/2021    MCHC 33.2 03/09/2021    MCHC 33.4 (L) 02/08/2021    MPV 10.3 02/08/2021      Medical Decision Making:  Today's Assessment / Status / Plan:     No diagnosis found.  Patient Type: Secondary Prevention    Etiology of Established CVD if Present:   1) Aortic dissection status post graft  2) valvular heart disease without previous intervention  3) DVT    Lipid Management: Qualifies for Statin Therapy   Currently on Statin: Yes  Would like to see LDL less than 70 and non HDL less than 100, Borderline at goal but probably due to inactivity because of her T11 & T12 compression fx and ankle wound  At goal since change to rosuvastatic  Plan:  -continue rosuvastatin 20 mg  -Repeat lipid panel 6-12 mo    Blood Pressure Management:  ACC-AHA blood pressure goal less than 130/80  Home bp under reasonable control per her report  A bit elevated in office today  Has gained some weight which may have led to BP increase  GFR normal and no albuminuria  Plan:  -Intensify therapy lifestyle change  -Continue metoprolol at current dose  -Continue losartan at current dose  -Continue spironolactone at current dose  -Continue to follow blood pressures at home  -Intensify therapy if above goal next visit    Glycemic Status: Normal  -Recheck fasting glucose prior to next visit    Anti-Platelet/Anti-Coagulant Tx: yes  -Continue indefinite anticoagulation  -Follow-up in anticoagulation clinic    Smoking: Quit recently  Uninterested medications or NRT  -continue   Complete cessation  -Continue to address at every visit    Physical Activity: Recommended more walking    Weight Management and Nutrition: More attention to diet with slow steady weight loss    Other:     1) Aortic dissection status post graft -stable on follow-up imaging.  Continue medical management.  Recheck CTA at 2-year intervals    2) valvular heart disease without previous intervention -remains asymptomatic. Limited on echo. Does not need regular follow up echo    3) DVT -continue indefinite anticoagulation as above.  Quit smoking    4) hypothyroidism - perhaps a bit symptomatic..  TSH elevated consistent with under treatment.  Has not been completely adherent with medications. Increase levo to 8 pills per day. Recheck tsh before next visit and continue to adjust as needed. F/u with pcp    5) Wound Lt ankle -Continue with wound care     Instructed to follow-up with PCP for remainder of adult medical needs: yes  We will partner with other providers in the management of established vascular disease and cardiometabolic risk factors.    Studies to Be Obtained: CTA thoracoabdominal aorta Feb 2023    Labs to Be Obtained: As above prior to next visit    Follow up in: 6 months  Time: 30-39min - chart review/prep, review of other providers' records, imaging/lab review, face-to-face time for history/examination, ordering, prescribing,  review of results/meds/ treatment plan with patient/family/caregiver, documentation in EMR, care coordination (as needed)    MAINOR Trimble.     CC: Jaquan Horne

## 2021-03-18 ENCOUNTER — OFFICE VISIT (OUTPATIENT)
Dept: WOUND CARE | Facility: MEDICAL CENTER | Age: 56
End: 2021-03-18
Attending: STUDENT IN AN ORGANIZED HEALTH CARE EDUCATION/TRAINING PROGRAM
Payer: COMMERCIAL

## 2021-03-18 VITALS
HEART RATE: 73 BPM | RESPIRATION RATE: 20 BRPM | SYSTOLIC BLOOD PRESSURE: 136 MMHG | TEMPERATURE: 97.9 F | OXYGEN SATURATION: 97 % | DIASTOLIC BLOOD PRESSURE: 67 MMHG

## 2021-03-18 DIAGNOSIS — L97.922 ULCER OF LEFT LOWER EXTREMITY WITH FAT LAYER EXPOSED (HCC): Primary | ICD-10-CM

## 2021-03-18 DIAGNOSIS — I87.2 VENOUS INSUFFICIENCY: ICD-10-CM

## 2021-03-18 PROCEDURE — 11042 DBRDMT SUBQ TIS 1ST 20SQCM/<: CPT

## 2021-03-18 PROCEDURE — 99214 OFFICE O/P EST MOD 30 MIN: CPT

## 2021-03-18 PROCEDURE — 11042 DBRDMT SUBQ TIS 1ST 20SQCM/<: CPT | Performed by: NURSE PRACTITIONER

## 2021-03-18 ASSESSMENT — ENCOUNTER SYMPTOMS
BLURRED VISION: 0
PALPITATIONS: 0
SHORTNESS OF BREATH: 0
DIZZINESS: 0
FEVER: 0
CHILLS: 0
DIARRHEA: 0
CONSTIPATION: 0
NERVOUS/ANXIOUS: 1
HEADACHES: 0
DOUBLE VISION: 0
BACK PAIN: 1
WHEEZING: 0
VOMITING: 0
COUGH: 0
NAUSEA: 0

## 2021-03-18 ASSESSMENT — PAIN SCALES - GENERAL: PAINLEVEL: NO PAIN

## 2021-03-18 NOTE — PROGRESS NOTES
Provider Encounter- Full Thickness wound    HISTORY OF PRESENT ILLNESS  Wound History:    START OF CARE IN CLINIC: 2/24/2021    REFERRING PROVIDER: Jorge Forbes     WOUND- Full Thickness Wound   LOCATION: Left lateral LE   HISTORY: Ms. Fisher was recently hospitalized on 2/6/2021 at [facility].  Patient was admitted for thoracic and sacral fractures.  The patient had imaging performed in the ED which was consistent with bilateral sacral insufficiency fractures and T11 and T12 compression fractures.  During the hospital stay she was assessed by orthopedics and neurosurgery with recommendations for TLSO brace and pain management rather than surgical intervention.  Patient is to follow-up for these injuries with the DEXA scan to help rule out osteopenia or osteoporosis.  During this hospitalization it was also noticed that the patient had a wound to her left lower extremity.  Patient was referred to [facility] for care of this wound.    Pertinent Medical History: Sacral fractures, protein S deficiency, mitral valve insufficiency and aortic valve insufficiency, leiomyoma, hypothyroidism, hypertension, history of DVT, history of aortic aneurysm, Graves' disease, dyslipidemia, dissection of t thoracoabdominal aorta, compression fractures of thoracic spine, asthma, severe anxiety, chronic anticoagulation.     TOBACCO USE: Current everyday smoker.    Patient's problem list, allergies, and current medications reviewed and updated in Epic    Interval History:  2/24/2021: Clinic visit with KORI Villalobos. Patient states that they are feeling well today.  Patient denies fever, chills, nausea, vomiting, lightheadedness, dizziness, shortness of breath and chest pain.  Patient has a significant history for venous stasis wounds.  Patient has evident scarring from prior wounds as well as hemosiderin staining to bilateral lower extremities and significant varicose veins to bilateral lower extremities.   Venous ultrasound ordered in clinic today patient reports last venous ultrasounds approximately 2 to 3 years ago.  I want to rule out any possible varicose veins,  veins or superficial reflux as contributing to the patient's left lower extremity wound.     3/4/2021: Clinic visit with KORI Villalobos. Patient states that they are feeling well today.  Patient denies fever, chills, nausea, vomiting, lightheadedness, dizziness, shortness of breath and chest pain.    3/11/2021: Clinic visit with KORI Villalobos. Patient states that they are feeling well today.  Patient denies fever, chills, nausea, vomiting, lightheadedness, dizziness, shortness of breath and chest pain.  Patient has not completed her venous US ordered in clinic on 2/24/2021.  Patient reports she will complete the image this week.  Patient does have some granulation tissue forming to the wound bed at this time.  Patient needs excessive pain control during debridement subcutaneous lidocaine used to anesthetized the wound bed prior to debridement.  100% debridement performed establishing a clean wound bed.       3/18/2021: Clinic visit with KORI Villalobos. Patient states that they are feeling well today.  Patient denies fever, chills, nausea, vomiting, lightheadedness, dizziness, shortness of breath and chest pain.  Patient's wound does have some granulation buds growing to the base of the wound.  Patient still to complete her venous ultrasound reports it is scheduled for next Monday.  Patient has excessive anxiety regarding debridement patient requires subcutaneous lidocaine approximately 3 mL of subcutaneous lidocaine was injected to the periwound and wound area prior to debridement.        REVIEW OF SYSTEMS:   Review of Systems   Constitutional: Negative for chills and fever.   HENT: Negative for hearing loss.    Eyes: Negative for blurred vision and double vision.   Respiratory: Negative for cough, shortness of breath  and wheezing.    Cardiovascular: Positive for leg swelling. Negative for chest pain and palpitations.   Gastrointestinal: Negative for constipation, diarrhea, nausea and vomiting.   Musculoskeletal: Positive for back pain and joint pain.        Patient with recent fractures see history note   Skin: Negative for itching and rash.        Hemosiderin staining bilateral lower extremities. Ulcer left lower extremity.  Varicose veins bilateral lower extremities   Neurological: Negative for dizziness and headaches.   Psychiatric/Behavioral: The patient is nervous/anxious.         Severely anxious       PHYSICAL EXAMINATION:   /67   Pulse 73   Temp 36.6 °C (97.9 °F)   Resp 20   LMP 06/27/2011   SpO2 97%     Physical Exam   Constitutional: She is oriented to person, place, and time. No distress.   HENT:   Head: Normocephalic and atraumatic.   Eyes: Pupils are equal, round, and reactive to light. Conjunctivae are normal.   Cardiovascular: Intact distal pulses.   Biphasic pulses heard via Doppler to DP and TP   Pulmonary/Chest: Effort normal. No respiratory distress. She has no wheezes.   Musculoskeletal:         General: Tenderness present.      Cervical back: Normal range of motion and neck supple.      Comments: Multiple fractures to sacral and thoracic spine see history note above.   Neurological: She is alert and oriented to person, place, and time.   Skin: She is not diaphoretic. No erythema.   Patient has hemosiderin staining to bilateral lower extremities, varicose veins to bilateral lower extremities previous scarring from what the patient states as venous stasis ulcers.  Open wound to left lateral lower extremity   Psychiatric:   Patient has severe anxiety regarding light debridement       WOUND ASSESSMENT      Wound 02/07/21 Venous Ulcer Ankle Lateral Left --Left Lataral Ankle (Active)   Wound Image    03/18/21 0830   Site Assessment Red;Yellow 03/18/21 0830   Periwound Assessment Blanchable  erythema;Painful;Edema 03/18/21 0830   Margins Attached edges 03/18/21 0830   Drainage Amount Moderate 03/18/21 0830   Drainage Description Serosanguineous 03/18/21 0830   Treatments Cleansed;Injectible Lidocaine;Provider debridement 03/18/21 0830   Wound Cleansing Normal Saline Irrigation 03/18/21 0830   Periwound Protectant Barrier Paste 03/18/21 0830   Dressing Cleansing/Solutions Normal Saline 03/18/21 0830   Dressing Options Collagen Dressing;Adaptic;Hydrofiber Silver;Compression Wrap Two Layer 03/18/21 0830   Dressing Changed Changed 03/18/21 0830   Dressing Change/Treatment Frequency Every 72 hrs, and As Needed 03/18/21 0830   Non-staged Wound Description Full thickness 03/18/21 0830   Wound Length (cm) 1 cm 03/18/21 0830   Wound Width (cm) 1 cm 03/18/21 0830   Wound Depth (cm) 0.2 cm 03/18/21 0830   Wound Surface Area (cm^2) 1 cm^2 03/18/21 0830   Wound Volume (cm^3) 0.2 cm^3 03/18/21 0830   Post-Procedure Length (cm) 1.2 cm 03/18/21 0830   Post-Procedure Width (cm) 1 cm 03/18/21 0830   Post-Procedure Depth (cm) 0.3 cm 03/18/21 0830   Post-Procedure Surface Area (cm^2) 1.2 cm^2 03/18/21 0830   Post-Procedure Volume (cm^3) 0.36 cm^3 03/18/21 0830   Wound Healing % 0 03/18/21 0830   Wound Bed Granulation (%) 20 % 03/11/21 0830   Wound Bed Epithelium (%) 0 % 03/11/21 0830   Wound Bed Slough (%) 80 % 03/11/21 0830   Wound Bed Eschar (%) 0 % 03/11/21 0830   Tunneling (cm) 0 cm 03/18/21 0830   Undermining (cm) 0 cm 03/18/21 0830   Wound Odor None 03/18/21 0830   Pulses Doppler 02/24/21 0910   Exposed Structures None 03/18/21 0830                 PROCEDURE:   Patient was identified by full name and birthdate.  Procedure explained to the patient, I would be injecting subcutaneous lidocaine to anesthetize the wound bed prior to debridement with curette.  Prior to initiation of procedure consent signed by the patient.  Patient has a very low pain threshold.  Subcutaneous lidocaine approximately 3 mL was injected to 1  location to the periwound area to anesthetize the wound bed prior to debridement.  Minimal bleeding, patient tolerated the procedure after the initial bee sting from the lidocaine injection.  -2% viscous lidocaine applied topically(prior to subcutaneous lidocaine) to wound bed for approximately 5 minutes prior to debridement  -Curette used to debride wound bed.  Excisional debridement was performed to remove devitalized tissue until healthy, bleeding tissue was visualized.   Entire surface of wound, 1.2 cm2 debrided.  Tissue debrided into the subcutaneous layer.    -Bleeding controlled with manual pressure.    -Wound care completed by wound RN, refer to flowsheet  -Patient tolerated the procedure well, without c/o pain or discomfort.       Pertinent Labs and Diagnostics:    Labs:     A1c:   Lab Results   Component Value Date/Time    HBA1C 5.6 02/06/2021 01:39 PM          IMAGING: N/A    VASCULAR STUDIES: N/A    LAST  WOUND CULTURE:  DATE : N/A           ASSESSMENT AND PLAN:     1.  Ulcer of left lower extremity with fat layer exposed (HCC)  -Excisional debridement of wound in clinic today, medically necessary to promote wound healing.  -Patient to return to clinic weekly for assessment and debridement  -Home health has been established with Barbara to change compression wrap 1 time a week  -Educated the patient on the need for compression to support venous return to the heart and decrease oxygen deprived nutrient deprived blood from surrounding the wound bed.  -Patient will need subcutaneous lidocaine at next clinic visit to allow for debridement.  Patient has severe anxiety regarding debridement patient did significantly better with subcutaneous lidocaine recommend at each provider visit for debridement.  -Patient reports that she has her venous ultrasound scheduled for Monday, 3/22/2021    Wound care: Collagen dressing, Adaptic, Hydrofiber silver, compression 2 layer wrap    2. Venous insufficiency of both lower  extremities  Comments: Hemosiderin staining, varicose veins, scarring from previous wounds, left lateral lower extremity wound all consistent with venous stasis and venous ulcers.      PATIENT EDUCATION  - Importance of adequate nutrition for wound healing  -Advised to go to ER for any increased redness, swelling, drainage, or odor, or if patient develops fever, chills, nausea or vomiting.       Please note that this note may have been created using voice recognition software. I have worked with technical experts from [facility] to optimize the interface.  I have made every reasonable attempt to correct obvious errors, but there may be errors of grammar and possibly content that I did not discover before finalizing the note.    N

## 2021-03-18 NOTE — PATIENT INSTRUCTIONS
-Keep your wound dressing clean, dry, and intact.    -Change your dressing if it becomes soiled, soaked, or falls off.    -Remove your compression wrap if you have severe pain, severe swelling, numbness, color change, or temperature change in your toes. If you need to remove your compression wrap, do so by unrolling it. Do not cut the compression wrap off to prevent cutting yourself on accident.    -Should you experience any significant changes in your wound(s), such as infection (redness, swelling, localized heat, increased pain, fever > 101 F, chills) or have any questions regarding your home care instructions, please contact the wound center at [phone]. If after hours, contact your primary care physician or go to the hospital emergency room.

## 2021-03-25 ENCOUNTER — OFFICE VISIT (OUTPATIENT)
Dept: WOUND CARE | Facility: MEDICAL CENTER | Age: 56
End: 2021-03-25
Attending: STUDENT IN AN ORGANIZED HEALTH CARE EDUCATION/TRAINING PROGRAM
Payer: COMMERCIAL

## 2021-03-25 VITALS
DIASTOLIC BLOOD PRESSURE: 69 MMHG | SYSTOLIC BLOOD PRESSURE: 119 MMHG | OXYGEN SATURATION: 95 % | HEART RATE: 74 BPM | RESPIRATION RATE: 20 BRPM | TEMPERATURE: 97.5 F

## 2021-03-25 DIAGNOSIS — T14.8XXA PAIN ASSOCIATED WITH WOUND: ICD-10-CM

## 2021-03-25 DIAGNOSIS — I87.2 VENOUS INSUFFICIENCY: ICD-10-CM

## 2021-03-25 DIAGNOSIS — R52 PAIN ASSOCIATED WITH WOUND: ICD-10-CM

## 2021-03-25 DIAGNOSIS — L97.922 ULCER OF LEFT LOWER EXTREMITY WITH FAT LAYER EXPOSED (HCC): ICD-10-CM

## 2021-03-25 PROCEDURE — 99213 OFFICE O/P EST LOW 20 MIN: CPT | Mod: 25 | Performed by: NURSE PRACTITIONER

## 2021-03-25 PROCEDURE — 99213 OFFICE O/P EST LOW 20 MIN: CPT

## 2021-03-25 PROCEDURE — 11042 DBRDMT SUBQ TIS 1ST 20SQCM/<: CPT | Performed by: NURSE PRACTITIONER

## 2021-03-25 PROCEDURE — 11042 DBRDMT SUBQ TIS 1ST 20SQCM/<: CPT

## 2021-03-25 ASSESSMENT — ENCOUNTER SYMPTOMS
BACK PAIN: 1
SHORTNESS OF BREATH: 0
DIARRHEA: 0
WHEEZING: 0
COUGH: 0
CONSTIPATION: 0
DOUBLE VISION: 0
VOMITING: 0
NERVOUS/ANXIOUS: 1
BLURRED VISION: 0
PALPITATIONS: 0
CHILLS: 0
DIZZINESS: 0
HEADACHES: 0
NAUSEA: 0
FEVER: 0

## 2021-03-25 NOTE — PROGRESS NOTES
Provider Encounter- Full Thickness wound    HISTORY OF PRESENT ILLNESS  Wound History:    START OF CARE IN CLINIC: 2/24/2021    REFERRING PROVIDER: Jorge Forbes     WOUND- Full Thickness Wound   LOCATION: Left lateral LE   HISTORY: Ms. Fisher was recently hospitalized on 2/6/2021 at [facility].  Patient was admitted for thoracic and sacral fractures.  The patient had imaging performed in the ED which was consistent with bilateral sacral insufficiency fractures and T11 and T12 compression fractures.  During the hospital stay she was assessed by orthopedics and neurosurgery with recommendations for TLSO brace and pain management rather than surgical intervention.  Patient is to follow-up for these injuries with the DEXA scan to help rule out osteopenia or osteoporosis.  During this hospitalization it was also noticed that the patient had a wound to her left lower extremity.  Patient was referred to [facility] for care of this wound.    Pertinent Medical History: Sacral fractures, protein S deficiency, mitral valve insufficiency and aortic valve insufficiency, leiomyoma, hypothyroidism, hypertension, history of DVT, history of aortic aneurysm, Graves' disease, dyslipidemia, dissection of t thoracoabdominal aorta, compression fractures of thoracic spine, asthma, severe anxiety, chronic anticoagulation.     TOBACCO USE: Current everyday smoker.    Patient's problem list, allergies, and current medications reviewed and updated in Epic    Interval History:  2/24/2021: Clinic visit with KORI Villalobos. Patient states that they are feeling well today.  Patient denies fever, chills, nausea, vomiting, lightheadedness, dizziness, shortness of breath and chest pain.  Patient has a significant history for venous stasis wounds.  Patient has evident scarring from prior wounds as well as hemosiderin staining to bilateral lower extremities and significant varicose veins to bilateral lower extremities.   Venous ultrasound ordered in clinic today patient reports last venous ultrasounds approximately 2 to 3 years ago.  I want to rule out any possible varicose veins,  veins or superficial reflux as contributing to the patient's left lower extremity wound.     3/4/2021: Clinic visit with KORI Villalobos. Patient states that they are feeling well today.  Patient denies fever, chills, nausea, vomiting, lightheadedness, dizziness, shortness of breath and chest pain.    3/11/2021: Clinic visit with KORI Villalobos. Patient states that they are feeling well today.  Patient denies fever, chills, nausea, vomiting, lightheadedness, dizziness, shortness of breath and chest pain.  Patient has not completed her venous US ordered in clinic on 2/24/2021.  Patient reports she will complete the image this week.  Patient does have some granulation tissue forming to the wound bed at this time.  Patient needs excessive pain control during debridement subcutaneous lidocaine used to anesthetized the wound bed prior to debridement.  100% debridement performed establishing a clean wound bed.       3/18/2021: Clinic visit with KORI Villalobos. Patient states that they are feeling well today.  Patient denies fever, chills, nausea, vomiting, lightheadedness, dizziness, shortness of breath and chest pain.  Patient's wound does have some granulation buds growing to the base of the wound.  Patient still to complete her venous ultrasound reports it is scheduled for next Monday.  Patient has excessive anxiety regarding debridement patient requires subcutaneous lidocaine approximately 3 mL of subcutaneous lidocaine was injected to the periwound and wound area prior to debridement.    3/25/2021 : Clinic visit with KORI George, FNP-BC, CWOCN, CFCN.  Oumar is feeling well overall, though she is very nervous about wound care today, anticipating pain and requests subcutaneous lidocaine.  She otherwise denies fevers,  chills, nausea, vomiting, cough or shortness of breath.  She has a venous ultrasound scheduled for 3/29, as well as a DEXA scan.        REVIEW OF SYSTEMS:   Review of Systems   Constitutional: Negative for chills and fever.   HENT: Negative for hearing loss.    Eyes: Negative for blurred vision and double vision.   Respiratory: Negative for cough, shortness of breath and wheezing.    Cardiovascular: Positive for leg swelling. Negative for chest pain and palpitations.   Gastrointestinal: Negative for constipation, diarrhea, nausea and vomiting.   Musculoskeletal: Positive for back pain and joint pain.        Patient with recent fractures see history note   Skin: Negative for itching and rash.        Hemosiderin staining bilateral lower extremities. Ulcer left lower extremity.  Varicose veins bilateral lower extremities   Neurological: Negative for dizziness and headaches.   Psychiatric/Behavioral: The patient is nervous/anxious.         Severely anxious       PHYSICAL EXAMINATION:   /69   Pulse 74   Temp 36.4 °C (97.5 °F) (Temporal)   Resp 20   LMP 06/27/2011   SpO2 95%   Physical Exam   Constitutional: She is oriented to person, place, and time. No distress.   HENT:   Head: Normocephalic and atraumatic.   Eyes: Pupils are equal, round, and reactive to light. Conjunctivae are normal.   Cardiovascular: Intact distal pulses.   Biphasic pulses heard via Doppler to DP and TP   Pulmonary/Chest: Effort normal. No respiratory distress. She has no wheezes.   Musculoskeletal:         General: Tenderness present.      Cervical back: Normal range of motion and neck supple.      Comments: Multiple fractures to sacral and thoracic spine see history note above.   Neurological: She is alert and oriented to person, place, and time.   Skin: She is not diaphoretic. No erythema.   Patient has hemosiderin staining to bilateral lower extremities, varicose veins to bilateral lower extremities previous scarring from what the  patient states as venous stasis ulcers.  Open wound to left lateral lower extremity   Psychiatric:   Patient has severe anxiety regarding light debridement       WOUND ASSESSMENT        Wound 02/07/21 Venous Ulcer Ankle Lateral Left --Left Lataral Ankle (Active)   Wound Image    03/25/21 0815   Site Assessment Red;Yellow;Brown 03/25/21 0815   Periwound Assessment Hemosiderin Staining;Edema;Painful 03/25/21 0815   Margins Attached edges 03/25/21 0815   Drainage Amount Small 03/25/21 0815   Drainage Description Serosanguineous 03/25/21 0815   Treatments Cleansed;Topical Lidocaine;Injectible Lidocaine;Provider debridement 03/25/21 0815   Wound Cleansing Normal Saline Irrigation 03/25/21 0815   Periwound Protectant Barrier Paste 03/25/21 0815   Dressing Cleansing/Solutions Normal Saline 03/25/21 0815   Dressing Options Collagen Dressing;Adaptic;Hydrofiber Silver;Compression Wrap Two Layer 03/25/21 0815   Dressing Changed Changed 03/25/21 0815   Dressing Change/Treatment Frequency Weekly, and As Needed 03/25/21 0815   Non-staged Wound Description Full thickness 03/25/21 0815   Wound Length (cm) 1.2 cm 03/25/21 0815   Wound Width (cm) 0.8 cm 03/25/21 0815   Wound Depth (cm) 0.3 cm 03/25/21 0815   Wound Surface Area (cm^2) 0.96 cm^2 03/25/21 0815   Wound Volume (cm^3) 0.29 cm^3 03/25/21 0815   Post-Procedure Length (cm) 1.2 cm 03/25/21 0815   Post-Procedure Width (cm) 0.9 cm 03/25/21 0815   Post-Procedure Depth (cm) 0.3 cm 03/25/21 0815   Post-Procedure Surface Area (cm^2) 1.08 cm^2 03/25/21 0815   Post-Procedure Volume (cm^3) 0.32 cm^3 03/25/21 0815   Wound Healing % -45 03/25/21 0815   Wound Bed Granulation (%) 30 % 03/25/21 0815   Wound Bed Epithelium (%) 0 % 03/25/21 0815   Wound Bed Slough (%) 70 % 03/25/21 0815   Wound Bed Eschar (%) 0 % 03/25/21 0815   Tunneling (cm) 0 cm 03/25/21 0815   Undermining (cm) 0 cm 03/25/21 0815   Wound Odor None 03/25/21 0815   Pulses Doppler 02/24/21 0910   Exposed Structures None  03/25/21 0815                      Debridement   Wound 02/07/21 Venous Ulcer Ankle Lateral Left --Left Lataral Ankle     Consent obtained? written  Consent given by: patient  Risks discussed? procedural risks discussed  Immediately prior to the procedure a time out was called  Performed by: NP  Pain control: lidocaine 2%  Post-debridement measurements  Length (cm): 1.2  Width (cm): 0.9  Depth (cm): 0.3  Percent debrided: 100%  Surface Area (cm^2): 1.08  Area debrided (cm^2): 1.08  Volume (cm^3): 0.32  Devitalized tissue debrided: biofilm and slough  Instrument(s) utilized: curette  Bleeding: small  Hemostasis obtained with: pressure  Response to treatment: procedure was not tolerated well            Pertinent Labs and Diagnostics:    Labs:     A1c:   Lab Results   Component Value Date/Time    HBA1C 5.6 02/06/2021 01:39 PM          IMAGING: N/A    VASCULAR STUDIES: Venous duplex study due to be completed on 3/29/2021    LAST  WOUND CULTURE:  DATE : N/A           ASSESSMENT AND PLAN:     1.  Ulcer of left lower extremity with fat layer exposed (HCC)  Comments: Patient has history of recurring ulcers to her ankles.  Most recently, ulcer appeared spontaneously to left lateral ankle, first noted during hospitalization in February 2021 for unrelated reason.    3/25/2021: Wound area has decreased slightly, new epithelialization noted at distal edge.  Patient insisting on subcutaneous lidocaine prior to debridement  -Excisional debridement of wound in clinic today, medically necessary to promote wound healing.    -Patient to return to clinic weekly for assessment and debridement  -Home health services discontinued as she is returning to work this week.  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  I reminded her that she will need compression for the rest of  her life to prevent recurrence of ulcers  -Patient reports that she has her venous ultrasound scheduled for Monday, 3/29/2021    Wound care: Collagen dressing, Adaptic, Hydrofiber silver, compression 2 layer wrap    2. Venous insufficiency of both lower extremities  Comments: Hemosiderin staining, varicose veins, scarring from previous wounds, left lateral lower extremity wound all consistent with venous stasis and venous ulcers.      3.  Pain associated with wound  Comments: Patient has severe pain and anxiety with debridement.  Requests that subcutaneous lidocaine be used each clinic visit      3/25/2021: Patient presents today severely anxious about debridement.  Crying with injection of lidocaine.  -2% lidocaine injected subcutaneously approximately 5 minutes prior to debridement today.  -Patient tolerated debridement fairly well, though still expressed a significant amount of discomfort    PATIENT EDUCATION  - Importance of adequate nutrition for wound healing  -Advised to go to ER for any increased redness, swelling, drainage, or odor, or if patient develops fever, chills, nausea or vomiting.       20 min spent  with patient, time spent counseling, coordinating care, reviewing records, discussing POC, educating patient regarding wound healing and progression.  This time was spent in excess to procedure time.     Please note that this note may have been created using voice recognition software. I have worked with technical experts from [email or web address] to optimize the interface.  I have made every reasonable attempt to correct obvious errors, but there may be errors of grammar and possibly content that I did not discover before finalizing the note.    N

## 2021-03-25 NOTE — PATIENT INSTRUCTIONS
-Keep dressings clean and dry. Change dressings if they become over saturated, soiled or fall off.     -Avoid prolonged standing or sitting without elevating your legs.    -Remove your compression garments if you have severe pain, severe swelling, numbness, color change, or temperature change in your toes. If you need to remove your compression garments, do so by unrolling them. Do not cut the compression garments off, this is to prevent cutting yourself on accident.    -Should you experience any significant changes in your wound, such as signs of infection (redness, swelling, localized heat, increased pain, fever > 101 F, chills) or have any questions regarding your home care instructions, please contact the wound center at [phone]. If after hours, contact your primary care physician or go to the hospital emergency room.

## 2021-03-29 ENCOUNTER — OFFICE VISIT (OUTPATIENT)
Dept: WOUND CARE | Facility: MEDICAL CENTER | Age: 56
End: 2021-03-29
Attending: STUDENT IN AN ORGANIZED HEALTH CARE EDUCATION/TRAINING PROGRAM
Payer: COMMERCIAL

## 2021-03-29 VITALS
OXYGEN SATURATION: 99 % | TEMPERATURE: 97.5 F | RESPIRATION RATE: 20 BRPM | HEART RATE: 70 BPM | SYSTOLIC BLOOD PRESSURE: 130 MMHG | DIASTOLIC BLOOD PRESSURE: 64 MMHG

## 2021-03-29 DIAGNOSIS — R52 PAIN ASSOCIATED WITH WOUND: ICD-10-CM

## 2021-03-29 DIAGNOSIS — I87.2 VENOUS INSUFFICIENCY: ICD-10-CM

## 2021-03-29 DIAGNOSIS — L97.922 ULCER OF LEFT LOWER EXTREMITY WITH FAT LAYER EXPOSED (HCC): Primary | ICD-10-CM

## 2021-03-29 DIAGNOSIS — T14.8XXA PAIN ASSOCIATED WITH WOUND: ICD-10-CM

## 2021-03-29 PROCEDURE — 11042 DBRDMT SUBQ TIS 1ST 20SQCM/<: CPT | Performed by: NURSE PRACTITIONER

## 2021-03-29 PROCEDURE — 11042 DBRDMT SUBQ TIS 1ST 20SQCM/<: CPT

## 2021-03-29 ASSESSMENT — ENCOUNTER SYMPTOMS
DIARRHEA: 0
CONSTIPATION: 0
DOUBLE VISION: 0
BACK PAIN: 1
CHILLS: 0
NAUSEA: 0
PALPITATIONS: 0
NERVOUS/ANXIOUS: 1
BLURRED VISION: 0
SHORTNESS OF BREATH: 0
FEVER: 0
COUGH: 0
DIZZINESS: 0
WHEEZING: 0
VOMITING: 0
HEADACHES: 0

## 2021-03-29 ASSESSMENT — PAIN SCALES - GENERAL: PAIN_LEVEL: 4

## 2021-03-29 NOTE — PROGRESS NOTES
Provider Encounter- Full Thickness wound    HISTORY OF PRESENT ILLNESS  Wound History:    START OF CARE IN CLINIC: 2/24/2021    REFERRING PROVIDER: Jorge Forbes     WOUND- Full Thickness Wound   LOCATION: Left lateral LE   HISTORY: Ms. Fisher was recently hospitalized on 2/6/2021 at [facility].  Patient was admitted for thoracic and sacral fractures.  The patient had imaging performed in the ED which was consistent with bilateral sacral insufficiency fractures and T11 and T12 compression fractures.  During the hospital stay she was assessed by orthopedics and neurosurgery with recommendations for TLSO brace and pain management rather than surgical intervention.  Patient is to follow-up for these injuries with the DEXA scan to help rule out osteopenia or osteoporosis.  During this hospitalization it was also noticed that the patient had a wound to her left lower extremity.  Patient was referred to [facility] for care of this wound.    Pertinent Medical History: Sacral fractures, protein S deficiency, mitral valve insufficiency and aortic valve insufficiency, leiomyoma, hypothyroidism, hypertension, history of DVT, history of aortic aneurysm, Graves' disease, dyslipidemia, dissection of t thoracoabdominal aorta, compression fractures of thoracic spine, asthma, severe anxiety, chronic anticoagulation.     TOBACCO USE: Current everyday smoker.    Patient's problem list, allergies, and current medications reviewed and updated in Epic    Interval History:  2/24/2021: Clinic visit with KORI Villalobos. Patient states that they are feeling well today.  Patient denies fever, chills, nausea, vomiting, lightheadedness, dizziness, shortness of breath and chest pain.  Patient has a significant history for venous stasis wounds.  Patient has evident scarring from prior wounds as well as hemosiderin staining to bilateral lower extremities and significant varicose veins to bilateral lower extremities.   Venous ultrasound ordered in clinic today patient reports last venous ultrasounds approximately 2 to 3 years ago.  I want to rule out any possible varicose veins,  veins or superficial reflux as contributing to the patient's left lower extremity wound.     3/4/2021: Clinic visit with KORI Villalobos. Patient states that they are feeling well today.  Patient denies fever, chills, nausea, vomiting, lightheadedness, dizziness, shortness of breath and chest pain.    3/11/2021: Clinic visit with KORI Villalobos. Patient states that they are feeling well today.  Patient denies fever, chills, nausea, vomiting, lightheadedness, dizziness, shortness of breath and chest pain.  Patient has not completed her venous US ordered in clinic on 2/24/2021.  Patient reports she will complete the image this week.  Patient does have some granulation tissue forming to the wound bed at this time.  Patient needs excessive pain control during debridement subcutaneous lidocaine used to anesthetized the wound bed prior to debridement.  100% debridement performed establishing a clean wound bed.       3/18/2021: Clinic visit with KORI Villalobos. Patient states that they are feeling well today.  Patient denies fever, chills, nausea, vomiting, lightheadedness, dizziness, shortness of breath and chest pain.  Patient's wound does have some granulation buds growing to the base of the wound.  Patient still to complete her venous ultrasound reports it is scheduled for next Monday.  Patient has excessive anxiety regarding debridement patient requires subcutaneous lidocaine approximately 3 mL of subcutaneous lidocaine was injected to the periwound and wound area prior to debridement.    3/25/2021 : Clinic visit with KORI George, FNP-BC, CWOCN, CFCN.  Oumar is feeling well overall, though she is very nervous about wound care today, anticipating pain and requests subcutaneous lidocaine.  She otherwise denies fevers,  chills, nausea, vomiting, cough or shortness of breath.  She has a venous ultrasound scheduled for 3/29, as well as a DEXA scan.    3/29/2021: Clinic visit with KORI Villalobos. Patient states that they are feeling well today.  Patient denies fever, chills, nausea, vomiting, lightheadedness, dizziness, shortness of breath and chest pain.  Patient completed her left lower extremity venous ultrasound at [facility] today.  We will need to request results and review with patient next clinic appointment.  Wound has granulation tissue to the bed decreasing in depth.  Patient again required subcutaneous lidocaine prior to debridement.        REVIEW OF SYSTEMS:   Review of Systems   Constitutional: Negative for chills and fever.   HENT: Negative for hearing loss.    Eyes: Negative for blurred vision and double vision.   Respiratory: Negative for cough, shortness of breath and wheezing.    Cardiovascular: Positive for leg swelling. Negative for chest pain and palpitations.   Gastrointestinal: Negative for constipation, diarrhea, nausea and vomiting.   Musculoskeletal: Positive for back pain and joint pain.        Patient with recent fractures see history note   Skin: Negative for itching and rash.        Hemosiderin staining bilateral lower extremities. Ulcer left lower extremity.  Varicose veins bilateral lower extremities   Neurological: Negative for dizziness and headaches.   Psychiatric/Behavioral: The patient is nervous/anxious.        PHYSICAL EXAMINATION:   /64   Pulse 70   Temp 36.4 °C (97.5 °F)   Resp 20   LMP 06/27/2011   SpO2 99%   Physical Exam   Constitutional: She is oriented to person, place, and time. No distress.   HENT:   Head: Normocephalic and atraumatic.   Eyes: Pupils are equal, round, and reactive to light. Conjunctivae are normal.   Cardiovascular: Intact distal pulses.   Biphasic pulses heard via Doppler to DP and TP   Pulmonary/Chest: Effort normal. No respiratory distress.  She has no wheezes.   Musculoskeletal:         General: Tenderness present.      Cervical back: Normal range of motion and neck supple.      Comments: Multiple fractures to sacral and thoracic spine see history note above.   Neurological: She is alert and oriented to person, place, and time.   Skin: She is not diaphoretic. No erythema.   Patient has hemosiderin staining to bilateral lower extremities, varicose veins to bilateral lower extremities previous scarring from what the patient states as venous stasis ulcers.  Open wound to left lateral lower extremity   Psychiatric:   Patient has severe anxiety regarding light debridement       WOUND ASSESSMENT        Wound 02/07/21 Venous Ulcer Ankle Lateral Left --Left Lataral Ankle (Active)   Wound Image    03/29/21 1300   Site Assessment Dry;Yellow;Painful 03/29/21 1300   Periwound Assessment Hemosiderin Staining;Edema;Painful 03/29/21 1300   Margins Attached edges 03/29/21 1300   Drainage Amount Small 03/29/21 1300   Drainage Description Serosanguineous 03/29/21 1300   Treatments Cleansed;Topical Lidocaine;Injectible Lidocaine;Provider debridement;Site care 03/29/21 1300   Wound Cleansing Puracyn Newport 03/29/21 1300   Periwound Protectant Skin Protectant Wipes to Periwound;Barrier Paste 03/29/21 1300   Dressing Cleansing/Solutions Other (Comments) 03/29/21 1300   Dressing Options Collagen Dressing;Hydrofiber Silver;Compression Wrap Two Layer 03/29/21 1300   Dressing Changed Changed 03/29/21 1300   Dressing Status Clean;Dry;Intact 03/29/21 1300   Dressing Change/Treatment Frequency Weekly, and As Needed 03/25/21 0815   Non-staged Wound Description Full thickness 03/29/21 1300   Wound Length (cm) 1.2 cm 03/25/21 0815   Wound Width (cm) 0.8 cm 03/25/21 0815   Wound Depth (cm) 0.3 cm 03/25/21 0815   Wound Surface Area (cm^2) 0.96 cm^2 03/25/21 0815   Wound Volume (cm^3) 0.29 cm^3 03/25/21 0815   Post-Procedure Length (cm) 1.2 cm 03/29/21 1300   Post-Procedure Width (cm)  1.2 cm 03/29/21 1300   Post-Procedure Depth (cm) 0.2 cm 03/29/21 1300   Post-Procedure Surface Area (cm^2) 1.44 cm^2 03/29/21 1300   Post-Procedure Volume (cm^3) 0.29 cm^3 03/29/21 1300   Wound Healing % -45 03/25/21 0815   Wound Bed Granulation (%) 30 % 03/25/21 0815   Wound Bed Epithelium (%) 0 % 03/25/21 0815   Wound Bed Slough (%) 70 % 03/25/21 0815   Wound Bed Eschar (%) 0 % 03/25/21 0815   Tunneling (cm) 0 cm 03/29/21 1300   Undermining (cm) 0 cm 03/29/21 1300   Wound Odor None 03/29/21 1300   Pulses Doppler 02/24/21 0910   Exposed Structures None 03/29/21 1300     Procedure: Patient was verified with full name and birthdate.  Procedure discussed with the inpatient including injecting subcutaneous lidocaine through 1 injection site.  Patient was in agreement with this procedure.  Approximately 4 mL of subcutaneous lidocaine was injected to the periwound and underneath the wound bed to allow for 100% debridement.    Debridement     Consent obtained? verbal  Consent given by: patient  Risks discussed? procedural risks discussed  Performed by: NP  Local anesthetic: Subcutaneous lidocaine.  Post-debridement measurements  Length (cm): 1.2  Width (cm): 0.8  Depth (cm): 0.3  Percent debrided: 100%  Surface Area (cm^2): 0.96  Area debrided (cm^2): 0.96  Volume (cm^3): 0.29  Devitalized tissue debrided: fibrin and slough  Instrument(s) utilized: curette  Bleeding: small  Hemostasis obtained with: pressure  Procedural pain (0-10): 0  Post-procedural pain: 4   Response to treatment: procedure was tolerated well            Pertinent Labs and Diagnostics:    Labs:     A1c:   Lab Results   Component Value Date/Time    HBA1C 5.6 02/06/2021 01:39 PM          IMAGING: N/A    VASCULAR STUDIES: Venous duplex study due to be completed on 3/29/2021    LAST  WOUND CULTURE:  DATE : N/A           ASSESSMENT AND PLAN:     1.  Ulcer of left lower extremity with fat layer exposed (HCC)  Comments: Patient has history of recurring ulcers  to her ankles.  Most recently, ulcer appeared spontaneously to left lateral ankle, first noted during hospitalization in February 2021 for unrelated reason.    3/29/2021: Wound depth has decreased, granulation tissue continues to increase slowly  -Excisional debridement of wound in clinic today, medically necessary to promote wound healing.    -Patient to return to clinic weekly for assessment and debridement  -Educated the patient on the need for compression to support venous return to the heart and decrease oxygen deprived nutrient deprived blood from surrounding the wound bed.  I reminded her that she will need compression for the rest of her life to prevent recurrence of ulcers  -Patient completed ultrasound renal diagnostics will request results.    Wound care: Collagen dressing, Hydrofiber silver, compression 2 layer wrap    2. Venous insufficiency of both lower extremities  Comments: Hemosiderin staining, varicose veins, scarring from previous wounds, left lateral lower extremity wound all consistent with venous stasis and venous ulcers.      3.  Pain associated with wound  Comments: Patient has severe pain and anxiety with debridement.  Requests that subcutaneous lidocaine be used each clinic visit      3/29/2021: Patient presents today severely anxious about debridement.    -2% lidocaine injected subcutaneously approximately 5 minutes prior to debridement today.  -Patient tolerated debridement fairly well, though still expressed a significant amount of discomfort    PATIENT EDUCATION  - Importance of adequate nutrition for wound healing  -Advised to go to ER for any increased redness, swelling, drainage, or odor, or if patient develops fever, chills, nausea or vomiting.        Please note that this note may have been created using voice recognition software. I have worked with technical experts from [employer] to optimize the interface.  I have made every reasonable attempt to correct obvious errors, but  there may be errors of grammar and possibly content that I did not discover before finalizing the note.    N

## 2021-03-31 ENCOUNTER — ANTICOAGULATION MONITORING (OUTPATIENT)
Dept: VASCULAR LAB | Facility: MEDICAL CENTER | Age: 56
End: 2021-03-31

## 2021-03-31 LAB — INR PPP: 3.4 (ref 2–3.5)

## 2021-03-31 NOTE — PROGRESS NOTES
Anticoagulation Summary  As of 3/31/2021    INR goal:  2.0-3.0   TTR:  62.3 % (5.8 y)   INR used for dosing:  3.40 (3/31/2021)   Warfarin maintenance plan:  7.5 mg (5 mg x 1.5) every Mon, Fri; 5 mg (5 mg x 1) all other days   Weekly warfarin total:  40 mg   Plan last modified:  Debi Ahuja PharmD (3/31/2021)   Next INR check:  4/14/2021   Priority:  Maintenance   Target end date:  Indefinite    Indications    Deep vein thrombosis [453.40] [I82.409]             Anticoagulation Episode Summary     INR check location:  Home Draw    Preferred lab:      Send INR reminders to:      Comments:  Eloy MYRICK      Anticoagulation Care Providers     Provider Role Specialty Phone number    Bruna Ureña M.D. Referring Cardiology [phone]    [facility] Anticoagulation Services Responsible  [phone]    Jaquan Horne M.D.  Family Medicine [phone]        Anticoagulation Patient Findings      Left voicemail message to report a SUPRAtherapeutic INR of 3.4.    Pt to begin a reduced warfarin dosing regimen. Requested pt contact the clinic for any s/s of unusual bleeding, bruising, clotting or any changes to diet or medication.    FU INR in 2 week(s).    Debi Ahuja, Josie

## 2021-04-05 ENCOUNTER — OFFICE VISIT (OUTPATIENT)
Dept: WOUND CARE | Facility: MEDICAL CENTER | Age: 56
End: 2021-04-05
Attending: STUDENT IN AN ORGANIZED HEALTH CARE EDUCATION/TRAINING PROGRAM
Payer: COMMERCIAL

## 2021-04-05 VITALS
DIASTOLIC BLOOD PRESSURE: 68 MMHG | HEART RATE: 75 BPM | OXYGEN SATURATION: 98 % | RESPIRATION RATE: 18 BRPM | SYSTOLIC BLOOD PRESSURE: 138 MMHG | TEMPERATURE: 97 F

## 2021-04-05 DIAGNOSIS — I87.2 VENOUS INSUFFICIENCY: ICD-10-CM

## 2021-04-05 DIAGNOSIS — R52 PAIN ASSOCIATED WITH WOUND: ICD-10-CM

## 2021-04-05 DIAGNOSIS — T14.8XXA PAIN ASSOCIATED WITH WOUND: ICD-10-CM

## 2021-04-05 DIAGNOSIS — L97.922 ULCER OF LEFT LOWER EXTREMITY WITH FAT LAYER EXPOSED (HCC): Primary | ICD-10-CM

## 2021-04-05 PROCEDURE — 99212 OFFICE O/P EST SF 10 MIN: CPT | Mod: 25 | Performed by: NURSE PRACTITIONER

## 2021-04-05 PROCEDURE — 11042 DBRDMT SUBQ TIS 1ST 20SQCM/<: CPT | Performed by: NURSE PRACTITIONER

## 2021-04-05 PROCEDURE — 11042 DBRDMT SUBQ TIS 1ST 20SQCM/<: CPT

## 2021-04-05 PROCEDURE — 99212 OFFICE O/P EST SF 10 MIN: CPT

## 2021-04-05 ASSESSMENT — ENCOUNTER SYMPTOMS
PALPITATIONS: 0
FEVER: 0
DIARRHEA: 0
SHORTNESS OF BREATH: 0
COUGH: 0
BLURRED VISION: 0
DIZZINESS: 0
CONSTIPATION: 0
NERVOUS/ANXIOUS: 1
DOUBLE VISION: 0
NAUSEA: 0
WHEEZING: 0
CHILLS: 0
HEADACHES: 0
VOMITING: 0
BACK PAIN: 1

## 2021-04-05 ASSESSMENT — PAIN SCALES - GENERAL: PAIN_LEVEL: 3

## 2021-04-05 NOTE — PROGRESS NOTES
Provider Encounter- Full Thickness wound    HISTORY OF PRESENT ILLNESS  Wound History:    START OF CARE IN CLINIC: 2/24/2021    REFERRING PROVIDER: Jorge Forbes     WOUND- Full Thickness Wound   LOCATION: Left lateral LE   HISTORY: Ms. Fisher was recently hospitalized on 2/6/2021 at [facility].  Patient was admitted for thoracic and sacral fractures.  The patient had imaging performed in the ED which was consistent with bilateral sacral insufficiency fractures and T11 and T12 compression fractures.  During the hospital stay she was assessed by orthopedics and neurosurgery with recommendations for TLSO brace and pain management rather than surgical intervention.  Patient is to follow-up for these injuries with the DEXA scan to help rule out osteopenia or osteoporosis.  During this hospitalization it was also noticed that the patient had a wound to her left lower extremity.  Patient was referred to [facility] for care of this wound.    Pertinent Medical History: Sacral fractures, protein S deficiency, mitral valve insufficiency and aortic valve insufficiency, leiomyoma, hypothyroidism, hypertension, history of DVT, history of aortic aneurysm, Graves' disease, dyslipidemia, dissection of t thoracoabdominal aorta, compression fractures of thoracic spine, asthma, severe anxiety, chronic anticoagulation.     TOBACCO USE: Current everyday smoker.    Patient's problem list, allergies, and current medications reviewed and updated in Epic    Interval History:  2/24/2021: Clinic visit with KORI Villalobos. Patient states that they are feeling well today.  Patient denies fever, chills, nausea, vomiting, lightheadedness, dizziness, shortness of breath and chest pain.  Patient has a significant history for venous stasis wounds.  Patient has evident scarring from prior wounds as well as hemosiderin staining to bilateral lower extremities and significant varicose veins to bilateral lower extremities.   Venous ultrasound ordered in clinic today patient reports last venous ultrasounds approximately 2 to 3 years ago.  I want to rule out any possible varicose veins,  veins or superficial reflux as contributing to the patient's left lower extremity wound.     3/4/2021: Clinic visit with KORI Villalobos. Patient states that they are feeling well today.  Patient denies fever, chills, nausea, vomiting, lightheadedness, dizziness, shortness of breath and chest pain.    3/11/2021: Clinic visit with KORI Villalobos. Patient states that they are feeling well today.  Patient denies fever, chills, nausea, vomiting, lightheadedness, dizziness, shortness of breath and chest pain.  Patient has not completed her venous US ordered in clinic on 2/24/2021.  Patient reports she will complete the image this week.  Patient does have some granulation tissue forming to the wound bed at this time.  Patient needs excessive pain control during debridement subcutaneous lidocaine used to anesthetized the wound bed prior to debridement.  100% debridement performed establishing a clean wound bed.       3/18/2021: Clinic visit with KORI Villalobos. Patient states that they are feeling well today.  Patient denies fever, chills, nausea, vomiting, lightheadedness, dizziness, shortness of breath and chest pain.  Patient's wound does have some granulation buds growing to the base of the wound.  Patient still to complete her venous ultrasound reports it is scheduled for next Monday.  Patient has excessive anxiety regarding debridement patient requires subcutaneous lidocaine approximately 3 mL of subcutaneous lidocaine was injected to the periwound and wound area prior to debridement.    3/25/2021 : Clinic visit with KORI George, FNP-BC, CWOCN, CFCN.  Oumar is feeling well overall, though she is very nervous about wound care today, anticipating pain and requests subcutaneous lidocaine.  She otherwise denies fevers,  chills, nausea, vomiting, cough or shortness of breath.  She has a venous ultrasound scheduled for 3/29, as well as a DEXA scan.    3/29/2021: Clinic visit with KORI Villalobos. Patient states that they are feeling well today.  Patient denies fever, chills, nausea, vomiting, lightheadedness, dizziness, shortness of breath and chest pain.  Patient completed her left lower extremity venous ultrasound at [facility] today.  We will need to request results and review with patient next clinic appointment.  Wound has granulation tissue to the bed decreasing in depth.  Patient again required subcutaneous lidocaine prior to debridement.    4/5/2021: Clinic visit with KORI Villalobos. Patient states that they are feeling well today.  Patient denies fever, chills, nausea, vomiting, lightheadedness, dizziness, shortness of breath and chest pain.  The depth of the patient's wound has decreased.  Reviewed venous ultrasound results with patient.  There is no obvious reflux or varicose veins contributing to the wound.  We will continue with compression and current plan of care.        REVIEW OF SYSTEMS:   Review of Systems   Constitutional: Negative for chills and fever.   HENT: Negative for hearing loss.    Eyes: Negative for blurred vision and double vision.   Respiratory: Negative for cough, shortness of breath and wheezing.    Cardiovascular: Positive for leg swelling. Negative for chest pain and palpitations.   Gastrointestinal: Negative for constipation, diarrhea, nausea and vomiting.   Musculoskeletal: Positive for back pain and joint pain.        Patient with recent fractures see history note   Skin: Negative for itching and rash.        Hemosiderin staining bilateral lower extremities. Ulcer left lower extremity.  Varicose veins bilateral lower extremities   Neurological: Negative for dizziness and headaches.   Psychiatric/Behavioral: The patient is nervous/anxious.        PHYSICAL EXAMINATION:   BP  138/68   Pulse 75   Temp 36.1 °C (97 °F)   Resp 18   LMP 06/27/2011   SpO2 98%   Physical Exam   Constitutional: She is oriented to person, place, and time. No distress.   HENT:   Head: Normocephalic and atraumatic.   Eyes: Pupils are equal, round, and reactive to light. Conjunctivae are normal.   Cardiovascular: Intact distal pulses.   Biphasic pulses heard via Doppler to DP and TP   Pulmonary/Chest: Effort normal. No respiratory distress. She has no wheezes.   Musculoskeletal:         General: Tenderness present.      Cervical back: Normal range of motion and neck supple.      Comments: Multiple fractures to sacral and thoracic spine see history note above.   Neurological: She is alert and oriented to person, place, and time.   Skin: She is not diaphoretic. No erythema.   Patient has hemosiderin staining to bilateral lower extremities, varicose veins to bilateral lower extremities previous scarring from what the patient states as venous stasis ulcers.  Open wound to left lateral lower extremity   Psychiatric:   Patient has severe anxiety regarding light debridement       WOUND ASSESSMENT            Wound 02/07/21 Venous Ulcer Ankle Lateral Left --Left Lataral Ankle (Active)   Wound Image    04/05/21 1000   Site Assessment Dry;Painful;Red;Brown 04/05/21 1000   Periwound Assessment Hemosiderin Staining;Edema;Painful 04/05/21 1000   Margins Attached edges 04/05/21 1000   Drainage Amount Small 04/05/21 1000   Drainage Description Sanguineous 04/05/21 1000   Treatments Cleansed;Topical Lidocaine;Injectible Lidocaine;Provider debridement;Site care 04/05/21 1000   Wound Cleansing Puracyn [place] 04/05/21 1000   Periwound Protectant Barrier Paste 04/05/21 1000   Dressing Cleansing/Solutions Other (Comments) 04/05/21 1000   Dressing Options Collagen Dressing;Hydrofera Blue Ready;Compression Wrap Two Layer 04/05/21 1000   Dressing Changed New 04/05/21 1000   Dressing Status Clean;Intact;Dry 04/05/21 1000   Dressing  Change/Treatment Frequency Weekly, and As Needed 03/25/21 0815   Non-staged Wound Description Full thickness 04/05/21 1000   Wound Length (cm) 1.2 cm 04/05/21 1000   Wound Width (cm) 1.1 cm 04/05/21 1000   Wound Depth (cm) 0.2 cm 04/05/21 1000   Wound Surface Area (cm^2) 1.32 cm^2 04/05/21 1000   Wound Volume (cm^3) 0.26 cm^3 04/05/21 1000   Post-Procedure Length (cm) 1.3 cm 04/05/21 1000   Post-Procedure Width (cm) 1.2 cm 04/05/21 1000   Post-Procedure Depth (cm) 0.2 cm 04/05/21 1000   Post-Procedure Surface Area (cm^2) 1.56 cm^2 04/05/21 1000   Post-Procedure Volume (cm^3) 0.31 cm^3 04/05/21 1000   Wound Healing % -30 04/05/21 1000   Wound Bed Granulation (%) 30 % 03/25/21 0815   Wound Bed Epithelium (%) 0 % 03/25/21 0815   Wound Bed Slough (%) 70 % 03/25/21 0815   Wound Bed Eschar (%) 0 % 03/25/21 0815   Tunneling (cm) 0 cm 04/05/21 1000   Undermining (cm) 0 cm 04/05/21 1000   Wound Odor None 04/05/21 1000   Pulses Doppler 02/24/21 0910   Exposed Structures None 04/05/21 1000              Procedure: Patient was verified with full name and birthdate.  Procedure discussed with the inpatient including injecting subcutaneous lidocaine through 1 injection site.  Patient was in agreement with this procedure.  Approximately 4 mL of subcutaneous lidocaine was injected to the periwound and underneath the wound bed to allow for 100% debridement.    Debridement     Consent obtained? verbal  Consent given by: patient  Risks discussed? procedural risks discussed  Performed by: NP  Local anesthetic: Subcutaneous lidocaine.  Post-debridement measurements  Length (cm): 1.3  Width (cm): 1.1  Depth (cm): 0.2  Percent debrided: 100%  Surface Area (cm^2): 1.43  Area debrided (cm^2): 1.43  Volume (cm^3): 0.29  Tissue and other material debrided: subcutaneous tissue  Devitalized tissue debrided: slough  Instrument(s) utilized: curette  Bleeding: small  Hemostasis obtained with: pressure  Procedural pain (0-10): 0  Post-procedural  pain: 3   Response to treatment: procedure was tolerated well            Pertinent Labs and Diagnostics:    Labs:     A1c:   Lab Results   Component Value Date/Time    HBA1C 5.6 02/06/2021 01:39 PM          IMAGING: N/A    VASCULAR STUDIES: Venous duplex study due to be completed on 3/29/2021    LAST  WOUND CULTURE:  DATE : N/A           ASSESSMENT AND PLAN:     1.  Ulcer of left lower extremity with fat layer exposed (HCC)  Comments: Patient has history of recurring ulcers to her ankles.  Most recently, ulcer appeared spontaneously to left lateral ankle, first noted during hospitalization in February 2021 for unrelated reason.    04/05/21: Wound depth has decreased, granulation tissue continues to increase slowly  -Excisional debridement of wound in clinic today, medically necessary to promote wound healing.    -Patient to return to clinic weekly for assessment and debridement  -Educated the patient on the need for compression to support venous return to the heart and decrease oxygen deprived nutrient deprived blood from surrounding the wound bed.  I reminded her that she will need compression for the rest of her life to prevent recurrence of ulcers  -Results of venous ultrasound discussed with patient in clinic today.  Patient does not have any significant reflux  veins or varicose veins contributing to her left lower extremity wound.    Wound care: Collagen dressing, Hydrofera Blue ready, compression 2 layer wrap    2. Venous insufficiency of both lower extremities  Comments: Hemosiderin staining, varicose veins, scarring from previous wounds, left lateral lower extremity wound all consistent with venous stasis and venous ulcers.      3.  Pain associated with wound  Comments: Patient has severe pain and anxiety with debridement.  Requests that subcutaneous lidocaine be used each clinic visit      04/05/21: Anxiety somewhat better in clinic today.  -Patient tolerated debridement fairly well, though still  expressed a significant amount of discomfort after 4 mL subcutaneous lidocaine was injected.    PATIENT EDUCATION  - Importance of adequate nutrition for wound healing  -Advised to go to ER for any increased redness, swelling, drainage, or odor, or if patient develops fever, chills, nausea or vomiting.     > 10 min spent face to face with patient, >50% of time spent counseling, coordinating care, reviewing records, discussing POC, educating patient.  Time spent in excess of procedure time.       Please note that this note may have been created using voice recognition software. I have worked with technical experts from [employer] to optimize the interface.  I have made every reasonable attempt to correct obvious errors, but there may be errors of grammar and possibly content that I did not discover before finalizing the note.    N

## 2021-04-05 NOTE — PATIENT INSTRUCTIONS
-Keep your wound dressing clean, dry, and intact.    -Change your dressing if it becomes soiled, soaked, or falls off.    -Should you experience any significant changes in your wound(s), such as infection (redness, swelling, localized heat, increased pain, fever > 101 F, chills) or have any questions regarding your home care instructions, please contact the wound center at [phone]. If after hours, contact your primary care physician or go to the hospital emergency room.

## 2021-04-13 ENCOUNTER — OFFICE VISIT (OUTPATIENT)
Dept: WOUND CARE | Facility: MEDICAL CENTER | Age: 56
End: 2021-04-13
Attending: STUDENT IN AN ORGANIZED HEALTH CARE EDUCATION/TRAINING PROGRAM
Payer: COMMERCIAL

## 2021-04-13 VITALS
DIASTOLIC BLOOD PRESSURE: 76 MMHG | SYSTOLIC BLOOD PRESSURE: 138 MMHG | OXYGEN SATURATION: 94 % | TEMPERATURE: 97.5 F | HEART RATE: 79 BPM | RESPIRATION RATE: 18 BRPM

## 2021-04-13 DIAGNOSIS — R52 PAIN ASSOCIATED WITH WOUND: ICD-10-CM

## 2021-04-13 DIAGNOSIS — L97.922 ULCER OF LEFT LOWER EXTREMITY WITH FAT LAYER EXPOSED (HCC): Primary | ICD-10-CM

## 2021-04-13 DIAGNOSIS — I87.2 VENOUS INSUFFICIENCY: ICD-10-CM

## 2021-04-13 DIAGNOSIS — T14.8XXA PAIN ASSOCIATED WITH WOUND: ICD-10-CM

## 2021-04-13 PROCEDURE — 11042 DBRDMT SUBQ TIS 1ST 20SQCM/<: CPT | Performed by: NURSE PRACTITIONER

## 2021-04-13 PROCEDURE — 11042 DBRDMT SUBQ TIS 1ST 20SQCM/<: CPT

## 2021-04-13 ASSESSMENT — ENCOUNTER SYMPTOMS
SHORTNESS OF BREATH: 0
DIZZINESS: 0
CONSTIPATION: 0
FEVER: 0
DOUBLE VISION: 0
BLURRED VISION: 0
BACK PAIN: 1
NERVOUS/ANXIOUS: 1
NAUSEA: 0
HEADACHES: 0
WHEEZING: 0
CHILLS: 0
COUGH: 0
PALPITATIONS: 0
DIARRHEA: 0
VOMITING: 0

## 2021-04-13 ASSESSMENT — PAIN SCALES - GENERAL: PAIN_LEVEL: 2

## 2021-04-13 NOTE — PROGRESS NOTES
Provider Encounter- Full Thickness wound    HISTORY OF PRESENT ILLNESS  Wound History:    START OF CARE IN CLINIC: 2/24/2021    REFERRING PROVIDER: Jorge Forbes     WOUND- Full Thickness Wound   LOCATION: Left lateral LE   HISTORY: Ms. Fisher was recently hospitalized on 2/6/2021 at [facility].  Patient was admitted for thoracic and sacral fractures.  The patient had imaging performed in the ED which was consistent with bilateral sacral insufficiency fractures and T11 and T12 compression fractures.  During the hospital stay she was assessed by orthopedics and neurosurgery with recommendations for TLSO brace and pain management rather than surgical intervention.  Patient is to follow-up for these injuries with the DEXA scan to help rule out osteopenia or osteoporosis.  During this hospitalization it was also noticed that the patient had a wound to her left lower extremity.  Patient was referred to [facility] for care of this wound.    Pertinent Medical History: Sacral fractures, protein S deficiency, mitral valve insufficiency and aortic valve insufficiency, leiomyoma, hypothyroidism, hypertension, history of DVT, history of aortic aneurysm, Graves' disease, dyslipidemia, dissection of t thoracoabdominal aorta, compression fractures of thoracic spine, asthma, severe anxiety, chronic anticoagulation.     TOBACCO USE: Current everyday smoker.    Patient's problem list, allergies, and current medications reviewed and updated in Epic    Interval History:  2/24/2021: Clinic visit with KORI Villalobos. Patient states that they are feeling well today.  Patient denies fever, chills, nausea, vomiting, lightheadedness, dizziness, shortness of breath and chest pain.  Patient has a significant history for venous stasis wounds.  Patient has evident scarring from prior wounds as well as hemosiderin staining to bilateral lower extremities and significant varicose veins to bilateral lower extremities.   Venous ultrasound ordered in clinic today patient reports last venous ultrasounds approximately 2 to 3 years ago.  I want to rule out any possible varicose veins,  veins or superficial reflux as contributing to the patient's left lower extremity wound.     3/4/2021: Clinic visit with KORI Villalobos. Patient states that they are feeling well today.  Patient denies fever, chills, nausea, vomiting, lightheadedness, dizziness, shortness of breath and chest pain.    3/11/2021: Clinic visit with KORI Villalobos. Patient states that they are feeling well today.  Patient denies fever, chills, nausea, vomiting, lightheadedness, dizziness, shortness of breath and chest pain.  Patient has not completed her venous US ordered in clinic on 2/24/2021.  Patient reports she will complete the image this week.  Patient does have some granulation tissue forming to the wound bed at this time.  Patient needs excessive pain control during debridement subcutaneous lidocaine used to anesthetized the wound bed prior to debridement.  100% debridement performed establishing a clean wound bed.       3/18/2021: Clinic visit with KORI Villalobos. Patient states that they are feeling well today.  Patient denies fever, chills, nausea, vomiting, lightheadedness, dizziness, shortness of breath and chest pain.  Patient's wound does have some granulation buds growing to the base of the wound.  Patient still to complete her venous ultrasound reports it is scheduled for next Monday.  Patient has excessive anxiety regarding debridement patient requires subcutaneous lidocaine approximately 3 mL of subcutaneous lidocaine was injected to the periwound and wound area prior to debridement.    3/25/2021 : Clinic visit with KORI George, FNP-BC, CWOCN, CFCN.  Oumar is feeling well overall, though she is very nervous about wound care today, anticipating pain and requests subcutaneous lidocaine.  She otherwise denies fevers,  chills, nausea, vomiting, cough or shortness of breath.  She has a venous ultrasound scheduled for 3/29, as well as a DEXA scan.    3/29/2021: Clinic visit with KORI Villalobos. Patient states that they are feeling well today.  Patient denies fever, chills, nausea, vomiting, lightheadedness, dizziness, shortness of breath and chest pain.  Patient completed her left lower extremity venous ultrasound at [facility] today.  We will need to request results and review with patient next clinic appointment.  Wound has granulation tissue to the bed decreasing in depth.  Patient again required subcutaneous lidocaine prior to debridement.    4/5/2021: Clinic visit with KORI Villalobos. Patient states that they are feeling well today.  Patient denies fever, chills, nausea, vomiting, lightheadedness, dizziness, shortness of breath and chest pain.  The depth of the patient's wound has decreased.  Reviewed venous ultrasound results with patient.  There is no obvious reflux or varicose veins contributing to the wound.  We will continue with compression and current plan of care.    4/13/2021: Clinic visit with KORI Villalobos. Patient states that they are feeling well today.  Patient denies fever, chills, nausea, vomiting, lightheadedness, dizziness, shortness of breath and chest pain.  Wound is decreasing in size and depth continue with current plan of care.        REVIEW OF SYSTEMS:   Review of Systems   Constitutional: Negative for chills and fever.   HENT: Negative for hearing loss.    Eyes: Negative for blurred vision and double vision.   Respiratory: Negative for cough, shortness of breath and wheezing.    Cardiovascular: Positive for leg swelling. Negative for chest pain and palpitations.   Gastrointestinal: Negative for constipation, diarrhea, nausea and vomiting.   Musculoskeletal: Positive for back pain and joint pain.        Patient with recent fractures see history note   Skin: Negative for  itching and rash.        Hemosiderin staining bilateral lower extremities. Ulcer left lower extremity.  Varicose veins bilateral lower extremities   Neurological: Negative for dizziness and headaches.   Psychiatric/Behavioral: The patient is nervous/anxious.        PHYSICAL EXAMINATION:   /76   Pulse 79   Temp 36.4 °C (97.5 °F) (Temporal)   Resp 18   LMP 06/27/2011   SpO2 94%   Physical Exam   Constitutional: She is oriented to person, place, and time. No distress.   HENT:   Head: Normocephalic and atraumatic.   Eyes: Pupils are equal, round, and reactive to light. Conjunctivae are normal.   Cardiovascular: Intact distal pulses.   Biphasic pulses heard via Doppler to DP and TP   Pulmonary/Chest: Effort normal. No respiratory distress. She has no wheezes.   Musculoskeletal:         General: Tenderness present.      Cervical back: Normal range of motion and neck supple.      Comments: Multiple fractures to sacral and thoracic spine see history note above.   Neurological: She is alert and oriented to person, place, and time.   Skin: She is not diaphoretic. No erythema.   Patient has hemosiderin staining to bilateral lower extremities, varicose veins to bilateral lower extremities previous scarring from what the patient states as venous stasis ulcers.  Open wound to left lateral lower extremity   Psychiatric:   Patient has severe anxiety regarding light debridement       WOUND ASSESSMENT        Wound 02/07/21 Venous Ulcer Ankle Lateral Left --Left Lataral Ankle (Active)   Wound Image    04/13/21 0930   Site Assessment Red;Yellow 04/13/21 0930   Periwound Assessment Hemosiderin Staining 04/13/21 0930   Margins Attached edges 04/13/21 0930   Drainage Amount Small 04/13/21 0930   Drainage Description Serosanguineous 04/13/21 0930   Treatments Cleansed;Topical Lidocaine;Injectible Lidocaine;Provider debridement 04/13/21 0930   Wound Cleansing Normal Saline Irrigation 04/13/21 0930   Periwound Protectant Skin  Moisturizer;Barrier Paste 04/13/21 0930   Dressing Cleansing/Solutions Not Applicable 04/13/21 0930   Dressing Options Hydrofera Blue Ready;Compression Wrap Two Layer 04/13/21 0930   Dressing Changed New 04/13/21 0930   Dressing Status Clean;Intact;Dry 04/05/21 1000   Dressing Change/Treatment Frequency Weekly, and As Needed 04/13/21 0930   Non-staged Wound Description Full thickness 04/13/21 0930   Wound Length (cm) 0.7 cm 04/13/21 0930   Wound Width (cm) 1.1 cm 04/13/21 0930   Wound Depth (cm) 0.2 cm 04/13/21 0930   Wound Surface Area (cm^2) 0.77 cm^2 04/13/21 0930   Wound Volume (cm^3) 0.15 cm^3 04/13/21 0930   Post-Procedure Length (cm) 0.7 cm 04/13/21 0930   Post-Procedure Width (cm) 1.2 cm 04/13/21 0930   Post-Procedure Depth (cm) 0.2 cm 04/13/21 0930   Post-Procedure Surface Area (cm^2) 0.84 cm^2 04/13/21 0930   Post-Procedure Volume (cm^3) 0.17 cm^3 04/13/21 0930   Wound Healing % 25 04/13/21 0930   Wound Bed Granulation (%) 50 % 04/13/21 0930   Wound Bed Epithelium (%) 0 % 04/13/21 0930   Wound Bed Slough (%) 50 % 04/13/21 0930   Wound Bed Eschar (%) 0 % 04/13/21 0930   Tunneling (cm) 0 cm 04/13/21 0930   Undermining (cm) 0 cm 04/13/21 0930   Wound Odor None 04/13/21 0930   Pulses Doppler 02/24/21 0910   Exposed Structures None 04/13/21 0930       Procedure: Patient was verified with full name and birthdate.  Procedure discussed with the inpatient including injecting subcutaneous lidocaine through 2 injection site.  Patient was in agreement with this procedure.  Approximately 5 mL of subcutaneous lidocaine was injected to the periwound and underneath the wound bed to allow for 100% debridement.    Debridement     Consent obtained? verbal  Consent given by: patient  Risks discussed? procedural risks discussed  Performed by: NP  Local anesthetic: Subcutaneous lidocaine.  Post-debridement measurements  Length (cm): 0.7  Width (cm): 1.2  Depth (cm): 0.84  Percent debrided: 100%  Surface Area (cm^2): 0.84  Area  debrided (cm^2): 0.84  Volume (cm^3): 0.71  Tissue and other material debrided: subcutaneous tissue  Devitalized tissue debrided: slough  Instrument(s) utilized: curette  Bleeding: small  Hemostasis obtained with: pressure  Procedural pain (0-10): 0  Post-procedural pain: 2   Response to treatment: procedure was tolerated well            Pertinent Labs and Diagnostics:    Labs:     A1c:   Lab Results   Component Value Date/Time    HBA1C 5.6 02/06/2021 01:39 PM          IMAGING: N/A    VASCULAR STUDIES: Venous duplex study due to be completed on 3/29/2021    LAST  WOUND CULTURE:  DATE : N/A           ASSESSMENT AND PLAN:     1.  Ulcer of left lower extremity with fat layer exposed (HCC)  Comments: Patient has history of recurring ulcers to her ankles.  Most recently, ulcer appeared spontaneously to left lateral ankle, first noted during hospitalization in February 2021 for unrelated reason.    04/13/21: Wound depth has decreased, granulation tissue continues to increase slowly  -Excisional debridement of wound in clinic today, medically necessary to promote wound healing.    -Patient to return to clinic weekly for assessment and debridement  -Results of venous ultrasound discussed with patient in clinic last week.  Patient does not have any significant reflux  veins or varicose veins contributing to her left lower extremity wound.    Wound care: Hydrofera Blue ready, compression 2 layer wrap    2. Venous insufficiency of both lower extremities  Comments: Hemosiderin staining, varicose veins, scarring from previous wounds, left lateral lower extremity wound all consistent with venous stasis and venous ulcers.      3.  Pain associated with wound  Comments: Patient has severe pain and anxiety with debridement.  Requests that subcutaneous lidocaine be used each clinic visit      04/13/21: Anxiety continues to decrease each clinic visit.  -Patient tolerated debridement fairly well, though still expressed some  amount of discomfort after 5 mL subcutaneous lidocaine was injected.    PATIENT EDUCATION  - Importance of adequate nutrition for wound healing  -Advised to go to ER for any increased redness, swelling, drainage, or odor, or if patient develops fever, chills, nausea or vomiting.          Please note that this note may have been created using voice recognition software. I have worked with technical experts from [facility] to optimize the interface.  I have made every reasonable attempt to correct obvious errors, but there may be errors of grammar and possibly content that I did not discover before finalizing the note.    N

## 2021-04-19 ENCOUNTER — OFFICE VISIT (OUTPATIENT)
Dept: WOUND CARE | Facility: MEDICAL CENTER | Age: 56
End: 2021-04-19
Attending: STUDENT IN AN ORGANIZED HEALTH CARE EDUCATION/TRAINING PROGRAM
Payer: COMMERCIAL

## 2021-04-19 VITALS
HEART RATE: 81 BPM | TEMPERATURE: 97.5 F | SYSTOLIC BLOOD PRESSURE: 118 MMHG | DIASTOLIC BLOOD PRESSURE: 71 MMHG | RESPIRATION RATE: 18 BRPM | OXYGEN SATURATION: 97 %

## 2021-04-19 DIAGNOSIS — I87.2 VENOUS INSUFFICIENCY: ICD-10-CM

## 2021-04-19 DIAGNOSIS — R52 PAIN ASSOCIATED WITH WOUND: ICD-10-CM

## 2021-04-19 DIAGNOSIS — L97.922 ULCER OF LEFT LOWER EXTREMITY WITH FAT LAYER EXPOSED (HCC): Primary | ICD-10-CM

## 2021-04-19 DIAGNOSIS — T14.8XXA PAIN ASSOCIATED WITH WOUND: ICD-10-CM

## 2021-04-19 PROCEDURE — 99213 OFFICE O/P EST LOW 20 MIN: CPT | Performed by: NURSE PRACTITIONER

## 2021-04-19 PROCEDURE — 29581 APPL MULTLAYER CMPRN SYS LEG: CPT | Mod: LT

## 2021-04-19 PROCEDURE — 99213 OFFICE O/P EST LOW 20 MIN: CPT

## 2021-04-19 ASSESSMENT — ENCOUNTER SYMPTOMS
DOUBLE VISION: 0
NAUSEA: 0
CONSTIPATION: 0
SHORTNESS OF BREATH: 0
COUGH: 0
BACK PAIN: 1
BLURRED VISION: 0
DIARRHEA: 0
DIZZINESS: 0
FEVER: 0
VOMITING: 0
NERVOUS/ANXIOUS: 1
CHILLS: 0
WHEEZING: 0
PALPITATIONS: 0
HEADACHES: 0

## 2021-04-19 ASSESSMENT — PAIN SCALES - GENERAL: PAINLEVEL: 3=SLIGHT PAIN

## 2021-04-19 NOTE — PROGRESS NOTES
Provider Encounter- Full Thickness wound    HISTORY OF PRESENT ILLNESS  Wound History:    START OF CARE IN CLINIC: 2/24/2021    REFERRING PROVIDER: Jorge Forbes     WOUND- Full Thickness Wound   LOCATION: Left lateral LE   HISTORY: Ms. Fisher was recently hospitalized on 2/6/2021 at [facility].  Patient was admitted for thoracic and sacral fractures.  The patient had imaging performed in the ED which was consistent with bilateral sacral insufficiency fractures and T11 and T12 compression fractures.  During the hospital stay she was assessed by orthopedics and neurosurgery with recommendations for TLSO brace and pain management rather than surgical intervention.  Patient is to follow-up for these injuries with the DEXA scan to help rule out osteopenia or osteoporosis.  During this hospitalization it was also noticed that the patient had a wound to her left lower extremity.  Patient was referred to [facility] for care of this wound.    Pertinent Medical History: Sacral fractures, protein S deficiency, mitral valve insufficiency and aortic valve insufficiency, leiomyoma, hypothyroidism, hypertension, history of DVT, history of aortic aneurysm, Graves' disease, dyslipidemia, dissection of t thoracoabdominal aorta, compression fractures of thoracic spine, asthma, severe anxiety, chronic anticoagulation.     TOBACCO USE: Current everyday smoker.    Patient's problem list, allergies, and current medications reviewed and updated in Epic    Interval History:  2/24/2021: Clinic visit with KORI Villalobos. Patient states that they are feeling well today.  Patient denies fever, chills, nausea, vomiting, lightheadedness, dizziness, shortness of breath and chest pain.  Patient has a significant history for venous stasis wounds.  Patient has evident scarring from prior wounds as well as hemosiderin staining to bilateral lower extremities and significant varicose veins to bilateral lower extremities.   Venous ultrasound ordered in clinic today patient reports last venous ultrasounds approximately 2 to 3 years ago.  I want to rule out any possible varicose veins,  veins or superficial reflux as contributing to the patient's left lower extremity wound.     3/4/2021: Clinic visit with KORI Villalobos. Patient states that they are feeling well today.  Patient denies fever, chills, nausea, vomiting, lightheadedness, dizziness, shortness of breath and chest pain.    3/11/2021: Clinic visit with KORI Villalobos. Patient states that they are feeling well today.  Patient denies fever, chills, nausea, vomiting, lightheadedness, dizziness, shortness of breath and chest pain.  Patient has not completed her venous US ordered in clinic on 2/24/2021.  Patient reports she will complete the image this week.  Patient does have some granulation tissue forming to the wound bed at this time.  Patient needs excessive pain control during debridement subcutaneous lidocaine used to anesthetized the wound bed prior to debridement.  100% debridement performed establishing a clean wound bed.       3/18/2021: Clinic visit with KORI Villalobos. Patient states that they are feeling well today.  Patient denies fever, chills, nausea, vomiting, lightheadedness, dizziness, shortness of breath and chest pain.  Patient's wound does have some granulation buds growing to the base of the wound.  Patient still to complete her venous ultrasound reports it is scheduled for next Monday.  Patient has excessive anxiety regarding debridement patient requires subcutaneous lidocaine approximately 3 mL of subcutaneous lidocaine was injected to the periwound and wound area prior to debridement.    3/25/2021 : Clinic visit with KORI George, FNP-BC, CWOCN, CFCN.  Oumar is feeling well overall, though she is very nervous about wound care today, anticipating pain and requests subcutaneous lidocaine.  She otherwise denies fevers,  chills, nausea, vomiting, cough or shortness of breath.  She has a venous ultrasound scheduled for 3/29, as well as a DEXA scan.    3/29/2021: Clinic visit with KORI Villalobos. Patient states that they are feeling well today.  Patient denies fever, chills, nausea, vomiting, lightheadedness, dizziness, shortness of breath and chest pain.  Patient completed her left lower extremity venous ultrasound at [facility] today.  We will need to request results and review with patient next clinic appointment.  Wound has granulation tissue to the bed decreasing in depth.  Patient again required subcutaneous lidocaine prior to debridement.    4/5/2021: Clinic visit with KORI Villalobos. Patient states that they are feeling well today.  Patient denies fever, chills, nausea, vomiting, lightheadedness, dizziness, shortness of breath and chest pain.  The depth of the patient's wound has decreased.  Reviewed venous ultrasound results with patient.  There is no obvious reflux or varicose veins contributing to the wound.  We will continue with compression and current plan of care.    4/13/2021: Clinic visit with KORI Villalobos. Patient states that they are feeling well today.  Patient denies fever, chills, nausea, vomiting, lightheadedness, dizziness, shortness of breath and chest pain.  Wound is decreasing in size and depth continue with current plan of care.    4/19/2021: Clinic visit with KORI Villalobos. Patient states that they are feeling well today.  Patient denies fever, chills, nausea, vomiting, lightheadedness, dizziness, shortness of breath and chest pain.  Patient's wound is slightly smaller in clinic today.  No need for excisional debridement.  We will continue with collagen at this time.        REVIEW OF SYSTEMS:   Review of Systems   Constitutional: Negative for chills and fever.   HENT: Negative for hearing loss.    Eyes: Negative for blurred vision and double vision.   Respiratory:  Negative for cough, shortness of breath and wheezing.    Cardiovascular: Positive for leg swelling. Negative for chest pain and palpitations.   Gastrointestinal: Negative for constipation, diarrhea, nausea and vomiting.   Musculoskeletal: Positive for back pain and joint pain.        Patient with recent fractures see history note   Skin: Negative for itching and rash.        Hemosiderin staining bilateral lower extremities. Ulcer left lower extremity.  Varicose veins bilateral lower extremities   Neurological: Negative for dizziness and headaches.   Psychiatric/Behavioral: The patient is nervous/anxious.        PHYSICAL EXAMINATION:   /71   Pulse 81   Temp 36.4 °C (97.5 °F)   Resp 18   LMP 06/27/2011   SpO2 97%   Physical Exam   Constitutional: She is oriented to person, place, and time. No distress.   HENT:   Head: Normocephalic and atraumatic.   Eyes: Pupils are equal, round, and reactive to light. Conjunctivae are normal.   Cardiovascular: Intact distal pulses.   Biphasic pulses heard via Doppler to DP and TP   Pulmonary/Chest: Effort normal. No respiratory distress. She has no wheezes.   Musculoskeletal:         General: Tenderness present.      Cervical back: Normal range of motion and neck supple.      Comments: Multiple fractures to sacral and thoracic spine see history note above.   Neurological: She is alert and oriented to person, place, and time.   Skin: She is not diaphoretic. No erythema.   Patient has hemosiderin staining to bilateral lower extremities, varicose veins to bilateral lower extremities previous scarring from what the patient states as venous stasis ulcers.  Open wound to left lateral lower extremity   Psychiatric:   Patient has severe anxiety regarding light debridement       WOUND ASSESSMENT           Wound 02/07/21 Venous Ulcer Ankle Lateral Left --Left Lataral Ankle (Active)   Wound Image   04/19/21 0845   Site Assessment Red;Yellow 04/19/21 0845   Periwound Assessment  Hemosiderin Staining 04/19/21 0845   Margins Attached edges 04/19/21 0845   Drainage Amount Small 04/19/21 0845   Drainage Description Serosanguineous 04/19/21 0845   Treatments Cleansed;Site care 04/19/21 0845   Wound Cleansing Normal Saline Irrigation 04/19/21 0845   Periwound Protectant Skin Protectant Wipes to Periwound;Barrier Paste 04/19/21 0845   Dressing Cleansing/Solutions Not Applicable 04/13/21 0930   Dressing Options Collagen Dressing;Hydrofera Blue Ready;Compression Wrap Two Layer 04/19/21 0845   Dressing Changed New 04/13/21 0930   Dressing Status Clean;Intact;Dry 04/05/21 1000   Dressing Change/Treatment Frequency Weekly, and As Needed 04/13/21 0930   Non-staged Wound Description Full thickness 04/13/21 0930   Wound Length (cm) 0.6 cm 04/19/21 0845   Wound Width (cm) 0.9 cm 04/19/21 0845   Wound Depth (cm) 0.2 cm 04/19/21 0845   Wound Surface Area (cm^2) 0.54 cm^2 04/19/21 0845   Wound Volume (cm^3) 0.11 cm^3 04/19/21 0845   Post-Procedure Length (cm) 0.7 cm 04/13/21 0930   Post-Procedure Width (cm) 1.2 cm 04/13/21 0930   Post-Procedure Depth (cm) 0.2 cm 04/13/21 0930   Post-Procedure Surface Area (cm^2) 0.84 cm^2 04/13/21 0930   Post-Procedure Volume (cm^3) 0.17 cm^3 04/13/21 0930   Wound Healing % 45 04/19/21 0845   Wound Bed Granulation (%) 75 % 04/19/21 0845   Wound Bed Epithelium (%) 0 % 04/13/21 0930   Wound Bed Slough (%) 25 % 04/19/21 0845   Wound Bed Eschar (%) 0 % 04/13/21 0930   Tunneling (cm) 0 cm 04/19/21 0845   Undermining (cm) 0 cm 04/19/21 0845   Wound Odor None 04/19/21 0845   Pulses Doppler 02/24/21 0910   Exposed Structures None 04/19/21 0845                 Procedure: Patient was assessed in clinic today.  The wound bed did not have any slough or fibrous tissue in need of debridement.  We will place collagen to the wound bed to help accelerate granulation tissue formation and growth.    Procedures      Pertinent Labs and Diagnostics:    Labs:     A1c:   Lab Results   Component  Value Date/Time    HBA1C 5.6 02/06/2021 01:39 PM          IMAGING: N/A    VASCULAR STUDIES: Venous duplex study due to be completed on 3/29/2021    LAST  WOUND CULTURE:  DATE : N/A           ASSESSMENT AND PLAN:     1.  Ulcer of left lower extremity with fat layer exposed (HCC)  Comments: Patient has history of recurring ulcers to her ankles.  Most recently, ulcer appeared spontaneously to left lateral ankle, first noted during hospitalization in February 2021 for unrelated reason.    4/19/2021: Wound depth has decreased, granulation tissue continues to increase very slowly  -Excisional debridement of wound not needed in clinic today  -Patient to return to clinic weekly for assessment and debridement  -Results of venous ultrasound discussed with patient in clinic on prior visit.  Patient does not have any significant reflux  veins or varicose veins contributing to her left lower extremity wound.    Wound care: Collagen, Hydrofera Blue ready, compression 2 layer wrap    2. Venous insufficiency of both lower extremities  Comments: Hemosiderin staining, varicose veins, scarring from previous wounds, left lateral lower extremity wound all consistent with venous stasis and venous ulcers.      3.  Pain associated with wound  Comments: Patient has severe pain and anxiety with debridement.  Requests that subcutaneous lidocaine be used each clinic visit      04/19/21: Anxiety continues to decrease each clinic visit.  -Patient did report some pain even with cleaning of the periwound area with NS and gauze.  No subcutaneous lidocaine or superficial lidocaine needed in clinic today    PATIENT EDUCATION  - Importance of adequate nutrition for wound healing  -Advised to go to ER for any increased redness, swelling, drainage, or odor, or if patient develops fever, chills, nausea or vomiting.          Please note that this note may have been created using voice recognition software. I have worked with technical experts  from [facility] to optimize the interface.  I have made every reasonable attempt to correct obvious errors, but there may be errors of grammar and possibly content that I did not discover before finalizing the note.    N

## 2021-04-19 NOTE — PATIENT INSTRUCTIONS
Avoid prolonged standing or sitting without elevating your legs.    - Multilayer compression wrap to left leg. Do not get wet and keep on for the week. Only remove if temperature or sensation changes.   If compression needs to be removed, un-wrap it do not cut it off.     Should you experience any significant changes in your wound(s), such as infection (redness, swelling, localized heat, increased pain, fever > 101 F, chills) or have any questions regarding your home care instructions, please contact the wound center at [phone]. If after hours, contact your primary care physician or go to the hospital emergency room.   Keep dressing clean, dry and covered while bathing. Only change dressing if it becomes over saturated, soiled or falls off.

## 2021-04-20 ENCOUNTER — ANTICOAGULATION MONITORING (OUTPATIENT)
Dept: VASCULAR LAB | Facility: MEDICAL CENTER | Age: 56
End: 2021-04-20

## 2021-04-20 LAB — INR PPP: 1.9 (ref 2–3.5)

## 2021-04-20 NOTE — PROGRESS NOTES
Anticoagulation Summary  As of 2021    INR goal:  2.0-3.0   TTR:  62.3 % (5.9 y)   INR used for dosin.90 (2021)   Warfarin maintenance plan:  7.5 mg (5 mg x 1.5) every Mon, Fri; 5 mg (5 mg x 1) all other days   Weekly warfarin total:  40 mg   Plan last modified:  Debi Ahuja PharmD (3/31/2021)   Next INR check:  2021   Priority:  Maintenance   Target end date:  Indefinite    Indications    Deep vein thrombosis [453.40] [I82.409]             Anticoagulation Episode Summary     INR check location:  Home Draw    Preferred lab:      Send INR reminders to:      Comments:  Eloy MYRICK      Anticoagulation Care Providers     Provider Role Specialty Phone number    Bruna Ureña M.D. Referring Cardiology [phone]    [facility] Anticoagulation Services Responsible  [phone]    Jqauan Horne M.D.  Family Medicine [phone]        Anticoagulation Patient Findings     Left voicemail message to report a subtherapeutic INR of 1.9.  Requested pt contact the clinic for any s/s of unusual bleeding, bruising, clotting or any changes to diet or medication.   Instructed patient to bolus with 7.5mg X 1, then resume current warfarin regimen.  FU 2 weeks.  Alexis Garcia, ClaudiaD, BCACP

## 2021-04-26 ENCOUNTER — OFFICE VISIT (OUTPATIENT)
Dept: WOUND CARE | Facility: MEDICAL CENTER | Age: 56
End: 2021-04-26
Attending: STUDENT IN AN ORGANIZED HEALTH CARE EDUCATION/TRAINING PROGRAM
Payer: COMMERCIAL

## 2021-04-26 VITALS
TEMPERATURE: 97.4 F | HEART RATE: 72 BPM | SYSTOLIC BLOOD PRESSURE: 145 MMHG | OXYGEN SATURATION: 99 % | RESPIRATION RATE: 18 BRPM | DIASTOLIC BLOOD PRESSURE: 73 MMHG

## 2021-04-26 DIAGNOSIS — R52 PAIN ASSOCIATED WITH WOUND: ICD-10-CM

## 2021-04-26 DIAGNOSIS — I87.2 VENOUS INSUFFICIENCY: ICD-10-CM

## 2021-04-26 DIAGNOSIS — T14.8XXA PAIN ASSOCIATED WITH WOUND: ICD-10-CM

## 2021-04-26 DIAGNOSIS — L97.922 ULCER OF LEFT LOWER EXTREMITY WITH FAT LAYER EXPOSED (HCC): Primary | ICD-10-CM

## 2021-04-26 PROCEDURE — 29581 APPL MULTLAYER CMPRN SYS LEG: CPT | Mod: LT

## 2021-04-26 PROCEDURE — 11042 DBRDMT SUBQ TIS 1ST 20SQCM/<: CPT

## 2021-04-26 PROCEDURE — 11042 DBRDMT SUBQ TIS 1ST 20SQCM/<: CPT | Performed by: NURSE PRACTITIONER

## 2021-04-26 RX ORDER — ALENDRONATE SODIUM 70 MG/1
TABLET ORAL
COMMUNITY
Start: 2021-04-15

## 2021-04-26 ASSESSMENT — ENCOUNTER SYMPTOMS
COUGH: 0
HEADACHES: 0
DIZZINESS: 0
DIARRHEA: 0
NERVOUS/ANXIOUS: 1
CHILLS: 0
CONSTIPATION: 0
WHEEZING: 0
BACK PAIN: 1
NAUSEA: 0
SHORTNESS OF BREATH: 0
PALPITATIONS: 0
BLURRED VISION: 0
FEVER: 0
DOUBLE VISION: 0
VOMITING: 0

## 2021-04-26 ASSESSMENT — PAIN SCALES - GENERAL: PAIN_LEVEL: 3

## 2021-04-26 NOTE — PATIENT INSTRUCTIONS
-Keep dressings clean and dry. Change dressings once between wound clinic visits, and if the dressings become saturated, soiled, or fall off.    -Avoid prolonged standing or sitting without elevating your legs.    -Remove your compression garments if you have severe pain, severe swelling, numbness, color change, or temperature change in your toes. If you need to remove your compression garments, do so by unrolling them. Do not cut the compression garments off, this is to prevent cutting yourself on accident.    -Never walk around the house barefoot. Always wear a rubber soled slipper when walking around the house.    -Should you experience any significant changes in your wound(s), such as signs of infection (redness, swelling, localized heat, increased pain, fever > 101 F, chills) or have any questions regarding your home care instructions, please contact the wound center at [phone]. If after hours, contact your primary care physician or go to the hospital emergency room.

## 2021-04-26 NOTE — PROGRESS NOTES
Provider Encounter- Full Thickness wound    HISTORY OF PRESENT ILLNESS  Wound History:    START OF CARE IN CLINIC: 2/24/2021    REFERRING PROVIDER: Jorge Forbes     WOUND- Full Thickness Wound   LOCATION: Left lateral LE   HISTORY: Ms. Fisher was recently hospitalized on 2/6/2021 at [facility].  Patient was admitted for thoracic and sacral fractures.  The patient had imaging performed in the ED which was consistent with bilateral sacral insufficiency fractures and T11 and T12 compression fractures.  During the hospital stay she was assessed by orthopedics and neurosurgery with recommendations for TLSO brace and pain management rather than surgical intervention.  Patient is to follow-up for these injuries with the DEXA scan to help rule out osteopenia or osteoporosis.  During this hospitalization it was also noticed that the patient had a wound to her left lower extremity.  Patient was referred to [facility] for care of this wound.    Pertinent Medical History: Sacral fractures, protein S deficiency, mitral valve insufficiency and aortic valve insufficiency, leiomyoma, hypothyroidism, hypertension, history of DVT, history of aortic aneurysm, Graves' disease, dyslipidemia, dissection of t thoracoabdominal aorta, compression fractures of thoracic spine, asthma, severe anxiety, chronic anticoagulation.     TOBACCO USE: Current everyday smoker.    Patient's problem list, allergies, and current medications reviewed and updated in Epic    Interval History:  2/24/2021: Clinic visit with KORI Villalobos. Patient states that they are feeling well today.  Patient denies fever, chills, nausea, vomiting, lightheadedness, dizziness, shortness of breath and chest pain.  Patient has a significant history for venous stasis wounds.  Patient has evident scarring from prior wounds as well as hemosiderin staining to bilateral lower extremities and significant varicose veins to bilateral lower extremities.   Venous ultrasound ordered in clinic today patient reports last venous ultrasounds approximately 2 to 3 years ago.  I want to rule out any possible varicose veins,  veins or superficial reflux as contributing to the patient's left lower extremity wound.     3/4/2021: Clinic visit with KORI Villalobos. Patient states that they are feeling well today.  Patient denies fever, chills, nausea, vomiting, lightheadedness, dizziness, shortness of breath and chest pain.    3/11/2021: Clinic visit with KORI Villalobos. Patient states that they are feeling well today.  Patient denies fever, chills, nausea, vomiting, lightheadedness, dizziness, shortness of breath and chest pain.  Patient has not completed her venous US ordered in clinic on 2/24/2021.  Patient reports she will complete the image this week.  Patient does have some granulation tissue forming to the wound bed at this time.  Patient needs excessive pain control during debridement subcutaneous lidocaine used to anesthetized the wound bed prior to debridement.  100% debridement performed establishing a clean wound bed.       3/18/2021: Clinic visit with KORI Villalobos. Patient states that they are feeling well today.  Patient denies fever, chills, nausea, vomiting, lightheadedness, dizziness, shortness of breath and chest pain.  Patient's wound does have some granulation buds growing to the base of the wound.  Patient still to complete her venous ultrasound reports it is scheduled for next Monday.  Patient has excessive anxiety regarding debridement patient requires subcutaneous lidocaine approximately 3 mL of subcutaneous lidocaine was injected to the periwound and wound area prior to debridement.    3/25/2021 : Clinic visit with KORI George, FNP-BC, CWOCN, CFCN.  Oumar is feeling well overall, though she is very nervous about wound care today, anticipating pain and requests subcutaneous lidocaine.  She otherwise denies fevers,  chills, nausea, vomiting, cough or shortness of breath.  She has a venous ultrasound scheduled for 3/29, as well as a DEXA scan.    3/29/2021: Clinic visit with KORI Villalobos. Patient states that they are feeling well today.  Patient denies fever, chills, nausea, vomiting, lightheadedness, dizziness, shortness of breath and chest pain.  Patient completed her left lower extremity venous ultrasound at [facility] today.  We will need to request results and review with patient next clinic appointment.  Wound has granulation tissue to the bed decreasing in depth.  Patient again required subcutaneous lidocaine prior to debridement.    4/5/2021: Clinic visit with KORI Villalobos. Patient states that they are feeling well today.  Patient denies fever, chills, nausea, vomiting, lightheadedness, dizziness, shortness of breath and chest pain.  The depth of the patient's wound has decreased.  Reviewed venous ultrasound results with patient.  There is no obvious reflux or varicose veins contributing to the wound.  We will continue with compression and current plan of care.    4/13/2021: Clinic visit with KORI Villalobos. Patient states that they are feeling well today.  Patient denies fever, chills, nausea, vomiting, lightheadedness, dizziness, shortness of breath and chest pain.  Wound is decreasing in size and depth continue with current plan of care.    4/19/2021: Clinic visit with KORI Villalobos. Patient states that they are feeling well today.  Patient denies fever, chills, nausea, vomiting, lightheadedness, dizziness, shortness of breath and chest pain.  Patient's wound is slightly smaller in clinic today.  No need for excisional debridement.  We will continue with collagen at this time.    4/26/2021: Clinic visit with KORI Villalobos. Patient states that they are feeling well today.  Patient denies fever, chills, nausea, vomiting, lightheadedness, dizziness, shortness of breath  and chest pain.  Patient with crusted collagen over wound bed.  We will hold collagen in clinic this week continue with 2 layer compression.  Wound is decreasing in size.        REVIEW OF SYSTEMS:   Review of Systems   Constitutional: Negative for chills and fever.   HENT: Negative for hearing loss.    Eyes: Negative for blurred vision and double vision.   Respiratory: Negative for cough, shortness of breath and wheezing.    Cardiovascular: Negative for chest pain, palpitations and leg swelling.   Gastrointestinal: Negative for constipation, diarrhea, nausea and vomiting.   Musculoskeletal: Positive for back pain and joint pain.        Patient with recent fractures see history note   Skin: Negative for itching and rash.        Hemosiderin staining bilateral lower extremities. Ulcer left lower extremity.  Varicose veins bilateral lower extremities   Neurological: Negative for dizziness and headaches.   Psychiatric/Behavioral: The patient is nervous/anxious.        PHYSICAL EXAMINATION:   /73   Pulse 72   Temp 36.3 °C (97.4 °F)   Resp 18   LMP 06/27/2011   SpO2 99%   Physical Exam   Constitutional: She is oriented to person, place, and time. No distress.   HENT:   Head: Normocephalic and atraumatic.   Eyes: Pupils are equal, round, and reactive to light. Conjunctivae are normal.   Cardiovascular: Intact distal pulses.   Biphasic pulses heard via Doppler to DP and TP   Pulmonary/Chest: Effort normal. No respiratory distress. She has no wheezes.   Musculoskeletal:         General: Tenderness present.      Cervical back: Normal range of motion and neck supple.      Comments: Multiple fractures to sacral and thoracic spine see history note above.   Neurological: She is alert and oriented to person, place, and time.   Skin: She is not diaphoretic. No erythema.   Patient has hemosiderin staining to bilateral lower extremities, varicose veins to bilateral lower extremities previous scarring from what the patient  states as venous stasis ulcers.  Open wound to left lateral lower extremity   Psychiatric:   Patient has severe anxiety regarding light debridement       WOUND ASSESSMENT      Wound 02/07/21 Venous Ulcer Ankle Lateral Left --Left Lataral Ankle (Active)   Wound Image    04/26/21 0850   Site Assessment Red;Yellow 04/26/21 0850   Periwound Assessment Hemosiderin Staining 04/26/21 0850   Margins Attached edges 04/26/21 0850   Drainage Amount Small 04/26/21 0850   Drainage Description Serosanguineous 04/26/21 0850   Treatments Cleansed;Provider debridement;Topical Lidocaine 04/26/21 0850   Wound Cleansing Puracyn [place] 04/26/21 0850   Periwound Protectant Barrier Paste;Skin Protectant Wipes to Periwound 04/26/21 0850   Dressing Cleansing/Solutions Not Applicable 04/26/21 0850   Dressing Options Compression Wrap Two Layer;Hydrofera Blue Ready 04/26/21 0850   Dressing Changed New 04/26/21 0850   Dressing Status Clean;Intact;Dry 04/05/21 1000   Dressing Change/Treatment Frequency Weekly, and As Needed 04/26/21 0850   Non-staged Wound Description Full thickness 04/26/21 0850   Wound Length (cm) 0.5 cm 04/26/21 0850   Wound Width (cm) 0.8 cm 04/26/21 0850   Wound Depth (cm) 0.1 cm 04/26/21 0850   Wound Surface Area (cm^2) 0.4 cm^2 04/26/21 0850   Wound Volume (cm^3) 0.04 cm^3 04/26/21 0850   Post-Procedure Length (cm) 0.5 cm 04/26/21 0850   Post-Procedure Width (cm) 0.9 cm 04/26/21 0850   Post-Procedure Depth (cm) 0.2 cm 04/26/21 0850   Post-Procedure Surface Area (cm^2) 0.45 cm^2 04/26/21 0850   Post-Procedure Volume (cm^3) 0.09 cm^3 04/26/21 0850   Wound Healing % 80 04/26/21 0850   Wound Bed Granulation (%) 75 % 04/19/21 0845   Wound Bed Epithelium (%) 0 % 04/13/21 0930   Wound Bed Slough (%) 25 % 04/19/21 0845   Wound Bed Eschar (%) 0 % 04/13/21 0930   Tunneling (cm) 0 cm 04/26/21 0850   Undermining (cm) 0 cm 04/26/21 0850   Wound Odor None 04/26/21 0850   Pulses Left;1+;DP 04/26/21 0850   Exposed Structures None 04/26/21  0850         Debridement     Consent obtained? verbal  Consent given by: patient  Risks discussed? procedural risks discussed  Performed by: NP  Pain control: lidocaine 2%  Post-debridement measurements  Length (cm): 0.5  Width (cm): 0.9  Depth (cm): 0.2  Percent debrided: 100%  Surface Area (cm^2): 0.45  Area debrided (cm^2): 0.45  Volume (cm^3): 0.09  Tissue and other material debrided: subcutaneous tissue  Devitalized tissue debrided: slough  Instrument(s) utilized: curette  Bleeding: small  Hemostasis obtained with: pressure  Procedural pain (0-10): 0  Post-procedural pain: 3   Response to treatment: procedure was tolerated well            Pertinent Labs and Diagnostics:    Labs:     A1c:   Lab Results   Component Value Date/Time    HBA1C 5.6 02/06/2021 01:39 PM          IMAGING: N/A    VASCULAR STUDIES: Venous duplex study due to be completed on 3/29/2021    LAST  WOUND CULTURE:  DATE : N/A           ASSESSMENT AND PLAN:     1.  Ulcer of left lower extremity with fat layer exposed (HCC)  Comments: Patient has history of recurring ulcers to her ankles.  Most recently, ulcer appeared spontaneously to left lateral ankle, first noted during hospitalization in February 2021 for unrelated reason.    4/26/2021: Wound slightly smaller in clinic today.  -Excisional debridement of wound not needed in clinic today  -Patient to return to clinic weekly for assessment and debridement  -Results of venous ultrasound discussed with patient in clinic on prior visit.  Patient does not have any significant reflux  veins or varicose veins contributing to her left lower extremity wound.    Wound care:  Hydrofera Blue ready, compression 2 layer wrap    2. Venous insufficiency of both lower extremities  Comments: Hemosiderin staining, varicose veins, scarring from previous wounds, left lateral lower extremity wound all consistent with venous stasis and venous ulcers.      3.  Pain associated with wound  -2% viscous lidocaine  applied topically to wound bed for approximately 5 minutes prior to debridement  -Patient tolerated procedure today with no complaints of discomfort.      PATIENT EDUCATION  - Importance of adequate nutrition for wound healing  -Advised to go to ER for any increased redness, swelling, drainage, or odor, or if patient develops fever, chills, nausea or vomiting.          Please note that this note may have been created using voice recognition software. I have worked with technical experts from [facility] to optimize the interface.  I have made every reasonable attempt to correct obvious errors, but there may be errors of grammar and possibly content that I did not discover before finalizing the note.    N

## 2021-05-03 ENCOUNTER — OFFICE VISIT (OUTPATIENT)
Dept: WOUND CARE | Facility: MEDICAL CENTER | Age: 56
End: 2021-05-03
Attending: STUDENT IN AN ORGANIZED HEALTH CARE EDUCATION/TRAINING PROGRAM
Payer: COMMERCIAL

## 2021-05-03 VITALS
DIASTOLIC BLOOD PRESSURE: 73 MMHG | OXYGEN SATURATION: 99 % | RESPIRATION RATE: 18 BRPM | HEART RATE: 71 BPM | SYSTOLIC BLOOD PRESSURE: 138 MMHG | TEMPERATURE: 97.5 F

## 2021-05-03 DIAGNOSIS — L97.922 ULCER OF LEFT LOWER EXTREMITY WITH FAT LAYER EXPOSED (HCC): Primary | ICD-10-CM

## 2021-05-03 DIAGNOSIS — T14.8XXA PAIN ASSOCIATED WITH WOUND: ICD-10-CM

## 2021-05-03 DIAGNOSIS — I87.2 VENOUS INSUFFICIENCY: ICD-10-CM

## 2021-05-03 DIAGNOSIS — R52 PAIN ASSOCIATED WITH WOUND: ICD-10-CM

## 2021-05-03 PROCEDURE — 11042 DBRDMT SUBQ TIS 1ST 20SQCM/<: CPT

## 2021-05-03 PROCEDURE — 11042 DBRDMT SUBQ TIS 1ST 20SQCM/<: CPT | Performed by: NURSE PRACTITIONER

## 2021-05-03 ASSESSMENT — ENCOUNTER SYMPTOMS
SHORTNESS OF BREATH: 0
DIZZINESS: 0
WHEEZING: 0
CONSTIPATION: 0
PALPITATIONS: 0
DIARRHEA: 0
NAUSEA: 0
VOMITING: 0
DOUBLE VISION: 0
BACK PAIN: 1
BLURRED VISION: 0
COUGH: 0
HEADACHES: 0
CHILLS: 0
FEVER: 0
NERVOUS/ANXIOUS: 1

## 2021-05-03 ASSESSMENT — PAIN SCALES - GENERAL: PAIN_LEVEL: 3

## 2021-05-03 NOTE — PROGRESS NOTES
Provider Encounter- Full Thickness wound    HISTORY OF PRESENT ILLNESS  Wound History:    START OF CARE IN CLINIC: 2/24/2021    REFERRING PROVIDER: Jorge Forbes     WOUND- Full Thickness Wound   LOCATION: Left lateral LE   HISTORY: Ms. Fisher was recently hospitalized on 2/6/2021 at [facility].  Patient was admitted for thoracic and sacral fractures.  The patient had imaging performed in the ED which was consistent with bilateral sacral insufficiency fractures and T11 and T12 compression fractures.  During the hospital stay she was assessed by orthopedics and neurosurgery with recommendations for TLSO brace and pain management rather than surgical intervention.  Patient is to follow-up for these injuries with the DEXA scan to help rule out osteopenia or osteoporosis.  During this hospitalization it was also noticed that the patient had a wound to her left lower extremity.  Patient was referred to [facility] for care of this wound.    Pertinent Medical History: Sacral fractures, protein S deficiency, mitral valve insufficiency and aortic valve insufficiency, leiomyoma, hypothyroidism, hypertension, history of DVT, history of aortic aneurysm, Graves' disease, dyslipidemia, dissection of t thoracoabdominal aorta, compression fractures of thoracic spine, asthma, severe anxiety, chronic anticoagulation.     TOBACCO USE: Current everyday smoker.    Patient's problem list, allergies, and current medications reviewed and updated in Epic    Interval History:  2/24/2021: Clinic visit with KORI Villalobos. Patient states that they are feeling well today.  Patient denies fever, chills, nausea, vomiting, lightheadedness, dizziness, shortness of breath and chest pain.  Patient has a significant history for venous stasis wounds.  Patient has evident scarring from prior wounds as well as hemosiderin staining to bilateral lower extremities and significant varicose veins to bilateral lower extremities.   Venous ultrasound ordered in clinic today patient reports last venous ultrasounds approximately 2 to 3 years ago.  I want to rule out any possible varicose veins,  veins or superficial reflux as contributing to the patient's left lower extremity wound.     3/4/2021: Clinic visit with KORI Villalobos. Patient states that they are feeling well today.  Patient denies fever, chills, nausea, vomiting, lightheadedness, dizziness, shortness of breath and chest pain.    3/11/2021: Clinic visit with KORI Villalobos. Patient states that they are feeling well today.  Patient denies fever, chills, nausea, vomiting, lightheadedness, dizziness, shortness of breath and chest pain.  Patient has not completed her venous US ordered in clinic on 2/24/2021.  Patient reports she will complete the image this week.  Patient does have some granulation tissue forming to the wound bed at this time.  Patient needs excessive pain control during debridement subcutaneous lidocaine used to anesthetized the wound bed prior to debridement.  100% debridement performed establishing a clean wound bed.       3/18/2021: Clinic visit with KORI Villalobos. Patient states that they are feeling well today.  Patient denies fever, chills, nausea, vomiting, lightheadedness, dizziness, shortness of breath and chest pain.  Patient's wound does have some granulation buds growing to the base of the wound.  Patient still to complete her venous ultrasound reports it is scheduled for next Monday.  Patient has excessive anxiety regarding debridement patient requires subcutaneous lidocaine approximately 3 mL of subcutaneous lidocaine was injected to the periwound and wound area prior to debridement.    3/25/2021 : Clinic visit with KORI George, FNP-BC, CWOCN, CFCN.  Oumar is feeling well overall, though she is very nervous about wound care today, anticipating pain and requests subcutaneous lidocaine.  She otherwise denies fevers,  chills, nausea, vomiting, cough or shortness of breath.  She has a venous ultrasound scheduled for 3/29, as well as a DEXA scan.    3/29/2021: Clinic visit with KORI Villalobos. Patient states that they are feeling well today.  Patient denies fever, chills, nausea, vomiting, lightheadedness, dizziness, shortness of breath and chest pain.  Patient completed her left lower extremity venous ultrasound at [facility] today.  We will need to request results and review with patient next clinic appointment.  Wound has granulation tissue to the bed decreasing in depth.  Patient again required subcutaneous lidocaine prior to debridement.    4/5/2021: Clinic visit with KORI Villalobos. Patient states that they are feeling well today.  Patient denies fever, chills, nausea, vomiting, lightheadedness, dizziness, shortness of breath and chest pain.  The depth of the patient's wound has decreased.  Reviewed venous ultrasound results with patient.  There is no obvious reflux or varicose veins contributing to the wound.  We will continue with compression and current plan of care.    4/13/2021: Clinic visit with KORI Villalobos. Patient states that they are feeling well today.  Patient denies fever, chills, nausea, vomiting, lightheadedness, dizziness, shortness of breath and chest pain.  Wound is decreasing in size and depth continue with current plan of care.    4/19/2021: Clinic visit with KORI Villalobos. Patient states that they are feeling well today.  Patient denies fever, chills, nausea, vomiting, lightheadedness, dizziness, shortness of breath and chest pain.  Patient's wound is slightly smaller in clinic today.  No need for excisional debridement.  We will continue with collagen at this time.    4/26/2021: Clinic visit with KORI Villalobos. Patient states that they are feeling well today.  Patient denies fever, chills, nausea, vomiting, lightheadedness, dizziness, shortness of breath  and chest pain.  Patient with crusted collagen over wound bed.  We will hold collagen in clinic this week continue with 2 layer compression.  Wound is decreasing in size.    5/3/2021: Clinic visit with KORI Villalobos. Patient states that they are feeling well today.  Patient denies fever, chills, nausea, vomiting, lightheadedness, dizziness, shortness of breath and chest pain.  Patient's wound is smaller in clinic today.  Collagen placed to wound bed to support granulation tissue growth and formation.  Continue current plan of care        REVIEW OF SYSTEMS:   Review of Systems   Constitutional: Negative for chills and fever.   HENT: Negative for hearing loss.    Eyes: Negative for blurred vision and double vision.   Respiratory: Negative for cough, shortness of breath and wheezing.    Cardiovascular: Negative for chest pain, palpitations and leg swelling.   Gastrointestinal: Negative for constipation, diarrhea, nausea and vomiting.   Musculoskeletal: Positive for back pain and joint pain.        Patient with recent fractures see history note   Skin: Negative for itching and rash.        Hemosiderin staining bilateral lower extremities. Ulcer left lower extremity.  Varicose veins bilateral lower extremities   Neurological: Negative for dizziness and headaches.   Psychiatric/Behavioral: The patient is nervous/anxious.         Nervousness and anxiety significantly decreased as wound decreases in size.       PHYSICAL EXAMINATION:   /73   Pulse 71   Temp 36.4 °C (97.5 °F)   Resp 18   LMP 06/27/2011   SpO2 99%   Physical Exam   Constitutional: She is oriented to person, place, and time. No distress.   HENT:   Head: Normocephalic and atraumatic.   Eyes: Pupils are equal, round, and reactive to light. Conjunctivae are normal.   Cardiovascular: Intact distal pulses.   Biphasic pulses heard via Doppler to DP and TP   Pulmonary/Chest: Effort normal. No respiratory distress. She has no wheezes.      Musculoskeletal:         General: Tenderness present.      Cervical back: Normal range of motion and neck supple.      Comments: Multiple fractures to sacral and thoracic spine see history note above.   Neurological: She is alert and oriented to person, place, and time.   Skin: She is not diaphoretic. No erythema.   Patient has hemosiderin staining to bilateral lower extremities, varicose veins to bilateral lower extremities previous scarring from what the patient states as venous stasis ulcers.  Open wound to left lateral lower extremity   Psychiatric:   Patient has severe anxiety regarding light debridement       WOUND ASSESSMENT       Wound 02/07/21 Venous Ulcer Ankle Lateral Left --Left Lataral Ankle (Active)   Wound Image    05/03/21 0800   Site Assessment Red;Yellow 05/03/21 0800   Periwound Assessment Hemosiderin Staining 05/03/21 0800   Margins Attached edges 05/03/21 0800   Drainage Amount Scant 05/03/21 0800   Drainage Description Serosanguineous 05/03/21 0800   Treatments Cleansed;Topical Lidocaine;Provider debridement;Site care 05/03/21 0800   Wound Cleansing Puracyn Bluejacket 05/03/21 0800   Periwound Protectant Skin Protectant Wipes to Periwound;Barrier Paste 05/03/21 0800   Dressing Cleansing/Solutions Not Applicable 05/03/21 0800   Dressing Options Hydrofera Blue Ready;Compression Wrap Two Layer 05/03/21 0800   Dressing Changed Changed 05/03/21 0800   Dressing Status Clean;Intact;Dry 04/05/21 1000   Dressing Change/Treatment Frequency Weekly, and As Needed 04/26/21 0850   Non-staged Wound Description Full thickness 05/03/21 0800   Wound Length (cm) 0.3 cm 05/03/21 0800   Wound Width (cm) 0.6 cm 05/03/21 0800   Wound Depth (cm) 0.1 cm 05/03/21 0800   Wound Surface Area (cm^2) 0.18 cm^2 05/03/21 0800   Wound Volume (cm^3) 0.02 cm^3 05/03/21 0800   Post-Procedure Length (cm) 0.3 cm 05/03/21 0800   Post-Procedure Width (cm) 0.5 cm 05/03/21 0800   Post-Procedure Depth (cm) 0.1 cm 05/03/21 0800    Post-Procedure Surface Area (cm^2) 0.15 cm^2 05/03/21 0800   Post-Procedure Volume (cm^3) 0.02 cm^3 05/03/21 0800   Wound Healing % 90 05/03/21 0800   Wound Bed Granulation (%) 75 % 04/19/21 0845   Wound Bed Epithelium (%) 0 % 04/13/21 0930   Wound Bed Slough (%) 25 % 04/19/21 0845   Wound Bed Eschar (%) 0 % 04/13/21 0930   Tunneling (cm) 0 cm 05/03/21 0800   Undermining (cm) 0 cm 05/03/21 0800   Wound Odor None 05/03/21 0800   Pulses Left;1+;DP 04/26/21 0850   Exposed Structures None 05/03/21 0800                Debridement     Consent obtained? verbal  Consent given by: patient  Risks discussed? procedural risks discussed  Performed by: NP  Pain control: lidocaine 2%  Post-debridement measurements  Length (cm): 0.3  Width (cm): 0.5  Depth (cm): 0.1  Percent debrided: 100%  Surface Area (cm^2): 0.15  Area debrided (cm^2): 0.15  Volume (cm^3): 0.02  Tissue and other material debrided: subcutaneous tissue  Devitalized tissue debrided: slough  Instrument(s) utilized: curette  Bleeding: small  Hemostasis obtained with: pressure  Procedural pain (0-10): 0  Post-procedural pain: 3   Response to treatment: procedure was tolerated well            Pertinent Labs and Diagnostics:    Labs:     A1c:   Lab Results   Component Value Date/Time    HBA1C 5.6 02/06/2021 01:39 PM          IMAGING: N/A    VASCULAR STUDIES: Venous duplex study due to be completed on 3/29/2021    LAST  WOUND CULTURE:  DATE : N/A           ASSESSMENT AND PLAN:     1.  Ulcer of left lower extremity with fat layer exposed (HCC)  Comments: Patient has history of recurring ulcers to her ankles.  Most recently, ulcer appeared spontaneously to left lateral ankle, first noted during hospitalization in February 2021 for unrelated reason.    05/03/21: Wound continues to decrease slowly however progressively.  -Excisional debridement of wound not needed in clinic today  -Patient to return to clinic weekly for assessment and debridement  -Results of venous  ultrasound previously discussed with patient in clinic.  Patient does not have any significant reflux  veins or varicose veins contributing to her left lower extremity wound.    Wound care: Collagen, Hydrofera Blue ready, compression 2 layer wrap    2. Venous insufficiency of both lower extremities  Comments: Hemosiderin staining, varicose veins, scarring from previous wounds, left lateral lower extremity wound all consistent with venous stasis and venous ulcers.      3.  Pain associated with wound  -2% viscous lidocaine applied topically to wound bed for approximately 5 minutes prior to debridement  -Patient tolerated procedure today with no complaints of discomfort.      PATIENT EDUCATION  - Importance of adequate nutrition for wound healing  -Advised to go to ER for any increased redness, swelling, drainage, or odor, or if patient develops fever, chills, nausea or vomiting.          Please note that this note may have been created using voice recognition software. I have worked with technical experts from [facility] [employer] to optimize the interface.  I have made every reasonable attempt to correct obvious errors, but there may be errors of grammar and possibly content that I did not discover before finalizing the note.    N

## 2021-05-03 NOTE — PATIENT INSTRUCTIONS
-Keep your wound dressing clean, dry, and intact.    -Remove your compression wrap if you have severe pain, severe swelling, numbness, color change, or temperature change in your toes. If you need to remove your compression wrap, do so by unrolling it. Do not cut the compression wrap off to prevent cutting yourself on accident.    -Should you experience any significant changes in your wound(s), such as infection (redness, swelling, localized heat, increased pain, fever > 101 F, chills) or have any questions regarding your home care instructions, please contact the wound center at [phone]. If after hours, contact your primary care physician or go to the hospital emergency room.

## 2021-05-06 DIAGNOSIS — E03.9 HYPOTHYROIDISM, UNSPECIFIED TYPE: ICD-10-CM

## 2021-05-06 RX ORDER — LEVOTHYROXINE SODIUM 112 UG/1
112 TABLET ORAL
Qty: 34 TABLET | Refills: 1 | Status: SHIPPED | OUTPATIENT
Start: 2021-05-06 | End: 2022-04-25

## 2021-05-10 LAB — INR PPP: 2.6 (ref 2–3.5)

## 2021-05-11 ENCOUNTER — ANTICOAGULATION MONITORING (OUTPATIENT)
Dept: VASCULAR LAB | Facility: MEDICAL CENTER | Age: 56
End: 2021-05-11

## 2021-05-11 ENCOUNTER — OFFICE VISIT (OUTPATIENT)
Dept: WOUND CARE | Facility: MEDICAL CENTER | Age: 56
End: 2021-05-11
Attending: STUDENT IN AN ORGANIZED HEALTH CARE EDUCATION/TRAINING PROGRAM
Payer: COMMERCIAL

## 2021-05-11 VITALS
OXYGEN SATURATION: 99 % | DIASTOLIC BLOOD PRESSURE: 81 MMHG | RESPIRATION RATE: 12 BRPM | SYSTOLIC BLOOD PRESSURE: 129 MMHG | TEMPERATURE: 95.5 F | HEART RATE: 69 BPM

## 2021-05-11 DIAGNOSIS — R52 PAIN ASSOCIATED WITH WOUND: ICD-10-CM

## 2021-05-11 DIAGNOSIS — L97.922 ULCER OF LEFT LOWER EXTREMITY WITH FAT LAYER EXPOSED (HCC): Primary | ICD-10-CM

## 2021-05-11 DIAGNOSIS — T14.8XXA PAIN ASSOCIATED WITH WOUND: ICD-10-CM

## 2021-05-11 DIAGNOSIS — I87.2 VENOUS INSUFFICIENCY: ICD-10-CM

## 2021-05-11 PROCEDURE — 11042 DBRDMT SUBQ TIS 1ST 20SQCM/<: CPT | Performed by: NURSE PRACTITIONER

## 2021-05-11 PROCEDURE — 11042 DBRDMT SUBQ TIS 1ST 20SQCM/<: CPT

## 2021-05-11 ASSESSMENT — ENCOUNTER SYMPTOMS
WHEEZING: 0
COUGH: 0
HEADACHES: 0
BACK PAIN: 1
DOUBLE VISION: 0
NAUSEA: 0
NERVOUS/ANXIOUS: 1
CHILLS: 0
DIARRHEA: 0
PALPITATIONS: 0
DIZZINESS: 0
SHORTNESS OF BREATH: 0
FEVER: 0
BLURRED VISION: 0
CONSTIPATION: 0
VOMITING: 0

## 2021-05-11 ASSESSMENT — PAIN SCALES - GENERAL
PAIN_LEVEL: 4
PAINLEVEL: NO PAIN

## 2021-05-11 NOTE — PROGRESS NOTES
Provider Encounter- Full Thickness wound    HISTORY OF PRESENT ILLNESS  Wound History:    START OF CARE IN CLINIC: 2/24/2021    REFERRING PROVIDER: Jorge Forbes     WOUND- Full Thickness Wound   LOCATION: Left lateral LE   HISTORY: Ms. Fisher was recently hospitalized on 2/6/2021 at [facility].  Patient was admitted for thoracic and sacral fractures.  The patient had imaging performed in the ED which was consistent with bilateral sacral insufficiency fractures and T11 and T12 compression fractures.  During the hospital stay she was assessed by orthopedics and neurosurgery with recommendations for TLSO brace and pain management rather than surgical intervention.  Patient is to follow-up for these injuries with the DEXA scan to help rule out osteopenia or osteoporosis.  During this hospitalization it was also noticed that the patient had a wound to her left lower extremity.  Patient was referred to [facility] for care of this wound.    Pertinent Medical History: Sacral fractures, protein S deficiency, mitral valve insufficiency and aortic valve insufficiency, leiomyoma, hypothyroidism, hypertension, history of DVT, history of aortic aneurysm, Graves' disease, dyslipidemia, dissection of t thoracoabdominal aorta, compression fractures of thoracic spine, asthma, severe anxiety, chronic anticoagulation.     TOBACCO USE: Current everyday smoker.    Patient's problem list, allergies, and current medications reviewed and updated in Epic    Interval History:  2/24/2021: Clinic visit with KORI Villalobos. Patient states that they are feeling well today.  Patient denies fever, chills, nausea, vomiting, lightheadedness, dizziness, shortness of breath and chest pain.  Patient has a significant history for venous stasis wounds.  Patient has evident scarring from prior wounds as well as hemosiderin staining to bilateral lower extremities and significant varicose veins to bilateral lower extremities.   Venous ultrasound ordered in clinic today patient reports last venous ultrasounds approximately 2 to 3 years ago.  I want to rule out any possible varicose veins,  veins or superficial reflux as contributing to the patient's left lower extremity wound.     3/4/2021: Clinic visit with KORI Villalobos. Patient states that they are feeling well today.  Patient denies fever, chills, nausea, vomiting, lightheadedness, dizziness, shortness of breath and chest pain.    3/11/2021: Clinic visit with KORI Villalobos. Patient states that they are feeling well today.  Patient denies fever, chills, nausea, vomiting, lightheadedness, dizziness, shortness of breath and chest pain.  Patient has not completed her venous US ordered in clinic on 2/24/2021.  Patient reports she will complete the image this week.  Patient does have some granulation tissue forming to the wound bed at this time.  Patient needs excessive pain control during debridement subcutaneous lidocaine used to anesthetized the wound bed prior to debridement.  100% debridement performed establishing a clean wound bed.       3/18/2021: Clinic visit with KORI Villalobos. Patient states that they are feeling well today.  Patient denies fever, chills, nausea, vomiting, lightheadedness, dizziness, shortness of breath and chest pain.  Patient's wound does have some granulation buds growing to the base of the wound.  Patient still to complete her venous ultrasound reports it is scheduled for next Monday.  Patient has excessive anxiety regarding debridement patient requires subcutaneous lidocaine approximately 3 mL of subcutaneous lidocaine was injected to the periwound and wound area prior to debridement.    3/25/2021 : Clinic visit with KORI George, FNP-BC, CWOCN, CFCN.  Oumar is feeling well overall, though she is very nervous about wound care today, anticipating pain and requests subcutaneous lidocaine.  She otherwise denies fevers,  chills, nausea, vomiting, cough or shortness of breath.  She has a venous ultrasound scheduled for 3/29, as well as a DEXA scan.    3/29/2021: Clinic visit with KORI Villalobos. Patient states that they are feeling well today.  Patient denies fever, chills, nausea, vomiting, lightheadedness, dizziness, shortness of breath and chest pain.  Patient completed her left lower extremity venous ultrasound at [facility] today.  We will need to request results and review with patient next clinic appointment.  Wound has granulation tissue to the bed decreasing in depth.  Patient again required subcutaneous lidocaine prior to debridement.    4/5/2021: Clinic visit with KORI Villalobos. Patient states that they are feeling well today.  Patient denies fever, chills, nausea, vomiting, lightheadedness, dizziness, shortness of breath and chest pain.  The depth of the patient's wound has decreased.  Reviewed venous ultrasound results with patient.  There is no obvious reflux or varicose veins contributing to the wound.  We will continue with compression and current plan of care.    4/13/2021: Clinic visit with KORI Villalobos. Patient states that they are feeling well today.  Patient denies fever, chills, nausea, vomiting, lightheadedness, dizziness, shortness of breath and chest pain.  Wound is decreasing in size and depth continue with current plan of care.    4/19/2021: Clinic visit with KORI Villalobos. Patient states that they are feeling well today.  Patient denies fever, chills, nausea, vomiting, lightheadedness, dizziness, shortness of breath and chest pain.  Patient's wound is slightly smaller in clinic today.  No need for excisional debridement.  We will continue with collagen at this time.    4/26/2021: Clinic visit with KORI Villalobos. Patient states that they are feeling well today.  Patient denies fever, chills, nausea, vomiting, lightheadedness, dizziness, shortness of breath  and chest pain.  Patient with crusted collagen over wound bed.  We will hold collagen in clinic this week continue with 2 layer compression.  Wound is decreasing in size.    5/3/2021: Clinic visit with KORI Villalobos. Patient states that they are feeling well today.  Patient denies fever, chills, nausea, vomiting, lightheadedness, dizziness, shortness of breath and chest pain.  Patient's wound is smaller in clinic today.  Collagen placed to wound bed to support granulation tissue growth and formation.  Continue current plan of care    5/11/2021: Clinic visit with KORI Villalobos. Patient states that they are feeling well today.  Patient denies fever, chills, nausea, vomiting, lightheadedness, dizziness, shortness of breath and chest pain.  Wound continues to decrease in size almost resolved continue current plan of care.        REVIEW OF SYSTEMS:   Review of Systems   Constitutional: Negative for chills and fever.   HENT: Negative for hearing loss.    Eyes: Negative for blurred vision and double vision.   Respiratory: Negative for cough, shortness of breath and wheezing.    Cardiovascular: Negative for chest pain, palpitations and leg swelling.   Gastrointestinal: Negative for constipation, diarrhea, nausea and vomiting.   Musculoskeletal: Positive for back pain and joint pain.        Patient with recent fractures see history note   Skin: Negative for itching and rash.        Hemosiderin staining bilateral lower extremities. Ulcer left lower extremity.  Varicose veins bilateral lower extremities   Neurological: Negative for dizziness and headaches.   Psychiatric/Behavioral: The patient is nervous/anxious.         Nervousness and anxiety significantly decreased as wound decreases in size.       PHYSICAL EXAMINATION:   /81 (BP Location: Right arm, Patient Position: Sitting, BP Cuff Size: Adult)   Pulse 69   Temp (!) 35.3 °C (95.5 °F) (Temporal)   Resp 12   LMP 06/27/2011   SpO2 99%   Physical  Exam   Constitutional: She is oriented to person, place, and time. No distress.   HENT:   Head: Normocephalic and atraumatic.   Eyes: Pupils are equal, round, and reactive to light. Conjunctivae are normal.   Cardiovascular: Intact distal pulses.   Biphasic pulses heard via Doppler to DP and TP   Pulmonary/Chest: Effort normal. No respiratory distress. She has no wheezes.   Musculoskeletal:         General: Tenderness present.      Cervical back: Normal range of motion and neck supple.      Comments: Multiple fractures to sacral and thoracic spine see history note above.   Neurological: She is alert and oriented to person, place, and time.   Skin: She is not diaphoretic. No erythema.   Patient has hemosiderin staining to bilateral lower extremities, varicose veins to bilateral lower extremities previous scarring from what the patient states as venous stasis ulcers.  Open wound to left lateral lower extremity   Psychiatric:   Patient has severe anxiety regarding light debridement       WOUND ASSESSMENT        Wound 02/07/21 Venous Ulcer Ankle Lateral Left --Left Lataral Ankle (Active)   Wound Image   05/11/21 1007   Site Assessment Red;Other (Comment) 05/11/21 0952   Periwound Assessment Hemosiderin Staining 05/11/21 0952   Margins Other (Comment) 05/11/21 0952   Drainage Amount Scant 05/11/21 0952   Drainage Description Sanguineous 05/11/21 0952   Treatments Cleansed;Topical Lidocaine 05/11/21 0952   Wound Cleansing Normal Saline Irrigation 05/11/21 0952   Periwound Protectant Skin Protectant Wipes to Periwound;Barrier Paste 05/11/21 0952   Dressing Cleansing/Solutions Not Applicable 05/11/21 0952   Dressing Options Compression Wrap Two Layer;Dry Gauze;Triad Hydro 05/11/21 0952   Dressing Changed Changed 05/11/21 0952   Dressing Status Clean;Intact;Dry 04/05/21 1000   Dressing Change/Treatment Frequency Weekly, and As Needed 05/11/21 0952   Non-staged Wound Description Partial thickness 05/11/21 0952   Wound Length  (cm) 0.4 cm 05/11/21 0952   Wound Width (cm) 0.8 cm 05/11/21 0952   Wound Depth (cm) 0.1 cm 05/03/21 0800   Wound Surface Area (cm^2) 0.32 cm^2 05/11/21 0952   Wound Volume (cm^3) 0.02 cm^3 05/03/21 0800   Post-Procedure Length (cm) 0.3 cm 05/11/21 0952   Post-Procedure Width (cm) 0.8 cm 05/11/21 0952   Post-Procedure Depth (cm) 0.1 cm 05/11/21 0952   Post-Procedure Surface Area (cm^2) 0.24 cm^2 05/11/21 0952   Post-Procedure Volume (cm^3) 0.02 cm^3 05/11/21 0952   Wound Healing % 90 05/03/21 0800   Wound Bed Granulation (%) 75 % 04/19/21 0845   Wound Bed Epithelium (%) 0 % 04/13/21 0930   Wound Bed Slough (%) 25 % 04/19/21 0845   Wound Bed Eschar (%) 0 % 04/13/21 0930   Tunneling (cm) 0 cm 05/11/21 0952   Undermining (cm) 0 cm 05/11/21 0952   Wound Odor None 05/11/21 0952   Pulses Left;1+;DP 04/26/21 0850   Exposed Structures None 05/11/21 0952          Debridement     Consent obtained? verbal  Consent given by: patient  Risks discussed? procedural risks discussed  Performed by: NP  Pain control: lidocaine 2%  Post-debridement measurements  Length (cm): 0.4  Width (cm): 0.8  Depth (cm): 0.1  Percent debrided: 100%  Surface Area (cm^2): 0.32  Area debrided (cm^2): 0.32  Volume (cm^3): 0.03  Tissue and other material debrided: subcutaneous tissue  Devitalized tissue debrided: slough  Instrument(s) utilized: curette  Bleeding: none  Procedural pain (0-10): 0  Post-procedural pain: 4   Response to treatment: procedure was tolerated well            Pertinent Labs and Diagnostics:    Labs:     A1c:   Lab Results   Component Value Date/Time    HBA1C 5.6 02/06/2021 01:39 PM          IMAGING: N/A    VASCULAR STUDIES: Venous duplex study due to be completed on 3/29/2021    LAST  WOUND CULTURE:  DATE : N/A           ASSESSMENT AND PLAN:     1.  Ulcer of left lower extremity with fat layer exposed (HCC)  Comments: Patient has history of recurring ulcers to her ankles.  Most recently, ulcer appeared spontaneously to left  lateral ankle, first noted during hospitalization in February 2021 for unrelated reason.    05/11/21: Wound continues to decrease slowly however progressively.  -Excisional debridement of wound necessary to promote wound healing.  -Patient to return to clinic weekly for assessment and debridement  -Results of venous ultrasound previously discussed with patient in clinic.  Patient does not have any significant reflux  veins or varicose veins contributing to her left lower extremity wound.    Wound care: Triad cream, dry gauze, 2 layer compression wrap    2. Venous insufficiency of both lower extremities  Comments: Hemosiderin staining, varicose veins, scarring from previous wounds, left lateral lower extremity wound all consistent with venous stasis and venous ulcers.      3.  Pain associated with wound  -2% viscous lidocaine applied topically to wound bed for approximately 5 minutes prior to debridement  -Patient tolerated procedure today with no complaints of discomfort.      PATIENT EDUCATION  - Importance of adequate nutrition for wound healing  -Advised to go to ER for any increased redness, swelling, drainage, or odor, or if patient develops fever, chills, nausea or vomiting.          Please note that this note may have been created using voice recognition software. I have worked with technical experts from [employer] to optimize the interface.  I have made every reasonable attempt to correct obvious errors, but there may be errors of grammar and possibly content that I did not discover before finalizing the note.    N

## 2021-05-11 NOTE — PATIENT INSTRUCTIONS
Avoid prolonged standing or sitting without elevating your legs.  - Knee-high gradient compression to legs  - Multilayer compression wrap to left leg. Do not get wet and keep on for the week. Only remove if temperature or sensation changes.   If compression needs to be removed, un-wrap it do not cut it off.     Should you experience any significant changes in your wound(s), such as infection (redness, swelling, localized heat, increased pain, fever > 101 F, chills) or have any questions regarding your home care instructions, please contact the wound center at [phone]. If after hours, contact your primary care physician or go to the hospital emergency room.   Keep dressing clean, dry and covered while bathing. Only change dressing if it becomes over saturated, soiled or falls off.     Look into order compression stockings from [employer] with their size finder in 30-40 mmHg.

## 2021-05-11 NOTE — PROGRESS NOTES
Anticoagulation Summary  As of 2021    INR goal:  2.0-3.0   TTR:  62.5 % (6 y)   INR used for dosin.60 (5/10/2021)   Warfarin maintenance plan:  7.5 mg (5 mg x 1.5) every Mon, Fri; 5 mg (5 mg x 1) all other days   Weekly warfarin total:  40 mg   Plan last modified:  Debi Ahuja PharmD (3/31/2021)   Next INR check:  2021   Priority:  Maintenance   Target end date:  Indefinite    Indications    Deep vein thrombosis [453.40] [I82.409]             Anticoagulation Episode Summary     INR check location:  Home Draw    Preferred lab:      Send INR reminders to:      Comments:  Eloy MYRICK      Anticoagulation Care Providers     Provider Role Specialty Phone number    Bruna Ureña M.D. Referring Cardiology [phone]    [facility] Anticoagulation Services Responsible  [phone]    Jaquan Horne M.D.  Family Medicine [phone]        Anticoagulation Patient Findings     HPI:  Oumar Fisher, on anticoagulation therapy with warfarin for DVT  Changes to current medical/health status since last appt: None  Denies signs/symptoms of bleeding and/or thrombosis since the last appt.    Denies any interval changes to diet  Pt started alendronate. Per Osiel no DDI.  Denies any complications or cost restrictions with current therapy.     A/P   INR -therapeutic.   Continue same dose    Next INR in 2 week(s).    Terri Logan, Med Ass't     I have reviewed and agree with the plan above on 21  Osiel Pete, ClaudiaD      Osiel Pete, ClaudiaD

## 2021-05-17 ENCOUNTER — OFFICE VISIT (OUTPATIENT)
Dept: WOUND CARE | Facility: MEDICAL CENTER | Age: 56
End: 2021-05-17
Attending: STUDENT IN AN ORGANIZED HEALTH CARE EDUCATION/TRAINING PROGRAM
Payer: COMMERCIAL

## 2021-05-17 VITALS
SYSTOLIC BLOOD PRESSURE: 125 MMHG | DIASTOLIC BLOOD PRESSURE: 68 MMHG | HEART RATE: 63 BPM | OXYGEN SATURATION: 99 % | RESPIRATION RATE: 18 BRPM | TEMPERATURE: 97.6 F

## 2021-05-17 DIAGNOSIS — R52 PAIN ASSOCIATED WITH WOUND: ICD-10-CM

## 2021-05-17 DIAGNOSIS — L97.922 ULCER OF LEFT LOWER EXTREMITY WITH FAT LAYER EXPOSED (HCC): Primary | ICD-10-CM

## 2021-05-17 DIAGNOSIS — I87.2 VENOUS INSUFFICIENCY: ICD-10-CM

## 2021-05-17 DIAGNOSIS — T14.8XXA PAIN ASSOCIATED WITH WOUND: ICD-10-CM

## 2021-05-17 PROCEDURE — 99212 OFFICE O/P EST SF 10 MIN: CPT | Performed by: NURSE PRACTITIONER

## 2021-05-17 PROCEDURE — 99212 OFFICE O/P EST SF 10 MIN: CPT

## 2021-05-17 ASSESSMENT — ENCOUNTER SYMPTOMS
DIZZINESS: 0
FEVER: 0
HEADACHES: 0
SHORTNESS OF BREATH: 0
CHILLS: 0
BACK PAIN: 1
NAUSEA: 0
BLURRED VISION: 0
DIARRHEA: 0
COUGH: 0
CONSTIPATION: 0
PALPITATIONS: 0
WHEEZING: 0
VOMITING: 0
DOUBLE VISION: 0
NERVOUS/ANXIOUS: 1

## 2021-05-17 ASSESSMENT — PAIN SCALES - GENERAL: PAINLEVEL: 7=MODERATE-SEVERE PAIN

## 2021-05-17 NOTE — PROGRESS NOTES
Provider Encounter- Full Thickness wound    HISTORY OF PRESENT ILLNESS  Wound History:    START OF CARE IN CLINIC: 2/24/2021    REFERRING PROVIDER: Jorge Forbes     WOUND- Full Thickness Wound   LOCATION: Left lateral LE   HISTORY: Ms. Fisher was recently hospitalized on 2/6/2021 at [facility].  Patient was admitted for thoracic and sacral fractures.  The patient had imaging performed in the ED which was consistent with bilateral sacral insufficiency fractures and T11 and T12 compression fractures.  During the hospital stay she was assessed by orthopedics and neurosurgery with recommendations for TLSO brace and pain management rather than surgical intervention.  Patient is to follow-up for these injuries with the DEXA scan to help rule out osteopenia or osteoporosis.  During this hospitalization it was also noticed that the patient had a wound to her left lower extremity.  Patient was referred to [facility] for care of this wound.    Pertinent Medical History: Sacral fractures, protein S deficiency, mitral valve insufficiency and aortic valve insufficiency, leiomyoma, hypothyroidism, hypertension, history of DVT, history of aortic aneurysm, Graves' disease, dyslipidemia, dissection of t thoracoabdominal aorta, compression fractures of thoracic spine, asthma, severe anxiety, chronic anticoagulation.     TOBACCO USE: Current everyday smoker.    Patient's problem list, allergies, and current medications reviewed and updated in Epic    Interval History:  2/24/2021: Clinic visit with KORI Villalobos. Patient states that they are feeling well today.  Patient denies fever, chills, nausea, vomiting, lightheadedness, dizziness, shortness of breath and chest pain.  Patient has a significant history for venous stasis wounds.  Patient has evident scarring from prior wounds as well as hemosiderin staining to bilateral lower extremities and significant varicose veins to bilateral lower extremities.   Venous ultrasound ordered in clinic today patient reports last venous ultrasounds approximately 2 to 3 years ago.  I want to rule out any possible varicose veins,  veins or superficial reflux as contributing to the patient's left lower extremity wound.     3/4/2021: Clinic visit with KORI Villalobos. Patient states that they are feeling well today.  Patient denies fever, chills, nausea, vomiting, lightheadedness, dizziness, shortness of breath and chest pain.    3/11/2021: Clinic visit with KORI Villalobos. Patient states that they are feeling well today.  Patient denies fever, chills, nausea, vomiting, lightheadedness, dizziness, shortness of breath and chest pain.  Patient has not completed her venous US ordered in clinic on 2/24/2021.  Patient reports she will complete the image this week.  Patient does have some granulation tissue forming to the wound bed at this time.  Patient needs excessive pain control during debridement subcutaneous lidocaine used to anesthetized the wound bed prior to debridement.  100% debridement performed establishing a clean wound bed.       3/18/2021: Clinic visit with KORI Villalobos. Patient states that they are feeling well today.  Patient denies fever, chills, nausea, vomiting, lightheadedness, dizziness, shortness of breath and chest pain.  Patient's wound does have some granulation buds growing to the base of the wound.  Patient still to complete her venous ultrasound reports it is scheduled for next Monday.  Patient has excessive anxiety regarding debridement patient requires subcutaneous lidocaine approximately 3 mL of subcutaneous lidocaine was injected to the periwound and wound area prior to debridement.    3/25/2021 : Clinic visit with KORI George, FNP-BC, CWOCN, CFCN.  Oumar is feeling well overall, though she is very nervous about wound care today, anticipating pain and requests subcutaneous lidocaine.  She otherwise denies fevers,  chills, nausea, vomiting, cough or shortness of breath.  She has a venous ultrasound scheduled for 3/29, as well as a DEXA scan.    3/29/2021: Clinic visit with KORI Villalobos. Patient states that they are feeling well today.  Patient denies fever, chills, nausea, vomiting, lightheadedness, dizziness, shortness of breath and chest pain.  Patient completed her left lower extremity venous ultrasound at [facility] today.  We will need to request results and review with patient next clinic appointment.  Wound has granulation tissue to the bed decreasing in depth.  Patient again required subcutaneous lidocaine prior to debridement.    4/5/2021: Clinic visit with KORI Villalobos. Patient states that they are feeling well today.  Patient denies fever, chills, nausea, vomiting, lightheadedness, dizziness, shortness of breath and chest pain.  The depth of the patient's wound has decreased.  Reviewed venous ultrasound results with patient.  There is no obvious reflux or varicose veins contributing to the wound.  We will continue with compression and current plan of care.    4/13/2021: Clinic visit with KORI Villalobos. Patient states that they are feeling well today.  Patient denies fever, chills, nausea, vomiting, lightheadedness, dizziness, shortness of breath and chest pain.  Wound is decreasing in size and depth continue with current plan of care.    4/19/2021: Clinic visit with KORI Villalobos. Patient states that they are feeling well today.  Patient denies fever, chills, nausea, vomiting, lightheadedness, dizziness, shortness of breath and chest pain.  Patient's wound is slightly smaller in clinic today.  No need for excisional debridement.  We will continue with collagen at this time.    4/26/2021: Clinic visit with KORI Villalobos. Patient states that they are feeling well today.  Patient denies fever, chills, nausea, vomiting, lightheadedness, dizziness, shortness of breath  and chest pain.  Patient with crusted collagen over wound bed.  We will hold collagen in clinic this week continue with 2 layer compression.  Wound is decreasing in size.    5/3/2021: Clinic visit with KORI Villalobos. Patient states that they are feeling well today.  Patient denies fever, chills, nausea, vomiting, lightheadedness, dizziness, shortness of breath and chest pain.  Patient's wound is smaller in clinic today.  Collagen placed to wound bed to support granulation tissue growth and formation.  Continue current plan of care    5/11/2021: Clinic visit with KORI Villalobos. Patient states that they are feeling well today.  Patient denies fever, chills, nausea, vomiting, lightheadedness, dizziness, shortness of breath and chest pain.  Wound continues to decrease in size almost resolved continue current plan of care.    5/17/2021: Clinic visit with KORI Villalobos. Patient states that they are feeling well today.  Patient denies fever, chills, nausea, vomiting, lightheadedness, dizziness, shortness of breath and chest pain.  Patient's wound has 100% epithelialized.  Patient instructed to cover wound with zinc and silicone adhesive foam for approximately 3 weeks.  This will help protect the wound and strengthen epidural the epithelial tissue and allow for collagen 3 to transition to collagen 1 providing stronger tissue.  Patient will be discharge from  at this time.  Patient also instructed to continue to take in adequate amounts of protein, vitamin C and zinc to promote resolution of wound healing and strengthening.        REVIEW OF SYSTEMS:   Review of Systems   Constitutional: Negative for chills and fever.   HENT: Negative for hearing loss.    Eyes: Negative for blurred vision and double vision.   Respiratory: Negative for cough, shortness of breath and wheezing.    Cardiovascular: Negative for chest pain, palpitations and leg swelling.   Gastrointestinal: Negative for constipation,  diarrhea, nausea and vomiting.   Musculoskeletal: Positive for back pain and joint pain.        Patient with recent fractures see history note   Skin: Negative for itching and rash.        Hemosiderin staining bilateral lower extremities.  Ulcer to left lower extremity resolved   Neurological: Negative for dizziness and headaches.   Psychiatric/Behavioral: The patient is nervous/anxious.         Nervousness and anxiety significantly decreased as wound decreases in size.       PHYSICAL EXAMINATION:   /68   Pulse 63   Temp 36.4 °C (97.6 °F)   Resp 18   LMP 06/27/2011   SpO2 99%   Physical Exam  Constitutional:       General: She is not in acute distress.     Appearance: She is not diaphoretic.   HENT:      Head: Normocephalic and atraumatic.   Eyes:      Conjunctiva/sclera: Conjunctivae normal.      Pupils: Pupils are equal, round, and reactive to light.   Cardiovascular:      Comments: Biphasic pulses heard via Doppler to DP and TP  Pulmonary:      Effort: Pulmonary effort is normal. No respiratory distress.      Breath sounds: No wheezing.   Musculoskeletal:         General: Tenderness present.      Cervical back: Normal range of motion and neck supple.      Comments: Multiple fractures to sacral and thoracic spine see history note above.   Skin:     Findings: No erythema.      Comments: Patient has hemosiderin staining to bilateral lower extremities however wound has resolved at this time.     Neurological:      Mental Status: She is alert and oriented to person, place, and time.   Psychiatric:      Comments: Patient has severe anxiety regarding light debridement                            Pertinent Labs and Diagnostics:    Labs:     A1c:   Lab Results   Component Value Date/Time    HBA1C 5.6 02/06/2021 01:39 PM          IMAGING: N/A    VASCULAR STUDIES: Venous duplex study due to be completed on 3/29/2021    LAST  WOUND CULTURE:  DATE : N/A           ASSESSMENT AND PLAN:     1.  Ulcer of left lower  extremity with fat layer exposed (HCC)  Comments: Patient has history of recurring ulcers to her ankles.  Most recently, ulcer appeared spontaneously to left lateral ankle, first noted during hospitalization in February 2021 for unrelated reason.    05/17/21: Resolved    Wound care: Zinc barrier paste and silicone adhesive foam    2. Venous insufficiency of both lower extremities  Comments: Hemosiderin staining, varicose veins, scarring from previous wounds, left lateral lower extremity wound all consistent with venous stasis and venous ulcers.  -Patient presented to clinic today with her own compression stockings  -Patient instructed that she will need lifelong compression and is to wear compression stockings daily with at least 20 to 30 mmHg compression preferably 30-40 if tolerated.      3.  Pain associated with wound  - no debridement performed in clinic today will be discharged from [facility] at this time.      PATIENT EDUCATION  - Importance of adequate nutrition for wound healing  -Advised to go to ER for any increased redness, swelling, drainage, or odor, or if patient develops fever, chills, nausea or vomiting.          Please note that this note may have been created using voice recognition software. I have worked with technical experts from [employer] to optimize the interface.  I have made every reasonable attempt to correct obvious errors, but there may be errors of grammar and possibly content that I did not discover before finalizing the note.    N

## 2021-05-24 ENCOUNTER — APPOINTMENT (OUTPATIENT)
Dept: WOUND CARE | Facility: MEDICAL CENTER | Age: 56
End: 2021-05-24
Attending: STUDENT IN AN ORGANIZED HEALTH CARE EDUCATION/TRAINING PROGRAM
Payer: COMMERCIAL

## 2021-05-25 ENCOUNTER — ANTICOAGULATION MONITORING (OUTPATIENT)
Dept: VASCULAR LAB | Facility: MEDICAL CENTER | Age: 56
End: 2021-05-25

## 2021-05-25 LAB — INR PPP: 3.1 (ref 2–3.5)

## 2021-05-25 NOTE — PROGRESS NOTES
Anticoagulation Summary  As of 5/25/2021    INR goal:  2.0-3.0   TTR:  62.7 % (6 y)   INR used for dosing:  3.10 (5/25/2021)   Warfarin maintenance plan:  7.5 mg (5 mg x 1.5) every Mon, Fri; 5 mg (5 mg x 1) all other days   Weekly warfarin total:  40 mg   Plan last modified:  Claudia FerroD (3/31/2021)   Next INR check:  6/8/2021   Priority:  Maintenance   Target end date:  Indefinite    Indications    Deep vein thrombosis [453.40] [I82.409]             Anticoagulation Episode Summary     INR check location:  Home Draw    Preferred lab:      Send INR reminders to:      Comments:  Eloy MYRICK      Anticoagulation Care Providers     Provider Role Specialty Phone number    Bruna Ureña M.D. Referring Cardiology [phone]    [facility] Anticoagulation Services Responsible  [phone]    Jaquan Horne M.D.  Family Medicine [phone]        Anticoagulation Patient Findings    Left voicemail message to report a supratherapeutic INR of 3.1.  Pt to continue with current warfarin dosing regimen along with increase in green vegetable intake this week. Requested pt contact the clinic for any s/s of unusual bleeding, bruising, clotting or any changes to diet or medication. FU 2 weeks.  Alexis Garcia, PharmD, BCACP

## 2021-06-09 ENCOUNTER — ANTICOAGULATION MONITORING (OUTPATIENT)
Dept: VASCULAR LAB | Facility: MEDICAL CENTER | Age: 56
End: 2021-06-09

## 2021-06-09 LAB — INR PPP: 2.4 (ref 2–3.5)

## 2021-06-09 NOTE — PROGRESS NOTES
Anticoagulation Summary  As of 2021    INR goal:  2.0-3.0   TTR:  62.8 % (6 y)   INR used for dosin.40 (2021)   Warfarin maintenance plan:  7.5 mg (5 mg x 1.5) every Mon, Fri; 5 mg (5 mg x 1) all other days   Weekly warfarin total:  40 mg   Plan last modified:  Debi Ahuja, PharmD (3/31/2021)   Next INR check:  2021   Priority:  Maintenance   Target end date:  Indefinite    Indications    Deep vein thrombosis [453.40] [I82.409]             Anticoagulation Episode Summary     INR check location:  Home Draw    Preferred lab:      Send INR reminders to:      Comments:  Eloy MYRICK      Anticoagulation Care Providers     Provider Role Specialty Phone number    Bruna Ureña M.D. [facility] Cardiology [phone]    [facility] Anticoagulation Services Responsible  [phone]    Jaquan Horne M.D.  Family Medicine [phone]        Anticoagulation Patient Findings      Left voicemail message to report a therapeutic INR of 2.4.    Pt to continue with current warfarin dosing regimen. Requested pt contact the clinic for any s/s of unusual bleeding, bruising, clotting or any changes to diet or medication.    FU INR in 2 week(s).    Cayla Navas, Pharmacy Intern

## 2021-07-01 ENCOUNTER — TELEPHONE (OUTPATIENT)
Dept: VASCULAR LAB | Facility: MEDICAL CENTER | Age: 56
End: 2021-07-01

## 2021-07-01 NOTE — TELEPHONE ENCOUNTER
[facility]    Patient called the clinic to report that she has been super busy and put off testing her INR until today, and when she attempted to test her INR today, she foun that apparently her strips are  as of 21 (yesterday).   She called [employer] to order more strips, but it will take a few weeks to come in the mail.   Patient just wanted to update us on the status on why we have not received an INR result.   She will test as soon as she receives her new strips.       Ed Sarmiento  PharmD

## 2021-07-14 ENCOUNTER — TELEPHONE (OUTPATIENT)
Dept: VASCULAR LAB | Facility: MEDICAL CENTER | Age: 56
End: 2021-07-14

## 2021-07-15 ENCOUNTER — DOCUMENTATION (OUTPATIENT)
Dept: VASCULAR LAB | Facility: MEDICAL CENTER | Age: 56
End: 2021-07-15

## 2021-07-15 ENCOUNTER — ANTICOAGULATION MONITORING (OUTPATIENT)
Dept: VASCULAR LAB | Facility: MEDICAL CENTER | Age: 56
End: 2021-07-15

## 2021-07-15 LAB — INR PPP: 1.8 (ref 2–3.5)

## 2021-07-15 NOTE — PROGRESS NOTES
Pt LVM requesting to reschedule 07/20 w/ APRN to a later date.     I called pt back but had to LVM.

## 2021-07-16 NOTE — PROGRESS NOTES
OP Telephone Anticoagulation Service Note    Date: 7/15/2021      Anticoagulation Summary  As of 7/15/2021    INR goal:  2.0-3.0   TTR:  62.9 % (6.1 y)   INR used for dosin.80 (7/15/2021)   Warfarin maintenance plan:  7.5 mg (5 mg x 1.5) every Mon, Fri; 5 mg (5 mg x 1) all other days   Weekly warfarin total:  40 mg   Plan last modified:  Claudia FerroD (3/31/2021)   Next INR check:  2021   Priority:  Maintenance   Target end date:  Indefinite    Indications    Deep vein thrombosis [453.40] [I82.409]             Anticoagulation Episode Summary     INR check location:  Home Draw    Preferred lab:      Send INR reminders to:      Comments:  Eloy MYRICK      Anticoagulation Care Providers     Provider Role Specialty Phone number    Bruna Ureña M.D. [facility] Cardiology [phone]    [facility] Anticoagulation Services Responsible  [phone]    Jaquan Horne M.D.  Family Medicine [phone]        Anticoagulation Patient Findings      INR sub-therapeutic at 1.8.  Left voicemail message for pt.  Verified regimen.  Instructed pt to bolus x 1 dose w/ 7.5 mg and to then continue on w/ her current regimen.  Told pt to call if any s/s of bleeding or medication changes.  Check INR in 2 week(s).  Instructed pt to call clinic at [phone] if there are any questions.    Lazaro Cerda, ClaudiaD     Detail Level: Zone

## 2021-07-22 ENCOUNTER — DOCUMENTATION (OUTPATIENT)
Dept: VASCULAR LAB | Facility: MEDICAL CENTER | Age: 56
End: 2021-07-22

## 2021-08-05 ENCOUNTER — ANTICOAGULATION MONITORING (OUTPATIENT)
Dept: VASCULAR LAB | Facility: MEDICAL CENTER | Age: 56
End: 2021-08-05

## 2021-08-05 LAB — INR PPP: 2.6 (ref 2–3.5)

## 2021-08-06 NOTE — PROGRESS NOTES
OP Telephone Anticoagulation Service Note    Date: 2021      Anticoagulation Summary  As of 2021    INR goal:  2.0-3.0   TTR:  63.0 % (6.2 y)   INR used for dosin.60 (2021)   Warfarin maintenance plan:  7.5 mg (5 mg x 1.5) every Mon, Fri; 5 mg (5 mg x 1) all other days   Weekly warfarin total:  40 mg   Plan last modified:  Claudia FerroD (3/31/2021)   Next INR check:  2021   Priority:  Maintenance   Target end date:  Indefinite    Indications    Deep vein thrombosis [453.40] [I82.409]             Anticoagulation Episode Summary     INR check location:  Home Draw    Preferred lab:      Send INR reminders to:      Comments:  Eloy MYRICK      Anticoagulation Care Providers     Provider Role Specialty Phone number    Bruna Ureña M.D. [facility] Cardiology [phone]    [facility] Anticoagulation Services Responsible  [phone]    Jaquan Horne M.D.  Family Medicine [phone]        Anticoagulation Patient Findings      INR therapeutic at 2.6.  Left voicemail message for pt.  Verified regimen.  Instructed pt to continue on with current regimen.  Told pt to call if any s/s of bleeding or medication changes.  Check INR in 2 week(s).  Instructed pt to call clinic at [phone] if there are any questions.    Lazaro Cerda, PharmD

## 2021-08-25 DIAGNOSIS — I71.20 ANEURYSM OF THORACIC AORTA (HCC): ICD-10-CM

## 2021-08-25 DIAGNOSIS — I71.00 DISSECTING ANEURYSM OF AORTA (HCC): ICD-10-CM

## 2021-08-25 DIAGNOSIS — I10 ESSENTIAL HYPERTENSION: ICD-10-CM

## 2021-08-25 RX ORDER — SPIRONOLACTONE 25 MG/1
25 TABLET ORAL DAILY
Qty: 90 TABLET | Refills: 3 | Status: SHIPPED | OUTPATIENT
Start: 2021-08-25 | End: 2021-08-30 | Stop reason: SDUPTHER

## 2021-08-25 RX ORDER — METOPROLOL SUCCINATE 100 MG/1
100 TABLET, EXTENDED RELEASE ORAL DAILY
Qty: 90 TABLET | Refills: 3 | Status: SHIPPED | OUTPATIENT
Start: 2021-08-25 | End: 2021-08-30 | Stop reason: SDUPTHER

## 2021-08-25 RX ORDER — LOSARTAN POTASSIUM 100 MG/1
100 TABLET ORAL DAILY
Qty: 90 TABLET | Refills: 3 | Status: SHIPPED | OUTPATIENT
Start: 2021-08-25 | End: 2021-08-30 | Stop reason: SDUPTHER

## 2021-08-26 LAB — INR PPP: 2.6 (ref 2–3.5)

## 2021-08-27 ENCOUNTER — ANTICOAGULATION MONITORING (OUTPATIENT)
Dept: VASCULAR LAB | Facility: MEDICAL CENTER | Age: 56
End: 2021-08-27

## 2021-08-27 LAB
ALBUMIN SERPL-MCNC: 4.5 G/DL (ref 3.8–4.9)
ALBUMIN/GLOB SERPL: 1.6 {RATIO} (ref 1.2–2.2)
ALP SERPL-CCNC: 59 IU/L (ref 48–121)
ALT SERPL-CCNC: 21 IU/L (ref 0–32)
AST SERPL-CCNC: 31 IU/L (ref 0–40)
BILIRUB SERPL-MCNC: 0.3 MG/DL (ref 0–1.2)
BUN SERPL-MCNC: 16 MG/DL (ref 6–24)
BUN/CREAT SERPL: 16 (ref 9–23)
CALCIUM SERPL-MCNC: 9.7 MG/DL (ref 8.7–10.2)
CHLORIDE SERPL-SCNC: 103 MMOL/L (ref 96–106)
CHOLEST SERPL-MCNC: 123 MG/DL (ref 100–199)
CO2 SERPL-SCNC: 22 MMOL/L (ref 20–29)
CREAT SERPL-MCNC: 1 MG/DL (ref 0.57–1)
GLOBULIN SER CALC-MCNC: 2.9 G/DL (ref 1.5–4.5)
GLUCOSE SERPL-MCNC: 86 MG/DL (ref 65–99)
HDLC SERPL-MCNC: 60 MG/DL
LABORATORY COMMENT REPORT: NORMAL
LDLC SERPL CALC-MCNC: 45 MG/DL (ref 0–99)
POTASSIUM SERPL-SCNC: 5.2 MMOL/L (ref 3.5–5.2)
PROT SERPL-MCNC: 7.4 G/DL (ref 6–8.5)
SODIUM SERPL-SCNC: 138 MMOL/L (ref 134–144)
TRIGL SERPL-MCNC: 94 MG/DL (ref 0–149)
VLDLC SERPL CALC-MCNC: 18 MG/DL (ref 5–40)

## 2021-08-27 NOTE — PROGRESS NOTES
OP Telephone Anticoagulation Service Note    Date: 2021      Anticoagulation Summary  As of 2021    INR goal:  2.0-3.0   TTR:  63.3 % (6.3 y)   INR used for dosin.60 (2021)   Warfarin maintenance plan:  7.5 mg (5 mg x 1.5) every Mon, Fri; 5 mg (5 mg x 1) all other days   Weekly warfarin total:  40 mg   Plan last modified:  Debi Ahuja, PharmD (3/31/2021)   Next INR check:     Priority:  Maintenance   Target end date:  Indefinite    Indications    Deep vein thrombosis [453.40] [I82.409]             Anticoagulation Episode Summary     INR check location:  Home Draw    Preferred lab:      Send INR reminders to:      Comments:  Eloy MYRICK      Anticoagulation Care Providers     Provider Role Specialty Phone number    Bruna Ureña M.D. [facility] Cardiology [phone]    [facility] Anticoagulation Services Responsible  [phone]    Jaquan Horne M.D.  Family Medicine [phone]        Anticoagulation Patient Findings      INR therapeutic at 2.6.  Left voicemail message for pt.  Verified regimen.  Instructed pt to continue current warfarin regimen.  Told pt to call if any s/s of bleeding or medication changes.  Check INR in 2 week(s).  Instructed pt to call clinic at [phone] if there are any questions.    Jamaica Chester, Pharmacy Intern.

## 2021-08-30 ENCOUNTER — OFFICE VISIT (OUTPATIENT)
Dept: VASCULAR LAB | Facility: MEDICAL CENTER | Age: 56
End: 2021-08-30
Payer: COMMERCIAL

## 2021-08-30 DIAGNOSIS — I82.409 DVT (DEEP VENOUS THROMBOSIS) (HCC): Chronic | ICD-10-CM

## 2021-08-30 DIAGNOSIS — I71.00 DISSECTING ANEURYSM OF AORTA (HCC): ICD-10-CM

## 2021-08-30 DIAGNOSIS — I08.0 MITRAL VALVE INSUFFICIENCY AND AORTIC VALVE INSUFFICIENCY: Chronic | ICD-10-CM

## 2021-08-30 DIAGNOSIS — E78.5 DYSLIPIDEMIA: ICD-10-CM

## 2021-08-30 DIAGNOSIS — I10 ESSENTIAL HYPERTENSION: Chronic | ICD-10-CM

## 2021-08-30 DIAGNOSIS — I71.03 DISSECTION OF THORACOABDOMINAL AORTA (HCC): ICD-10-CM

## 2021-08-30 DIAGNOSIS — Z79.01 CHRONIC ANTICOAGULATION: ICD-10-CM

## 2021-08-30 DIAGNOSIS — D68.59 PROTEIN S DEFICIENCY (HCC): ICD-10-CM

## 2021-08-30 DIAGNOSIS — I71.20 ANEURYSM OF THORACIC AORTA (HCC): ICD-10-CM

## 2021-08-30 DIAGNOSIS — I82.5Z9 CHRONIC DEEP VEIN THROMBOSIS (DVT) OF DISTAL VEIN OF LOWER EXTREMITY, UNSPECIFIED LATERALITY (HCC): Chronic | ICD-10-CM

## 2021-08-30 PROBLEM — I38 VALVULAR HEART DISEASE: Status: ACTIVE | Noted: 2021-08-30

## 2021-08-30 PROBLEM — F17.200 TOBACCO DEPENDENCE SYNDROME: Status: ACTIVE | Noted: 2021-08-30

## 2021-08-30 PROBLEM — I82.90 VENOUS THROMBOSIS: Status: ACTIVE | Noted: 2021-08-30

## 2021-08-30 PROBLEM — F39 AFFECTIVE DISORDER (HCC): Status: ACTIVE | Noted: 2021-08-30

## 2021-08-30 PROBLEM — E03.9 HYPOTHYROIDISM: Status: ACTIVE | Noted: 2021-08-30

## 2021-08-30 PROCEDURE — 99214 OFFICE O/P EST MOD 30 MIN: CPT | Performed by: NURSE PRACTITIONER

## 2021-08-30 RX ORDER — WARFARIN SODIUM 5 MG/1
TABLET ORAL
Qty: 135 TABLET | Refills: 1 | Status: SHIPPED | OUTPATIENT
Start: 2021-08-30 | End: 2021-12-14 | Stop reason: SDUPTHER

## 2021-08-30 RX ORDER — ROSUVASTATIN CALCIUM 20 MG/1
TABLET, COATED ORAL
COMMUNITY
Start: 2021-07-27 | End: 2021-08-30

## 2021-08-30 RX ORDER — METOPROLOL SUCCINATE 100 MG/1
100 TABLET, EXTENDED RELEASE ORAL DAILY
Qty: 90 TABLET | Refills: 3 | Status: SHIPPED | OUTPATIENT
Start: 2021-08-30 | End: 2022-11-14

## 2021-08-30 RX ORDER — ROSUVASTATIN CALCIUM 20 MG/1
20 TABLET, COATED ORAL EVERY EVENING
Qty: 90 TABLET | Refills: 3 | Status: SHIPPED | OUTPATIENT
Start: 2021-08-30 | End: 2022-05-16

## 2021-08-30 RX ORDER — LOSARTAN POTASSIUM 100 MG/1
100 TABLET ORAL DAILY
Qty: 90 TABLET | Refills: 3 | Status: SHIPPED | OUTPATIENT
Start: 2021-08-30 | End: 2022-11-14

## 2021-08-30 RX ORDER — SPIRONOLACTONE 25 MG/1
25 TABLET ORAL DAILY
Qty: 90 TABLET | Refills: 3 | Status: SHIPPED | OUTPATIENT
Start: 2021-08-30 | End: 2022-11-14

## 2021-08-30 ASSESSMENT — ENCOUNTER SYMPTOMS
WEIGHT LOSS: 0
BLOOD IN STOOL: 0
MYALGIAS: 0
SPEECH CHANGE: 0
COUGH: 0
WEAKNESS: 1
BACK PAIN: 1
SENSORY CHANGE: 0
BRUISES/BLEEDS EASILY: 0
SHORTNESS OF BREATH: 0
DEPRESSION: 0
FOCAL WEAKNESS: 0
FALLS: 0
DIZZINESS: 0
NERVOUS/ANXIOUS: 1
PALPITATIONS: 0

## 2021-08-30 NOTE — PROGRESS NOTES
This visit was conducted via [employer] video call using secure and encrypted video conferencing technology due to covid-19 restrictions.   The patient was in a private location in the state of [place].    The patient's identity was confirmed and verbal consent was obtained for this virtual visit.  VASCULAR FOLLOW UP VISIT  Subjective:   Oumar Fisher is a 56 y.o. female who presents today 8/30/21 for   No chief complaint on file.    HPI:  Here for f/u of aortic dissection, dyslipidemia, htn.   BPs in 125-135//70's-80's HR 80-90  Daughter was diagnosed with moyamoya - had brain surgery in [place]  Quit aug 1 - no tobacco since  No change in bp meds  No myalgias on statin - changed to rosuvastatin  No chest, back or abd pain.   Has missed medications on occasion  No bleeding on warfarin  Pt anxious, hyperventilated due to having to take the COVID vaccine due to work requirements  Requesting us to give her an excuse to not have to take the medication      Social History     Tobacco Use   • Smoking status: Current Every Day Smoker   • Smokeless tobacco: Never Used   Vaping Use   • Vaping Use: Never used   Substance Use Topics   • Alcohol use: Never   • Drug use: Never     DIET AND EXERCISE:  Weight Change: up about 8 pounds and stable  Diet: common adult  Exercise: minimal exercise   Smoking - quit     Review of Systems   Constitutional: Negative for malaise/fatigue and weight loss.   HENT: Negative for nosebleeds.    Respiratory: Negative for cough and shortness of breath.    Cardiovascular: Negative for chest pain, palpitations and leg swelling.   Gastrointestinal: Negative for blood in stool.   Genitourinary: Negative for hematuria.   Musculoskeletal: Positive for back pain. Negative for falls and myalgias.   Skin:        Wound on Lt outer ankle    Neurological: Positive for weakness. Negative for dizziness, sensory change, speech change and focal weakness.   Endo/Heme/Allergies: Does not bruise/bleed easily.    Psychiatric/Behavioral: Negative for depression. The patient is nervous/anxious.       Objective:     There were no vitals filed for this visit.   There is no height or weight on file to calculate BMI.  Physical Exam  Vitals reviewed.   Constitutional:       General: She is not in acute distress.     Appearance: She is well-developed.   Pulmonary:      Effort: No respiratory distress.   Skin:     Coloration: Skin is not jaundiced or pale.   Neurological:      Mental Status: She is alert and oriented to person, place, and time.   Psychiatric:         Mood and Affect: Mood normal.         Behavior: Behavior normal.     Data review  Lab Results   Component Value Date    CHOLSTRLTOT 123 08/26/2021    CHOLSTRLTOT 94 (L) 02/07/2021    LDL 26 02/07/2021    HDL 60 08/26/2021    HDL 39 (A) 02/07/2021    TRIGLYCERIDE 94 08/26/2021    TRIGLYCERIDE 147 02/07/2021      Lab Results   Component Value Date    PROTHROMBTM 20.4 (H) 02/10/2021    INR 2.60 08/26/2021       Lab Results   Component Value Date    HBA1C 5.6 02/06/2021      Blood work May 2020 from lab core  Glucose 90  GFR 70  Total cholesterol 183, triglycerides 259, HDL 60, LDL 71,  TSH 5.29  Urine for albumin less than 3    CTA October 2019  Endovascular intervention stable  No endoleak     Echo RDC aug 2020  Normal ef  Mild AI and MR     CTA 2/6/21  1.  Extensive atherosclerotic change of the aorta and originating vessels. No evidence of aortic dissection.     2.  Mild ectasia of the ascending aorta measured at 3.8 cm in diameter.     3.  Descending thoracic aortic stent present.     4.  Fatty liver.     5.  Inferior vena cava filter present.     6.  Multilevel thoracic spine compression fractures which are possibly chronic in nature.  Lab Results   Component Value Date    SODIUM 138 08/26/2021    SODIUM 137 02/08/2021    POTASSIUM 5.2 08/26/2021    POTASSIUM 4.0 02/08/2021    CHLORIDE 103 08/26/2021    CHLORIDE 100 02/08/2021    CO2 22 08/26/2021    CO2 26  02/08/2021    GLUCOSE 86 08/26/2021    GLUCOSE 81 02/08/2021    BUN 16 08/26/2021    BUN 12 02/08/2021    CREATININE 1.00 08/26/2021    CREATININE 0.70 02/08/2021    BUNCREATRAT 16 08/26/2021    IFAFRICA 74 08/26/2021    IFAFRICA >60 02/08/2021    IFNOTAFR 64 08/26/2021    IFNOTAFR >60 02/08/2021      Lab Results   Component Value Date    TSH 3.230 03/09/2021      Lab Results   Component Value Date    WBC 6.1 03/09/2021    WBC 5.1 02/08/2021    RBC 4.88 03/09/2021    RBC 4.17 (L) 02/08/2021    HEMOGLOBIN 15.4 03/09/2021    HEMOGLOBIN 13.4 02/08/2021    HEMATOCRIT 46.4 03/09/2021    HEMATOCRIT 40.1 02/08/2021    MCV 95 03/09/2021    MCV 96.2 02/08/2021    MCH 31.6 03/09/2021    MCH 32.1 02/08/2021    MCHC 33.2 03/09/2021    MCHC 33.4 (L) 02/08/2021    MPV 10.3 02/08/2021      Medical Decision Making:  Today's Assessment / Status / Plan:     1. Dissection of thoracoabdominal aorta (HCC)     2. Chronic deep vein thrombosis (DVT) of distal vein of lower extremity, unspecified laterality (HCC)     3. Dyslipidemia     4. Essential hypertension     5. Protein S deficiency (HCC)       Patient Type: Secondary Prevention    Etiology of Established CVD if Present:   1) Aortic dissection status post graft  2) valvular heart disease without previous intervention  3) DVT    Lipid Management: Qualifies for Statin Therapy   Currently on Statin: Yes  Would like to see LDL less than 70 and non HDL less than 100, Borderline at goal but probably due to inactivity because of her T11 & T12 compression fx and ankle wound  At goal since change to rosuvastatic  Plan:  -continue rosuvastatin 20 mg  -Repeat lipid panel 6-12 mo    Blood Pressure Management:  ACC-AHA blood pressure goal less than 130/80  Home bp under reasonable control per her report  A bit elevated in office today  Has gained some weight which may have led to BP increase  GFR normal and no albuminuria  Plan:  -Intensify therapy lifestyle change  -Continue metoprolol at  current dose  -Continue losartan at current dose  -Continue spironolactone at current dose  -Continue to follow blood pressures at home  -Intensify therapy if above goal next visit    Glycemic Status: Normal  -Recheck fasting glucose prior to next visit    Anti-Platelet/Anti-Coagulant Tx: yes  -Continue indefinite anticoagulation  -Follow-up in anticoagulation clinic    Smoking: Quit recently  Uninterested medications or NRT  -continue  Complete cessation  -Continue to address at every visit    Physical Activity: Recommended more walking    Weight Management and Nutrition: More attention to diet with slow steady weight loss    Other:     1) Aortic dissection status post graft -stable on follow-up imaging.  Continue medical management.  Recheck CTA at 2-year intervals    2) valvular heart disease without previous intervention -remains asymptomatic. Limited on echo. Does not need regular follow up echo    3) DVT -continue indefinite anticoagulation as above.  Quit smoking    4) hypothyroidism - perhaps a bit symptomatic..  TSH elevated consistent with under treatment.  Has not been completely adherent with medications. Increase levo to 8 pills per day. Recheck tsh before next visit and continue to adjust as needed. F/u with pcp    5) Wound Lt ankle -Continue with wound care     6) anxiety related to requirement of covid vaccine- Encourage her to use breathing techniques, calming apps on the cell phone or counseling.      Instructed to follow-up with PCP for remainder of adult medical needs: yes  We will partner with other providers in the management of established vascular disease and cardiometabolic risk factors.    Studies to Be Obtained: CTA thoracoabdominal aorta Feb 2023    Labs to Be Obtained: As above prior to next visit    Follow up in: 6 months  Time: 30-39min - chart review/prep, review of other providers' records, imaging/lab review, face-to-face time for history/examination, ordering, prescribing,  review  of results/meds/ treatment plan with patient/family/caregiver, documentation in EMR, care coordination (as needed)    MAINOR Trimble.     CC: Jaquan Horne

## 2021-09-17 ENCOUNTER — ANTICOAGULATION MONITORING (OUTPATIENT)
Dept: VASCULAR LAB | Facility: MEDICAL CENTER | Age: 56
End: 2021-09-17

## 2021-09-17 LAB — INR PPP: 2.3 (ref 2–3.5)

## 2021-10-11 ENCOUNTER — ANTICOAGULATION MONITORING (OUTPATIENT)
Dept: MEDICAL GROUP | Facility: PHYSICIAN GROUP | Age: 56
End: 2021-10-11

## 2021-10-11 LAB — INR PPP: 3.5 (ref 2–3.5)

## 2021-10-12 ENCOUNTER — DOCUMENTATION (OUTPATIENT)
Dept: VASCULAR LAB | Facility: MEDICAL CENTER | Age: 56
End: 2021-10-12

## 2021-10-12 NOTE — PROGRESS NOTES
[facility]      Pt is over due for PT/INR for warfarin monitoring. Left message for patient to have INR checked ASAP.     Clinic phone number left for any questions or concerns.    Ed Sarmiento  PharmD

## 2021-10-12 NOTE — PROGRESS NOTES
Anticoagulation Summary  As of 10/11/2021    INR goal:  2.0-3.0   TTR:  63.6 % (6.4 y)   INR used for dosing:  3.50 (10/11/2021)   Warfarin maintenance plan:  7.5 mg (5 mg x 1.5) every Mon, Fri; 5 mg (5 mg x 1) all other days   Weekly warfarin total:  40 mg   Plan last modified:  Debi Ahuja PharmD (3/31/2021)   Next INR check:  10/26/2021   Priority:  Maintenance   Target end date:  Indefinite    Indications    Deep vein thrombosis [453.40] [I82.409]             Anticoagulation Episode Summary     INR check location:  Home Draw    Preferred lab:      Send INR reminders to:      Comments:  Eloy MYRICK      Anticoagulation Care Providers     Provider Role Specialty Phone number    Bruna Ureña M.D. Referring Cardiovascular Disease (Cardiology) [phone]    [facility] Responsible  [phone]    Jaquan Horne M.D.  Family Medicine [phone]        Anticoagulation Patient Findings      Left voicemail message to report a supratherapeutic INR of 3.5.    Will have pt HOLD dose of warfarin today of 5 mg and then   Pt to continue with current warfarin dosing regimen. Requested pt contact the clinic for any s/s of unusual bleeding, bruising, clotting or any changes to diet or medication.    FU INR in 2 week(s).    Debi Ahuja, PharmD

## 2021-11-01 ENCOUNTER — ANTICOAGULATION MONITORING (OUTPATIENT)
Dept: VASCULAR LAB | Facility: MEDICAL CENTER | Age: 56
End: 2021-11-01

## 2021-11-01 LAB — INR PPP: 3.5 (ref 2–3.5)

## 2021-11-01 NOTE — PROGRESS NOTES
Anticoagulation Summary  As of 11/1/2021    INR goal:  2.0-3.0   TTR:  63.1 % (6.4 y)   INR used for dosing:  3.50 (10/29/2021)   Warfarin maintenance plan:  5 mg (5 mg x 1) every day   Weekly warfarin total:  35 mg   Plan last modified:  Debi Ahuja PharmD (11/1/2021)   Next INR check:  11/15/2021   Priority:  Maintenance   Target end date:  Indefinite    Indications    Deep vein thrombosis [453.40] [I82.409]             Anticoagulation Episode Summary     INR check location:  Home Draw    Preferred lab:      Send INR reminders to:      Comments:  Eloy MYRICK      Anticoagulation Care Providers     Provider Role Specialty Phone number    Bruna Ureña M.D. Referring Cardiovascular Disease (Cardiology) [phone]    [facility] Responsible  [phone]    Jaquan Horne M.D.  Family Medicine [phone]        Anticoagulation Patient Findings      Left voicemail message to report a supratherapeutic INR of 3.5    Pt to continue with decreased warfarin dosing regimen. Requested pt contact the clinic for any s/s of unusual bleeding, bruising, clotting or any changes to diet or medication.    FU INR in 2 week(s).    Debi Ahuja, PharmD

## 2021-11-02 ENCOUNTER — OFFICE VISIT (OUTPATIENT)
Dept: WOUND CARE | Facility: MEDICAL CENTER | Age: 56
End: 2021-11-02
Attending: STUDENT IN AN ORGANIZED HEALTH CARE EDUCATION/TRAINING PROGRAM
Payer: COMMERCIAL

## 2021-11-02 VITALS
DIASTOLIC BLOOD PRESSURE: 80 MMHG | RESPIRATION RATE: 16 BRPM | HEART RATE: 77 BPM | TEMPERATURE: 97.8 F | SYSTOLIC BLOOD PRESSURE: 161 MMHG | OXYGEN SATURATION: 96 %

## 2021-11-02 DIAGNOSIS — I87.2 VENOUS INSUFFICIENCY: ICD-10-CM

## 2021-11-02 DIAGNOSIS — L97.922 ULCER OF LEFT LOWER EXTREMITY WITH FAT LAYER EXPOSED (HCC): ICD-10-CM

## 2021-11-02 PROCEDURE — 11042 DBRDMT SUBQ TIS 1ST 20SQCM/<: CPT

## 2021-11-02 PROCEDURE — 99214 OFFICE O/P EST MOD 30 MIN: CPT

## 2021-11-02 PROCEDURE — 11042 DBRDMT SUBQ TIS 1ST 20SQCM/<: CPT | Performed by: NURSE PRACTITIONER

## 2021-11-02 PROCEDURE — 99214 OFFICE O/P EST MOD 30 MIN: CPT | Mod: 25 | Performed by: NURSE PRACTITIONER

## 2021-11-02 ASSESSMENT — ENCOUNTER SYMPTOMS
VOMITING: 0
WEAKNESS: 0
NAUSEA: 0
CHILLS: 0
DIARRHEA: 0
HEADACHES: 0
CONSTIPATION: 0
FEVER: 0
DIZZINESS: 0
PALPITATIONS: 0

## 2021-11-02 NOTE — PROGRESS NOTES
Provider Encounter- Lower Extremity Ulcer      HISTORY OF PRESENT ILLNESS  Wound History:   START OF CARE IN CLINIC: 11/2/2021   REFERRING PROVIDER: Chris Horne   WOUND ETIOLOGY: venous   LOCATION: Left medial lower extremity   HISTORY: Patient is well-known to the clinic from previous venous ulcer to the left medial lower extremity.  Patient presents to clinic today reporting that she has had this new left medial ankle ulcer for over a month however, she was reluctant to make an appointment due to reported pain from compression wraps and debridement.  Patient reports that she is being followed by Dr. Dillon and has planned for vascular studies around November 11.    Pertinent Medical History:   Past Medical History:   Diagnosis Date   • Anticoagulated 9/21/2010   • Anxiety 9/21/2010   • ASTHMA 9/21/2010   • Dissecting aneurysm of aorta ([facility]) 9/21/2010   • Dissection of aorta, thoracic ([facility]) 3/10/2010   • DVT (deep venous thrombosis) ([facility]) 11/2/2009   • Grave's disease 9/21/2010   • HTN (hypertension) 8/12/2010   • Leiomyoma of uterus, unspecified 9/30/2007   • Long term (current) use of anticoagulants 8/19/2011   • Mitral valve insufficiency and aortic valve insufficiency 6/25/2010   • Murmur 2/18/2009   • Status post insertion of inferior vena caval filter 9/21/2010   • Thoracic aneurysm without mention of rupture 8/12/2010        VASCULAR HISTORY:  Vascular Surgeon: Dr. Dillon. Compression: two layer compression  Varicose Veins: present        TOBACCO USE: Current everyday smoker    Patient's problem list, allergies, and current medications reviewed and updated in Epic    Interval History:  11/2/2021: Clinic visit with KORI Villalobos.  Excisional debridement performed in clinic today to remove nonviable fibrous tissue from the wound bed.  Again discussed the use of compression to mobilize venous blood flow back towards the right side the heart.        REVIEW OF SYSTEMS:   Review of Systems    Constitutional: Negative for chills and fever.   Cardiovascular: Negative for chest pain and palpitations.   Gastrointestinal: Negative for constipation, diarrhea, nausea and vomiting.   Skin: Negative for itching and rash.        Open wound left medial lower extremity   Neurological: Negative for dizziness, weakness and headaches.         PHYSICAL EXAMINATION:   BP (!) 161/80 (BP Location: Left arm, Patient Position: Sitting) Comment: RN notified  Pulse 77   Temp 36.6 °C (97.8 °F) (Temporal)   Resp 16   LMP 06/27/2011   SpO2 96%     Physical Exam  HENT:      Head: Normocephalic.   Eyes:      Pupils: Pupils are equal, round, and reactive to light.   Pulmonary:      Effort: Pulmonary effort is normal. No respiratory distress.      Breath sounds: No wheezing.   Skin:     Comments: Venous ulcer left medial lower extremity  Hemosiderosis bilateral lower extremities   Neurological:      General: No focal deficit present.      Mental Status: She is alert and oriented to person, place, and time.   Psychiatric:         Mood and Affect: Mood normal.         WOUND ASSESSMENT  Wound 11/02/21 Left Medial LE (Active)   Wound Image    11/02/21 1600   Site Assessment Pink;Yellow 11/02/21 1600   Periwound Assessment Hemosiderin Staining;Dry;Intact;Edema 11/02/21 1600   Margins Attached edges 11/02/21 1600   Drainage Amount Moderate 11/02/21 1600   Drainage Description Serosanguineous 11/02/21 1600   Treatments Cleansed;Topical Lidocaine;Provider debridement;Site care;Compression 11/02/21 1600   Wound Cleansing Puracyn [place] 11/02/21 1600   Periwound Protectant Skin Protectant Wipes to Periwound;Barrier Paste 11/02/21 1600   Dressing Cleansing/Solutions Not Applicable 11/02/21 1600   Dressing Options Honey Gel;Hydrofiber;Silicone Adhesive Foam;Compression Wrap Two Layer 11/02/21 1600   Dressing Changed New 11/02/21 1600   Dressing Status Clean;Dry;Intact 11/02/21 1600   Non-staged Wound Description Full thickness 11/02/21  1600   Wound Length (cm) 0.5 cm 11/02/21 1600   Wound Width (cm) 1 cm 11/02/21 1600   Wound Depth (cm) 0.1 cm 11/02/21 1600   Wound Surface Area (cm^2) 0.5 cm^2 11/02/21 1600   Wound Volume (cm^3) 0.05 cm^3 11/02/21 1600   Post-Procedure Length (cm) 0.6 cm 11/02/21 1600   Post-Procedure Width (cm) 1 cm 11/02/21 1600   Post-Procedure Depth (cm) 0.1 cm 11/02/21 1600   Post-Procedure Surface Area (cm^2) 0.6 cm^2 11/02/21 1600   Post-Procedure Volume (cm^3) 0.06 cm^3 11/02/21 1600   Tunneling (cm) 0 cm 11/02/21 1600   Undermining (cm) 0 cm 11/02/21 1600   Wound Odor None 11/02/21 1600   Exposed Structures None 11/02/21 1600   Number of days: 0       PROCEDURE:   -2% viscous lidocaine applied topically to wound bed for approximately 5 minutes prior to debridement  -Curette used to debride wound bed.  Excisional debridement was performed to remove devitalized tissue until healthy, bleeding tissue was visualized.   Entire surface of wound, 0.6 cm2 debrided.  Tissue debrided into the subcutaneous  layer.    -Bleeding controlled with manual pressure.    -Wound care completed by wound RN, refer to flowsheet  -Patient tolerated the procedure well, without c/o pain or discomfort.       Pertinent Labs and Diagnostics:    Labs:     IMAGING: No results found.    VASCULAR STUDIES: No results found.     LAST  WOUND CULTURE:   Lab Results   Component Value Date/Time    CULTRSULT No growth after 5 days of incubation. 02/06/2021 02:10 PM              ASSESSMENT AND PLAN:     1. Ulcer of left lower extremity with fat layer exposed (HCC)  -Excisional debridement of wound in clinic today, medically necessary to promote wound healing.  -Patient to return to clinic weekly for assessment and debridement  -Patient to change dressing 1-2 times per week in between clinic visits   Wound care: Honey gel, Hydrofiber, silk adhesive foam, 2 layer compression wrap      2. Venous insufficiency  Comments: Complicating factor.  Patient is being followed  by Dr. Dillon vascular surgery.  -Patient with hemosiderosis bilateral lower extremities varicose veins bilateral lower extremities.  -Patient has a follow-up with Dr. Dillon in the next few weeks for imaging.  -2 layer compression wrap initiated in clinic to support venous flow back to his right-sided heart.              PATIENT EDUCATION  - Etiology of  venous stasis ulceration discussed with patient  - Importance of managing edema for healing of ulcer, and for prevention of new ulcer development  - Need for lifelong compression of lower legs   - Elevate legs above the level of the heart periodically throughout the day.  - Importance of adequate nutrition for wound healing  - Advised to go to ER for any increased redness, swelling, drainage or odor, or if patient develops fever, chills, nausea or vomiting.    30 min spent face to face with patient, >50% of time spent counseling, coordinating care, reviewing records, discussing POC, educating patient regarding vascular disease, wound healing and progression. This time was spent in excess to procedure time.       Please note that this note may have been created using voice recognition software. I have worked with technical experts from [employer] to optimize the interface.  I have made every reasonable attempt to correct obvious errors, but there may be errors of grammar and possibly

## 2021-11-02 NOTE — PATIENT INSTRUCTIONS
-Keep your wound dressing clean, dry, and intact.    -Remove your compression wrap if you have severe pain, severe swelling, numbness, color change, or temperature change in your toes. If you need to remove your compression wrap, do so by unrolling it. Do not cut the compression wrap off to prevent cutting yourself on accident.    -Should you experience any significant changes in your wound(s), such as infection (redness, swelling, localized heat, increased pain, fever > 101 F, chills) or have any questions regarding your home care instructions, please contact the wound center at [phone]. If after hours, contact your primary care physician or go to the hospital emergency room.

## 2021-11-08 ENCOUNTER — OFFICE VISIT (OUTPATIENT)
Dept: WOUND CARE | Facility: MEDICAL CENTER | Age: 56
End: 2021-11-08
Attending: STUDENT IN AN ORGANIZED HEALTH CARE EDUCATION/TRAINING PROGRAM
Payer: COMMERCIAL

## 2021-11-08 VITALS
DIASTOLIC BLOOD PRESSURE: 80 MMHG | RESPIRATION RATE: 16 BRPM | TEMPERATURE: 96.8 F | SYSTOLIC BLOOD PRESSURE: 147 MMHG | HEART RATE: 64 BPM | OXYGEN SATURATION: 98 %

## 2021-11-08 DIAGNOSIS — I87.2 VENOUS INSUFFICIENCY: ICD-10-CM

## 2021-11-08 DIAGNOSIS — L97.922 ULCER OF LEFT LOWER EXTREMITY WITH FAT LAYER EXPOSED (HCC): Primary | ICD-10-CM

## 2021-11-08 PROCEDURE — 11042 DBRDMT SUBQ TIS 1ST 20SQCM/<: CPT | Performed by: NURSE PRACTITIONER

## 2021-11-08 PROCEDURE — 99213 OFFICE O/P EST LOW 20 MIN: CPT

## 2021-11-08 PROCEDURE — 99213 OFFICE O/P EST LOW 20 MIN: CPT | Mod: 25 | Performed by: NURSE PRACTITIONER

## 2021-11-08 PROCEDURE — 11042 DBRDMT SUBQ TIS 1ST 20SQCM/<: CPT

## 2021-11-08 ASSESSMENT — ENCOUNTER SYMPTOMS
NAUSEA: 0
FEVER: 0
HEADACHES: 0
WEAKNESS: 0
VOMITING: 0
CONSTIPATION: 0
DIZZINESS: 0
PALPITATIONS: 0
CHILLS: 0
DIARRHEA: 0

## 2021-11-08 ASSESSMENT — PAIN SCALES - GENERAL: PAINLEVEL: 7=MODERATE-SEVERE PAIN

## 2021-11-08 NOTE — PROGRESS NOTES
Provider Encounter- Lower Extremity Ulcer      HISTORY OF PRESENT ILLNESS  Wound History:   START OF CARE IN CLINIC: 11/2/2021   REFERRING PROVIDER: Chris Horne   WOUND ETIOLOGY: venous   LOCATION: Left medial lower extremity   HISTORY: Patient is well-known to the clinic from previous venous ulcer to the left medial lower extremity.  Patient presents to clinic today reporting that she has had this new left medial ankle ulcer for over a month however, she was reluctant to make an appointment due to reported pain from compression wraps and debridement.  Patient reports that she is being followed by Dr. Dillon and has planned for vascular studies around November 15.    Pertinent Medical History:   Past Medical History:   Diagnosis Date   • Anticoagulated 9/21/2010   • Anxiety 9/21/2010   • ASTHMA 9/21/2010   • Dissecting aneurysm of aorta ([facility]) 9/21/2010   • Dissection of aorta, thoracic ([facility]) 3/10/2010   • DVT (deep venous thrombosis) ([facility]) 11/2/2009   • Grave's disease 9/21/2010   • HTN (hypertension) 8/12/2010   • Leiomyoma of uterus, unspecified 9/30/2007   • Long term (current) use of anticoagulants 8/19/2011   • Mitral valve insufficiency and aortic valve insufficiency 6/25/2010   • Murmur 2/18/2009   • Status post insertion of inferior vena caval filter 9/21/2010   • Thoracic aneurysm without mention of rupture 8/12/2010        VASCULAR HISTORY:  Vascular Surgeon: Dr. Dillon. Compression: two layer compression  Varicose Veins: present        TOBACCO USE: Current everyday smoker    Patient's problem list, allergies, and current medications reviewed and updated in Epic    Interval History:  11/2/2021: Clinic visit with KORI Villalobos.  Excisional debridement performed in clinic today to remove nonviable fibrous tissue from the wound bed.  Again discussed the use of compression to mobilize venous blood flow back towards the right side the heart.      11/8/2021: Clinic visit with Irineo  KROI Haque.  Ms. Fisher reports that she is on her feet for approximately 10 hours a day 5 to 6 days a week working at [employer].  This complicates the healing process of her venous ulcer.  She does follow-up with Dr. Dillon on 15 November.  Initiated collagen to promote granulation tissue formation.  Patient knows that she is spending a significant time with her legs dependent increasing venous pressure patient reports that on her off days she elevates her legs.      REVIEW OF SYSTEMS:   Review of Systems   Constitutional: Negative for chills and fever.   Cardiovascular: Negative for chest pain and palpitations.   Gastrointestinal: Negative for constipation, diarrhea, nausea and vomiting.   Skin: Negative for itching and rash.        Open wound left medial lower extremity   Neurological: Negative for dizziness, weakness and headaches.         PHYSICAL EXAMINATION:   /80 (BP Location: Left arm, Patient Position: Sitting)   Pulse 64   Temp 36 °C (96.8 °F) (Temporal)   Resp 16   LMP 06/27/2011   SpO2 98%     Physical Exam  HENT:      Head: Normocephalic.   Eyes:      Pupils: Pupils are equal, round, and reactive to light.   Pulmonary:      Effort: Pulmonary effort is normal. No respiratory distress.      Breath sounds: No wheezing.   Skin:     Comments: Venous ulcer left medial lower extremity  Hemosiderosis bilateral lower extremities   Neurological:      General: No focal deficit present.      Mental Status: She is alert and oriented to person, place, and time.   Psychiatric:         Mood and Affect: Mood normal.         WOUND ASSESSMENT  Wound 11/02/21 Left Medial LE (Active)   Wound Image    11/08/21 0830   Site Assessment Red;Yellow;Painful 11/08/21 0830   Periwound Assessment Hemosiderin Staining;Edema;Painful 11/08/21 0830   Margins Attached edges 11/08/21 0830   Drainage Amount Small 11/08/21 0830   Drainage Description Serosanguineous;Other (Comment) 11/08/21 0830   Treatments  Cleansed;Topical Lidocaine;Provider debridement;Site care 11/08/21 0830   Wound Cleansing Puracyn [place] 11/08/21 0830   Periwound Protectant Skin Protectant Wipes to Periwound;Barrier Paste 11/08/21 0830   Dressing Cleansing/Solutions Other (Comments) 11/08/21 0830   Dressing Options Collagen Dressing;Hydrofiber Silver;Nonadhesive Foam;Mepilex Lite;Compression Wrap Two Layer 11/08/21 0830   Dressing Changed New 11/02/21 1600   Dressing Status Clean;Dry;Intact 11/02/21 1600   Non-staged Wound Description Full thickness 11/08/21 0830   Wound Length (cm) 0.6 cm 11/08/21 0830   Wound Width (cm) 1 cm 11/08/21 0830   Wound Depth (cm) 0.1 cm 11/08/21 0830   Wound Surface Area (cm^2) 0.6 cm^2 11/08/21 0830   Wound Volume (cm^3) 0.06 cm^3 11/08/21 0830   Post-Procedure Length (cm) 0.6 cm 11/08/21 0830   Post-Procedure Width (cm) 1 cm 11/08/21 0830   Post-Procedure Depth (cm) 0.2 cm 11/08/21 0830   Post-Procedure Surface Area (cm^2) 0.6 cm^2 11/08/21 0830   Post-Procedure Volume (cm^3) 0.12 cm^3 11/08/21 0830   Wound Healing % -20 11/08/21 0830   Wound Bed Granulation (%) 55 % 11/08/21 0830   Wound Bed Slough (%) 45 % 11/08/21 0830   Tunneling (cm) 0 cm 11/08/21 0830   Undermining (cm) 0 cm 11/08/21 0830   Wound Odor None 11/08/21 0830   Exposed Structures None 11/08/21 0830   Number of days: 6       PROCEDURE:   -2% viscous lidocaine applied topically to wound bed for approximately 5 minutes prior to debridement  -Curette used to debride wound bed.  Excisional debridement was performed to remove devitalized tissue until healthy, bleeding tissue was visualized.   Entire surface of wound, 0.6 cm2 debrided.  Tissue debrided into the subcutaneous  layer.    -Bleeding controlled with manual pressure.    -Wound care completed by wound RN, refer to flowsheet  -Patient tolerated the procedure well, without c/o pain or discomfort.       Pertinent Labs and Diagnostics:    Labs:     IMAGING: No results found.    VASCULAR STUDIES: No  results found.     LAST  WOUND CULTURE:   Lab Results   Component Value Date/Time    CULTRSULT No growth after 5 days of incubation. 02/06/2021 02:10 PM              ASSESSMENT AND PLAN:     1. Ulcer of left lower extremity with fat layer exposed (HCC)  -Excisional debridement of wound in clinic today, medically necessary to promote wound healing.  -Patient to return to clinic weekly for assessment and debridement  -Patient to change dressing 1-2 times per week in between clinic visits  -Collagen initiated in clinic today.   Wound care: Collagen, Hydrofiber silver, 2 layer compression wrap      2. Venous insufficiency  Comments: Complicating factor.  Patient is being followed by Dr. Dillon vascular surgery.  -Patient with hemosiderosis bilateral lower extremities varicose veins bilateral lower extremities.  -Patient has a follow-up with Dr. Dillon on November 15  -2 layer compression wrap continued in clinic to support venous flow back to his right-sided heart.  -Consider increasing to 4 layer compression if wound does not progress.      PATIENT EDUCATION  - Etiology of  venous stasis ulceration discussed with patient  - Importance of managing edema for healing of ulcer, and for prevention of new ulcer development  - Need for lifelong compression of lower legs   - Elevate legs above the level of the heart periodically throughout the day.  - Importance of adequate nutrition for wound healing  - Advised to go to ER for any increased redness, swelling, drainage or odor, or if patient develops fever, chills, nausea or vomiting.    20 min spent face to face with patient, >50% of time spent counseling, coordinating care, reviewing records, discussing POC, educating patient regarding vascular disease, wound healing and progression. This time was spent in excess to procedure time.       Please note that this note may have been created using voice recognition software. I have worked with technical experts from [facility] to optimize the interface.  I have made every reasonable attempt to correct obvious errors, but there may be errors of grammar and possibly

## 2021-11-08 NOTE — PATIENT INSTRUCTIONS
Avoid prolonged standing or sitting without elevating your legs.    - Multilayer compression wrap to left leg. Do not get wet and keep on for the week. Only remove if temperature or sensation changes.   If compression needs to be removed, un-wrap it do not cut it off.     Should you experience any significant changes in your wound(s), such as infection (redness, swelling, localized heat, increased pain, fever > 101 F, chills) or have any questions regarding your home care instructions, please contact the wound center at [phone]. If after hours, contact your primary care physician or go to the hospital emergency room.   Keep dressing clean, dry and covered while bathing. Only change dressing if it becomes over saturated, soiled or falls off.    Cigarettes

## 2021-11-18 ENCOUNTER — OFFICE VISIT (OUTPATIENT)
Dept: WOUND CARE | Facility: MEDICAL CENTER | Age: 56
End: 2021-11-18
Attending: STUDENT IN AN ORGANIZED HEALTH CARE EDUCATION/TRAINING PROGRAM
Payer: COMMERCIAL

## 2021-11-18 VITALS
TEMPERATURE: 97.9 F | DIASTOLIC BLOOD PRESSURE: 74 MMHG | HEART RATE: 75 BPM | SYSTOLIC BLOOD PRESSURE: 149 MMHG | OXYGEN SATURATION: 100 % | RESPIRATION RATE: 19 BRPM

## 2021-11-18 DIAGNOSIS — I87.2 VENOUS INSUFFICIENCY: ICD-10-CM

## 2021-11-18 DIAGNOSIS — L97.922 ULCER OF LEFT LOWER EXTREMITY WITH FAT LAYER EXPOSED (HCC): Primary | ICD-10-CM

## 2021-11-18 PROCEDURE — 99213 OFFICE O/P EST LOW 20 MIN: CPT | Mod: 25 | Performed by: NURSE PRACTITIONER

## 2021-11-18 PROCEDURE — 99213 OFFICE O/P EST LOW 20 MIN: CPT

## 2021-11-18 PROCEDURE — 11042 DBRDMT SUBQ TIS 1ST 20SQCM/<: CPT | Performed by: NURSE PRACTITIONER

## 2021-11-18 PROCEDURE — 11042 DBRDMT SUBQ TIS 1ST 20SQCM/<: CPT

## 2021-11-18 ASSESSMENT — ENCOUNTER SYMPTOMS
NAUSEA: 0
PALPITATIONS: 0
CHILLS: 0
HEADACHES: 0
VOMITING: 0
DIZZINESS: 0
WEAKNESS: 0
CONSTIPATION: 0
DIARRHEA: 0
FEVER: 0

## 2021-11-18 NOTE — PATIENT INSTRUCTIONS
-Keep your wound dressing clean, dry, and intact.    -Change your dressing if it becomes soiled, soaked, or falls off.    -Remove your compression wrap if you have severe pain, severe swelling, numbness, color change, or temperature change in your toes. If you need to remove your compression wrap, do so by unrolling it. Do not cut the compression wrap off to prevent cutting yourself on accident.    -Should you experience any significant changes in your wound(s), such as infection (redness, swelling, localized heat, increased pain, fever > 101 F, chills) or have any questions regarding your home care instructions, please contact the wound center at [phone]. If after hours, contact your primary care physician or go to the hospital emergency room.

## 2021-11-19 ENCOUNTER — ANTICOAGULATION MONITORING (OUTPATIENT)
Dept: VASCULAR LAB | Facility: MEDICAL CENTER | Age: 56
End: 2021-11-19

## 2021-11-19 LAB — INR PPP: 2.6 (ref 2–3.5)

## 2021-11-19 NOTE — PROGRESS NOTES
Provider Encounter- Lower Extremity Ulcer      HISTORY OF PRESENT ILLNESS  Wound History:   START OF CARE IN CLINIC: 11/2/2021   REFERRING PROVIDER: Chris Horne   WOUND ETIOLOGY: venous   LOCATION: Left medial lower extremity   HISTORY: Patient is well-known to the clinic from previous venous ulcer to the left medial lower extremity.  Patient presents to clinic today reporting that she has had this new left medial ankle ulcer for over a month however, she was reluctant to make an appointment due to reported pain from compression wraps and debridement.  Patient reports that she is being followed by Dr. Dillon and has planned for vascular studies around November 15.    Pertinent Medical History:   Past Medical History:   Diagnosis Date   • Anticoagulated 9/21/2010   • Anxiety 9/21/2010   • ASTHMA 9/21/2010   • Dissecting aneurysm of aorta ([facility]) 9/21/2010   • Dissection of aorta, thoracic ([facility]) 3/10/2010   • DVT (deep venous thrombosis) ([facility]) 11/2/2009   • Grave's disease 9/21/2010   • HTN (hypertension) 8/12/2010   • Leiomyoma of uterus, unspecified 9/30/2007   • Long term (current) use of anticoagulants 8/19/2011   • Mitral valve insufficiency and aortic valve insufficiency 6/25/2010   • Murmur 2/18/2009   • Status post insertion of inferior vena caval filter 9/21/2010   • Thoracic aneurysm without mention of rupture 8/12/2010        VASCULAR HISTORY:  Vascular Surgeon: Dr. Dillon. Compression: two layer compression  Varicose Veins: present        TOBACCO USE: Current everyday smoker    Patient's problem list, allergies, and current medications reviewed and updated in Epic    Interval History:  11/2/2021: Clinic visit with KORI Villalobos.  Excisional debridement performed in clinic today to remove nonviable fibrous tissue from the wound bed.  Again discussed the use of compression to mobilize venous blood flow back towards the right side the heart.      11/8/2021: Clinic visit with Irineo  KORI Haque.  Ms. Fisher reports that she is on her feet for approximately 10 hours a day 5 to 6 days a week working at [employer].  This complicates the healing process of her venous ulcer.  She does follow-up with Dr. Dillon on 15 November.  Initiated collagen to promote granulation tissue formation.  Patient knows that she is spending a significant time with her legs dependent increasing venous pressure patient reports that on her off days she elevates her legs.    11/18/2021: Clinic visit with KORI Villalobos.  Ms. Fisher has some granulation tissue starting to break through.  Patient is following up with Dr. Dillon we have requested the venous ultrasounds performed in her clinic.  Wound is superficial in nature.  Patient instructed to maintain compression as this is again to be the main factor in healing her venous ulcer.      REVIEW OF SYSTEMS:   Review of Systems   Constitutional: Negative for chills and fever.   Cardiovascular: Negative for chest pain and palpitations.   Gastrointestinal: Negative for constipation, diarrhea, nausea and vomiting.   Skin: Negative for itching and rash.        Open wound left medial lower extremity   Neurological: Negative for dizziness, weakness and headaches.         PHYSICAL EXAMINATION:   /74 (BP Location: Left arm, Patient Position: Sitting)   Pulse 75   Temp 36.6 °C (97.9 °F) (Temporal)   Resp 19   LMP 06/27/2011   SpO2 100%     Physical Exam  HENT:      Head: Normocephalic.   Eyes:      Pupils: Pupils are equal, round, and reactive to light.   Pulmonary:      Effort: Pulmonary effort is normal. No respiratory distress.      Breath sounds: No wheezing.   Skin:     Comments: Venous ulcer left medial lower extremity  Hemosiderosis bilateral lower extremities   Neurological:      General: No focal deficit present.      Mental Status: She is alert and oriented to person, place, and time.   Psychiatric:         Mood and Affect: Mood normal.          WOUND ASSESSMENT  Wound 11/02/21 Left Medial LE (Active)   Wound Image    11/18/21 1500   Site Assessment Red;Yellow;Painful 11/18/21 1500   Periwound Assessment Hemosiderin Staining;Edema;Painful 11/18/21 1500   Margins Attached edges 11/18/21 1500   Drainage Amount Small 11/18/21 1500   Drainage Description Serosanguineous 11/18/21 1500   Treatments Cleansed;Topical Lidocaine;Provider debridement 11/18/21 1500   Wound Cleansing Puracyn [place] 11/18/21 1500   Periwound Protectant Skin Protectant Wipes to Periwound;Barrier Paste 11/18/21 1500   Dressing Cleansing/Solutions Other (Comments) 11/08/21 0830   Dressing Options Hydrofiber Silver;Silicone Adhesive Foam;Compression Wrap Two Layer 11/18/21 1500   Dressing Changed Changed 11/18/21 1500   Dressing Status Clean;Dry;Intact 11/18/21 1500   Non-staged Wound Description Full thickness 11/18/21 1500   Wound Length (cm) 0.2 cm 11/18/21 1500   Wound Width (cm) 0.6 cm 11/18/21 1500   Wound Depth (cm) 0.2 cm 11/18/21 1500   Wound Surface Area (cm^2) 0.12 cm^2 11/18/21 1500   Wound Volume (cm^3) 0.024 cm^3 11/18/21 1500   Post-Procedure Length (cm) 0.2 cm 11/18/21 1500   Post-Procedure Width (cm) 0.7 cm 11/18/21 1500   Post-Procedure Depth (cm) 0.2 cm 11/18/21 1500   Post-Procedure Surface Area (cm^2) 0.14 cm^2 11/18/21 1500   Post-Procedure Volume (cm^3) 0.028 cm^3 11/18/21 1500   Wound Healing % 52 11/18/21 1500   Wound Bed Granulation (%) 55 % 11/08/21 0830   Wound Bed Slough (%) 45 % 11/08/21 0830   Tunneling (cm) 0 cm 11/18/21 1500   Undermining (cm) 0 cm 11/18/21 1500   Wound Odor None 11/18/21 1500   Exposed Structures None 11/18/21 1500   Number of days: 16       PROCEDURE:   -2% viscous lidocaine applied topically to wound bed for approximately 5 minutes prior to debridement  -Curette used to debride wound bed.  Excisional debridement was performed to remove devitalized tissue until healthy, bleeding tissue was visualized.   Entire surface of wound, 0.14  cm2 debrided.  Tissue debrided into the subcutaneous  layer.    -Bleeding controlled with manual pressure.    -Wound care completed by wound RN, refer to flowsheet  -Patient tolerated the procedure well, without c/o pain or discomfort.       Pertinent Labs and Diagnostics:    Labs:     IMAGING: No results found.    VASCULAR STUDIES: No results found.     LAST  WOUND CULTURE:   Lab Results   Component Value Date/Time    CULTRSULT No growth after 5 days of incubation. 02/06/2021 02:10 PM              ASSESSMENT AND PLAN:     1. Ulcer of left lower extremity with fat layer exposed (HCC)  -Excisional debridement of wound in clinic today, medically necessary to promote wound healing.  -Patient to return to clinic weekly for assessment and debridement  -Patient to change dressing 1-2 times per week in between clinic visits     Wound care: Collagen, Hydrofiber silver, silicone adhesive foam 2 layer compression wrap      2. Venous insufficiency  Comments: Complicating factor.  Patient is being followed by Dr. Dillon vascular surgery.  -Patient with hemosiderosis bilateral lower extremities varicose veins bilateral lower extremities.  -Patient has completed venous ultrasounds with Dr. Dillon will request results.  -2 layer compression wrap continued in clinic to support venous flow back to his right-sided heart.  -Consider increasing to 4 layer compression if wound does not progress.      PATIENT EDUCATION  - Etiology of  venous stasis ulceration discussed with patient  - Importance of managing edema for healing of ulcer, and for prevention of new ulcer development  - Need for lifelong compression of lower legs   - Elevate legs above the level of the heart periodically throughout the day.  - Importance of adequate nutrition for wound healing  - Advised to go to ER for any increased redness, swelling, drainage or odor, or if patient develops fever, chills, nausea or vomiting.    20 min spent face to face with patient,  >50% of time spent counseling, coordinating care, reviewing records, discussing POC, educating patient regarding vascular disease, wound healing and progression. This time was spent in excess to procedure time.       Please note that this note may have been created using voice recognition software. I have worked with technical experts from [facility] [employer] to optimize the interface.  I have made every reasonable attempt to correct obvious errors, but there may be errors of grammar and possibly

## 2021-11-20 NOTE — PROGRESS NOTES
OP Telephone Anticoagulation Service Note    Date: 2021      Anticoagulation Summary  As of 2021    INR goal:  2.0-3.0   TTR:  63.0 % (6.5 y)   INR used for dosin.60 (2021)   Warfarin maintenance plan:  5 mg (5 mg x 1) every day   Weekly warfarin total:  35 mg   Plan last modified:  Debi Ahuja, PharmD (2021)   Next INR check:  12/3/2021   Priority:  Maintenance   Target end date:  Indefinite    Indications    Deep vein thrombosis [453.40] [I82.409]             Anticoagulation Episode Summary     INR check location:  Home Draw    Preferred lab:      Send INR reminders to:      Comments:  Eloy MYRICK      Anticoagulation Care Providers     Provider Role Specialty Phone number    Bruna Ureña M.D. Referring Cardiovascular Disease (Cardiology) [phone]    [facility] Anticoagulation Services Responsible  [phone]    Jaquan Horne M.D.  [facility] Medicine [phone]        Anticoagulation Patient Findings      INR therapeutic at 2.6.  Spoke w/ pt on phone.  Verified regimen w/ pt.  Instructed pt to continue on with current regimen.  NO s/s bleeding reported per pt.  NO changes in diet reported per pt.  NO changes in medications reported per pt.  Check INR in 2 week(s).  Instructed pt to call clinic at [phone] if there are any questions.  Pt stated understanding.    Lazaro Cerda, ClaudiaD

## 2021-11-29 ENCOUNTER — NON-PROVIDER VISIT (OUTPATIENT)
Dept: WOUND CARE | Facility: MEDICAL CENTER | Age: 56
End: 2021-11-29
Attending: STUDENT IN AN ORGANIZED HEALTH CARE EDUCATION/TRAINING PROGRAM
Payer: COMMERCIAL

## 2021-11-29 PROCEDURE — 97602 WOUND(S) CARE NON-SELECTIVE: CPT

## 2021-11-29 NOTE — PATIENT INSTRUCTIONS
-Keep your wound dressing clean, dry, and intact.    -Change your dressing if it becomes soiled, soaked, or falls off.    -Remove your compression wrap if you have severe pain, severe swelling, numbness, color change, or temperature change in your toes. If you need to remove your compression wrap, do so by unrolling it. Do not cut the compression wrap off to prevent cutting yourself on accident.    -Should you experience any significant changes in your wound(s), such as infection (redness, swelling, localized heat, increased pain, fever > 101 F, chills) or have any questions regarding your home care instructions, please contact the wound center at [phone]. If after hours, contact your primary care physician or go to the hospital emergency room.

## 2021-12-06 ENCOUNTER — ANTICOAGULATION MONITORING (OUTPATIENT)
Dept: VASCULAR LAB | Facility: MEDICAL CENTER | Age: 56
End: 2021-12-06

## 2021-12-06 ENCOUNTER — OFFICE VISIT (OUTPATIENT)
Dept: WOUND CARE | Facility: MEDICAL CENTER | Age: 56
End: 2021-12-06
Attending: STUDENT IN AN ORGANIZED HEALTH CARE EDUCATION/TRAINING PROGRAM
Payer: COMMERCIAL

## 2021-12-06 VITALS
TEMPERATURE: 97.5 F | DIASTOLIC BLOOD PRESSURE: 83 MMHG | SYSTOLIC BLOOD PRESSURE: 149 MMHG | OXYGEN SATURATION: 100 % | RESPIRATION RATE: 18 BRPM | HEART RATE: 75 BPM

## 2021-12-06 DIAGNOSIS — L97.922 ULCER OF LEFT LOWER EXTREMITY WITH FAT LAYER EXPOSED (HCC): ICD-10-CM

## 2021-12-06 LAB — INR PPP: 2.2 (ref 2–3.5)

## 2021-12-06 PROCEDURE — 97602 WOUND(S) CARE NON-SELECTIVE: CPT

## 2021-12-06 NOTE — PROCEDURES
2% viscous lidocaine applied topically to wound bed for approximately 5 minutes.  Non-selective debridement with puracyn spray and clean gauze to remove non-viable tissue from wound bed.  2 layer compression wrap applied.  Patient tolerated well.

## 2021-12-06 NOTE — PATIENT INSTRUCTIONS
-Keep your wound dressing clean, dry, and intact.    -Change your dressing if it becomes soiled, soaked, or falls off.    -Remove your compression wrap if you have severe pain, severe swelling, numbness, color change, or temperature change in your toes. If you need to remove your compression wrap, do so by unrolling it. Do not cut the compression wrap off to prevent cutting yourself on accident.      -Should you experience any significant changes in your wound(s), such as infection (redness, swelling, localized heat, increased pain, fever > 101 F, chills) or have any questions regarding your home care instructions, please contact the wound center at [phone]. If after hours, contact your primary care physician or go to the hospital emergency room.

## 2021-12-07 NOTE — PROGRESS NOTES
Anticoagulation Summary  As of 2021    INR goal:  2.0-3.0   TTR:  63.2 % (6.5 y)   INR used for dosin.20 (2021)   Warfarin maintenance plan:  5 mg (5 mg x 1) every day   Weekly warfarin total:  35 mg   Plan last modified:  Claudia FerroD (2021)   Next INR check:  2021   Priority:  Maintenance   Target end date:  Indefinite    Indications    Deep vein thrombosis [453.40] [I82.409]             Anticoagulation Episode Summary     INR check location:  Home Draw    Preferred lab:      Send INR reminders to:      Comments:  Eloy MYRICK      Anticoagulation Care Providers     Provider Role Specialty Phone number    Bruna Ureña M.D. Referring Cardiovascular Disease (Cardiology) [phone]    [facility] Anticoagulation Services Responsible  [phone]    Jaquan Horne M.D.  Family Medicine [phone]        Anticoagulation Patient Findings     Left voicemail message to report a therapeutic INR of 2.2.  Requested pt contact the clinic for any s/s of unusual bleeding, bruising, clotting or any changes to diet or medication.   Pt is to continue with current warfarin dosing regimen.    Pt is not on antiplatelet therapy      FU 2 weeks.  Alexis Garcia, PharmD, BCACP

## 2021-12-13 ENCOUNTER — OFFICE VISIT (OUTPATIENT)
Dept: WOUND CARE | Facility: MEDICAL CENTER | Age: 56
End: 2021-12-13
Attending: STUDENT IN AN ORGANIZED HEALTH CARE EDUCATION/TRAINING PROGRAM
Payer: COMMERCIAL

## 2021-12-13 VITALS
TEMPERATURE: 97.3 F | HEART RATE: 72 BPM | RESPIRATION RATE: 18 BRPM | DIASTOLIC BLOOD PRESSURE: 73 MMHG | OXYGEN SATURATION: 99 % | SYSTOLIC BLOOD PRESSURE: 150 MMHG

## 2021-12-13 DIAGNOSIS — I87.2 VENOUS INSUFFICIENCY: ICD-10-CM

## 2021-12-13 DIAGNOSIS — L97.922 ULCER OF LEFT LOWER EXTREMITY WITH FAT LAYER EXPOSED (HCC): ICD-10-CM

## 2021-12-13 PROCEDURE — 99212 OFFICE O/P EST SF 10 MIN: CPT

## 2021-12-13 PROCEDURE — 99212 OFFICE O/P EST SF 10 MIN: CPT | Mod: 25 | Performed by: NURSE PRACTITIONER

## 2021-12-13 PROCEDURE — 11042 DBRDMT SUBQ TIS 1ST 20SQCM/<: CPT

## 2021-12-13 PROCEDURE — 11042 DBRDMT SUBQ TIS 1ST 20SQCM/<: CPT | Performed by: NURSE PRACTITIONER

## 2021-12-13 ASSESSMENT — ENCOUNTER SYMPTOMS
VOMITING: 0
DIARRHEA: 0
CHILLS: 0
FEVER: 0
PALPITATIONS: 0
DIZZINESS: 0
WEAKNESS: 0
HEADACHES: 0
NAUSEA: 0
CONSTIPATION: 0

## 2021-12-13 NOTE — PROGRESS NOTES
Provider Encounter- Lower Extremity Ulcer      HISTORY OF PRESENT ILLNESS  Wound History:   START OF CARE IN CLINIC: 11/2/2021   REFERRING PROVIDER: Chris Horne   WOUND ETIOLOGY: venous   LOCATION: Left medial lower extremity   HISTORY: Patient is well-known to the clinic from previous venous ulcer to the left medial lower extremity.  Patient presents to clinic today reporting that she has had this new left medial ankle ulcer for over a month however, she was reluctant to make an appointment due to reported pain from compression wraps and debridement.  Patient reports that she is being followed by Dr. Dillon and has planned for vascular studies around November 15.    Pertinent Medical History:   Past Medical History:   Diagnosis Date   • Anticoagulated 9/21/2010   • Anxiety 9/21/2010   • ASTHMA 9/21/2010   • Dissecting aneurysm of aorta ([facility]) 9/21/2010   • Dissection of aorta, thoracic ([facility]) 3/10/2010   • DVT (deep venous thrombosis) ([facility]) 11/2/2009   • Grave's disease 9/21/2010   • HTN (hypertension) 8/12/2010   • Leiomyoma of uterus, unspecified 9/30/2007   • Long term (current) use of anticoagulants 8/19/2011   • Mitral valve insufficiency and aortic valve insufficiency 6/25/2010   • Murmur 2/18/2009   • Status post insertion of inferior vena caval filter 9/21/2010   • Thoracic aneurysm without mention of rupture 8/12/2010        VASCULAR HISTORY:  Vascular Surgeon: Dr. Dillon. Compression: two layer compression  Varicose Veins: present        TOBACCO USE: Current everyday smoker    Patient's problem list, allergies, and current medications reviewed and updated in Epic    Interval History:  11/2/2021: Clinic visit with KORI Villalobos.  Excisional debridement performed in clinic today to remove nonviable fibrous tissue from the wound bed.  Again discussed the use of compression to mobilize venous blood flow back towards the right side the heart.      11/8/2021: Clinic visit with Irineo  KORI Haque.  Ms. Fisher reports that she is on her feet for approximately 10 hours a day 5 to 6 days a week working at [employer].  This complicates the healing process of her venous ulcer.  She does follow-up with Dr. Dillon on 15 November.  Initiated collagen to promote granulation tissue formation.  Patient knows that she is spending a significant time with her legs dependent increasing venous pressure patient reports that on her off days she elevates her legs.    11/18/2021: Clinic visit with KORI Villalobos.  Ms. Fisher has some granulation tissue starting to break through.  Patient is following up with Dr. Dillon we have requested the venous ultrasounds performed in her clinic.  Wound is superficial in nature.  Patient instructed to maintain compression as this is again to be the main factor in healing her venous ulcer.    12/13/2021: Clinic visit with KORI Villalobos.  Ms. Fisher has increased granulation tissue.  Approximately 50% of the wound has granulation tissue.  Continue with compression at this time.      REVIEW OF SYSTEMS:   Review of Systems   Constitutional: Negative for chills and fever.   Cardiovascular: Negative for chest pain and palpitations.   Gastrointestinal: Negative for constipation, diarrhea, nausea and vomiting.   Skin: Negative for itching and rash.        Open wound left medial lower extremity   Neurological: Negative for dizziness, weakness and headaches.         PHYSICAL EXAMINATION:   /73 (BP Location: Right arm, Patient Position: Sitting) Comment: 2nd set of vitals; Rn notified  Pulse 72   Temp 36.3 °C (97.3 °F) (Temporal)   Resp 18   LMP 06/27/2011   SpO2 99%     Physical Exam  HENT:      Head: Normocephalic.   Eyes:      Pupils: Pupils are equal, round, and reactive to light.   Pulmonary:      Effort: Pulmonary effort is normal. No respiratory distress.      Breath sounds: No wheezing.   Skin:     Comments: Venous ulcer left medial lower  extremity  Hemosiderosis bilateral lower extremities   Neurological:      General: No focal deficit present.      Mental Status: She is alert and oriented to person, place, and time.   Psychiatric:         Mood and Affect: Mood normal.         WOUND ASSESSMENT  Wound 11/02/21 Left Medial LE (Active)   Wound Image    12/13/21 1000   Site Assessment Red;Pink;Painful 12/13/21 1000   Periwound Assessment Hemosiderin Staining;Edema;Painful 12/13/21 1000   Margins Attached edges 12/13/21 1000   Drainage Amount Small 12/13/21 1000   Drainage Description Serosanguineous 12/13/21 1000   Treatments Cleansed;Topical Lidocaine;Provider debridement;Site care 12/13/21 1000   Wound Cleansing Puracyn Keeler 12/13/21 1000   Periwound Protectant Skin Protectant Wipes to Periwound;Barrier Paste 12/13/21 1000   Dressing Cleansing/Solutions Not Applicable 12/13/21 1000   Dressing Options Mepitel One;Hydrofiber Silver;Silicone Adhesive Foam;Compression Wrap Two Layer 12/13/21 1000   Dressing Changed Changed 12/13/21 1000   Dressing Status Clean;Dry;Intact 12/13/21 1000   Non-staged Wound Description Full thickness 12/13/21 1000   Wound Length (cm) 0.6 cm 12/13/21 1000   Wound Width (cm) 0.9 cm 12/13/21 1000   Wound Depth (cm) 0.1 cm 12/06/21 1100   Wound Surface Area (cm^2) 0.54 cm^2 12/13/21 1000   Wound Volume (cm^3) 0.03 cm^3 12/06/21 1100   Post-Procedure Length (cm) 0.3 cm 12/13/21 1000   Post-Procedure Width (cm) 0.9 cm 12/13/21 1000   Post-Procedure Depth (cm) 0.1 cm 12/13/21 1000   Post-Procedure Surface Area (cm^2) 0.27 cm^2 12/13/21 1000   Post-Procedure Volume (cm^3) 0.027 cm^3 12/13/21 1000   Wound Healing % 40 12/06/21 1100   Wound Bed Granulation (%) 55 % 11/08/21 0830   Wound Bed Slough (%) 45 % 11/08/21 0830   Tunneling (cm) 0 cm 12/13/21 1000   Undermining (cm) 0 cm 12/13/21 1000   Wound Odor None 12/13/21 1000   Exposed Structures None 12/13/21 1000   Number of days: 41       PROCEDURE:   -2% viscous lidocaine applied  topically to wound bed for approximately 5 minutes prior to debridement  -Curette used to debride wound bed.  Excisional debridement was performed to remove devitalized tissue until healthy, bleeding tissue was visualized.   Entire surface of wound, 0.27 cm2 debrided.  Tissue debrided into the subcutaneous  layer.    -Bleeding controlled with manual pressure.    -Wound care completed by wound RN, refer to flowsheet  -Patient tolerated the procedure well, without c/o pain or discomfort.       Pertinent Labs and Diagnostics:    Labs:     IMAGING: No results found.    VASCULAR STUDIES: No results found.     LAST  WOUND CULTURE:   Lab Results   Component Value Date/Time    CULTRSULT No growth after 5 days of incubation. 02/06/2021 02:10 PM              ASSESSMENT AND PLAN:     1. Ulcer of left lower extremity with fat layer exposed (HCC)  -Excisional debridement of wound in clinic today, medically necessary to promote wound healing.  -Patient to return to clinic weekly for assessment and debridement  -Dressing to be changed once weekly at each clinic visit.     Wound care: Mepitel 1, Hydrofiber silver, silicone adhesive foam, 2 layer compression wrap      2. Venous insufficiency  Comments: Complicating factor.  Patient is being followed by Dr. Dillon vascular surgery.  -Patient with hemosiderosis bilateral lower extremities varicose veins bilateral lower extremities.  -Patient has completed venous ultrasounds with Dr. Dillon will request results.  -2 layer compression wrap continued in clinic to support venous flow back to his right-sided heart.  -Consider increasing to 4 layer compression if wound does not progress.      PATIENT EDUCATION  - Etiology of  venous stasis ulceration discussed with patient  - Importance of managing edema for healing of ulcer, and for prevention of new ulcer development  - Need for lifelong compression of lower legs   - Elevate legs above the level of the heart periodically throughout  the day.  - Importance of adequate nutrition for wound healing  - Advised to go to ER for any increased redness, swelling, drainage or odor, or if patient develops fever, chills, nausea or vomiting.    10 min spent face to face with patient, >50% of time spent counseling, coordinating care, reviewing records, discussing POC, educating patient regarding vascular disease, wound healing and progression. This time was spent in excess to procedure time.       Please note that this note may have been created using voice recognition software. I have worked with technical experts from [employer] to optimize the interface.  I have made every reasonable attempt to correct obvious errors, but there may be errors of grammar and possibly

## 2021-12-13 NOTE — PATIENT INSTRUCTIONS
-Keep your wound dressing clean, dry, and intact.    -Remove your compression wrap if you have severe pain, severe swelling, numbness, color change, or temperature change in your toes. If you need to remove your compression wrap, do so by unrolling it. Do not cut the compression wrap off to prevent cutting yourself on accident.    -Should you experience any significant changes in your wound(s), such as infection (redness, swelling, localized heat, increased pain, fever > 101 F, chills) or have any questions regarding your home care instructions, please contact the wound center at [phone]. If after hours, contact your primary care physician or go to the hospital emergency room.

## 2021-12-14 DIAGNOSIS — I82.409 DVT (DEEP VENOUS THROMBOSIS) (HCC): Chronic | ICD-10-CM

## 2021-12-14 DIAGNOSIS — I08.0 MITRAL VALVE INSUFFICIENCY AND AORTIC VALVE INSUFFICIENCY: Chronic | ICD-10-CM

## 2021-12-15 RX ORDER — WARFARIN SODIUM 5 MG/1
TABLET ORAL
Qty: 90 TABLET | Refills: 1 | Status: SHIPPED | OUTPATIENT
Start: 2021-12-15 | End: 2021-12-16 | Stop reason: SDUPTHER

## 2021-12-16 DIAGNOSIS — I08.0 MITRAL VALVE INSUFFICIENCY AND AORTIC VALVE INSUFFICIENCY: Chronic | ICD-10-CM

## 2021-12-16 DIAGNOSIS — I82.409 DVT (DEEP VENOUS THROMBOSIS) (HCC): Chronic | ICD-10-CM

## 2021-12-16 RX ORDER — WARFARIN SODIUM 5 MG/1
TABLET ORAL
Qty: 90 TABLET | Refills: 1 | Status: SHIPPED | OUTPATIENT
Start: 2021-12-16 | End: 2022-05-16

## 2021-12-16 RX ORDER — WARFARIN SODIUM 5 MG/1
TABLET ORAL
Qty: 90 TABLET | Refills: 1 | Status: SHIPPED | OUTPATIENT
Start: 2021-12-16 | End: 2021-12-16

## 2021-12-22 ENCOUNTER — NON-PROVIDER VISIT (OUTPATIENT)
Dept: WOUND CARE | Facility: MEDICAL CENTER | Age: 56
End: 2021-12-22
Attending: STUDENT IN AN ORGANIZED HEALTH CARE EDUCATION/TRAINING PROGRAM
Payer: COMMERCIAL

## 2021-12-22 PROCEDURE — 97602 WOUND(S) CARE NON-SELECTIVE: CPT

## 2021-12-23 NOTE — PROCEDURES
2% viscous lidocaine for ~5 minute dwell time. Non selective blunt debridement with puracyn spray and gauze to remove non viable tissue from wound bed. Despite some burning with debridement, patient tolerated well.

## 2021-12-23 NOTE — PATIENT INSTRUCTIONS
-Keep your wound dressing clean, dry, and intact.    -Change your dressing if it becomes soiled, soaked, or falls off.    -Should you experience any significant changes in your wound(s), such as infection (redness, swelling, localized heat, increased pain, fever > 101 F, chills) or have any questions regarding your home care instructions, please contact the wound center at [phone]. If after hours, contact your primary care physician or go to the hospital emergency room.

## 2022-01-03 ENCOUNTER — OFFICE VISIT (OUTPATIENT)
Dept: WOUND CARE | Facility: MEDICAL CENTER | Age: 57
End: 2022-01-03
Attending: STUDENT IN AN ORGANIZED HEALTH CARE EDUCATION/TRAINING PROGRAM
Payer: COMMERCIAL

## 2022-01-03 VITALS
TEMPERATURE: 97.9 F | RESPIRATION RATE: 16 BRPM | SYSTOLIC BLOOD PRESSURE: 135 MMHG | DIASTOLIC BLOOD PRESSURE: 76 MMHG | HEART RATE: 65 BPM | OXYGEN SATURATION: 94 %

## 2022-01-03 DIAGNOSIS — L97.922 ULCER OF LEFT LOWER EXTREMITY WITH FAT LAYER EXPOSED (HCC): Primary | ICD-10-CM

## 2022-01-03 DIAGNOSIS — I87.2 VENOUS INSUFFICIENCY: ICD-10-CM

## 2022-01-03 PROCEDURE — 99213 OFFICE O/P EST LOW 20 MIN: CPT | Performed by: NURSE PRACTITIONER

## 2022-01-03 PROCEDURE — 99213 OFFICE O/P EST LOW 20 MIN: CPT

## 2022-01-03 ASSESSMENT — ENCOUNTER SYMPTOMS
CHILLS: 0
DIARRHEA: 0
NAUSEA: 0
DIZZINESS: 0
PALPITATIONS: 0
CONSTIPATION: 0
FEVER: 0
VOMITING: 0
HEADACHES: 0
WEAKNESS: 0

## 2022-01-04 NOTE — PROGRESS NOTES
Provider Encounter- Lower Extremity Ulcer      HISTORY OF PRESENT ILLNESS  Wound History:   START OF CARE IN CLINIC: 11/2/2021   REFERRING PROVIDER: Chris Horne   WOUND ETIOLOGY: venous   LOCATION: Left medial lower extremity   HISTORY: Patient is well-known to the clinic from previous venous ulcer to the left medial lower extremity.  Patient presents to clinic today reporting that she has had this new left medial ankle ulcer for over a month however, she was reluctant to make an appointment due to reported pain from compression wraps and debridement.  Patient reports that she is being followed by Dr. Dillon and has planned for vascular studies around November 15.    Pertinent Medical History:   Past Medical History:   Diagnosis Date   • Anticoagulated 9/21/2010   • Anxiety 9/21/2010   • ASTHMA 9/21/2010   • Dissecting aneurysm of aorta ([facility]) 9/21/2010   • Dissection of aorta, thoracic ([facility]) 3/10/2010   • DVT (deep venous thrombosis) ([facility]) 11/2/2009   • Grave's disease 9/21/2010   • HTN (hypertension) 8/12/2010   • Leiomyoma of uterus, unspecified 9/30/2007   • Long term (current) use of anticoagulants 8/19/2011   • Mitral valve insufficiency and aortic valve insufficiency 6/25/2010   • Murmur 2/18/2009   • Status post insertion of inferior vena caval filter 9/21/2010   • Thoracic aneurysm without mention of rupture 8/12/2010        VASCULAR HISTORY:  Vascular Surgeon: Dr. Dillon. Compression: two layer compression  Varicose Veins: present        TOBACCO USE: Current everyday smoker    Patient's problem list, allergies, and current medications reviewed and updated in Epic    Interval History:  11/2/2021: Clinic visit with KORI Villalobos.  Excisional debridement performed in clinic today to remove nonviable fibrous tissue from the wound bed.  Again discussed the use of compression to mobilize venous blood flow back towards the right side the heart.      11/8/2021: Clinic visit with Irineo  KORI Haque.  Ms. Fisher reports that she is on her feet for approximately 10 hours a day 5 to 6 days a week working at [employer].  This complicates the healing process of her venous ulcer.  She does follow-up with Dr. Dillon on 15 November.  Initiated collagen to promote granulation tissue formation.  Patient knows that she is spending a significant time with her legs dependent increasing venous pressure patient reports that on her off days she elevates her legs.    11/18/2021: Clinic visit with KORI Villalobos.  Ms. Fisher has some granulation tissue starting to break through.  Patient is following up with Dr. Dillon we have requested the venous ultrasounds performed in her clinic.  Wound is superficial in nature.  Patient instructed to maintain compression as this is again to be the main factor in healing her venous ulcer.    12/13/2021: Clinic visit with KORI Villalobos.  Ms. Fisher has increased granulation tissue.  Approximately 50% of the wound has granulation tissue.  Continue with compression at this time.    1/3/2022: Clinic visit with KORI Villalobos.  Ms. Fisher's wound is very superficial in nature smaller in clinic today.  We will continue with 2 layer compression wrap.  Patient to bring her own compression stockings and next clinical appointment hopeful that we can discharge the patient in 1 to 2 weeks.      REVIEW OF SYSTEMS:   Review of Systems   Constitutional: Negative for chills and fever.   Cardiovascular: Negative for chest pain and palpitations.   Gastrointestinal: Negative for constipation, diarrhea, nausea and vomiting.   Skin: Negative for itching and rash.        Open wound left medial lower extremity   Neurological: Negative for dizziness, weakness and headaches.         PHYSICAL EXAMINATION:   /76   Pulse 65   Temp 36.6 °C (97.9 °F)   Resp 16   LMP 06/27/2011   SpO2 94%     Physical Exam  HENT:      Head: Normocephalic.   Eyes:      Pupils:  Pupils are equal, round, and reactive to light.   Pulmonary:      Effort: Pulmonary effort is normal. No respiratory distress.      Breath sounds: No wheezing.   Skin:     Comments: Venous ulcer left medial lower extremity  Hemosiderosis bilateral lower extremities   Neurological:      General: No focal deficit present.      Mental Status: She is alert and oriented to person, place, and time.   Psychiatric:         Mood and Affect: Mood normal.         WOUND ASSESSMENT  Wound 11/02/21 Left Medial LE (Active)   Wound Image   01/03/22 1545   Site Assessment Pink;Yellow;Painful 01/03/22 1545   Periwound Assessment Hemosiderin Staining;Edema;Painful 01/03/22 1545   Margins Attached edges 01/03/22 1545   Drainage Amount Small 01/03/22 1545   Drainage Description Serosanguineous 01/03/22 1545   Treatments Cleansed;Site care 01/03/22 1545   Wound Cleansing Puracyn Spray 01/03/22 1545   Periwound Protectant Barrier Paste 01/03/22 1545   Dressing Cleansing/Solutions Not Applicable 01/03/22 1545   Dressing Options Hydrofiber Silver;Compression Wrap Two Layer 01/03/22 1545   Dressing Changed Changed 12/22/21 1500   Dressing Status Clean;Dry;Intact 12/22/21 1500   Dressing Change/Treatment Frequency Weekly, and As Needed 12/22/21 1500   Non-staged Wound Description Full thickness 01/03/22 1545   Wound Length (cm) 0.3 cm 01/03/22 1545   Wound Width (cm) 0.7 cm 01/03/22 1545   Wound Depth (cm) 0 cm 01/03/22 1545   Wound Surface Area (cm^2) 0.21 cm^2 01/03/22 1545   Wound Volume (cm^3) 0 cm^3 01/03/22 1545   Post-Procedure Length (cm) 0.5 cm 12/22/21 1500   Post-Procedure Width (cm) 0.9 cm 12/22/21 1500   Post-Procedure Depth (cm) 0.1 cm 12/22/21 1500   Post-Procedure Surface Area (cm^2) 0.45 cm^2 12/22/21 1500   Post-Procedure Volume (cm^3) 0.045 cm^3 12/22/21 1500   Wound Healing % 100 01/03/22 1545   Wound Bed Granulation (%) 80 % 01/03/22 1545   Wound Bed Slough (%) 20 % 01/03/22 1545   Tunneling (cm) 0 cm 01/03/22 1545    Undermining (cm) 0 cm 01/03/22 1545   Wound Odor None 01/03/22 1545   Exposed Structures None 01/03/22 1545   Number of days: 62       PROCEDURE:   - no excisional debridement required in clinic today.      Pertinent Labs and Diagnostics:    Labs:     IMAGING: No results found.    VASCULAR STUDIES: No results found.     LAST  WOUND CULTURE:   Lab Results   Component Value Date/Time    CULTRSULT No growth after 5 days of incubation. 02/06/2021 02:10 PM              ASSESSMENT AND PLAN:     1. Ulcer of left lower extremity with fat layer exposed (HCC)  -No excisional debridement required in clinic today.  -Patient to return to clinic weekly for assessment and debridement if necessary.  -Dressing to be changed once weekly at each clinic visit.     Wound care: Hydrofiber silver, 2 layer compression wrap      2. Venous insufficiency  Comments: Complicating factor.  Patient is being followed by Dr. Dillon vascular surgery.  -Patient with hemosiderosis bilateral lower extremities varicose veins bilateral lower extremities.  -Patient has completed venous ultrasounds with Dr. Dillon will request results.  -2 layer compression wrap continued in clinic to support venous flow back to his right-sided heart.  -Consider increasing to 4 layer compression if wound does not progress.      PATIENT EDUCATION  - Etiology of  venous stasis ulceration discussed with patient  - Importance of managing edema for healing of ulcer, and for prevention of new ulcer development  - Need for lifelong compression of lower legs   - Elevate legs above the level of the heart periodically throughout the day.  - Importance of adequate nutrition for wound healing  - Advised to go to ER for any increased redness, swelling, drainage or odor, or if patient develops fever, chills, nausea or vomiting.    20 min spent face to face with patient, >50% of time spent counseling, coordinating care, reviewing records, discussing POC, educating patient regarding  vascular disease, wound healing and progression. This time was spent in excess to procedure time.       Please note that this note may have been created using voice recognition software. I have worked with technical experts from [facility] to optimize the interface.  I have made every reasonable attempt to correct obvious errors, but there may be errors of grammar and possibly

## 2022-01-04 NOTE — PATIENT INSTRUCTIONS
Avoid prolonged standing or sitting without elevating your legs.    - Multilayer compression wrap to left leg. Do not get wet and keep on for the week. Only remove if temperature or sensation changes.   If compression needs to be removed, un-wrap it do not cut it off.     Should you experience any significant changes in your wound(s), such as infection (redness, swelling, localized heat, increased pain, fever > 101 F, chills) or have any questions regarding your home care instructions, please contact the wound center at [phone]. If after hours, contact your primary care physician or go to the hospital emergency room.   Keep dressing clean, dry and covered while bathing. Only change dressing if it becomes over saturated, soiled or falls off.

## 2022-01-06 LAB — INR PPP: 1.8 (ref 2–3.5)

## 2022-01-10 ENCOUNTER — ANTICOAGULATION MONITORING (OUTPATIENT)
Dept: VASCULAR LAB | Facility: MEDICAL CENTER | Age: 57
End: 2022-01-10

## 2022-01-10 ENCOUNTER — NON-PROVIDER VISIT (OUTPATIENT)
Dept: WOUND CARE | Facility: MEDICAL CENTER | Age: 57
End: 2022-01-10
Attending: STUDENT IN AN ORGANIZED HEALTH CARE EDUCATION/TRAINING PROGRAM
Payer: COMMERCIAL

## 2022-01-10 PROCEDURE — 97597 DBRDMT OPN WND 1ST 20 CM/<: CPT

## 2022-01-10 NOTE — PROCEDURES
2% viscous lidocaine for ~5 minute dwell time. CSWD with scalpel and forceps to remove overlying crust. <2 cm2 removed. Wound nearly resolved. Patient had significant pain with debridement despite lidocaine use.

## 2022-01-10 NOTE — PATIENT INSTRUCTIONS
-Keep your wound dressing clean, dry, and intact.    -Remove your compression wrap if you have severe pain, severe swelling, numbness, color change, or temperature change in your toes. If you need to remove your compression wrap, do so by unrolling it. Do not cut the compression wrap off to prevent cutting yourself on accident.    -Should you experience any significant changes in your wound(s), such as infection (redness, swelling, localized heat, increased pain, fever > 101 F, chills) or have any questions regarding your home care instructions, please contact the wound center at [phone]. If after hours, contact your primary care physician or go to the hospital emergency room.

## 2022-01-10 NOTE — PROGRESS NOTES
Anticoagulation Summary  As of 1/10/2022    INR goal:  2.0-3.0   TTR:  63.1 % (6.6 y)   INR used for dosing:     Warfarin maintenance plan:  5 mg (5 mg x 1) every day   Weekly warfarin total:  35 mg   Plan last modified:  Debi Lieberman, PharmD (11/1/2021)   Next INR check:  1/13/2022   Priority:  Maintenance   Target end date:  Indefinite    Indications    Deep vein thrombosis [453.40] [I82.409]             Anticoagulation Episode Summary     INR check location:  Home Draw    Preferred lab:      Send INR reminders to:      Comments:  Eloy       Anticoagulation Care Providers     Provider Role Specialty Phone number    Bruna Ureña M.D. Referring Cardiovascular Disease (Cardiology) [phone]    [facility] Responsible  [phone]    Jaquan Horne M.D.  Family Medicine [phone]          Refer to Anticoagulation Patient Findings for HPI  Patient Findings     Negatives:  Signs/symptoms of thrombosis, Signs/symptoms of bleeding, Laboratory test error suspected, Change in health, Change in alcohol use, Change in activity, Upcoming invasive procedure, Emergency department visit, Upcoming dental procedure, Missed doses, Extra doses, Change in medications, Change in diet/appetite, Hospital admission, Bruising, Other complaints          Spoke with Merrit.  INR is sub-therapeutic.     Pt verifies warfarin weekly dosing.     Patient bolused with 7.5mg of warfarin on Thursday after testing her INR at home. She did not report her INR or it was not transferred by Ale. Discussed the importance of reporting her INR and not self adjusting her warfarin dose. Instructed patient to test her INR in a week.     Repeat INR in 1 week(s).     Selwyn Patterson, PharmD

## 2022-01-17 ENCOUNTER — NON-PROVIDER VISIT (OUTPATIENT)
Dept: WOUND CARE | Facility: MEDICAL CENTER | Age: 57
End: 2022-01-17
Attending: STUDENT IN AN ORGANIZED HEALTH CARE EDUCATION/TRAINING PROGRAM
Payer: COMMERCIAL

## 2022-01-17 PROCEDURE — 99211 OFF/OP EST MAY X REQ PHY/QHP: CPT

## 2022-01-17 NOTE — PATIENT INSTRUCTIONS
- Resolved wound be fragile for a few days, bathe and dry area gently, only ever regains a maximum of 80% of the tensile strength of the surrounding skin, remodeling of scar can continue for 6mo - a year. Contact PCP for a referral back her if any problems with area opening and draining again.    -Should you experience any significant changes in your wound(s), such as infection (redness, swelling, localized heat, increased pain, fever > 101 F, chills) or have any questions regarding your home care instructions, please contact the wound center at [phone]. If after hours, contact your primary care physician or go to the hospital emergency room.

## 2022-01-17 NOTE — CERTIFICATION
[facility]   [facility]   [address]   [address]   [place] [address]   [phone] Fax: [phone]    Discharge Note      Referring Physician: Chris Horne M.D.  Wound location: Left Medial Lower Extremity  ICD-10: S81.809A   Date of Discharge: 1/17/2022    Assessment:  Discharge patient at this time secondary to wound resolution.    Thank you for the referral and the opportunity to treat your patient.

## 2022-01-18 ENCOUNTER — ANTICOAGULATION MONITORING (OUTPATIENT)
Dept: VASCULAR LAB | Facility: MEDICAL CENTER | Age: 57
End: 2022-01-18

## 2022-01-18 DIAGNOSIS — I82.409 DEEP VEIN THROMBOSIS (DVT) OF LOWER EXTREMITY, UNSPECIFIED CHRONICITY, UNSPECIFIED LATERALITY, UNSPECIFIED VEIN (HCC): ICD-10-CM

## 2022-01-18 LAB — INR PPP: 2.1 (ref 2–3.5)

## 2022-01-19 NOTE — PROGRESS NOTES
OP   Telephone Anticoagulation Service Note      Anticoagulation Summary  As of 2022    INR goal:  2.0-3.0   TTR:  62.9 % (6.7 y)   INR used for dosin.10 (2022)   Warfarin maintenance plan:  5 mg (5 mg x 1) every day   Weekly warfarin total:  35 mg   Plan last modified:  Claudia ManD (2021)   Next INR check:  2022   Priority:  Maintenance   Target end date:  Indefinite    Indications    Deep vein thrombosis [453.40] [I82.409]             Anticoagulation Episode Summary     INR check location:  Home Draw    Preferred lab:      Send INR reminders to:      Comments:  Eloy MYRICK      Anticoagulation Care Providers     Provider Role Specialty Phone number    Bruna Ureña M.D. Referring Cardiovascular Disease (Cardiology) [phone]    [facility] Anticoagulation Services Responsible  [phone]    Jaquan Horne M.D.  Family Medicine [phone]        Anticoagulation Patient Findings    Left a message on the patient's voicemail today, informing the patient of a therapeutic INR of 2.1.   Instructed the patient to call the clinic with any changes to diet or medications, with any signs/sx of bleeding or clotting or with any questions or concerns.     Patient instructed to continue with the current warfarin dosing regimen, and asked to follow up again in 2 weeks.     Ed TaylorD

## 2022-02-03 ENCOUNTER — ANTICOAGULATION MONITORING (OUTPATIENT)
Dept: VASCULAR LAB | Facility: MEDICAL CENTER | Age: 57
End: 2022-02-03

## 2022-02-03 LAB — INR PPP: 1.5 (ref 2–3.5)

## 2022-02-04 NOTE — PROGRESS NOTES
Anticoagulation Summary  As of 2/3/2022    INR goal:  2.0-3.0   TTR:  62.6 % (6.7 y)   INR used for dosin.50 (2/3/2022)   Warfarin maintenance plan:  7.5 mg (5 mg x 1.5) every Mon, Fri; 5 mg (5 mg x 1) all other days   Weekly warfarin total:  40 mg   Plan last modified:  Osiel Pete, PharmD (2/3/2022)   Next INR check:  2/10/2022   Priority:  Maintenance   Target end date:  Indefinite    Indications    Deep vein thrombosis [453.40] [I82.409]             Anticoagulation Episode Summary     INR check location:  Home Draw    Preferred lab:      Send INR reminders to:      Comments:  Eloy MYRICK      Anticoagulation Care Providers     Provider Role Specialty Phone number    Bruna Ureña M.D. Referring Cardiovascular Disease (Cardiology) [phone]    [facility] Anticoagulation Services Responsible  [phone]    Jaquan Horne M.D.  Family Medicine [phone]        Anticoagulation Patient Findings          Left voicemail message to report a SUB therapeutic INR of 1.5.  Pt to bolus 10 mg today and then begin increased warfarin dosing regimen. Requested pt contact the clinic for any s/s of unusual bleeding, bruising, clotting or any changes to diet or medication. FU 1 weeks.    Osiel Pete, PharmD

## 2022-02-11 ENCOUNTER — ANTICOAGULATION MONITORING (OUTPATIENT)
Dept: VASCULAR LAB | Facility: MEDICAL CENTER | Age: 57
End: 2022-02-11

## 2022-02-11 DIAGNOSIS — I82.409 DEEP VEIN THROMBOSIS (DVT) OF LOWER EXTREMITY, UNSPECIFIED CHRONICITY, UNSPECIFIED LATERALITY, UNSPECIFIED VEIN (HCC): ICD-10-CM

## 2022-02-11 LAB — INR PPP: 2.3 (ref 2–3.5)

## 2022-02-12 NOTE — PROGRESS NOTES
OP   Telephone Anticoagulation Service Note      Anticoagulation Summary  As of 2022    INR goal:  2.0-3.0   TTR:  62.5 % (6.7 y)   INR used for dosin.30 (2022)   Warfarin maintenance plan:  7.5 mg (5 mg x 1.5) every Mon, Fri; 5 mg (5 mg x 1) all other days   Weekly warfarin total:  40 mg   Plan last modified:  Claudia LozadaD (2/3/2022)   Next INR check:  2022   Priority:  Maintenance   Target end date:  Indefinite    Indications    Deep vein thrombosis [453.40] [I82.409]             Anticoagulation Episode Summary     INR check location:  Home Draw    Preferred lab:      Send INR reminders to:      Comments:  Eloy MYRICK      Anticoagulation Care Providers     Provider Role Specialty Phone number    Bruna Ureña M.D. Referring Cardiovascular Disease (Cardiology) [phone]    [facility] Anticoagulation Services Responsible  [phone]    Jaquan Horne M.D.  Family Medicine [phone]        Anticoagulation Patient Findings     Left a message on the patient's voicemail today, informing the patient of a therapeutic INR of 2.3.   Instructed the patient to call the clinic with any changes to diet or medications, with any signs/sx of bleeding or clotting or with any questions or concerns.     Patient instructed to continue with the current warfarin dosing regimen, and asked to follow up again in 2 weeks.     Ed TaylorD

## 2022-02-25 ENCOUNTER — ANTICOAGULATION MONITORING (OUTPATIENT)
Dept: VASCULAR LAB | Facility: MEDICAL CENTER | Age: 57
End: 2022-02-25
Payer: COMMERCIAL

## 2022-02-25 LAB — INR PPP: 3.4 (ref 2–3.5)

## 2022-02-26 NOTE — PROGRESS NOTES
Anticoagulation Summary  As of 2/25/2022    INR goal:  2.0-3.0   TTR:  62.5 % (6.8 y)   INR used for dosing:  3.40 (2/25/2022)   Warfarin maintenance plan:  7.5 mg (5 mg x 1.5) every Mon, Fri; 5 mg (5 mg x 1) all other days   Weekly warfarin total:  40 mg   Plan last modified:  Claudia LozadaD (2/3/2022)   Next INR check:  3/11/2022   Priority:  Maintenance   Target end date:  Indefinite    Indications    Deep vein thrombosis [453.40] [I82.409]             Anticoagulation Episode Summary     INR check location:  Home Draw    Preferred lab:      Send INR reminders to:      Comments:  Eloy MYRICK      Anticoagulation Care Providers     Provider Role Specialty Phone number    Bruna Ureña M.D. Referring Cardiovascular Disease (Cardiology) [phone]    [facility] Anticoagulation Services Responsible  [phone]    Jaquan Horne M.D.  Family Medicine [phone]        Anticoagulation Patient Findings      Left voicemail message to report a SUPRA-therapeutic INR of 3.4.    Will have pt HOLD x 1 dose today and then continue on with current warfarin dosing regimen.   Requested pt contact the clinic for any s/s of unusual bleeding, bruising, clotting or any changes to diet or medication.    FU INR in 2 week(s).    Lazaro Cerda, PharmD, BCACP

## 2022-02-28 ENCOUNTER — OFFICE VISIT (OUTPATIENT)
Dept: VASCULAR LAB | Facility: MEDICAL CENTER | Age: 57
End: 2022-02-28
Payer: COMMERCIAL

## 2022-02-28 DIAGNOSIS — E87.5 HYPERKALEMIA: ICD-10-CM

## 2022-02-28 DIAGNOSIS — I82.5Z9 CHRONIC DEEP VEIN THROMBOSIS (DVT) OF DISTAL VEIN OF LOWER EXTREMITY, UNSPECIFIED LATERALITY (HCC): Chronic | ICD-10-CM

## 2022-02-28 DIAGNOSIS — D68.59 PROTEIN S DEFICIENCY (HCC): ICD-10-CM

## 2022-02-28 DIAGNOSIS — I10 PRIMARY HYPERTENSION: Chronic | ICD-10-CM

## 2022-02-28 DIAGNOSIS — E78.5 DYSLIPIDEMIA: ICD-10-CM

## 2022-02-28 DIAGNOSIS — F17.200 TOBACCO DEPENDENCE SYNDROME: ICD-10-CM

## 2022-02-28 DIAGNOSIS — Z79.01 CHRONIC ANTICOAGULATION: ICD-10-CM

## 2022-02-28 DIAGNOSIS — I08.0 MITRAL VALVE INSUFFICIENCY AND AORTIC VALVE INSUFFICIENCY: Chronic | ICD-10-CM

## 2022-02-28 DIAGNOSIS — I71.00 DISSECTING ANEURYSM OF AORTA (HCC): ICD-10-CM

## 2022-02-28 PROCEDURE — 99214 OFFICE O/P EST MOD 30 MIN: CPT | Performed by: NURSE PRACTITIONER

## 2022-02-28 ASSESSMENT — ENCOUNTER SYMPTOMS
BRUISES/BLEEDS EASILY: 0
SENSORY CHANGE: 0
PALPITATIONS: 0
DIZZINESS: 0
SHORTNESS OF BREATH: 0
NERVOUS/ANXIOUS: 1
BACK PAIN: 1
MYALGIAS: 0
FALLS: 0
BLOOD IN STOOL: 0
FOCAL WEAKNESS: 0
SPEECH CHANGE: 0
WEAKNESS: 1
DEPRESSION: 0
WEIGHT LOSS: 0
COUGH: 0

## 2022-02-28 NOTE — PROGRESS NOTES
This visit was conducted via [employer] video call using secure and encrypted video conferencing technology due to covid-19 restrictions.   The patient was in a private location in the state of [place].    The patient's identity was confirmed and verbal consent was obtained for this virtual visit.  VASCULAR FOLLOW UP VISIT  Subjective:   Oumar Fisher is a 56 y.o. female who presents today 02/28/2022 for   No chief complaint on file.    HPI:  Here for f/u of aortic dissection, dyslipidemia, htn.   BPs in 130-135//70's-80's   Daughter had brain surgery at [facility]  Quit smoking aug 1 -but back to smoking 1/2 to none per day depending on how much pain from the wound care Has been released from wound care  No change in bp meds  No myalgias on statin - changed to rosuvastatin  No chest, back or abd pain.   Has missed medications on occasion  No bleeding on warfarin  Pt did get her COVID vaccine so she is able to work   Does not have a current appt with Dr Dillon will follow    Social History     Tobacco Use   • Smoking status: Current Every Day Smoker   • Smokeless tobacco: Never Used   Vaping Use   • Vaping Use: Never used   Substance Use Topics   • Alcohol use: Never   • Drug use: Never     DIET AND EXERCISE:  Weight Change: up about 8 pounds and stable  Diet: common adult  Exercise: minimal exercise   Smoking - quit     Review of Systems   Constitutional: Negative for malaise/fatigue and weight loss.   HENT: Negative for nosebleeds.    Respiratory: Negative for cough and shortness of breath.    Cardiovascular: Negative for chest pain, palpitations and leg swelling.   Gastrointestinal: Negative for blood in stool.   Genitourinary: Negative for hematuria.   Musculoskeletal: Positive for back pain. Negative for falls and myalgias.   Skin:        Wound on Lt outer ankle    Neurological: Positive for weakness. Negative for dizziness, sensory change, speech change and focal weakness.   Endo/Heme/Allergies: Does not  bruise/bleed easily.   Psychiatric/Behavioral: Negative for depression. The patient is nervous/anxious.       Objective:     There were no vitals filed for this visit.   There is no height or weight on file to calculate BMI.  Physical Exam  Vitals reviewed.   Constitutional:       General: She is not in acute distress.     Appearance: She is well-developed.   Pulmonary:      Effort: No respiratory distress.   Skin:     Coloration: Skin is not jaundiced or pale.   Neurological:      Mental Status: She is alert and oriented to person, place, and time.   Psychiatric:         Mood and Affect: Mood normal.         Behavior: Behavior normal.     Data review  Lab Results   Component Value Date    CHOLSTRLTOT 123 08/26/2021    CHOLSTRLTOT 94 (L) 02/07/2021    LDL 26 02/07/2021    HDL 60 08/26/2021    HDL 39 (A) 02/07/2021    TRIGLYCERIDE 94 08/26/2021    TRIGLYCERIDE 147 02/07/2021      Lab Results   Component Value Date    PROTHROMBTM 20.4 (H) 02/10/2021    INR 3.40 02/25/2022       Lab Results   Component Value Date    HBA1C 5.6 02/06/2021      Blood work May 2020 from lab core  Glucose 90  GFR 70  Total cholesterol 183, triglycerides 259, HDL 60, LDL 71,  TSH 5.29  Urine for albumin less than 3    CTA October 2019  Endovascular intervention stable  No endoleak     Echo RDC aug 2020  Normal ef  Mild AI and MR     CTA 2/6/21  1.  Extensive atherosclerotic change of the aorta and originating vessels. No evidence of aortic dissection.     2.  Mild ectasia of the ascending aorta measured at 3.8 cm in diameter.     3.  Descending thoracic aortic stent present.     4.  Fatty liver.     5.  Inferior vena cava filter present.     6.  Multilevel thoracic spine compression fractures which are possibly chronic in nature.  Lab Results   Component Value Date    SODIUM 138 08/26/2021    SODIUM 137 02/08/2021    POTASSIUM 5.2 08/26/2021    POTASSIUM 4.0 02/08/2021    CHLORIDE 103 08/26/2021    CHLORIDE 100 02/08/2021    CO2 22  08/26/2021    CO2 26 02/08/2021    GLUCOSE 86 08/26/2021    GLUCOSE 81 02/08/2021    BUN 16 08/26/2021    BUN 12 02/08/2021    CREATININE 1.00 08/26/2021    CREATININE 0.70 02/08/2021    BUNCREATRAT 16 08/26/2021    IFAFRICA 74 08/26/2021    IFAFRICA >60 02/08/2021    IFNOTAFR 64 08/26/2021    IFNOTAFR >60 02/08/2021      Lab Results   Component Value Date    TSH 3.230 03/09/2021      Lab Results   Component Value Date    WBC 6.1 03/09/2021    WBC 5.1 02/08/2021    RBC 4.88 03/09/2021    RBC 4.17 (L) 02/08/2021    HEMOGLOBIN 15.4 03/09/2021    HEMOGLOBIN 13.4 02/08/2021    HEMATOCRIT 46.4 03/09/2021    HEMATOCRIT 40.1 02/08/2021    MCV 95 03/09/2021    MCV 96.2 02/08/2021    MCH 31.6 03/09/2021    MCH 32.1 02/08/2021    MCHC 33.2 03/09/2021    MCHC 33.4 (L) 02/08/2021    MPV 10.3 02/08/2021      Medical Decision Making:  Today's Assessment / Status / Plan:     1. Chronic deep vein thrombosis (DVT) of distal vein of lower extremity, unspecified laterality (HCC)     2. Primary hypertension     3. Dissecting aneurysm of aorta (HCC)     4. Mitral valve insufficiency and aortic valve insufficiency     5. Protein S deficiency (HCC)     6. Chronic anticoagulation     7. Dyslipidemia     8. Tobacco dependence syndrome       Patient Type: Secondary Prevention    Etiology of Established CVD if Present:   1) Aortic dissection status post graft  2) valvular heart disease without previous intervention  3) DVT    Lipid Management: Qualifies for Statin Therapy   Currently on Statin: Yes  Would like to see LDL less than 70 and non HDL less than 100, Borderline at goal but probably due to inactivity because of her T11 & T12 compression fx and ankle wound  At goal since change to rosuvastatic  Plan:  -continue rosuvastatin 20 mg  -Repeat lipid panel 6-12 mo    Blood Pressure Management:  ACC-AHA blood pressure goal less than 130/80  Home bp under reasonable control per her report  A bit elevated in office today  Has gained some  weight which may have led to BP increase  GFR normal and no albuminuria  Plan:  -Intensify therapy lifestyle change  -Continue metoprolol at current dose  -Continue losartan at current dose  -Continue spironolactone at current dose  -Continue to follow blood pressures at home  -Intensify therapy if above goal next visit    Glycemic Status: Normal  -Recheck fasting glucose prior to next visit    Anti-Platelet/Anti-Coagulant Tx: yes  -Continue indefinite anticoagulation  -Follow-up in anticoagulation clinic    Smoking: Quit recently  Uninterested medications or NRT  -continue  Complete cessation  -Continue to address at every visit    Physical Activity: Recommended more walking    Weight Management and Nutrition: More attention to diet with slow steady weight loss    Other:     1) Aortic dissection status post graft -stable on follow-up imaging.  Continue medical management.  Recheck CTA at 2-year intervals    2) valvular heart disease without previous intervention -remains asymptomatic. Limited on echo. Does not need regular follow up echo    3) DVT -continue indefinite anticoagulation as above.  Quit smoking-    4) hypothyroidism - perhaps a bit symptomatic.  TSH elevated consistent with under treatment.  Has not been completely adherent with medications. Increase levo to 8 pills per day. Recheck tsh before next visit and continue to adjust as needed. F/u with pcp    5) Wound Lt ankle -Continue with wound care     6) Hyperkalemia -decrease intake of foods containing K+. Increase fluids.  BMP in 4 weeks      6) anxiety related to requirement of covid vaccine- Encourage her to use breathing techniques, calming apps on the cell phone or counseling.      Instructed to follow-up with PCP for remainder of adult medical needs: yes  We will partner with other providers in the management of established vascular disease and cardiometabolic risk factors.    Studies to Be Obtained: CTA thoracoabdominal aorta Feb 2023    Labs to  Be Obtained: bmp in 4 weeks then call results and ask about the appt with Dr. Dillon also need to order for next appt in July test lipid, cmp, cbc,     Follow up in: 6 months  Time: 38 min - chart review/prep, review of other providers' records, imaging/lab review, face-to-face time for history/examination, ordering, prescribing,  review of results/meds/ treatment plan with patient/family/caregiver, documentation in EMR, care coordination (as needed)    MAINOR Trimble.     CC: Jaquan Horne

## 2022-03-14 ENCOUNTER — ANTICOAGULATION MONITORING (OUTPATIENT)
Dept: VASCULAR LAB | Facility: MEDICAL CENTER | Age: 57
End: 2022-03-14
Payer: COMMERCIAL

## 2022-03-14 LAB — INR PPP: 2.3 (ref 2–3.5)

## 2022-03-15 NOTE — PROGRESS NOTES
OP Anticoagulation Service Note    Date: 3/14/2022    Anticoagulation Summary  As of 3/14/2022    INR goal:  2.0-3.0   TTR:  62.6 % (6.8 y)   INR used for dosin.30 (3/14/2022)   Warfarin maintenance plan:  7.5 mg (5 mg x 1.5) every Mon, Fri; 5 mg (5 mg x 1) all other days   Weekly warfarin total:  40 mg   Plan last modified:  Osiel Pete, PharmD (2/3/2022)   Next INR check:  2022   Priority:  Maintenance   Target end date:  Indefinite    Indications    Deep vein thrombosis [453.40] [I82.409]             Anticoagulation Episode Summary     INR check location:  Home Draw    Preferred lab:      Send INR reminders to:      Comments:  Eloy MYRICK      Anticoagulation Care Providers     Provider Role Specialty Phone number    Bruna Ureña M.D. Referring Cardiovascular Disease (Cardiology) [phone]    [facility] Anticoagulation Services Responsible  [phone]    Jaquan Horne M.D.  Family Medicine [phone]        Anticoagulation Patient Findings      Voice message for patient regarding their anticoagulant.     HPI:   The reason for today's call is to prevent morbidity and mortality from a blood clot and/or stroke and to reduce the risk of bleeding while on a anticoagulant.     PCP:  Jaquan Horne M.D.  [address] [facility] [phone]    Assessment:     • INR  therapeutic.       Current Outpatient Medications:   •  warfarin, Take one tablet daily as directed by [facility] Anticoagulation Services  •  losartan, 100 mg, Oral, DAILY  •  metoprolol SR, 100 mg, Oral, DAILY  •  rosuvastatin, 20 mg, Oral, Q EVENING  •  spironolactone, 25 mg, Oral, DAILY  •  levothyroxine, 112 mcg, Oral, QDAY  •  alendronate, Take  by mouth.  •  senna-docusate, 1 Tablet, Oral, DAILY      Plan:     • Continue the same warfarin dose, as noted above.       Follow-up:     • Our protocol suggests we test in 4 weeks.        Additional information discussed with patient:     • Asked patient to please call the  anticoagulation clinic if they have any signs/symptoms of bleeding and/or thrombosis or any changes to diet or medications.      National recommendations regarding anticoagulation therapy:     The CHEST guidelines recommends frequent INR monitoring at regular intervals (a few days up to a max of 12 weeks) to ensure patients are on the proper dose of warfarin, and patients are not having any complications from therapy.  INRs can dramatically change over a short time period due to diet, medications, and medical conditions.     [facility]  Phone [phone] fax [phone]    This note was created using voice recognition software (Dragon). The accuracy of the dictation is limited by the abilities of the software. I have reviewed the note prior to signing, however some errors in grammar and context are still possible. If you have any questions related to this note please do not hesitate to contact our office.

## 2022-03-31 ENCOUNTER — DOCUMENTATION (OUTPATIENT)
Dept: VASCULAR LAB | Facility: MEDICAL CENTER | Age: 57
End: 2022-03-31
Payer: COMMERCIAL

## 2022-03-31 NOTE — PROGRESS NOTES
Spoke with the pt regarding her lab work that is due. Sent them to her house. Pt is still planing on getting an appt with Dr Dillon. MAINOR Trimble.

## 2022-04-05 ENCOUNTER — DOCUMENTATION (OUTPATIENT)
Dept: VASCULAR LAB | Facility: MEDICAL CENTER | Age: 57
End: 2022-04-05
Payer: COMMERCIAL

## 2022-04-05 NOTE — PROGRESS NOTES
Spoke with the pt regarding her lab work that is due. Sent them to her house. Pt is still planing on getting an appt with Dr Dillon. MAINOR Tribmle.

## 2022-04-19 LAB — INR PPP: 3.2 (ref 2–3.5)

## 2022-04-20 ENCOUNTER — ANTICOAGULATION MONITORING (OUTPATIENT)
Dept: VASCULAR LAB | Facility: MEDICAL CENTER | Age: 57
End: 2022-04-20
Payer: COMMERCIAL

## 2022-04-20 NOTE — PROGRESS NOTES
Anticoagulation Summary  As of 4/20/2022    INR goal:  2.0-3.0   TTR:  62.8 % (6.9 y)   INR used for dosing:  3.20 (4/19/2022)   Warfarin maintenance plan:  7.5 mg (5 mg x 1.5) every Mon, Fri; 5 mg (5 mg x 1) all other days   Weekly warfarin total:  40 mg   Plan last modified:  Abhi Carlos, PharmD (3/14/2022)   Next INR check:  5/3/2022   Priority:  Maintenance   Target end date:  Indefinite    Indications    Deep vein thrombosis [453.40] [I82.409]             Anticoagulation Episode Summary     INR check location:  Home Draw    Preferred lab:      Send INR reminders to:      Comments:  Eloy MYRICK      Anticoagulation Care Providers     Provider Role Specialty Phone number    Bruna Ureña M.D. Referring Cardiovascular Disease (Cardiology) [phone]    [facility] Anticoagulation Services Responsible  [phone]    Jaquan Horne M.D.  Family Medicine [phone]        Anticoagulation Patient Findings    Left voicemail message to report a supratherapeutic INR of 3.2.  Requested pt contact the clinic for any s/s of unusual bleeding, bruising, clotting or any changes to diet or medication.   Pt is to reduce dose x 1 day then continue with current warfarin dosing regimen.    Pt is not on antiplatelet therapy    FU 2 weeks.    Judd Johansen, Pharmacy Intern

## 2022-04-25 DIAGNOSIS — E03.9 HYPOTHYROIDISM, UNSPECIFIED TYPE: ICD-10-CM

## 2022-04-25 RX ORDER — LEVOTHYROXINE SODIUM 112 UG/1
TABLET ORAL
Qty: 90 TABLET | Refills: 1 | Status: SHIPPED | OUTPATIENT
Start: 2022-04-25 | End: 2022-10-24

## 2022-05-05 ENCOUNTER — DOCUMENTATION (OUTPATIENT)
Dept: VASCULAR LAB | Facility: MEDICAL CENTER | Age: 57
End: 2022-05-05
Payer: COMMERCIAL

## 2022-05-05 NOTE — PROGRESS NOTES
LM ob VM that she is overdue to get her BMP done to check her potassium.Her potassium was up to 5.5 on the last test.  Also, requested a call back to see if she has seen Dr Dillon yet. MAINOR Trimble.

## 2022-05-09 LAB — INR PPP: 2.7 (ref 2–3.5)

## 2022-05-10 ENCOUNTER — ANTICOAGULATION MONITORING (OUTPATIENT)
Dept: VASCULAR LAB | Facility: MEDICAL CENTER | Age: 57
End: 2022-05-10
Payer: COMMERCIAL

## 2022-05-10 ENCOUNTER — DOCUMENTATION (OUTPATIENT)
Dept: VASCULAR LAB | Facility: MEDICAL CENTER | Age: 57
End: 2022-05-10
Payer: COMMERCIAL

## 2022-05-10 NOTE — PROGRESS NOTES
Called results of lab work to pt. Also, requested a call back to see if she has seen Dr Dillon yet. MAINOR Trimble.

## 2022-05-10 NOTE — PROGRESS NOTES
OP Anticoagulation Service Note    Date: 5/10/2022    Anticoagulation Summary  As of 5/10/2022    INR goal:  2.0-3.0   TTR:  62.7 % (7 y)   INR used for dosin.70 (2022)   Warfarin maintenance plan:  7.5 mg (5 mg x 1.5) every Mon, Fri; 5 mg (5 mg x 1) all other days   Weekly warfarin total:  40 mg   Plan last modified:  Abhi Carlos, PharmD (3/14/2022)   Next INR check:  2022   Priority:  Maintenance   Target end date:  Indefinite    Indications    Deep vein thrombosis [453.40] [I82.409]             Anticoagulation Episode Summary     INR check location:  Home Draw    Preferred lab:      Send INR reminders to:      Comments:  Eloy MYRICK      Anticoagulation Care Providers     Provider Role Specialty Phone number    Bruna Ureña M.D. Referring Cardiovascular Disease (Cardiology) [phone]    [facility] Anticoagulation Services Responsible  [phone]    Jaquan Horne M.D.  Family Medicine [phone]        Anticoagulation Patient Findings        Voice message for patient regarding their anticoagulant.     HPI:   The reason for today's call is to prevent morbidity and mortality from a blood clot and/or stroke and to reduce the risk of bleeding while on a anticoagulant.     PCP:  Jaquan Horne M.D.  [address] [facility] [phone]    Assessment:     • INR  therapeutic.       Current Outpatient Medications:   •  levothyroxine, TAKE 1 TABLET BY MOUTH EVERY DAY  •  warfarin, Take one tablet daily as directed by [facility] Anticoagulation Services  •  losartan, 100 mg, Oral, DAILY  •  metoprolol SR, 100 mg, Oral, DAILY  •  rosuvastatin, 20 mg, Oral, Q EVENING  •  spironolactone, 25 mg, Oral, DAILY  •  alendronate, Take  by mouth.  •  senna-docusate, 1 Tablet, Oral, DAILY      Plan:     • Continue the same warfarin dose, as noted above.       Follow-up:     • Our protocol suggests we test in 4 weeks.        Additional information discussed with patient:     • Asked patient to please call  the anticoagulation clinic if they have any signs/symptoms of bleeding and/or thrombosis or any changes to diet or medications.      National recommendations regarding anticoagulation therapy:     The CHEST guidelines recommends frequent INR monitoring at regular intervals (a few days up to a max of 12 weeks) to ensure patients are on the proper dose of warfarin, and patients are not having any complications from therapy.  INRs can dramatically change over a short time period due to diet, medications, and medical conditions.     [facility]  Phone [phone] fax [phone]    This note was created using voice recognition software (Dragon). The accuracy of the dictation is limited by the abilities of the software. I have reviewed the note prior to signing, however some errors in grammar and context are still possible. If you have any questions related to this note please do not hesitate to contact our office.

## 2022-05-12 ENCOUNTER — DOCUMENTATION (OUTPATIENT)
Dept: VASCULAR LAB | Facility: MEDICAL CENTER | Age: 57
End: 2022-05-12
Payer: COMMERCIAL

## 2022-05-12 NOTE — PROGRESS NOTES
Called pt to request a call back regarding the Dr. Dillon appt. Has she seen the doctor or is she going to see the doctor? My number provided. If she does not call back will discuss it in the next visit. AMINOR Trimble.

## 2022-05-15 DIAGNOSIS — Z79.01 CHRONIC ANTICOAGULATION: ICD-10-CM

## 2022-05-16 DIAGNOSIS — E78.5 DYSLIPIDEMIA: ICD-10-CM

## 2022-05-16 RX ORDER — ROSUVASTATIN CALCIUM 20 MG/1
20 TABLET, COATED ORAL EVERY EVENING
Qty: 90 TABLET | Refills: 1 | Status: SHIPPED | OUTPATIENT
Start: 2022-05-16 | End: 2023-01-05

## 2022-05-16 RX ORDER — WARFARIN SODIUM 5 MG/1
TABLET ORAL
Qty: 135 TABLET | Refills: 1 | Status: SHIPPED | OUTPATIENT
Start: 2022-05-16 | End: 2022-11-14

## 2022-06-02 LAB — INR PPP: 2.8 (ref 2–3.5)

## 2022-06-03 ENCOUNTER — ANTICOAGULATION MONITORING (OUTPATIENT)
Dept: VASCULAR LAB | Facility: MEDICAL CENTER | Age: 57
End: 2022-06-03
Payer: COMMERCIAL

## 2022-06-03 DIAGNOSIS — I82.409 DEEP VEIN THROMBOSIS (DVT) OF LOWER EXTREMITY, UNSPECIFIED CHRONICITY, UNSPECIFIED LATERALITY, UNSPECIFIED VEIN (HCC): ICD-10-CM

## 2022-06-03 NOTE — PROGRESS NOTES
OP   Telephone Anticoagulation Service Note      Anticoagulation Summary  As of 6/3/2022    INR goal:  2.0-3.0   TTR:  62.8 % (7 y)   INR used for dosin.80 (2022)   Warfarin maintenance plan:  7.5 mg (5 mg x 1.5) every Mon, Fri; 5 mg (5 mg x 1) all other days   Weekly warfarin total:  40 mg   Plan last modified:  Abhi Carlos PharmD (3/14/2022)   Next INR check:  2022   Priority:  Maintenance   Target end date:  Indefinite    Indications    Deep vein thrombosis [453.40] [I82.409]             Anticoagulation Episode Summary     INR check location:  Home Draw    Preferred lab:      Send INR reminders to:      Comments:  Eloy MYRICK      Anticoagulation Care Providers     Provider Role Specialty Phone number    Bruna Ureña M.D. Referring Cardiovascular Disease (Cardiology) [phone]    [facility] Anticoagulation Services Responsible  [phone]    Jaquan Horne M.D.  Family Medicine [phone]        Anticoagulation Patient Findings     Left a message on the patient's voicemail today, informing the patient of a therapeutic INR of 2.8.   Instructed the patient to call the clinic with any changes to diet or medications, with any signs/sx of bleeding or clotting or with any questions or concerns.     Patient instructed to continue with the current warfarin dosing regimen, and asked to follow up again in 4 weeks.     Ed TaylorD

## 2022-06-09 ENCOUNTER — DOCUMENTATION (OUTPATIENT)
Dept: VASCULAR LAB | Facility: MEDICAL CENTER | Age: 57
End: 2022-06-09
Payer: COMMERCIAL

## 2022-06-09 NOTE — PROGRESS NOTES
Lm on VM asking pt to call back to let me know if she has been to see Dr. Dillon yet. My number provided. MAINOR Trimble.

## 2022-06-13 ENCOUNTER — DOCUMENTATION (OUTPATIENT)
Dept: VASCULAR LAB | Facility: MEDICAL CENTER | Age: 57
End: 2022-06-13
Payer: COMMERCIAL

## 2022-06-13 NOTE — PROGRESS NOTES
Spoke with the pt she had an appt with Dr. Dillon and she is suppose to have an office procedure. They are checking with her insurance to make sure it is autherized. Tasked to Ma to get Dr Gilbert office notes and ultrasound reports. MAINOR Trimble.

## 2022-06-14 ENCOUNTER — TELEPHONE (OUTPATIENT)
Dept: VASCULAR LAB | Facility: MEDICAL CENTER | Age: 57
End: 2022-06-14
Payer: COMMERCIAL

## 2022-06-14 NOTE — TELEPHONE ENCOUNTER
Faxed below request to office.     ----- Message from BONITA Tribmle sent at 6/13/2022  4:25 PM PDT -----  Regarding: office visit note and ultrasound results  Please see if you can get the office note and ultrasound both done at Dr. Dillon's office. Thanks BONITA Trimble

## 2022-07-05 ENCOUNTER — ANTICOAGULATION MONITORING (OUTPATIENT)
Dept: VASCULAR LAB | Facility: MEDICAL CENTER | Age: 57
End: 2022-07-05
Payer: COMMERCIAL

## 2022-07-05 DIAGNOSIS — I82.409 DEEP VEIN THROMBOSIS (DVT) OF LOWER EXTREMITY, UNSPECIFIED CHRONICITY, UNSPECIFIED LATERALITY, UNSPECIFIED VEIN (HCC): ICD-10-CM

## 2022-07-05 LAB — INR PPP: 3 (ref 2–3.5)

## 2022-07-06 ENCOUNTER — OFFICE VISIT (OUTPATIENT)
Dept: VASCULAR LAB | Facility: MEDICAL CENTER | Age: 57
End: 2022-07-06
Attending: NURSE PRACTITIONER
Payer: COMMERCIAL

## 2022-07-06 VITALS
HEART RATE: 80 BPM | SYSTOLIC BLOOD PRESSURE: 128 MMHG | HEIGHT: 66 IN | DIASTOLIC BLOOD PRESSURE: 69 MMHG | WEIGHT: 129.3 LBS | BODY MASS INDEX: 20.78 KG/M2

## 2022-07-06 DIAGNOSIS — E78.5 DYSLIPIDEMIA: ICD-10-CM

## 2022-07-06 DIAGNOSIS — Z79.01 CHRONIC ANTICOAGULATION: ICD-10-CM

## 2022-07-06 DIAGNOSIS — I82.409 DEEP VEIN THROMBOSIS (DVT) OF LOWER EXTREMITY, UNSPECIFIED CHRONICITY, UNSPECIFIED LATERALITY, UNSPECIFIED VEIN (HCC): ICD-10-CM

## 2022-07-06 DIAGNOSIS — I10 PRIMARY HYPERTENSION: Chronic | ICD-10-CM

## 2022-07-06 DIAGNOSIS — Z86.718 HISTORY OF DVT (DEEP VEIN THROMBOSIS): ICD-10-CM

## 2022-07-06 DIAGNOSIS — D68.59 PROTEIN S DEFICIENCY (HCC): ICD-10-CM

## 2022-07-06 DIAGNOSIS — I71.00 DISSECTING ANEURYSM OF AORTA (HCC): ICD-10-CM

## 2022-07-06 DIAGNOSIS — I38 VALVULAR HEART DISEASE: ICD-10-CM

## 2022-07-06 DIAGNOSIS — F17.200 TOBACCO DEPENDENCE SYNDROME: ICD-10-CM

## 2022-07-06 DIAGNOSIS — I87.2 PERIPHERAL VENOUS INSUFFICIENCY: ICD-10-CM

## 2022-07-06 PROCEDURE — 99212 OFFICE O/P EST SF 10 MIN: CPT

## 2022-07-06 PROCEDURE — 99214 OFFICE O/P EST MOD 30 MIN: CPT | Performed by: NURSE PRACTITIONER

## 2022-07-06 ASSESSMENT — ENCOUNTER SYMPTOMS
SENSORY CHANGE: 0
FOCAL WEAKNESS: 0
WEIGHT LOSS: 0
MYALGIAS: 0
WEAKNESS: 1
BLOOD IN STOOL: 0
COUGH: 0
SHORTNESS OF BREATH: 0
FALLS: 0
DEPRESSION: 0
DIZZINESS: 0
BACK PAIN: 1
SPEECH CHANGE: 0
NERVOUS/ANXIOUS: 1
BRUISES/BLEEDS EASILY: 0
PALPITATIONS: 0

## 2022-07-06 ASSESSMENT — FIBROSIS 4 INDEX: FIB4 SCORE: 1.21

## 2022-07-06 NOTE — PROGRESS NOTES
"  VASCULAR FOLLOW UP VISIT  Subjective:   Oumar Fisher is a 57 y.o. female who presents today 07/06/2022 for   Chief Complaint   Patient presents with   • Follow-Up     HPI:  Here for f/u of aortic dissection, dyslipidemia, htn.   BPs in 130-135//70's-80's  Daughter had brain surgery at [facility]  Quit smoking aug 1 -but back to smoking 1/2 to none per day depending on how much pain from the wound care Has been released from wound care  No change in bp meds  No myalgias on statin - changed to rosuvastatin  No chest, back or abd pain.   Has missed medications on occasion  No bleeding on warfarin  Does not have a current appt with Dr Dillon will follow July 19th    Social History     Tobacco Use   • Smoking status: Current Every Day Smoker   • Smokeless tobacco: Never Used   Vaping Use   • Vaping Use: Never used   Substance Use Topics   • Alcohol use: Never   • Drug use: Never     DIET AND EXERCISE:  Weight Change: up about 8 pounds and stable  Diet: common adult  Exercise: minimal exercise   Smoking - quit     Review of Systems   Constitutional: Negative for malaise/fatigue and weight loss.   HENT: Negative for nosebleeds.    Respiratory: Negative for cough and shortness of breath.    Cardiovascular: Negative for chest pain, palpitations and leg swelling.   Gastrointestinal: Negative for blood in stool.   Genitourinary: Negative for hematuria.   Musculoskeletal: Positive for back pain. Negative for falls and myalgias.   Skin:        Wound on Lt outer ankle    Neurological: Positive for weakness. Negative for dizziness, sensory change, speech change and focal weakness.   Endo/Heme/Allergies: Does not bruise/bleed easily.   Psychiatric/Behavioral: Negative for depression. The patient is nervous/anxious.       Objective:     Vitals:    07/06/22 1534   BP: 128/69   BP Location: Left arm   Patient Position: Sitting   BP Cuff Size: Small adult   Pulse: 80   Weight: 58.7 kg (129 lb 4.8 oz)   Height: 1.676 m (5' 6\") "      Body mass index is 20.87 kg/m².  Physical Exam  Vitals reviewed.   Constitutional:       General: She is not in acute distress.     Appearance: She is well-developed.   Pulmonary:      Effort: No respiratory distress.   Skin:     Coloration: Skin is not jaundiced or pale.   Neurological:      Mental Status: She is alert and oriented to person, place, and time.   Psychiatric:         Mood and Affect: Mood normal.         Behavior: Behavior normal.     Data review  Lab Results   Component Value Date    CHOLSTRLTOT 123 08/26/2021    CHOLSTRLTOT 94 (L) 02/07/2021    LDL 26 02/07/2021    HDL 60 08/26/2021    HDL 39 (A) 02/07/2021    TRIGLYCERIDE 94 08/26/2021    TRIGLYCERIDE 147 02/07/2021      Lab Results   Component Value Date    PROTHROMBTM 20.4 (H) 02/10/2021    INR 3.00 07/05/2022       Lab Results   Component Value Date    HBA1C 5.6 02/06/2021      Blood work May 2020 from lab core  Glucose 90  GFR 70  Total cholesterol 183, triglycerides 259, HDL 60, LDL 71,  TSH 5.29  Urine for albumin less than 3    CTA October 2019  Endovascular intervention stable  No endoleak     Echo RDC aug 2020  Normal ef  Mild AI and MR     CTA 2/6/21  1.  Extensive atherosclerotic change of the aorta and originating vessels. No evidence of aortic dissection.     2.  Mild ectasia of the ascending aorta measured at 3.8 cm in diameter.     3.  Descending thoracic aortic stent present.     4.  Fatty liver.     5.  Inferior vena cava filter present.     6.  Multilevel thoracic spine compression fractures which are possibly chronic in nature.  Lab Results   Component Value Date    SODIUM 138 08/26/2021    SODIUM 137 02/08/2021    POTASSIUM 5.2 08/26/2021    POTASSIUM 4.0 02/08/2021    CHLORIDE 103 08/26/2021    CHLORIDE 100 02/08/2021    CO2 22 08/26/2021    CO2 26 02/08/2021    GLUCOSE 86 08/26/2021    GLUCOSE 81 02/08/2021    BUN 16 08/26/2021    BUN 12 02/08/2021    CREATININE 1.00 08/26/2021    CREATININE 0.70 02/08/2021     BUNCREATRAT 16 08/26/2021    IFAFRICA 74 08/26/2021    IFAFRICA >60 02/08/2021    IFNOTAFR 64 08/26/2021    IFNOTAFR >60 02/08/2021      Lab Results   Component Value Date    TSH 3.230 03/09/2021      Lab Results   Component Value Date    WBC 6.1 03/09/2021    WBC 5.1 02/08/2021    RBC 4.88 03/09/2021    RBC 4.17 (L) 02/08/2021    HEMOGLOBIN 15.4 03/09/2021    HEMOGLOBIN 13.4 02/08/2021    HEMATOCRIT 46.4 03/09/2021    HEMATOCRIT 40.1 02/08/2021    MCV 95 03/09/2021    MCV 96.2 02/08/2021    MCH 31.6 03/09/2021    MCH 32.1 02/08/2021    MCHC 33.2 03/09/2021    MCHC 33.4 (L) 02/08/2021    MPV 10.3 02/08/2021      Medical Decision Making:  Today's Assessment / Status / Plan:     1. Deep vein thrombosis (DVT) of lower extremity, unspecified chronicity, unspecified laterality, unspecified vein (HCC)     2. Dissecting aneurysm of aorta (HCC)     3. Dyslipidemia     4. History of DVT (deep vein thrombosis)     5. Primary hypertension     6. Peripheral venous insufficiency     7. Protein S deficiency (HCC)     8. Valvular heart disease     9. Tobacco dependence syndrome       Patient Type: Secondary Prevention    Etiology of Established CVD if Present:   1) Aortic dissection status post graft  2) valvular heart disease without previous intervention  3) DVT    Lipid Management: Qualifies for Statin Therapy   Currently on Statin: Yes  Would like to see LDL less than 70 and non HDL less than 100, Borderline at goal but probably due to inactivity because of her T11 & T12 compression fx and ankle wound  At goal since change to rosuvastatic  Plan:  -continue rosuvastatin 20 mg  -Repeat lipid panel 6-12 mo    Blood Pressure Management:  ACC-AHA blood pressure goal less than 130/80  Home bp under reasonable control per her report  A bit elevated in office today  Has gained some weight which may have led to BP increase  GFR normal and no albuminuria  Plan:  -Intensify therapy lifestyle change  -Continue metoprolol at current  dose  -Continue losartan at current dose  -Continue spironolactone at current dose  -Continue to follow blood pressures at home  -Intensify therapy if above goal next visit    Glycemic Status: Normal  -Recheck fasting glucose prior to next visit    Anti-Platelet/Anti-Coagulant Tx: yes  -Continue indefinite anticoagulation  -Follow-up in anticoagulation clinic    Smoking: Quit recently  Uninterested medications or NRT  -continue  Complete cessation  -Continue to address at every visit    Physical Activity: Recommended more walking    Weight Management and Nutrition: More attention to diet with slow steady weight loss    Other:     1) Aortic dissection status post graft -stable on follow-up imaging.  Continue medical management.  Recheck CTA at 2-year intervals    2) valvular heart disease without previous intervention -remains asymptomatic. Limited on echo. Does not need regular follow up echo    3) DVT -continue indefinite anticoagulation as above.  Quit smoking-    4) hypothyroidism - perhaps a bit symptomatic.  TSH elevated consistent with under treatment.  Has not been completely adherent with medications. Increase levo to 8 pills per day. Recheck tsh before next visit and continue to adjust as needed. F/u with pcp    5) Wound Lt ankle -Continue with wound care     6) Hyperkalemia -decrease intake of foods containing K+. Increase fluids.  BMP in 4 weeks      6) anxiety related to requirement of covid vaccine- Encourage her to use breathing techniques, calming apps on the cell phone or counseling.      Instructed to follow-up with PCP for remainder of adult medical needs: yes  We will partner with other providers in the management of established vascular disease and cardiometabolic risk factors.    Studies to Be Obtained: CTA thoracoabdominal aorta Feb 2023    Labs to Be Obtained: bmp in 4 weeks then call results and ask about the appt with Dr. Dillon also need to order for next appt in July test lipid, cmp,  cbc,     Follow up in: 6 months  Time: 38 min - chart review/prep, review of other providers' records, imaging/lab review, face-to-face time for history/examination, ordering, prescribing,  review of results/meds/ treatment plan with patient/family/caregiver, documentation in EMR, care coordination (as needed)    BONITA Trimble     CC: Jaquan Horne

## 2022-07-06 NOTE — PROGRESS NOTES
OP   Telephone Anticoagulation Service Note      Anticoagulation Summary  As of 7/5/2022    INR goal:  2.0-3.0   TTR:  63.2 % (7.1 y)   INR used for dosing:  3.00 (7/5/2022)   Warfarin maintenance plan:  7.5 mg (5 mg x 1.5) every Mon, Fri; 5 mg (5 mg x 1) all other days   Weekly warfarin total:  40 mg   No change documented:  Babita Sarmiento   Plan last modified:  Abhi Carlos PharmD (3/14/2022)   Next INR check:  8/2/2022   Priority:  Maintenance   Target end date:  Indefinite    Indications    Deep vein thrombosis [453.40] [I82.409]             Anticoagulation Episode Summary     INR check location:  Home Draw    Preferred lab:      Send INR reminders to:      Comments:  Eloy MYRICK      Anticoagulation Care Providers     Provider Role Specialty Phone number    Bruna Ureña M.D. Referring Cardiovascular Disease (Cardiology) [phone]    [facility] Anticoagulation Services Responsible  [phone]    Jaquan Horne M.D.  Family Medicine [phone]        Anticoagulation Patient Findings     Left a message on the patient's voicemail today, informing the patient of a therapeutic INR of 3.0.   Instructed the patient to call the clinic with any changes to diet or medications, with any signs/sx of bleeding or clotting or with any questions or concerns.     Patient instructed to continue with the current warfarin dosing regimen, and asked to follow up again in 4 weeks.     Ed TaylorD

## 2022-08-04 ENCOUNTER — ANTICOAGULATION MONITORING (OUTPATIENT)
Dept: MEDICAL GROUP | Facility: MEDICAL CENTER | Age: 57
End: 2022-08-04
Payer: COMMERCIAL

## 2022-08-04 LAB — INR PPP: 2.6 (ref 2–3.5)

## 2022-08-05 NOTE — PROGRESS NOTES
Anticoagulation Summary  As of 2022    INR goal:  2.0-3.0   TTR:  63.6 % (7.2 y)   INR used for dosin.60 (2022)   Warfarin maintenance plan:  7.5 mg (5 mg x 1.5) every Mon, Fri; 5 mg (5 mg x 1) all other days   Weekly warfarin total:  40 mg   Plan last modified:  Claudia EagleD (3/14/2022)   Next INR check:  2022   Priority:  Maintenance   Target end date:  Indefinite    Indications    Deep vein thrombosis [453.40] [I82.409]             Anticoagulation Episode Summary     INR check location:  Home Draw    Preferred lab:      Send INR reminders to:      Comments:  Eloy MYRICK      Anticoagulation Care Providers     Provider Role Specialty Phone number    Bruna Ureña M.D. Referring Cardiovascular Disease (Cardiology) [phone]    [facility] Anticoagulation Services Responsible  [phone]    Jaquan Horne M.D.  Family Medicine [phone]        Anticoagulation Patient Findings      Left voicemail message to report a therapeutic INR of 2.6.    Will have pt continue on with current warfarin dosing regimen.   Requested pt contact the clinic for any s/s of unusual bleeding, bruising, clotting or any changes to diet or medication.    FU INR in 4 week(s).    Lazaro Cerda, PharmD, BCACP

## 2022-09-08 ENCOUNTER — ANTICOAGULATION MONITORING (OUTPATIENT)
Dept: VASCULAR LAB | Facility: MEDICAL CENTER | Age: 57
End: 2022-09-08
Payer: COMMERCIAL

## 2022-09-08 ENCOUNTER — DOCUMENTATION (OUTPATIENT)
Dept: VASCULAR LAB | Facility: MEDICAL CENTER | Age: 57
End: 2022-09-08
Payer: COMMERCIAL

## 2022-09-08 DIAGNOSIS — I82.409 DEEP VEIN THROMBOSIS (DVT) OF LOWER EXTREMITY, UNSPECIFIED CHRONICITY, UNSPECIFIED LATERALITY, UNSPECIFIED VEIN (HCC): ICD-10-CM

## 2022-09-08 LAB — INR PPP: 2.9 (ref 2–3.5)

## 2022-09-08 NOTE — PROGRESS NOTES
OP   Telephone Anticoagulation Service Note      Anticoagulation Summary  As of 2022      INR goal:  2.0-3.0   TTR:  64.0 % (7.3 y)   INR used for dosin.90 (2022)   Warfarin maintenance plan:  7.5 mg (5 mg x 1.5) every Mon, Fri; 5 mg (5 mg x 1) all other days   Weekly warfarin total:  40 mg   Plan last modified:  Abhi Carlos PharmD (3/14/2022)   Next INR check:  10/6/2022   Priority:  Maintenance   Target end date:  Indefinite    Indications    Deep vein thrombosis [453.40] [I82.409]                 Anticoagulation Episode Summary       INR check location:  Home Draw    Preferred lab:      Send INR reminders to:      Comments:  Eloy MYRICK          Anticoagulation Care Providers       Provider Role Specialty Phone number    Bruna Ureña M.D. Referring Cardiovascular Disease (Cardiology) [phone]    [facility] Anticoagulation Services Responsible  [phone]    Jaquan Horne M.D.  Family Medicine [phone]          Anticoagulation Patient Findings    Left a message on the patient's voicemail today, informing the patient of a therapeutic INR of 2.9.   Instructed the patient to call the clinic with any changes to diet or medications, with any signs/sx of bleeding or clotting or with any questions or concerns.     Patient instructed to continue with the current warfarin dosing regimen, and asked to follow up again in 4 weeks.     Ed TaylorD

## 2022-09-13 ENCOUNTER — DOCUMENTATION (OUTPATIENT)
Dept: VASCULAR LAB | Facility: MEDICAL CENTER | Age: 57
End: 2022-09-13
Payer: COMMERCIAL

## 2022-09-14 NOTE — PROGRESS NOTES
Spoke with the pt regarding her surgery with Dr Dillon. She stated it was completed and her wounds are healing nicely. Tasked to ma to get progress note. MAINOR Trimble.

## 2022-10-07 ENCOUNTER — ANTICOAGULATION MONITORING (OUTPATIENT)
Dept: VASCULAR LAB | Facility: MEDICAL CENTER | Age: 57
End: 2022-10-07
Payer: COMMERCIAL

## 2022-10-07 DIAGNOSIS — I82.409 DEEP VEIN THROMBOSIS (DVT) OF LOWER EXTREMITY, UNSPECIFIED CHRONICITY, UNSPECIFIED LATERALITY, UNSPECIFIED VEIN (HCC): ICD-10-CM

## 2022-10-07 LAB — INR PPP: 2.4 (ref 2–3.5)

## 2022-10-08 NOTE — PROGRESS NOTES
Anticoagulation Summary  As of 10/7/2022      INR goal:  2.0-3.0   TTR:  64.4 % (7.4 y)   INR used for dosin.40 (10/7/2022)   Warfarin maintenance plan:  7.5 mg (5 mg x 1.5) every Mon, Fri; 5 mg (5 mg x 1) all other days   Weekly warfarin total:  40 mg   Plan last modified:  Claudia EagleD (3/14/2022)   Next INR check:  2022   Priority:  Maintenance   Target end date:  Indefinite    Indications    Deep vein thrombosis [453.40] [I82.409]                 Anticoagulation Episode Summary       INR check location:  Home Draw    Preferred lab:      Send INR reminders to:      Comments:  Eloy MYRICK          Anticoagulation Care Providers       Provider Role Specialty Phone number    Bruna Ureña M.D. Referring Cardiovascular Disease (Cardiology) [phone]    [facility] Anticoagulation Services Responsible  [phone]    Jaquan Horne M.D.  Family Medicine [phone]          Anticoagulation Patient Findings    Left voicemail message to report a therapeutic INR of 2.4.  Requested pt contact the clinic for any s/s of unusual bleeding, bruising, clotting or any changes to diet or medication.   Pt is to continue with current warfarin dosing regimen.    Pt is not on antiplatelet therapy      FU 4 weeks.  Alexis Garcia, ClaudiaD, BCACP

## 2022-11-07 LAB — INR PPP: 2.9 (ref 2–3.5)

## 2022-11-08 ENCOUNTER — ANTICOAGULATION MONITORING (OUTPATIENT)
Dept: VASCULAR LAB | Facility: MEDICAL CENTER | Age: 57
End: 2022-11-08
Payer: COMMERCIAL

## 2022-11-08 DIAGNOSIS — I82.409 DEEP VEIN THROMBOSIS (DVT) OF LOWER EXTREMITY, UNSPECIFIED CHRONICITY, UNSPECIFIED LATERALITY, UNSPECIFIED VEIN (HCC): ICD-10-CM

## 2022-11-08 NOTE — PROGRESS NOTES
Anticoagulation Summary  As of 2022      INR goal:  2.0-3.0   TTR:  64.8 % (7.5 y)   INR used for dosin.90 (2022)   Warfarin maintenance plan:  7.5 mg (5 mg x 1.5) every Mon, Fri; 5 mg (5 mg x 1) all other days; Starting 2022   Weekly warfarin total:  40 mg   Plan last modified:  Abhi Carlos, PharmD (3/14/2022)   Next INR check:  2022   Priority:  Maintenance   Target end date:  Indefinite    Indications    Deep vein thrombosis [453.40] [I82.409]                 Anticoagulation Episode Summary       INR check location:  Home Draw    Preferred lab:      Send INR reminders to:      Comments:  Eloy MYRICK          Anticoagulation Care Providers       Provider Role Specialty Phone number    Bruna Ureña M.D. Referring Cardiovascular Disease (Cardiology) [phone]    [facility] Anticoagulation Services Responsible  [phone]    Jaquan Horne M.D.  Family Medicine [phone]          Anticoagulation Patient Findings      Left voicemail message to report a therapeutic INR.      Pt to continue with current warfarin dosing regimen. Requested pt contact the clinic for any s/s of unusual bleeding, bruising, clotting or any changes to diet or medication.    FU INR in 4 week(s).    Elan Alves, Luis AntonioT

## 2022-11-12 DIAGNOSIS — I10 ESSENTIAL HYPERTENSION: Chronic | ICD-10-CM

## 2022-11-12 DIAGNOSIS — Z79.01 CHRONIC ANTICOAGULATION: ICD-10-CM

## 2022-11-12 DIAGNOSIS — I71.00 DISSECTING ANEURYSM OF AORTA (HCC): ICD-10-CM

## 2022-11-12 DIAGNOSIS — I71.20 ANEURYSM OF THORACIC AORTA (HCC): ICD-10-CM

## 2022-11-14 RX ORDER — WARFARIN SODIUM 5 MG/1
TABLET ORAL
Qty: 135 TABLET | Refills: 1 | Status: SHIPPED | OUTPATIENT
Start: 2022-11-14 | End: 2023-05-15

## 2022-11-14 RX ORDER — SPIRONOLACTONE 25 MG/1
25 TABLET ORAL DAILY
Qty: 90 TABLET | Refills: 2 | Status: SHIPPED | OUTPATIENT
Start: 2022-11-14 | End: 2023-06-30

## 2022-11-14 RX ORDER — LOSARTAN POTASSIUM 100 MG/1
100 TABLET ORAL DAILY
Qty: 90 TABLET | Refills: 2 | Status: SHIPPED | OUTPATIENT
Start: 2022-11-14 | End: 2023-06-30

## 2022-11-14 RX ORDER — METOPROLOL SUCCINATE 100 MG/1
100 TABLET, EXTENDED RELEASE ORAL DAILY
Qty: 90 TABLET | Refills: 2 | Status: SHIPPED | OUTPATIENT
Start: 2022-11-14 | End: 2023-06-30

## 2022-12-08 ENCOUNTER — ANTICOAGULATION MONITORING (OUTPATIENT)
Dept: VASCULAR LAB | Facility: MEDICAL CENTER | Age: 57
End: 2022-12-08
Payer: COMMERCIAL

## 2022-12-08 DIAGNOSIS — I82.409 DEEP VEIN THROMBOSIS (DVT) OF LOWER EXTREMITY, UNSPECIFIED CHRONICITY, UNSPECIFIED LATERALITY, UNSPECIFIED VEIN (HCC): ICD-10-CM

## 2022-12-08 LAB — INR PPP: 3.9 (ref 2–3.5)

## 2022-12-09 NOTE — PROGRESS NOTES
Anticoagulation Summary  As of 12/8/2022      INR goal:  2.0-3.0   TTR:  64.2 % (7.5 y)   INR used for dosing:  3.90 (12/8/2022)   Warfarin maintenance plan:  7.5 mg (5 mg x 1.5) every Mon, Fri; 5 mg (5 mg x 1) all other days; Starting 12/8/2022   Weekly warfarin total:  40 mg   Plan last modified:  Claudia EagleD (3/14/2022)   Next INR check:  12/22/2022   Priority:  Maintenance   Target end date:  Indefinite    Indications    Deep vein thrombosis [453.40] [I82.409]                 Anticoagulation Episode Summary       INR check location:  Home Draw    Preferred lab:      Send INR reminders to:      Comments:  Eloy MYRICK          Anticoagulation Care Providers       Provider Role Specialty Phone number    Bruna Ureña M.D. Referring Cardiovascular Disease (Cardiology) [phone]    [facility] Anticoagulation Services Responsible  [phone]    Jaquan Horne M.D.  Family Medicine [phone]          Anticoagulation Patient Findings      Left voicemail message to report a SUPRA therapeutic INR of 3.9.    Will have pt HOLD x 1 dose today and then continue on with current warfarin dosing regimen.   Requested pt contact the clinic for any s/s of unusual bleeding, bruising, clotting or any changes to diet or medication.    FU INR in 2 week(s).    Lazaro Cerda, PharmD, BCACP

## 2022-12-23 ENCOUNTER — ANTICOAGULATION MONITORING (OUTPATIENT)
Dept: MEDICAL GROUP | Facility: PHYSICIAN GROUP | Age: 57
End: 2022-12-23
Payer: COMMERCIAL

## 2022-12-23 DIAGNOSIS — I82.409 DEEP VEIN THROMBOSIS (DVT) OF LOWER EXTREMITY, UNSPECIFIED CHRONICITY, UNSPECIFIED LATERALITY, UNSPECIFIED VEIN (HCC): ICD-10-CM

## 2022-12-23 LAB — INR PPP: 3.3 (ref 2–3.5)

## 2023-01-03 ENCOUNTER — DOCUMENTATION (OUTPATIENT)
Dept: VASCULAR LAB | Facility: MEDICAL CENTER | Age: 58
End: 2023-01-03
Payer: COMMERCIAL

## 2023-01-03 DIAGNOSIS — I71.00 DISSECTING ANEURYSM OF AORTA (HCC): ICD-10-CM

## 2023-01-03 NOTE — PROGRESS NOTES
Order placed for CTA complete.  FreeATM message sent to pt to remind that they are due for their surveillance vascular imaging.  Scheduling numbers provided. MA to send order to [place] for pt.   Teri DUBOSE  [facility]    Patient stated she would like to have these labs drawn at [place].  Lab orders entered and patient transferred to scheduling.

## 2023-01-05 DIAGNOSIS — E78.5 DYSLIPIDEMIA: ICD-10-CM

## 2023-01-05 RX ORDER — ROSUVASTATIN CALCIUM 20 MG/1
TABLET, COATED ORAL
Qty: 90 TABLET | Refills: 1 | Status: SHIPPED | OUTPATIENT
Start: 2023-01-05 | End: 2023-04-03

## 2023-01-11 ENCOUNTER — OFFICE VISIT (OUTPATIENT)
Dept: VASCULAR LAB | Facility: MEDICAL CENTER | Age: 58
End: 2023-01-11
Attending: NURSE PRACTITIONER
Payer: COMMERCIAL

## 2023-01-11 DIAGNOSIS — I10 PRIMARY HYPERTENSION: Chronic | ICD-10-CM

## 2023-01-11 DIAGNOSIS — I82.5Z9 CHRONIC DEEP VEIN THROMBOSIS (DVT) OF DISTAL VEIN OF LOWER EXTREMITY, UNSPECIFIED LATERALITY (HCC): Chronic | ICD-10-CM

## 2023-01-11 PROCEDURE — 99999 PR NO CHARGE: CPT | Performed by: NURSE PRACTITIONER

## 2023-01-11 ASSESSMENT — ENCOUNTER SYMPTOMS
NERVOUS/ANXIOUS: 1
COUGH: 0
WEAKNESS: 1
SENSORY CHANGE: 0
MYALGIAS: 0
BRUISES/BLEEDS EASILY: 0
WEIGHT LOSS: 0
DIZZINESS: 0
SHORTNESS OF BREATH: 0
FOCAL WEAKNESS: 0
DEPRESSION: 0
BLOOD IN STOOL: 0
FALLS: 0
BACK PAIN: 1
SPEECH CHANGE: 0
PALPITATIONS: 0

## 2023-01-11 NOTE — PROGRESS NOTES
Left msg with pt regarding vascular medicine visit.  It appears pt may have called to cancel appt but unclear.  Recommended pt have labs drawn and CT scan completed and then we can review at next appt.  MA to reach out to reschedule pt.    Alda Mojica [facility]  [place] [facility]

## 2023-01-11 NOTE — PROGRESS NOTES
VASCULAR FOLLOW UP VISIT  Subjective:   Oumar Fisher is a 57 y.o. female who presents today 01/11/2023 for     HPI:  Here for f/u of aortic dissection, dyslipidemia, htn.   BPs in 130-135//70's-80's  Daughter had brain surgery at [place]  Quit smoking aug 1 -but back to smoking 1/2 to none per day depending on how much pain from the wound care Has been released from wound care  No change in bp meds  No myalgias on statin - changed to rosuvastatin  No chest, back or abd pain.   Has missed medications on occasion  No bleeding on warfarin  Does not have a current appt with Dr Dillon will follow July 19th    Social History     Tobacco Use    Smoking status: Every Day    Smokeless tobacco: Never   Vaping Use    Vaping Use: Never used   Substance Use Topics    Alcohol use: Never    Drug use: Never     DIET AND EXERCISE:  Weight Change: up about 8 pounds and stable  Diet: common adult  Exercise: minimal exercise   Smoking - quit     Review of Systems   Constitutional:  Negative for malaise/fatigue and weight loss.   HENT:  Negative for nosebleeds.    Respiratory:  Negative for cough and shortness of breath.    Cardiovascular:  Negative for chest pain, palpitations and leg swelling.   Gastrointestinal:  Negative for blood in stool.   Genitourinary:  Negative for hematuria.   Musculoskeletal:  Positive for back pain. Negative for falls and myalgias.   Skin:         Wound on Lt outer ankle    Neurological:  Positive for weakness. Negative for dizziness, sensory change, speech change and focal weakness.   Endo/Heme/Allergies:  Does not bruise/bleed easily.   Psychiatric/Behavioral:  Negative for depression. The patient is nervous/anxious.     Objective:     There were no vitals filed for this visit.     There is no height or weight on file to calculate BMI.  Physical Exam  Vitals reviewed.   Constitutional:       General: She is not in acute distress.     Appearance: She is well-developed.   Pulmonary:      Effort:  No respiratory distress.   Skin:     Coloration: Skin is not jaundiced or pale.   Neurological:      Mental Status: She is alert and oriented to person, place, and time.   Psychiatric:         Mood and Affect: Mood normal.         Behavior: Behavior normal.   Data review  Lab Results   Component Value Date    CHOLSTRLTOT 123 08/26/2021    CHOLSTRLTOT 94 (L) 02/07/2021    LDL 26 02/07/2021    HDL 60 08/26/2021    HDL 39 (A) 02/07/2021    TRIGLYCERIDE 94 08/26/2021    TRIGLYCERIDE 147 02/07/2021      Lab Results   Component Value Date    PROTHROMBTM 20.4 (H) 02/10/2021    INR 3.30 12/23/2022       Lab Results   Component Value Date    HBA1C 5.6 02/06/2021      Blood work May 2020 from lab core  Glucose 90  GFR 70  Total cholesterol 183, triglycerides 259, HDL 60, LDL 71,  TSH 5.29  Urine for albumin less than 3    CTA October 2019  Endovascular intervention stable  No endoleak     Echo RDC aug 2020  Normal ef  Mild AI and MR     CTA 2/6/21  1.  Extensive atherosclerotic change of the aorta and originating vessels. No evidence of aortic dissection.     2.  Mild ectasia of the ascending aorta measured at 3.8 cm in diameter.     3.  Descending thoracic aortic stent present.     4.  Fatty liver.     5.  Inferior vena cava filter present.     6.  Multilevel thoracic spine compression fractures which are possibly chronic in nature.  Lab Results   Component Value Date    SODIUM 138 08/26/2021    SODIUM 137 02/08/2021    POTASSIUM 5.2 08/26/2021    POTASSIUM 4.0 02/08/2021    CHLORIDE 103 08/26/2021    CHLORIDE 100 02/08/2021    CO2 22 08/26/2021    CO2 26 02/08/2021    GLUCOSE 86 08/26/2021    GLUCOSE 81 02/08/2021    BUN 16 08/26/2021    BUN 12 02/08/2021    CREATININE 1.00 08/26/2021    CREATININE 0.70 02/08/2021    BUNCREATRAT 16 08/26/2021    IFAFRICA 74 08/26/2021    IFAFRICA >60 02/08/2021    IFNOTAFR 64 08/26/2021    IFNOTAFR >60 02/08/2021      Lab Results   Component Value Date    TSH 3.230 03/09/2021      Lab  Results   Component Value Date    WBC 6.1 03/09/2021    WBC 5.1 02/08/2021    RBC 4.88 03/09/2021    RBC 4.17 (L) 02/08/2021    HEMOGLOBIN 15.4 03/09/2021    HEMOGLOBIN 13.4 02/08/2021    HEMATOCRIT 46.4 03/09/2021    HEMATOCRIT 40.1 02/08/2021    MCV 95 03/09/2021    MCV 96.2 02/08/2021    MCH 31.6 03/09/2021    MCH 32.1 02/08/2021    MCHC 33.2 03/09/2021    MCHC 33.4 (L) 02/08/2021    MPV 10.3 02/08/2021      Medical Decision Making:  Today's Assessment / Status / Plan:     No diagnosis found.    Patient Type: Secondary Prevention    Etiology of Established CVD if Present:   1) Aortic dissection status post graft  2) valvular heart disease without previous intervention  3) DVT    Lipid Management: Qualifies for Statin Therapy   Currently on Statin: Yes  Would like to see LDL less than 70 and non HDL less than 100, Borderline at goal but probably due to inactivity because of her T11 & T12 compression fx and ankle wound  At goal since change to rosuvastatin  Plan:  -continue rosuvastatin 20 mg  -Repeat lipid panel 6-12 mo    Blood Pressure Management:  ACC-AHA blood pressure goal less than 130/80  Home bp under reasonable control per her report  A bit elevated in office today  Has gained some weight which may have led to BP increase  GFR normal and no albuminuria  Plan:  -Intensify therapy lifestyle change  -Continue metoprolol at current dose  -Continue losartan at current dose  -Continue spironolactone at current dose  -Continue to follow blood pressures at home  -Intensify therapy if above goal next visit    Glycemic Status: Normal  -Recheck fasting glucose prior to next visit    Anti-Platelet/Anti-Coagulant Tx: yes  Pt with IVC filter placed many years ago ~2010  Remains in place  Indefinite anticoagulation due to history of multiple DVT's  -Continue indefinite anticoagulation  -Follow-up in anticoagulation clinic    Smoking: Quit recently  Uninterested medications or NRT  -continue  Complete  cessation  -Continue to address at every visit    Physical Activity: Recommended more walking    Weight Management and Nutrition: More attention to diet with slow steady weight loss    Other:     1) Aortic dissection status post graft -stable on follow-up imaging.  Continue medical management.  Recheck CTA at 2-year intervals    2) valvular heart disease without previous intervention -remains asymptomatic. Limited on echo. Does not need regular follow up echo    3) DVT -continue indefinite anticoagulation as above.  Quit smoking-    4) hypothyroidism - perhaps a bit symptomatic.  TSH elevated consistent with under treatment.  Has not been completely adherent with medications. Increase levo to 8 pills per day. Recheck tsh before next visit and continue to adjust as needed. F/u with pcp    5) Wound Lt ankle -Continue with wound care     6) Hyperkalemia -decrease intake of foods containing K+. Increase fluids.  BMP in 4 weeks      6) anxiety related to requirement of covid vaccine- Encourage her to use breathing techniques, calming apps on the cell phone or counseling.      Instructed to follow-up with PCP for remainder of adult medical needs: yes  We will partner with other providers in the management of established vascular disease and cardiometabolic risk factors.    Studies to Be Obtained: CTA thoracoabdominal aorta Feb 2023    Labs to Be Obtained: bmp in 4 weeks then call results and ask about the appt with Dr. Dillon also need to order for next appt in July test lipid, cmp, cbc,     Follow up in: 6 months  Time: 38 min - chart review/prep, review of other providers' records, imaging/lab review, face-to-face time for history/examination, ordering, prescribing,  review of results/meds/ treatment plan with patient/family/caregiver, documentation in EMR, care coordination (as needed)    MAINOR Iqbal.     CC: Jaquan Horne

## 2023-01-18 ENCOUNTER — ANTICOAGULATION MONITORING (OUTPATIENT)
Dept: VASCULAR LAB | Facility: MEDICAL CENTER | Age: 58
End: 2023-01-18
Payer: COMMERCIAL

## 2023-01-18 DIAGNOSIS — I82.409 DEEP VEIN THROMBOSIS (DVT) OF LOWER EXTREMITY, UNSPECIFIED CHRONICITY, UNSPECIFIED LATERALITY, UNSPECIFIED VEIN (HCC): ICD-10-CM

## 2023-01-18 LAB — INR PPP: 1.8 (ref 2–3.5)

## 2023-01-19 NOTE — PROGRESS NOTES
Anticoagulation Summary  As of 2023      INR goal:  2.0-3.0   TTR:  63.8 % (7.7 y)   INR used for dosin.80 (2023)   Warfarin maintenance plan:  7.5 mg (5 mg x 1.5) every Mon, Fri; 5 mg (5 mg x 1) all other days   Weekly warfarin total:  40 mg   Plan last modified:  Claudia EagleD (3/14/2022)   Next INR check:  2023   Priority:  Maintenance   Target end date:  Indefinite    Indications    Deep vein thrombosis [453.40] [I82.409]                 Anticoagulation Episode Summary       INR check location:  Home Draw    Preferred lab:      Send INR reminders to:      Comments:  Eloy MYRICK          Anticoagulation Care Providers       Provider Role Specialty Phone number    Bruna Ureña M.D. Referring Cardiovascular Disease (Cardiology) [phone]    [facility] Anticoagulation Services Responsible  [phone]    Jaquan Horne M.D.  Family Medicine [phone]          Anticoagulation Patient Findings      Left voicemail message to report a SUB-therapeutic INR.    Will have pt take Increase dose of warfarin today of 7.5 mg and then  continue with current warfarin dosing regimen.     Requested pt contact the clinic for any s/s of unusual bleeding, bruising, clotting or any changes to diet or medication.    FU INR in 1 week(s).    Claudia PedroD    ADDENDUM 23: Pt returned our call - she reports there have not been any changes to medications or diet. Pt confirmed she received our VM. F/u INR in 1 week. Debi Lieberman, ClaudiaD

## 2023-01-27 ENCOUNTER — ANTICOAGULATION MONITORING (OUTPATIENT)
Dept: VASCULAR LAB | Facility: MEDICAL CENTER | Age: 58
End: 2023-01-27
Payer: COMMERCIAL

## 2023-01-27 ENCOUNTER — DOCUMENTATION (OUTPATIENT)
Dept: VASCULAR LAB | Facility: MEDICAL CENTER | Age: 58
End: 2023-01-27
Payer: COMMERCIAL

## 2023-01-27 DIAGNOSIS — I82.409 DEEP VEIN THROMBOSIS (DVT) OF LOWER EXTREMITY, UNSPECIFIED CHRONICITY, UNSPECIFIED LATERALITY, UNSPECIFIED VEIN (HCC): ICD-10-CM

## 2023-01-27 LAB — INR PPP: 3.4 (ref 2–3.5)

## 2023-01-28 NOTE — PROGRESS NOTES
OP   Telephone Anticoagulation Service Note      Anticoagulation Summary  As of 1/27/2023      INR goal:  2.0-3.0   TTR:  63.8 % (7.7 y)   INR used for dosing:  3.40 (1/27/2023)   Warfarin maintenance plan:  7.5 mg (5 mg x 1.5) every Mon, Fri; 5 mg (5 mg x 1) all other days   Weekly warfarin total:  40 mg   Plan last modified:  Claudia EagleD (3/14/2022)   Next INR check:  2/10/2023   Priority:  Maintenance   Target end date:  Indefinite    Indications    Deep vein thrombosis [453.40] [I82.409]                 Anticoagulation Episode Summary       INR check location:  Home Draw    Preferred lab:      Send INR reminders to:      Comments:  Eloy MYRICK          Anticoagulation Care Providers       Provider Role Specialty Phone number    Bruna Ureña M.D. Referring Cardiovascular Disease (Cardiology) [phone]    [facility] Anticoagulation Services Responsible  [phone]    Jaquan Horne M.D.  Family Medicine [phone]          Anticoagulation Patient Findings    Left a message on the patient's voicemail today, informing the patient of a SUPRA-therapeutic INR of 3.4.   Instructed the patient to call the clinic with any changes to diet or medications, with any signs/sx of bleeding or clotting or with any questions or concerns.     Patient instructed to reduce dose to 5mg TONIGHT ONLY, as a one time dose adjustment, then to resume her current dosing regimen thereafter.   Patient will retest INR again in 2 weeks.     Ed TaylorD

## 2023-01-28 NOTE — PROGRESS NOTES
Pt called stating that she did not want to do the CTA due to the price of the exam at [facility]. Advised patient to call [facility] to see if their price would be any different.  Pt stated she would call us when she made a decision.

## 2023-02-01 ENCOUNTER — DOCUMENTATION (OUTPATIENT)
Dept: VASCULAR LAB | Facility: MEDICAL CENTER | Age: 58
End: 2023-02-01
Payer: COMMERCIAL

## 2023-02-08 ENCOUNTER — APPOINTMENT (OUTPATIENT)
Dept: RADIOLOGY | Facility: MEDICAL CENTER | Age: 58
End: 2023-02-08
Payer: COMMERCIAL

## 2023-02-10 ENCOUNTER — ANTICOAGULATION MONITORING (OUTPATIENT)
Dept: VASCULAR LAB | Facility: MEDICAL CENTER | Age: 58
End: 2023-02-10
Payer: COMMERCIAL

## 2023-02-10 LAB — INR PPP: 2.6 (ref 2–3.5)

## 2023-02-11 NOTE — PROGRESS NOTES
Anticoagulation Summary  As of 2/10/2023      INR goal:  2.0-3.0   TTR:  63.8 % (7.7 y)   INR used for dosin.60 (2/10/2023)   Warfarin maintenance plan:  7.5 mg (5 mg x 1.5) every Mon, Fri; 5 mg (5 mg x 1) all other days   Weekly warfarin total:  40 mg   Plan last modified:  Abhi Carlos, PharmD (3/14/2022)   Next INR check:  2023   Priority:  Maintenance   Target end date:  Indefinite    Indications    Deep vein thrombosis [453.40] [I82.409]                 Anticoagulation Episode Summary       INR check location:  Home Draw    Preferred lab:      Send INR reminders to:      Comments:  Eloy MYRICK          Anticoagulation Care Providers       Provider Role Specialty Phone number    Bruna Ureña M.D. Referring Cardiovascular Disease (Cardiology) [phone]    [facility] Anticoagulation Services Responsible  [phone]    Jaquan Horne M.D.  Family Medicine [phone]          Anticoagulation Patient Findings          Left voicemail message to report atherapeutic INR of 2.6.  Pt to continue with current warfarin dosing regimen. Requested pt contact the clinic for any s/s of unusual bleeding, bruising, clotting or any changes to diet or medication. FU 2 weeks.    Osiel Pete, PharmD

## 2023-02-22 ENCOUNTER — ANTICOAGULATION MONITORING (OUTPATIENT)
Dept: VASCULAR LAB | Facility: MEDICAL CENTER | Age: 58
End: 2023-02-22
Payer: COMMERCIAL

## 2023-02-22 DIAGNOSIS — I82.409 DEEP VEIN THROMBOSIS (DVT) OF LOWER EXTREMITY, UNSPECIFIED CHRONICITY, UNSPECIFIED LATERALITY, UNSPECIFIED VEIN (HCC): ICD-10-CM

## 2023-02-22 LAB — INR PPP: 7.2 (ref 2–3.5)

## 2023-02-23 NOTE — PROGRESS NOTES
Anticoagulation Summary  As of 2023      INR goal:  2.0-3.0   TTR:  63.5 % (7.8 y)   INR used for dosin.20 (2023)   Warfarin maintenance plan:  7.5 mg (5 mg x 1.5) every Mon, Fri; 5 mg (5 mg x 1) all other days   Weekly warfarin total:  40 mg   Plan last modified:  Abhi Carlos, PharmD (3/14/2022)   Next INR check:  2023   Priority:  Maintenance   Target end date:  Indefinite    Indications    Deep vein thrombosis [453.40] [I82.409]                 Anticoagulation Episode Summary       INR check location:  Home Draw    Preferred lab:      Send INR reminders to:      Comments:  Eloy MYRICK          Anticoagulation Care Providers       Provider Role Specialty Phone number    Bruna Ureña M.D. Referring Cardiovascular Disease (Cardiology) [phone]    [facility] Anticoagulation Services Responsible  [phone]    Jaquan Horne M.D.  Family Medicine [phone]          Anticoagulation Patient Findings  Patient Findings       Positives:  Change in medications, Change in diet/appetite    Negatives:  Signs/symptoms of thrombosis, Signs/symptoms of bleeding, Laboratory test error suspected, Change in health, Change in alcohol use, Change in activity, Upcoming invasive procedure, Emergency department visit, Upcoming dental procedure, Missed doses, Extra doses, Hospital admission, Bruising, Other complaints          Spoke with patient today regarding supratherapeutic INR of 7.2.  Patient denies any signs/symptoms of bruising or bleeding.  Instructed patient to call clinic with any questions or concerns.  Patient has been drinking some pomegranate juice recently.  Also has been taking IBU 600mg TID and APAP 1000mg QID for the last week or so.  Instructed stop IBU dosing and max APAP dose at 3500mg/day.    Pt is not on antiplatelet therapy    Instructed patient to HOLD X 2, then recheck INR.  Follow up in 2 days, to reduce risk of adverse events related to this high risk medication,   Warfarin.    Alexis Garcia, PharmD, BCACP

## 2023-02-24 ENCOUNTER — ANTICOAGULATION MONITORING (OUTPATIENT)
Dept: VASCULAR LAB | Facility: MEDICAL CENTER | Age: 58
End: 2023-02-24
Payer: COMMERCIAL

## 2023-02-24 DIAGNOSIS — I82.409 DEEP VEIN THROMBOSIS (DVT) OF LOWER EXTREMITY, UNSPECIFIED CHRONICITY, UNSPECIFIED LATERALITY, UNSPECIFIED VEIN (HCC): ICD-10-CM

## 2023-02-24 LAB — INR PPP: 3.5 (ref 2–3.5)

## 2023-02-25 NOTE — PROGRESS NOTES
Anticoagulation Summary  As of 2/24/2023      INR goal:  2.0-3.0   TTR:  63.5 % (7.8 y)   INR used for dosing:  3.50 (2/24/2023)   Warfarin maintenance plan:  7.5 mg (5 mg x 1.5) every Mon, Fri; 5 mg (5 mg x 1) all other days   Weekly warfarin total:  40 mg   Plan last modified:  Claudia EagleD (3/14/2022)   Next INR check:  2/27/2023   Priority:  Maintenance   Target end date:  Indefinite    Indications    Deep vein thrombosis [453.40] [I82.409]                 Anticoagulation Episode Summary       INR check location:  Home Draw    Preferred lab:      Send INR reminders to:      Comments:  Eloy MYRICK          Anticoagulation Care Providers       Provider Role Specialty Phone number    Bruna Ureña M.D. Referring Cardiovascular Disease (Cardiology) [phone]    [facility] Anticoagulation Services Responsible  [phone]    Jaquan Horne M.D.  Family Medicine [phone]          Anticoagulation Patient Findings    Left voicemail message to report a supratherapeutic INR of 3.5.  Requested pt contact the clinic for any s/s of unusual bleeding, bruising, clotting or any changes to diet or medication.   Pt is to HOLD X 1, then continue with current warfarin dosing regimen.    Pt is not on antiplatelet therapy      FU 3 days.  Alexis Garcia, ClaudiaD, BCACP

## 2023-02-27 ENCOUNTER — ANTICOAGULATION MONITORING (OUTPATIENT)
Dept: VASCULAR LAB | Facility: MEDICAL CENTER | Age: 58
End: 2023-02-27
Payer: COMMERCIAL

## 2023-02-27 DIAGNOSIS — I82.409 DEEP VEIN THROMBOSIS (DVT) OF LOWER EXTREMITY, UNSPECIFIED CHRONICITY, UNSPECIFIED LATERALITY, UNSPECIFIED VEIN (HCC): ICD-10-CM

## 2023-02-27 LAB
INR PPP: 1.3 (ref 2–3.5)
INR PPP: 1.3 (ref 2–3.5)

## 2023-02-28 NOTE — PROGRESS NOTES
OP   Telephone Anticoagulation Service Note      Anticoagulation Summary  As of 2023      INR goal:  2.0-3.0   TTR:  63.4 % (7.8 y)   INR used for dosin.30 (2023)   Warfarin maintenance plan:  7.5 mg (5 mg x 1.5) every Mon, Fri; 5 mg (5 mg x 1) all other days   Weekly warfarin total:  40 mg   Plan last modified:  Abhi Carlos, PharmD (3/14/2022)   Next INR check:  3/3/2023   Priority:  Maintenance   Target end date:  Indefinite    Indications    Deep vein thrombosis [453.40] [I82.409]                 Anticoagulation Episode Summary       INR check location:  Home Draw    Preferred lab:      Send INR reminders to:      Comments:  Eloy MYRICK          Anticoagulation Care Providers       Provider Role Specialty Phone number    Bruna Ureña M.D. Referring Cardiovascular Disease (Cardiology) [phone]    [facility] Anticoagulation Services Responsible  [phone]    Jaquan Horne M.D.  Family Medicine [phone]          Anticoagulation Patient Findings  Patient Findings       Negatives:  Signs/symptoms of thrombosis, Signs/symptoms of bleeding, Laboratory test error suspected, Change in health, Change in alcohol use, Change in activity, Upcoming invasive procedure, Emergency department visit, Upcoming dental procedure, Missed doses, Extra doses, Change in medications, Change in diet/appetite, Hospital admission, Bruising, Other complaints          Spoke with the patient on the phone today, reporting a SUB-therapeutic INR of 1.3.   Confirmed the current warfarin dosing regimen and patient compliance.  Patient denies any interval changes to diet and/or medications. Patient denies any signs/symptoms of bleeding or clotting.  Patient had a few syncopal episodes last night and will be following up with her PCP about this today and will keep us posted on her status.   Patient will bolus with 7.5mg x 2 days, then will resume her current dosing regimen.   Patient will follow up again in 4 days.      Ed TaylorD

## 2023-03-03 ENCOUNTER — ANTICOAGULATION MONITORING (OUTPATIENT)
Dept: VASCULAR LAB | Facility: MEDICAL CENTER | Age: 58
End: 2023-03-03
Payer: COMMERCIAL

## 2023-03-03 DIAGNOSIS — I82.409 DEEP VEIN THROMBOSIS (DVT) OF LOWER EXTREMITY, UNSPECIFIED CHRONICITY, UNSPECIFIED LATERALITY, UNSPECIFIED VEIN (HCC): ICD-10-CM

## 2023-03-03 LAB — INR PPP: 1.8 (ref 2–3.5)

## 2023-03-04 NOTE — PROGRESS NOTES
Anticoagulation Summary  As of 3/3/2023      INR goal:  2.0-3.0   TTR:  63.3 % (7.8 y)   INR used for dosin.80 (3/3/2023)   Warfarin maintenance plan:  7.5 mg (5 mg x 1.5) every Mon, Fri; 5 mg (5 mg x 1) all other days   Weekly warfarin total:  40 mg   Plan last modified:  Claudia EagleD (3/14/2022)   Next INR check:  3/7/2023   Priority:  Maintenance   Target end date:  Indefinite    Indications    Deep vein thrombosis [453.40] [I82.409]                 Anticoagulation Episode Summary       INR check location:  Home Draw    Preferred lab:      Send INR reminders to:      Comments:  Eloy MYRICK          Anticoagulation Care Providers       Provider Role Specialty Phone number    Bruna Ureña M.D. Referring Cardiovascular Disease (Cardiology) [phone]    [facility] Anticoagulation Services Responsible  [phone]    Jaquan Horne M.D.  Family Medicine [phone]          Anticoagulation Patient Findings      Left voicemail message to report a sub therapeutic INR of 1.8.    Will have pt BOLUS x 1 dose today w/ 10 mg and then continue on with current warfarin dosing regimen.   Requested pt contact the clinic for any s/s of unusual bleeding, bruising, clotting or any changes to diet or medication.    FU INR in 3 days.    Lazaro Cerda, PharmD, BCACP

## 2023-03-07 ENCOUNTER — ANTICOAGULATION MONITORING (OUTPATIENT)
Dept: VASCULAR LAB | Facility: MEDICAL CENTER | Age: 58
End: 2023-03-07
Payer: COMMERCIAL

## 2023-03-07 DIAGNOSIS — I82.409 DEEP VEIN THROMBOSIS (DVT) OF LOWER EXTREMITY, UNSPECIFIED CHRONICITY, UNSPECIFIED LATERALITY, UNSPECIFIED VEIN (HCC): ICD-10-CM

## 2023-03-07 LAB — INR PPP: 3.4 (ref 2–3.5)

## 2023-03-08 NOTE — NON-PROVIDER
Anticoagulation Summary  As of 3/7/2023      INR goal:  2.0-3.0   TTR:  63.3 % (7.8 y)   INR used for dosing:  3.40 (3/7/2023)   Warfarin maintenance plan:  7.5 mg (5 mg x 1.5) every Mon, Fri; 5 mg (5 mg x 1) all other days   Weekly warfarin total:  40 mg   Plan last modified:  Abhi Carlos, PharmD (3/14/2022)   Next INR check:  3/10/2023   Priority:  Maintenance   Target end date:  Indefinite    Indications    Deep vein thrombosis [453.40] [I82.409]                 Anticoagulation Episode Summary       INR check location:  Home Draw    Preferred lab:      Send INR reminders to:      Comments:  Eloy MYRICK          Anticoagulation Care Providers       Provider Role Specialty Phone number    Bruna Ureña M.D. Referring Cardiovascular Disease (Cardiology) [phone]    [facility] Anticoagulation Services Responsible  [phone]    Jaquan Horne M.D.  Family Medicine [phone]          Anticoagulation Patient Findings          HPI:  Oumar Fisher, on anticoagulation therapy with warfarin for DVT  Changes to current medical/health status since last appt: none  Denies signs/symptoms of bleeding and/or thrombosis since the last appt.    Denies any interval changes to diet  Denies any interval changes to medications since last appt.   Denies any complications or cost restrictions with current therapy.     A/P   INR  SUPRA-therapeutic.   Reduce today then Pt is to continue with current warfarin dosing regimen.     Next INR in 3 days.    Osiel Pete, ClaudiaD

## 2023-03-09 ENCOUNTER — DOCUMENTATION (OUTPATIENT)
Dept: VASCULAR LAB | Facility: MEDICAL CENTER | Age: 58
End: 2023-03-09
Payer: COMMERCIAL

## 2023-03-10 ENCOUNTER — DOCUMENTATION (OUTPATIENT)
Dept: VASCULAR LAB | Facility: MEDICAL CENTER | Age: 58
End: 2023-03-10
Payer: COMMERCIAL

## 2023-03-10 ENCOUNTER — ANTICOAGULATION MONITORING (OUTPATIENT)
Dept: VASCULAR LAB | Facility: MEDICAL CENTER | Age: 58
End: 2023-03-10
Payer: COMMERCIAL

## 2023-03-10 DIAGNOSIS — I82.409 DEEP VEIN THROMBOSIS (DVT) OF LOWER EXTREMITY, UNSPECIFIED CHRONICITY, UNSPECIFIED LATERALITY, UNSPECIFIED VEIN (HCC): ICD-10-CM

## 2023-03-10 LAB — INR PPP: 2.1 (ref 2–3.5)

## 2023-03-10 NOTE — PROGRESS NOTES
Left message to schedule appt.     Kathleen Irizarry NP had advised patient to come next week due to patient recently loss of consciousness.     We have a few openings next week.  Losing consciousness needs to be worked up ASAP.       Please also remind her to seek immediate care if this happens again

## 2023-03-10 NOTE — PROGRESS NOTES
Left message for patient to call back and schedule appt for next week for further work up regarding recent syncopal episodes.

## 2023-03-11 NOTE — PROGRESS NOTES
Anticoagulation Summary  As of 3/10/2023      INR goal:  2.0-3.0   TTR:  63.3 % (7.8 y)   INR used for dosin.10 (3/10/2023)   Warfarin maintenance plan:  7.5 mg (5 mg x 1.5) every Mon, Fri; 5 mg (5 mg x 1) all other days   Weekly warfarin total:  40 mg   Plan last modified:  Claudia EagleD (3/14/2022)   Next INR check:  3/15/2023   Priority:  Maintenance   Target end date:  Indefinite    Indications    Deep vein thrombosis [453.40] [I82.409]                 Anticoagulation Episode Summary       INR check location:  Home Draw    Preferred lab:      Send INR reminders to:      Comments:  Eloy MYRICK          Anticoagulation Care Providers       Provider Role Specialty Phone number    Bruna Ureña M.D. Referring Cardiovascular Disease (Cardiology) [phone]    [facility] Anticoagulation Services Responsible  [phone]    Jaquan Horne M.D.  Family Medicine [phone]          Anticoagulation Patient Findings            Left voicemail message to report a therapeutic INR of 2.1.  Pt to continue with current warfarin dosing regimen. Requested pt contact the clinic for any s/s of unusual bleeding, bruising, clotting or any changes to diet or medication. FU 1 weeks.    Osiel Pete, PharmD

## 2023-03-16 ENCOUNTER — ANTICOAGULATION MONITORING (OUTPATIENT)
Dept: VASCULAR LAB | Facility: MEDICAL CENTER | Age: 58
End: 2023-03-16
Payer: COMMERCIAL

## 2023-03-16 DIAGNOSIS — I82.409 DEEP VEIN THROMBOSIS (DVT) OF LOWER EXTREMITY, UNSPECIFIED CHRONICITY, UNSPECIFIED LATERALITY, UNSPECIFIED VEIN (HCC): ICD-10-CM

## 2023-03-16 LAB — INR PPP: 2.4 (ref 2–3.5)

## 2023-03-17 NOTE — PROGRESS NOTES
Anticoagulation Summary  As of 3/16/2023      INR goal:  2.0-3.0   TTR:  63.4 % (7.8 y)   INR used for dosin.40 (3/16/2023)   Warfarin maintenance plan:  7.5 mg (5 mg x 1.5) every Mon, Fri; 5 mg (5 mg x 1) all other days   Weekly warfarin total:  40 mg   Plan last modified:  Claudia EagleD (3/14/2022)   Next INR check:  3/23/2023   Priority:  Maintenance   Target end date:  Indefinite    Indications    Deep vein thrombosis [453.40] [I82.409]                 Anticoagulation Episode Summary       INR check location:  Home Draw    Preferred lab:      Send INR reminders to:      Comments:  Eloy MYRICK          Anticoagulation Care Providers       Provider Role Specialty Phone number    Bruna Ureña M.D. Referring Cardiovascular Disease (Cardiology) [phone]    [facility] Anticoagulation Services Responsible  [phone]    Jaquan Horne M.D.  Family Medicine [phone]          Anticoagulation Patient Findings          Left voicemail message to report a therapeutic INR of 2.4.  Pt to continue with current warfarin dosing regimen. Requested pt contact the clinic for any s/s of unusual bleeding, bruising, clotting or any changes to diet or medication. FU 1 weeks.    Osiel Pete, PharmD

## 2023-03-21 ENCOUNTER — APPOINTMENT (OUTPATIENT)
Dept: VASCULAR LAB | Facility: MEDICAL CENTER | Age: 58
End: 2023-03-21
Attending: NURSE PRACTITIONER
Payer: COMMERCIAL

## 2023-03-21 ENCOUNTER — TELEPHONE (OUTPATIENT)
Dept: VASCULAR LAB | Facility: MEDICAL CENTER | Age: 58
End: 2023-03-21
Payer: COMMERCIAL

## 2023-03-21 NOTE — TELEPHONE ENCOUNTER
Pt unable to join [employer] video today for vascular follow up for unknown reason.  Called and spoke with pt by phone.    She was agitated at receiving a bill from [employer] for a previous virtual visit for $45 and states that it costs her >$400 to see us in the office and she refuses to come in.  She had many questions about her bill, of which I directed her to contact both her insurance company and the [employer] Billing Dept.  She is requesting to be sent to another facility for care; I instructed her to f/u with her PCP who can refer her to another provider.  She will have her CTA done this wk at [facility], 3/24/23- will call results, but anticipate that moving fwd, her care will be taken over by PCP and another specialist d/t high copay and inability to be seen at [facility] d/t cost.        KORI Escobar  [facility]      CC:  Holden Horne MD

## 2023-03-28 ENCOUNTER — TELEPHONE (OUTPATIENT)
Dept: VASCULAR LAB | Facility: MEDICAL CENTER | Age: 58
End: 2023-03-28
Payer: COMMERCIAL

## 2023-03-28 ENCOUNTER — DOCUMENTATION (OUTPATIENT)
Dept: VASCULAR LAB | Facility: MEDICAL CENTER | Age: 58
End: 2023-03-28
Payer: COMMERCIAL

## 2023-03-28 NOTE — PROGRESS NOTES
Faxed [facility] - CTA response to [phone].    Scanned fx confirmation and documentation to media.

## 2023-03-28 NOTE — TELEPHONE ENCOUNTER
Received correspondence from insurance company needing more information to complete the requested CT scan or her aorta.    Faxed past imaging and progress note indicating a history of a stenting in 2009.  Regular surveillance is indicated.  Scanned into media as well.    Alda Mojica [facility]

## 2023-03-29 ENCOUNTER — HOSPITAL ENCOUNTER (OUTPATIENT)
Facility: MEDICAL CENTER | Age: 58
End: 2023-03-29
Attending: NURSE PRACTITIONER
Payer: COMMERCIAL

## 2023-03-29 DIAGNOSIS — E78.5 DYSLIPIDEMIA: ICD-10-CM

## 2023-03-29 DIAGNOSIS — I10 PRIMARY HYPERTENSION: Chronic | ICD-10-CM

## 2023-03-29 DIAGNOSIS — Z79.01 CHRONIC ANTICOAGULATION: ICD-10-CM

## 2023-03-29 LAB
ALBUMIN SERPL BCP-MCNC: 4.4 G/DL (ref 3.2–4.9)
ALBUMIN/GLOB SERPL: 1.5 G/DL
ALP SERPL-CCNC: 45 U/L (ref 30–99)
ALT SERPL-CCNC: 34 U/L (ref 2–50)
ANION GAP SERPL CALC-SCNC: 12 MMOL/L (ref 7–16)
AST SERPL-CCNC: 41 U/L (ref 12–45)
BASOPHILS # BLD AUTO: 1.1 % (ref 0–1.8)
BASOPHILS # BLD: 0.07 K/UL (ref 0–0.12)
BILIRUB SERPL-MCNC: 0.6 MG/DL (ref 0.1–1.5)
BUN SERPL-MCNC: 22 MG/DL (ref 8–22)
CALCIUM ALBUM COR SERPL-MCNC: 8.9 MG/DL (ref 8.5–10.5)
CALCIUM SERPL-MCNC: 9.2 MG/DL (ref 8.5–10.5)
CHLORIDE SERPL-SCNC: 103 MMOL/L (ref 96–112)
CHOLEST SERPL-MCNC: 135 MG/DL (ref 100–199)
CO2 SERPL-SCNC: 23 MMOL/L (ref 20–33)
CREAT SERPL-MCNC: 0.81 MG/DL (ref 0.5–1.4)
EOSINOPHIL # BLD AUTO: 0.16 K/UL (ref 0–0.51)
EOSINOPHIL NFR BLD: 2.6 % (ref 0–6.9)
ERYTHROCYTE [DISTWIDTH] IN BLOOD BY AUTOMATED COUNT: 47.4 FL (ref 35.9–50)
GFR SERPLBLD CREATININE-BSD FMLA CKD-EPI: 85 ML/MIN/1.73 M 2
GLOBULIN SER CALC-MCNC: 3 G/DL (ref 1.9–3.5)
GLUCOSE SERPL-MCNC: 69 MG/DL (ref 65–99)
HCT VFR BLD AUTO: 37.8 % (ref 37–47)
HDLC SERPL-MCNC: 67 MG/DL
HGB BLD-MCNC: 12.7 G/DL (ref 12–16)
IMM GRANULOCYTES # BLD AUTO: 0.01 K/UL (ref 0–0.11)
IMM GRANULOCYTES NFR BLD AUTO: 0.2 % (ref 0–0.9)
LDLC SERPL CALC-MCNC: 46 MG/DL
LYMPHOCYTES # BLD AUTO: 3.12 K/UL (ref 1–4.8)
LYMPHOCYTES NFR BLD: 50.6 % (ref 22–41)
MCH RBC QN AUTO: 32.5 PG (ref 27–33)
MCHC RBC AUTO-ENTMCNC: 33.6 G/DL (ref 33.6–35)
MCV RBC AUTO: 96.7 FL (ref 81.4–97.8)
MONOCYTES # BLD AUTO: 0.59 K/UL (ref 0–0.85)
MONOCYTES NFR BLD AUTO: 9.6 % (ref 0–13.4)
NEUTROPHILS # BLD AUTO: 2.21 K/UL (ref 2–7.15)
NEUTROPHILS NFR BLD: 35.9 % (ref 44–72)
NRBC # BLD AUTO: 0 K/UL
NRBC BLD-RTO: 0 /100 WBC
PLATELET # BLD AUTO: 222 K/UL (ref 164–446)
PMV BLD AUTO: 11.6 FL (ref 9–12.9)
POTASSIUM SERPL-SCNC: 4.3 MMOL/L (ref 3.6–5.5)
PROT SERPL-MCNC: 7.4 G/DL (ref 6–8.2)
RBC # BLD AUTO: 3.91 M/UL (ref 4.2–5.4)
SODIUM SERPL-SCNC: 138 MMOL/L (ref 135–145)
TRIGL SERPL-MCNC: 112 MG/DL (ref 0–149)
WBC # BLD AUTO: 6.2 K/UL (ref 4.8–10.8)

## 2023-03-29 PROCEDURE — 85025 COMPLETE CBC W/AUTO DIFF WBC: CPT

## 2023-03-29 PROCEDURE — 80061 LIPID PANEL: CPT

## 2023-03-29 PROCEDURE — 80053 COMPREHEN METABOLIC PANEL: CPT

## 2023-03-31 ENCOUNTER — ANTICOAGULATION MONITORING (OUTPATIENT)
Dept: VASCULAR LAB | Facility: MEDICAL CENTER | Age: 58
End: 2023-03-31
Payer: COMMERCIAL

## 2023-03-31 LAB — INR PPP: 3.8 (ref 2–3.5)

## 2023-03-31 NOTE — PROGRESS NOTES
Anticoagulation Summary  As of 3/31/2023      INR goal:  2.0-3.0   TTR:  63.3 % (7.9 y)   INR used for dosing:  3.80 (3/31/2023)   Warfarin maintenance plan:  7.5 mg (5 mg x 1.5) every Mon, Fri; 5 mg (5 mg x 1) all other days   Weekly warfarin total:  40 mg   Plan last modified:  Claudia EagleD (3/14/2022)   Next INR check:  4/7/2023   Priority:  Maintenance   Target end date:  Indefinite    Indications    Deep vein thrombosis [453.40] [I82.409]                 Anticoagulation Episode Summary       INR check location:  Home Draw    Preferred lab:      Send INR reminders to:      Comments:  Eloy MYRICK          Anticoagulation Care Providers       Provider Role Specialty Phone number    Bruna Ureña M.D. Referring Cardiovascular Disease (Cardiology) [phone]    [facility] Anticoagulation Services Responsible  [phone]    Jaquan Horne M.D.  Family Medicine [phone]          Anticoagulation Patient Findings  Patient Findings       Negatives:  Signs/symptoms of thrombosis, Signs/symptoms of bleeding, Laboratory test error suspected, Change in health, Change in alcohol use, Change in activity, Upcoming invasive procedure, Emergency department visit, Upcoming dental procedure, Missed doses, Extra doses, Change in medications, Change in diet/appetite, Hospital admission, Bruising, Other complaints            Left voicemail message to report a SUPRA-therapeutic INR.      Will have pt take decreased dose of warfarin today of 2.5 mg and then   Pt to continue with current warfarin dosing regimen. Requested pt contact the clinic for any s/s of unusual bleeding, bruising, clotting or any changes to diet or medication.    FU INR in 1 week(s).    Kiki Gupta, ClaudiaD

## 2023-04-01 DIAGNOSIS — E78.5 DYSLIPIDEMIA: ICD-10-CM

## 2023-04-03 RX ORDER — ATORVASTATIN CALCIUM 40 MG/1
40 TABLET, FILM COATED ORAL DAILY
Qty: 90 TABLET | Refills: 0 | Status: SHIPPED | OUTPATIENT
Start: 2023-04-03 | End: 2023-07-10

## 2023-04-03 NOTE — TELEPHONE ENCOUNTER
Pt requesting change from Rosuvastatin to Atorvastatin per formulary change.  Order placed and sent to pharmacy on file.    Teri DUBOSE  [facility]

## 2023-04-07 ENCOUNTER — ANTICOAGULATION MONITORING (OUTPATIENT)
Dept: VASCULAR LAB | Facility: MEDICAL CENTER | Age: 58
End: 2023-04-07
Payer: COMMERCIAL

## 2023-04-07 DIAGNOSIS — I82.409 DEEP VEIN THROMBOSIS (DVT) OF LOWER EXTREMITY, UNSPECIFIED CHRONICITY, UNSPECIFIED LATERALITY, UNSPECIFIED VEIN (HCC): ICD-10-CM

## 2023-04-07 LAB — INR PPP: 3.4 (ref 2–3.5)

## 2023-04-07 NOTE — PROGRESS NOTES
Anticoagulation Summary  As of 4/7/2023      INR goal:  2.0-3.0   TTR:  63.1 % (7.9 y)   INR used for dosing:  3.40 (4/7/2023)   Warfarin maintenance plan:  2.5 mg (5 mg x 0.5) every Fri; 5 mg (5 mg x 1) all other days   Weekly warfarin total:  32.5 mg   Plan last modified:  Alexis Garcia PharmD (4/7/2023)   Next INR check:  4/14/2023   Priority:  Maintenance   Target end date:  Indefinite    Indications    Deep vein thrombosis [453.40] [I82.409]                 Anticoagulation Episode Summary       INR check location:  Home Draw    Preferred lab:      Send INR reminders to:      Comments:  Eloy MYRICK          Anticoagulation Care Providers       Provider Role Specialty Phone number    Bruna Ureña M.D. Referring Cardiovascular Disease (Cardiology) [phone]    [facility] Anticoagulation Services Responsible  [phone]    Jaquan Horne M.D.  Family Medicine [phone]          Anticoagulation Patient Findings    Left voicemail message to report a supratherapeutic INR of 3.4.  Requested pt contact the clinic for any s/s of unusual bleeding, bruising, clotting or any changes to diet or medication.   Pt is to decrease weekly warfarin regimen as detailed above.    Pt is not on antiplatelet therapy      FU 1 weeks.  Alexis Garcia, PharmD, BCACP

## 2023-04-11 ENCOUNTER — DOCUMENTATION (OUTPATIENT)
Dept: VASCULAR LAB | Facility: MEDICAL CENTER | Age: 58
End: 2023-04-11
Payer: COMMERCIAL

## 2023-04-11 NOTE — PROGRESS NOTES
CTA demonstrates stable endograft in the descending thoracic aorta.  No residual aneurysm or dissection  We will discuss with patient at follow-up visit  Continue medical management  Repeat CTA in 2 years    Michael Bloch, MD  Vascular Care

## 2023-04-14 ENCOUNTER — ANTICOAGULATION MONITORING (OUTPATIENT)
Dept: VASCULAR LAB | Facility: MEDICAL CENTER | Age: 58
End: 2023-04-14

## 2023-04-14 ENCOUNTER — TELEMEDICINE (OUTPATIENT)
Dept: VASCULAR LAB | Facility: MEDICAL CENTER | Age: 58
End: 2023-04-14
Payer: COMMERCIAL

## 2023-04-14 DIAGNOSIS — I82.5Z9 CHRONIC DEEP VEIN THROMBOSIS (DVT) OF DISTAL VEIN OF LOWER EXTREMITY, UNSPECIFIED LATERALITY (HCC): ICD-10-CM

## 2023-04-14 DIAGNOSIS — Z79.01 CHRONIC ANTICOAGULATION: ICD-10-CM

## 2023-04-14 DIAGNOSIS — Z86.79 HISTORY OF AORTIC ANEURYSM: ICD-10-CM

## 2023-04-14 DIAGNOSIS — R55 SYNCOPE, UNSPECIFIED SYNCOPE TYPE: ICD-10-CM

## 2023-04-14 DIAGNOSIS — I10 PRIMARY HYPERTENSION: ICD-10-CM

## 2023-04-14 DIAGNOSIS — D68.59 PROTEIN S DEFICIENCY (HCC): ICD-10-CM

## 2023-04-14 DIAGNOSIS — I82.409 DEEP VEIN THROMBOSIS (DVT) OF LOWER EXTREMITY, UNSPECIFIED CHRONICITY, UNSPECIFIED LATERALITY, UNSPECIFIED VEIN (HCC): ICD-10-CM

## 2023-04-14 DIAGNOSIS — E78.5 DYSLIPIDEMIA: ICD-10-CM

## 2023-04-14 DIAGNOSIS — I71.03 DISSECTION OF THORACOABDOMINAL AORTA (HCC): ICD-10-CM

## 2023-04-14 LAB — INR PPP: 2.1 (ref 2–3.5)

## 2023-04-14 PROCEDURE — 99214 OFFICE O/P EST MOD 30 MIN: CPT | Mod: 95

## 2023-04-14 ASSESSMENT — ENCOUNTER SYMPTOMS
DEPRESSION: 0
FALLS: 0
COUGH: 0
MYALGIAS: 0
NERVOUS/ANXIOUS: 1
BLOOD IN STOOL: 0
LOSS OF CONSCIOUSNESS: 1
SHORTNESS OF BREATH: 0
FOCAL WEAKNESS: 0
WEIGHT LOSS: 0
WEAKNESS: 1
PALPITATIONS: 0
SENSORY CHANGE: 0
BACK PAIN: 1
SPEECH CHANGE: 0
BRUISES/BLEEDS EASILY: 0
DIZZINESS: 0

## 2023-04-14 NOTE — PROGRESS NOTES
Anticoagulation Summary  As of 2023      INR goal:  2.0-3.0   TTR:  63.2 % (7.9 y)   INR used for dosin.10 (2023)   Warfarin maintenance plan:  2.5 mg (5 mg x 0.5) every Fri; 5 mg (5 mg x 1) all other days   Weekly warfarin total:  32.5 mg   Plan last modified:  Claudia DixonD (2023)   Next INR check:  2023   Priority:  Maintenance   Target end date:  Indefinite    Indications    Deep vein thrombosis [453.40] [I82.409]                 Anticoagulation Episode Summary       INR check location:  Home Draw    Preferred lab:      Send INR reminders to:      Comments:  Eloy MYRICK          Anticoagulation Care Providers       Provider Role Specialty Phone number    Bruna Ureña M.D. Referring Cardiovascular Disease (Cardiology) [phone]    [facility] Anticoagulation Services Responsible  [phone]    Jaquan Horne M.D.  Family Medicine [phone]          Anticoagulation Patient Findings      Left voicemail message to report a therapeutic INR of 2.1.    Will have pt continue on with current warfarin dosing regimen.   Requested pt contact the clinic for any s/s of unusual bleeding, bruising, clotting or any changes to diet or medication.    FU INR in 1 week(s).    Lazaro Cedra, PharmD, BCACP

## 2023-04-14 NOTE — PROGRESS NOTES
This evaluation was conducted via [employer] using secure and encrypted videoconferencing technology. The patient was in their home in the [facility].    The patient's identity was confirmed and verbal consent was obtained for this virtual visit.     VASCULAR FOLLOW UP VISIT  Subjective:   Oumar Fisher is a 58 y.o. female who presents today 04/14/2023 for vascular follow up  No chief complaint on file.    HPI:  Here for f/u of aortic dissection, dyslipidemia, htn.   Had a couple syncopal episodes in march, has not had any recurrence since.  BPs 88/50, did not report any palpitations at the time, does think it may have to do with her recent travel and stress level at the time.  She was not eating and drinking regularly.  Did not go to UC or ER to be evaluated.    Home BPs 116-123/60-70s  No smoking, quit 9/2022, vaped to help quit,  has also stopped vaping.  No desire to restart but has relapsed a few times in the past  Wounds are fully healed,  Tolerating bp meds,   On atorva, changed from rosuva for formulary, no myalgias  No CP, SOB, palpitations, leg swelling or abd pain  On warfarin, see AC clinic  Has appt with Dr Dillon in July for annual follow up  Did imaging at [facility]  Had fasting labs  Concerned about copay cost for appts, prefers to be seen virtually.  Is considering looking for another provider to continue care due to copay costs.  Has spoken with billing department.      Social History     Tobacco Use    Smoking status: Every Day    Smokeless tobacco: Never   Vaping Use    Vaping Use: Never used   Substance Use Topics    Alcohol use: Never    Drug use: Never     DIET AND EXERCISE:  Weight Change: unknown - virtual  Diet: common adult  Exercise: minimal exercise   Smoking - quit     Review of Systems   Constitutional:  Negative for malaise/fatigue and weight loss.   HENT:  Negative for nosebleeds.    Respiratory:  Negative for cough and shortness of breath.    Cardiovascular:  Negative for chest  pain, palpitations and leg swelling.   Gastrointestinal:  Negative for blood in stool.   Genitourinary:  Negative for hematuria.   Musculoskeletal:  Positive for back pain. Negative for falls and myalgias.   Skin:         Wound on Lt outer ankle    Neurological:  Positive for loss of consciousness (syncopal episodes in march x2) and weakness. Negative for dizziness, sensory change, speech change and focal weakness.   Endo/Heme/Allergies:  Does not bruise/bleed easily.   Psychiatric/Behavioral:  Negative for depression. The patient is nervous/anxious.     Objective:     There were no vitals filed for this visit.     There is no height or weight on file to calculate BMI.  Physical Exam  Constitutional:       General: She is not in acute distress.     Appearance: She is well-developed.   HENT:      Head: Normocephalic and atraumatic.   Pulmonary:      Effort: Pulmonary effort is normal. No respiratory distress.   Skin:     Coloration: Skin is not jaundiced or pale.   Neurological:      Mental Status: She is alert and oriented to person, place, and time.   Psychiatric:         Mood and Affect: Mood normal.         Behavior: Behavior normal.   Data review  Lab Results   Component Value Date    CHOLSTRLTOT 135 03/29/2023    LDL 46 03/29/2023    HDL 67 03/29/2023    TRIGLYCERIDE 112 03/29/2023      Lab Results   Component Value Date    INR 3.40 04/07/2023             Blood work May 2020 from lab core  Glucose 90  GFR 70  Total cholesterol 183, triglycerides 259, HDL 60, LDL 71,  TSH 5.29  Urine for albumin less than 3    CTA October 2019  Endovascular intervention stable  No endoleak     Echo [facility] aug 2020  Normal ef  Mild AI and MR     CTA 2/6/21  1.  Extensive atherosclerotic change of the aorta and originating vessels. No evidence of aortic dissection.     2.  Mild ectasia of the ascending aorta measured at 3.8 cm in diameter.     3.  Descending thoracic aortic stent present.     4.  Fatty liver.     5.  Inferior vena  cava filter present.     6.  Multilevel thoracic spine compression fractures which are possibly chronic in nature.    CTA complete 3/2023 @ [facility] (see media)      Lab Results   Component Value Date    SODIUM 138 03/29/2023    POTASSIUM 4.3 03/29/2023    CHLORIDE 103 03/29/2023    CO2 23 03/29/2023    GLUCOSE 69 03/29/2023    BUN 22 03/29/2023    CREATININE 0.81 03/29/2023    BUNCREATRAT 16 08/26/2021    IFAFRICA 74 08/26/2021    IFNOTAFR 64 08/26/2021            Lab Results   Component Value Date    WBC 6.2 03/29/2023    RBC 3.91 (L) 03/29/2023    HEMOGLOBIN 12.7 03/29/2023    HEMATOCRIT 37.8 03/29/2023    MCV 96.7 03/29/2023    MCH 32.5 03/29/2023    MCHC 33.6 03/29/2023    MPV 11.6 03/29/2023      Medical Decision Making:  Today's Assessment / Status / Plan:     1. Primary hypertension        2. Chronic anticoagulation        3. Protein S deficiency (HCC)        4. History of aortic aneurysm        5. Dyslipidemia        6. Chronic deep vein thrombosis (DVT) of distal vein of lower extremity, unspecified laterality (HCC)        7. Dissection of thoracoabdominal aorta (HCC)            Patient Type: Secondary Prevention    Etiology of Established CVD if Present:   1) Aortic dissection status post graft  2) valvular heart disease without previous intervention  3) DVT    Lipid Management: Qualifies for Statin Therapy   Currently on Statin: Yes  Would like to see LDL less than 70 and non HDL less than 100,  previously Borderline at goal but probably due to inactivity because of her T11 & T12 compression fx and ankle wound  At goal since change to rosuvastatin  Rosuva changed to atorva for formulary  Plan:  -stop rosuvastatin 20 mg  - start atorvastatin 40mg due to formulary change  -Repeat lipid panel 6-12 mo    Blood Pressure Management:  ACC-AHA blood pressure goal less than 130/80  Home bp under reasonable control per her report  elevated in office at previous appts, virtual today no bp available  Has gained some  weight which may have led to BP increase  GFR normal and no albuminuria  Plan:  -Intensify therapy lifestyle change  -Continue metoprolol at current dose  -Continue losartan at current dose  -Continue spironolactone at current dose  -Continue to follow blood pressures at home  -Intensify therapy if above goal next visit    Glycemic Status: Normal  -Recheck fasting glucose prior to next visit    Anti-Platelet/Anti-Coagulant Tx: yes  -Continue indefinite anticoagulation  -Follow-up in anticoagulation clinic    Smoking: Quit recently  Uninterested medications or NRT  -continue  Complete cessation  -Continue to address at every visit    Physical Activity: Recommended more walking    Weight Management and Nutrition: More attention to diet with slow steady weight loss    Other:     1) Aortic dissection status post graft -stable on follow-up imaging and again on most recent imaging.  Continue medical management.  Recheck CTA at 2-year intervals next due 3/2025    2) valvular heart disease without previous intervention -remains asymptomatic. Limited on echo. Does not need regular follow up echo    3) DVT -continue indefinite anticoagulation as above.  Quit smoking-    4) hypothyroidism - perhaps a bit symptomatic.  TSH elevated consistent with under treatment previously.  Last labs 2021.  Has not been completely adherent with medications. F/u with pcp for further monitoring and adjustment/treatment    5) Wound Lt ankle - resolved.  Wound healed     6) Hyperkalemia -resolved.  Recent K+ wnl on labs.  Continue to decrease intake of foods containing K+. Increase fluids.  Monitor lytes in future      7) syncopal events - new, self-limited.  Occurred 2-3 times, was not evaluated in UC or ER, no recurrence of symptoms.  Suspect related to stress around travel, not eating and hydrating regularly and continuing to take all BP meds at this time.  Reports bps 80/50s during episodes.  Denies palpitations, tia/cva symptoms.  Recommend  pt seek emergency care if symptoms return.        Instructed to follow-up with PCP for remainder of adult medical needs: yes  We will partner with other providers in the management of established vascular disease and cardiometabolic risk factors.    Studies to Be Obtained: CTA thoracoabdominal aorta Feb 2025 - not ordered    Labs to Be Obtained:  none     Follow up in: 6 months (prefer in office as pt has not been seen in person in quite a while); may follow up with other provider/office due to high copay for office visits.  Recommend speaking with PCP for new referral if desired.        SEDRICK Abrams.     CC: Jaquan Horne

## 2023-04-24 ENCOUNTER — DOCUMENTATION (OUTPATIENT)
Dept: VASCULAR LAB | Facility: MEDICAL CENTER | Age: 58
End: 2023-04-24
Payer: COMMERCIAL

## 2023-04-25 ENCOUNTER — ANTICOAGULATION MONITORING (OUTPATIENT)
Dept: VASCULAR LAB | Facility: MEDICAL CENTER | Age: 58
End: 2023-04-25
Payer: COMMERCIAL

## 2023-04-25 DIAGNOSIS — I82.409 DEEP VEIN THROMBOSIS (DVT) OF LOWER EXTREMITY, UNSPECIFIED CHRONICITY, UNSPECIFIED LATERALITY, UNSPECIFIED VEIN (HCC): ICD-10-CM

## 2023-04-25 LAB — INR PPP: 2.6 (ref 2–3.5)

## 2023-04-25 NOTE — PROGRESS NOTES
Anticoagulation Summary  As of 2023      INR goal:  2.0-3.0   TTR:  63.3 % (7.9 y)   INR used for dosin.60 (2023)   Warfarin maintenance plan:  2.5 mg (5 mg x 0.5) every Fri; 5 mg (5 mg x 1) all other days   Weekly warfarin total:  32.5 mg   Plan last modified:  Alexis Garcia, PharmD (2023)   Next INR check:  2023   Priority:  Maintenance   Target end date:  Indefinite    Indications    Deep vein thrombosis [453.40] [I82.409]                 Anticoagulation Episode Summary       INR check location:  Home Draw    Preferred lab:      Send INR reminders to:      Comments:  Eloy MYRICK          Anticoagulation Care Providers       Provider Role Specialty Phone number    Bruna Ureña M.D. Referring Cardiovascular Disease (Cardiology) [phone]    [facility] Anticoagulation Services Responsible  [phone]    Jaquan Horne M.D.  Family Medicine [phone]          Anticoagulation Patient Findings          Left voicemail message to report a therapeutic INR of 2.6.  Pt to continue with current warfarin dosing regimen. Requested pt contact the clinic for any s/s of unusual bleeding, bruising, clotting or any changes to diet or medication. FU 2 weeks.    Osiel Pete, PharmD

## 2023-05-12 ENCOUNTER — ANTICOAGULATION MONITORING (OUTPATIENT)
Dept: MEDICAL GROUP | Facility: PHYSICIAN GROUP | Age: 58
End: 2023-05-12
Payer: COMMERCIAL

## 2023-05-12 DIAGNOSIS — I82.409 DEEP VEIN THROMBOSIS (DVT) OF LOWER EXTREMITY, UNSPECIFIED CHRONICITY, UNSPECIFIED LATERALITY, UNSPECIFIED VEIN (HCC): ICD-10-CM

## 2023-05-12 LAB — INR PPP: 3.7 (ref 2–3.5)

## 2023-05-12 NOTE — PROGRESS NOTES
Anticoagulation Summary  As of 5/12/2023      INR goal:  2.0-3.0   TTR:  63.1 % (8 y)   INR used for dosing:  3.70 (5/12/2023)   Warfarin maintenance plan:  2.5 mg (5 mg x 0.5) every Fri; 5 mg (5 mg x 1) all other days   Weekly warfarin total:  32.5 mg   Plan last modified:  Claudia DixonD (4/7/2023)   Next INR check:  5/18/2023   Priority:  Maintenance   Target end date:  Indefinite    Indications    Deep vein thrombosis [453.40] [I82.409]                 Anticoagulation Episode Summary       INR check location:  Home Draw    Preferred lab:      Send INR reminders to:      Comments:  Eloy MYRICK          Anticoagulation Care Providers       Provider Role Specialty Phone number    Bruna Ureña M.D. Referring Cardiovascular Disease (Cardiology) [phone]    [facility] Anticoagulation Services Responsible  [phone]    Jaquan Horne M.D.  Family Medicine [phone]          Anticoagulation Patient Findings      Left voicemail message to report a Supra-therapeutic INR.    Will have pt  HOLD dose of warfarin today of 2.5 mg and then   Pt to continue with current warfarin dosing regimen. Requested pt contact the clinic for any s/s of unusual bleeding, bruising, clotting or any changes to diet or medication.    FU INR in 1 week(s).    Claudia PedroD

## 2023-05-19 ENCOUNTER — ANTICOAGULATION MONITORING (OUTPATIENT)
Dept: VASCULAR LAB | Facility: MEDICAL CENTER | Age: 58
End: 2023-05-19
Payer: COMMERCIAL

## 2023-05-19 DIAGNOSIS — I82.409 DEEP VEIN THROMBOSIS (DVT) OF LOWER EXTREMITY, UNSPECIFIED CHRONICITY, UNSPECIFIED LATERALITY, UNSPECIFIED VEIN (HCC): ICD-10-CM

## 2023-05-19 LAB — INR PPP: 1.4 (ref 2–3.5)

## 2023-05-19 NOTE — PROGRESS NOTES
Anticoagulation Summary  As of 2023      INR goal:  2.0-3.0   TTR:  63.0 % (8 y)   INR used for dosin.40 (2023)   Warfarin maintenance plan:  2.5 mg (5 mg x 0.5) every Fri; 5 mg (5 mg x 1) all other days   Weekly warfarin total:  32.5 mg   Plan last modified:  Alexis Garcia PharmD (2023)   Next INR check:  2023   Priority:  Maintenance   Target end date:  Indefinite    Indications    Deep vein thrombosis [453.40] [I82.409]                 Anticoagulation Episode Summary       INR check location:  Home Draw    Preferred lab:      Send INR reminders to:      Comments:  Eloy MYRICK          Anticoagulation Care Providers       Provider Role Specialty Phone number    Bruna Ureña M.D. Referring Cardiovascular Disease (Cardiology) [phone]    [facility] Anticoagulation Services Responsible  [phone]    Jaquan Horne M.D.  Family Medicine [phone]          Anticoagulation Patient Findings      Left voicemail to report a subtherapeutic INR.      Pt to bolus with 5mg x 1 today, then continue with current warfarin dosing regimen. Requested pt contact the clinic for any s/s of unusual bleeding, bruising, clotting or any changes to diet or medication.    FU INR in 1 week(s).    Treatment regimen was discussed with Alexis Garcia, Josie.     Wendy Marcano, PhT     Reviewed with student and agree with above plan.   Claudia PedroD

## 2023-05-26 ENCOUNTER — ANTICOAGULATION MONITORING (OUTPATIENT)
Dept: VASCULAR LAB | Facility: MEDICAL CENTER | Age: 58
End: 2023-05-26
Payer: COMMERCIAL

## 2023-05-26 DIAGNOSIS — I82.409 DEEP VEIN THROMBOSIS (DVT) OF LOWER EXTREMITY, UNSPECIFIED CHRONICITY, UNSPECIFIED LATERALITY, UNSPECIFIED VEIN (HCC): ICD-10-CM

## 2023-05-26 LAB — INR PPP: 2.4 (ref 2–3.5)

## 2023-05-27 NOTE — PROGRESS NOTES
Anticoagulation Summary  As of 2023      INR goal:  2.0-3.0   TTR:  63.0 % (8 y)   INR used for dosin.40 (2023)   Warfarin maintenance plan:  2.5 mg (5 mg x 0.5) every Fri; 5 mg (5 mg x 1) all other days   Weekly warfarin total:  32.5 mg   Plan last modified:  Alexis Garcia PharmD (2023)   Next INR check:  2023   Priority:  Maintenance   Target end date:  Indefinite    Indications    Deep vein thrombosis [453.40] [I82.409]                 Anticoagulation Episode Summary       INR check location:  Home Draw    Preferred lab:      Send INR reminders to:      Comments:  Eloy MYRICK          Anticoagulation Care Providers       Provider Role Specialty Phone number    Bruna Ureña M.D. Referring Cardiovascular Disease (Cardiology) [phone]    [facility] Anticoagulation Services Responsible  [phone]    Jaquan Horne M.D.  Family Medicine [phone]          Anticoagulation Patient Findings          Left voicemail message to report a therapeutic INR of 2.4.  Requested pt contact the clinic for any s/s of unusual bleeding, bruising, clotting or any changes to diet or medication.   Pt is to continue with current warfarin dosing regimen.    Pt is not on antiplatelet therapy      FU 2 weeks.    Wendy Marcano, PhT    I have reviewed and am in agreement with the above stated plan on 23.  Alexis Garcia, PharmD, BCACP

## 2023-06-09 ENCOUNTER — ANTICOAGULATION MONITORING (OUTPATIENT)
Dept: VASCULAR LAB | Facility: MEDICAL CENTER | Age: 58
End: 2023-06-09
Payer: COMMERCIAL

## 2023-06-09 DIAGNOSIS — I82.409 DEEP VEIN THROMBOSIS (DVT) OF LOWER EXTREMITY, UNSPECIFIED CHRONICITY, UNSPECIFIED LATERALITY, UNSPECIFIED VEIN (HCC): ICD-10-CM

## 2023-06-09 LAB — INR PPP: 3.6 (ref 2–3.5)

## 2023-06-09 NOTE — PROGRESS NOTES
OP Anticoagulation Service Note    Date: 6/9/2023    Anticoagulation Summary  As of 6/9/2023      INR goal:  2.0-3.0   TTR:  62.8 % (8 y)   INR used for dosing:  3.60 (6/9/2023)   Warfarin maintenance plan:  2.5 mg (5 mg x 0.5) every Fri; 5 mg (5 mg x 1) all other days   Weekly warfarin total:  32.5 mg   Plan last modified:  Alexis Garcia, PharmD (4/7/2023)   Next INR check:  6/23/2023   Priority:  Maintenance   Target end date:  Indefinite    Indications    Deep vein thrombosis [453.40] [I82.409]                 Anticoagulation Episode Summary       INR check location:  Home Draw    Preferred lab:      Send INR reminders to:      Comments:  Eloy MYRICK          Anticoagulation Care Providers       Provider Role Specialty Phone number    Bruna Ureña M.D. Referring Cardiovascular Disease (Cardiology) [phone]    [facility] Anticoagulation Services Responsible  [phone]    Jaquan Horne M.D.  Family Medicine [phone]          Anticoagulation Patient Findings        Patient's preferred phone number:  [phone]        HPI:   The reason for today's call is to prevent morbidity and mortality from a blood clot and/or stroke and to reduce the risk of bleeding while on a anticoagulant.     PCP:  Jaquan Horne M.D.  [address] [place] [phone]    Assessment:     INR  supra-therapeutic.     Lab Results   Component Value Date/Time    BUN 22 03/29/2023 02:40 PM    CREATININE 0.81 03/29/2023 02:40 PM    CREATININE 0.83 07/06/2011 02:05 PM    BUNCREATRAT 16 08/26/2021 08:29 AM    BUNCREATRAT 8 (L) 07/06/2011 02:05 PM     Lab Results   Component Value Date/Time    HEMOGLOBIN 12.7 03/29/2023 02:40 PM    HEMATOCRIT 37.8 03/29/2023 02:40 PM    PLATELETCT 222 03/29/2023 02:40 PM    ALKPHOSPHAT 45 03/29/2023 02:40 PM    ASTSGOT 41 03/29/2023 02:40 PM    ALTSGPT 34 03/29/2023 02:40 PM          Current Outpatient Medications:     warfarin, Take one-half to one tablet by mouth daily or as directed by  anticoagulation clinic    atorvastatin, 40 mg, Oral, DAILY    spironolactone, 25 mg, Oral, DAILY    losartan, 100 mg, Oral, DAILY    metoprolol SR, 100 mg, Oral, DAILY    levothyroxine, TAKE 1 TABLET BY MOUTH EVERY DAY    alendronate, Take  by mouth.      Plan:     Hold today then resume     Follow-up:     On date seen above    Additional information discussed with patient:     Asked patient to please call the anticoagulation clinic if they have any signs/symptoms of bleeding and/or thrombosis or any changes to diet or medications.      National recommendations regarding anticoagulation therapy:     The CHEST guidelines recommends frequent INR monitoring at regular intervals (a few days up to a max of 12 weeks) to ensure patients are on the proper dose of warfarin, and patients are not having any complications from therapy.  INRs can dramatically change over a short time period due to diet, medications, and medical conditions.       [facility]  Phone: [phone]  Fax: [phone]  On call: [phone]  General scheduling/information [phone]  For emergencies please dial [phone]  Please do not use [employer] for urgent matters, call the phone numbers listed above.    This note was created using voice recognition software (Dragon). The accuracy of the dictation is limited by the abilities of the software. I have reviewed the note prior to signing, however some errors in grammar and context are still possible. If you have any questions related to this note please do not hesitate to contact our office.

## 2023-06-27 ENCOUNTER — ANTICOAGULATION MONITORING (OUTPATIENT)
Dept: CARDIOLOGY | Facility: MEDICAL CENTER | Age: 58
End: 2023-06-27
Payer: COMMERCIAL

## 2023-06-27 DIAGNOSIS — I82.409 DEEP VEIN THROMBOSIS (DVT) OF LOWER EXTREMITY, UNSPECIFIED CHRONICITY, UNSPECIFIED LATERALITY, UNSPECIFIED VEIN (HCC): ICD-10-CM

## 2023-06-27 LAB — INR PPP: 2.8 (ref 2–3.5)

## 2023-06-27 NOTE — PROGRESS NOTES
OP Anticoagulation Service Note    Date: 2023    Anticoagulation Summary  As of 2023      INR goal:  2.0-3.0   TTR:  62.7 % (8.1 y)   INR used for dosin.80 (2023)   Warfarin maintenance plan:  2.5 mg (5 mg x 0.5) every Fri; 5 mg (5 mg x 1) all other days   Weekly warfarin total:  32.5 mg   Plan last modified:  Alexis Garcia, PharmD (2023)   Next INR check:  2023   Priority:  Maintenance   Target end date:  Indefinite    Indications    Deep vein thrombosis [453.40] [I82.409]                 Anticoagulation Episode Summary       INR check location:  Home Draw    Preferred lab:      Send INR reminders to:      Comments:  Eloy MYRICK          Anticoagulation Care Providers       Provider Role Specialty Phone number    Bruna Ureña M.D. Referring Cardiovascular Disease (Cardiology) [phone]    [facility] Anticoagulation Services Responsible  [phone]    Jaquan Horne M.D.  Family Medicine [phone]          Anticoagulation Patient Findings        Patient's preferred phone number:  [phone]        HPI:   The reason for today's call is to prevent morbidity and mortality from a blood clot and/or stroke and to reduce the risk of bleeding while on a anticoagulant.     PCP:  Jaquan Horne M.D.  [address] [facility] [phone]    Assessment:     INR  therapeutic.     Lab Results   Component Value Date/Time    BUN 22 2023 02:40 PM    CREATININE 0.81 2023 02:40 PM    CREATININE 0.83 2011 02:05 PM    BUNCREATRAT 16 2021 08:29 AM    BUNCREATRAT 8 (L) 2011 02:05 PM     Lab Results   Component Value Date/Time    HEMOGLOBIN 12.7 2023 02:40 PM    HEMATOCRIT 37.8 2023 02:40 PM    PLATELETCT 222 2023 02:40 PM    ALKPHOSPHAT 45 2023 02:40 PM    ASTSGOT 41 2023 02:40 PM    ALTSGPT 34 2023 02:40 PM          Current Outpatient Medications:     warfarin, Take one-half to one tablet by mouth daily or as directed by  anticoagulation clinic    atorvastatin, 40 mg, Oral, DAILY    spironolactone, 25 mg, Oral, DAILY    losartan, 100 mg, Oral, DAILY    metoprolol SR, 100 mg, Oral, DAILY    levothyroxine, TAKE 1 TABLET BY MOUTH EVERY DAY    alendronate, Take  by mouth.      Plan:     Continue the same warfarin dose, as noted above.       Follow-up:     As seen above      Additional information discussed with patient:     Asked patient to please call the anticoagulation clinic if they have any signs/symptoms of bleeding and/or thrombosis or any changes to diet or medications.      National recommendations regarding anticoagulation therapy:     The CHEST guidelines recommends frequent INR monitoring at regular intervals (a few days up to a max of 12 weeks) to ensure patients are on the proper dose of warfarin, and patients are not having any complications from therapy.  INRs can dramatically change over a short time period due to diet, medications, and medical conditions.         [facility]  Phone: [phone]  Fax: [phone]  On call: [phone]  General scheduling/information [phone]  For emergencies please dial [phone]  Please do not use [employer] for urgent matters, call the phone numbers listed above.    This note was created using voice recognition software (Dragon). The accuracy of the dictation is limited by the abilities of the software. I have reviewed the note prior to signing, however some errors in grammar and context are still possible. If you have any questions related to this note please do not hesitate to contact our office.

## 2023-07-14 ENCOUNTER — ANTICOAGULATION MONITORING (OUTPATIENT)
Dept: MEDICAL GROUP | Facility: PHYSICIAN GROUP | Age: 58
End: 2023-07-14
Payer: COMMERCIAL

## 2023-07-14 DIAGNOSIS — I82.409 DEEP VEIN THROMBOSIS (DVT) OF LOWER EXTREMITY, UNSPECIFIED CHRONICITY, UNSPECIFIED LATERALITY, UNSPECIFIED VEIN (HCC): ICD-10-CM

## 2023-07-14 LAB — INR PPP: 3.7 (ref 2–3.5)

## 2023-07-14 NOTE — PROGRESS NOTES
Anticoagulation Summary  As of 7/14/2023      INR goal:  2.0-3.0   TTR:  63.0 % (8.1 y)   INR used for dosing:  3.70 (7/14/2023)   Warfarin maintenance plan:  2.5 mg (5 mg x 0.5) every Mon, Fri; 5 mg (5 mg x 1) all other days   Weekly warfarin total:  30 mg   Plan last modified:  Chandrika Santamaria, Pharmacy Intern (7/14/2023)   Next INR check:  7/21/2023   Priority:  Maintenance   Target end date:  Indefinite    Indications    Deep vein thrombosis [453.40] [I82.409]                 Anticoagulation Episode Summary       INR check location:  Home Draw    Preferred lab:      Send INR reminders to:      Comments:  Eloy MYRICK          Anticoagulation Care Providers       Provider Role Specialty Phone number    Bruna Ureña M.D. Referring Cardiovascular Disease (Cardiology) [phone]    [facility] Anticoagulation Services Responsible  [phone]    Jaquan Horne M.D.  Family Medicine [phone]          Anticoagulation Patient Findings      Left voicemail  to report a supra therapeutic INR.    Will have pt take 2.5 mg dose of warfarin today and then have pt take 2.5 mg on Monday and Fridays then take 5 mg on all of the other days.  Requested pt contact the clinic for any s/s of unusual bleeding, bruising, clotting or any changes to diet or medication.    FU INR in 1 week(s).  Chandrika Santamaria, Pharmacy Intern    Reviewed with student and agree with above plan.   Kiki Gupta, ClaudiaD

## 2023-07-20 NOTE — PROGRESS NOTES
OP Anticoagulation Service Note    Date: 12/23/2022    Anticoagulation Summary  As of 12/23/2022      INR goal:  2.0-3.0   TTR:  63.8 % (7.6 y)   INR used for dosing:  3.30 (12/23/2022)   Warfarin maintenance plan:  7.5 mg (5 mg x 1.5) every Mon, Fri; 5 mg (5 mg x 1) all other days   Weekly warfarin total:  40 mg   Plan last modified:  Abhi Carlos, PharmD (3/14/2022)   Next INR check:  1/6/2023   Priority:  Maintenance   Target end date:  Indefinite    Indications    Deep vein thrombosis [453.40] [I82.409]                 Anticoagulation Episode Summary       INR check location:  Home Draw    Preferred lab:      Send INR reminders to:      Comments:  Eloy MYRICK          Anticoagulation Care Providers       Provider Role Specialty Phone number    Bruna Ureña M.D. Referring Cardiovascular Disease (Cardiology) [phone]    [facility] Anticoagulation Services Responsible  [phone]    Jaquan Horne M.D.  Family Medicine [phone]          Anticoagulation Patient Findings        Voice message for patient regarding their anticoagulant.   Patient's preferred phone number:  [phone]        HPI:   The reason for today's call is to prevent morbidity and mortality from a blood clot and/or stroke and to reduce the risk of bleeding while on a anticoagulant.     PCP:  Jaquan Horne M.D.  [address] [facility] [phone]    Assessment:     INR  supra-therapeutic.     Lab Results   Component Value Date/Time    BUN 16 08/26/2021 08:29 AM    BUN 12 02/08/2021 05:39 AM    CREATININE 1.00 08/26/2021 08:29 AM    CREATININE 0.70 02/08/2021 05:39 AM    CREATININE 0.83 07/06/2011 02:05 PM    BUNCREATRAT 16 08/26/2021 08:29 AM    BUNCREATRAT 8 (L) 07/06/2011 02:05 PM     Lab Results   Component Value Date/Time    HEMOGLOBIN 15.4 03/09/2021 09:00 PM    HEMOGLOBIN 13.4 02/08/2021 05:39 AM    HEMATOCRIT 46.4 03/09/2021 09:00 PM    HEMATOCRIT 40.1 02/08/2021 05:39 AM    PLATELETCT 308 03/09/2021 09:00 PM     Appeal letter created and faxed to  [phone]   PLATELETCT 255 02/08/2021 05:39 AM    ALKPHOSPHAT 59 08/26/2021 08:29 AM    ALKPHOSPHAT 94 02/07/2021 07:11 AM    ASTSGOT 31 08/26/2021 08:29 AM    ASTSGOT 18 02/07/2021 07:11 AM    ALTSGPT 21 08/26/2021 08:29 AM    ALTSGPT 11 02/07/2021 07:11 AM          Current Outpatient Medications:     spironolactone, 25 mg, Oral, DAILY    losartan, 100 mg, Oral, DAILY    metoprolol SR, 100 mg, Oral, DAILY    warfarin, TAKE ONE TO ONE AND ONE-HALF TABLETS BY MOUTH DAILY OR AS DIRECTED BY ANTICOAGULATION CLINIC    levothyroxine, TAKE 1 TABLET BY MOUTH EVERY DAY    rosuvastatin, 20 mg, Oral, Q EVENING    alendronate, Take  by mouth.    senna-docusate, 1 Tablet, Oral, DAILY      Plan:     5 mg today then resume    Follow-up:     test in 2 weeks.        Additional information discussed with patient:     Asked patient to please call the anticoagulation clinic if they have any signs/symptoms of bleeding and/or thrombosis or any changes to diet or medications.      National recommendations regarding anticoagulation therapy:     The CHEST guidelines recommends frequent INR monitoring at regular intervals (a few days up to a max of 12 weeks) to ensure patients are on the proper dose of warfarin, and patients are not having any complications from therapy.  INRs can dramatically change over a short time period due to diet, medications, and medical conditions.       Abhi Carlos, PharmD, MS, BCACP, C  [facility]  Phone: [phone]  Fax: [phone]  On call: [phone]  General scheduling/information [phone]  For emergencies please dial [phone]  Please do not use [employer] for urgent matters, call the phone numbers listed above.    This note was created using voice recognition software (Dragon). The accuracy of the dictation is limited by the abilities of the software. I have reviewed the note prior to signing, however some errors in grammar and context are still possible. If you have any  questions related to this note please do not hesitate to contact our office.

## 2023-07-21 ENCOUNTER — TELEPHONE (OUTPATIENT)
Dept: VASCULAR LAB | Facility: MEDICAL CENTER | Age: 58
End: 2023-07-21
Payer: COMMERCIAL

## 2023-07-21 NOTE — TELEPHONE ENCOUNTER
Chauncey Barber, Med Ass't  P Amb Anticoag Pool  Patient left  stating she was supposed to check inr today but was unable to get blood from two different fingers, went through quite a few lancets and stips. She would like to know what to do.     Called pt and advised her to continue to try to test INR. Recommended she ask family member or friend to assist w/ testing her INR if she is still unable to successfully obtain blood sample.    Pt advised to make clinic appt for INR if she continues to have issues w/ home testing.    Lazaro Cerda, PharmD, BCACP

## 2023-07-24 ENCOUNTER — ANTICOAGULATION MONITORING (OUTPATIENT)
Dept: VASCULAR LAB | Facility: MEDICAL CENTER | Age: 58
End: 2023-07-24
Payer: COMMERCIAL

## 2023-07-24 DIAGNOSIS — I82.409 DEEP VEIN THROMBOSIS (DVT) OF LOWER EXTREMITY, UNSPECIFIED CHRONICITY, UNSPECIFIED LATERALITY, UNSPECIFIED VEIN (HCC): ICD-10-CM

## 2023-07-24 LAB — INR PPP: 2.2 (ref 2–3.5)

## 2023-07-25 NOTE — PROGRESS NOTES
Anticoagulation Summary  As of 2023      INR goal:  2.0-3.0   TTR:  63.0 % (8.2 y)   INR used for dosin.20 (2023)   Warfarin maintenance plan:  2.5 mg (5 mg x 0.5) every Mon, Fri; 5 mg (5 mg x 1) all other days   Weekly warfarin total:  30 mg   Plan last modified:  Chandrika Santamaria, Pharmacy Intern (2023)   Next INR check:  2023   Priority:  Maintenance   Target end date:  Indefinite    Indications    Deep vein thrombosis [453.40] [I82.409]                 Anticoagulation Episode Summary       INR check location:  Home Draw    Preferred lab:      Send INR reminders to:      Comments:  Eloy MYRICK          Anticoagulation Care Providers       Provider Role Specialty Phone number    Bruna Ureña M.D. Referring Cardiovascular Disease (Cardiology) [phone]    [facility] Anticoagulation Services Responsible  [phone]    Jaquan Horne M.D.  Family Medicine [phone]          Anticoagulation Patient Findings      Left voicemail message to report a therapeutic INR.      Pt to continue with current warfarin dosing regimen. Requested pt contact the clinic for any s/s of unusual bleeding, bruising, clotting or any changes to diet or medication.    FU INR in 2 week(s).    Debi Lieberman, PharmD

## 2023-08-08 LAB — INR PPP: 1.9 (ref 2–3.5)

## 2023-08-09 ENCOUNTER — ANTICOAGULATION MONITORING (OUTPATIENT)
Dept: VASCULAR LAB | Facility: MEDICAL CENTER | Age: 58
End: 2023-08-09
Payer: COMMERCIAL

## 2023-08-09 DIAGNOSIS — I82.409 DEEP VEIN THROMBOSIS (DVT) OF LOWER EXTREMITY, UNSPECIFIED CHRONICITY, UNSPECIFIED LATERALITY, UNSPECIFIED VEIN (HCC): ICD-10-CM

## 2023-08-09 NOTE — PROGRESS NOTES
Anticoagulation Summary  As of 2023      INR goal:  2.0-3.0   TTR:  63.0 % (8.2 y)   INR used for dosin.90 (2023)   Warfarin maintenance plan:  2.5 mg (5 mg x 0.5) every Mon; 5 mg (5 mg x 1) all other days   Weekly warfarin total:  32.5 mg   Plan last modified:  Parul Sahu (2023)   Next INR check:  2023   Priority:  Maintenance   Target end date:  Indefinite    Indications    Deep vein thrombosis [453.40] [I82.409]                 Anticoagulation Episode Summary       INR check location:  Home Draw    Preferred lab:      Send INR reminders to:      Comments:  Eloy MYRICK          Anticoagulation Care Providers       Provider Role Specialty Phone number    Bruna Ureña M.D. Referring Cardiovascular Disease (Cardiology) [phone]    [facility] Anticoagulation Services Responsible  [phone]    Jaquan Horne M.D.  Family Medicine [phone]          Anticoagulation Patient Findings     Left voicemail message to report a sub-therapeutic INR of 1.9.  Requested pt contact the clinic for any s/s of unusual bleeding, bruising, clotting or any changes to diet or medication.   Pt is to continue with the above warfarin dosing regimen.    Plan was discussed with Alexis    Pt is not on antiplatelet therapy      FU 2 weeks.  Parul Sahu, Student Pharmacist

## 2023-09-05 ENCOUNTER — ANTICOAGULATION MONITORING (OUTPATIENT)
Dept: VASCULAR LAB | Facility: MEDICAL CENTER | Age: 58
End: 2023-09-05
Payer: COMMERCIAL

## 2023-09-05 DIAGNOSIS — I82.409 DEEP VEIN THROMBOSIS (DVT) OF LOWER EXTREMITY, UNSPECIFIED CHRONICITY, UNSPECIFIED LATERALITY, UNSPECIFIED VEIN (HCC): ICD-10-CM

## 2023-09-05 LAB — INR PPP: 2.3 (ref 2–3.5)

## 2023-09-06 NOTE — PROGRESS NOTES
OP   Telephone Anticoagulation Service Note      Anticoagulation Summary  As of 2023      INR goal:  2.0-3.0   TTR:  63.1 % (8.3 y)   INR used for dosin.30 (2023)   Warfarin maintenance plan:  2.5 mg (5 mg x 0.5) every Mon; 5 mg (5 mg x 1) all other days   Weekly warfarin total:  32.5 mg   Plan last modified:  Parul Sahu (2023)   Next INR check:  10/3/2023   Priority:  Maintenance   Target end date:  Indefinite    Indications    Deep vein thrombosis [453.40] [I82.409]                 Anticoagulation Episode Summary       INR check location:  Home Draw    Preferred lab:      Send INR reminders to:      Comments:  Eloy MYRICK          Anticoagulation Care Providers       Provider Role Specialty Phone number    Bruna Ureña M.D. Referring Cardiovascular Disease (Cardiology) [phone]    [facility] Anticoagulation Services Responsible  [phone]    Jaquan Horne M.D.  Family Medicine [phone]          Anticoagulation Patient Findings    Left a message on the patient's voicemail today, informing the patient of a therapeutic INR of 2.3.  Instructed the patient to call the clinic with any changes to diet or medications, with any signs/sx of bleeding or clotting or with any questions or concerns.     Patient instructed to continue with the current warfarin dosing regimen, and asked to follow up again in 4 weeks.     dE TaylorD

## 2023-09-08 NOTE — CARE PLAN
Problem: Discharge Barriers/Planning  Goal: Patient's continuum of care needs will be met  Outcome: PROGRESSING AS EXPECTED   HH accepted. Discharge orders received. MD rounded this A.M. waiting for pain med prescription. Anticipated discharge home today.    Problem: Pain Management  Goal: Pain level will decrease to patient's comfort goal  Outcome: PROGRESSING AS EXPECTED   PRN pain meds in use. Ice/heat packs provided. Extra pillows in use.    no

## 2023-10-09 ENCOUNTER — ANTICOAGULATION MONITORING (OUTPATIENT)
Dept: VASCULAR LAB | Facility: MEDICAL CENTER | Age: 58
End: 2023-10-09
Payer: COMMERCIAL

## 2023-10-09 DIAGNOSIS — I82.409 DEEP VEIN THROMBOSIS (DVT) OF LOWER EXTREMITY, UNSPECIFIED CHRONICITY, UNSPECIFIED LATERALITY, UNSPECIFIED VEIN (HCC): ICD-10-CM

## 2023-10-09 LAB — INR PPP: 2.2 (ref 2–3.5)

## 2023-10-10 NOTE — PROGRESS NOTES
Anticoagulation Summary  As of 10/9/2023      INR goal:  2.0-3.0   TTR:  63.5 % (8.4 y)   INR used for dosin.20 (10/9/2023)   Warfarin maintenance plan:  2.5 mg (5 mg x 0.5) every Mon; 5 mg (5 mg x 1) all other days   Weekly warfarin total:  32.5 mg   Plan last modified:  Parul Sahu (2023)   Next INR check:  2023   Priority:  Maintenance   Target end date:  Indefinite    Indications    Deep vein thrombosis [453.40] [I82.409]                 Anticoagulation Episode Summary       INR check location:  Home Draw    Preferred lab:      Send INR reminders to:      Comments:  Eloy MYRICK          Anticoagulation Care Providers       Provider Role Specialty Phone number    Bruna Ureña M.D. Referring Cardiovascular Disease (Cardiology) [phone]    [facility] Anticoagulation Services Responsible  [phone]    Jaquan Horne M.D.  Family Medicine [phone]          Anticoagulation Patient Findings          Left voicemail to patients phone.  Patient is therapeutic today.   Confirmed dosing.   Pt is not on antiplatelet agent    Instructed patient to call clinic if any unusual bleeding or bruising occurs.   Will continue dosing as outlined.   Will follow-up with patient in 4 week(s).    Kenyatta Harris, Student Pharmacist

## 2023-10-20 ENCOUNTER — OFFICE VISIT (OUTPATIENT)
Dept: VASCULAR LAB | Facility: MEDICAL CENTER | Age: 58
End: 2023-10-20
Payer: COMMERCIAL

## 2023-10-20 DIAGNOSIS — Z79.01 CHRONIC ANTICOAGULATION: ICD-10-CM

## 2023-11-09 LAB
INR PPP: 2.3 (ref 2–3.5)
INR PPP: 2.3 (ref 2–3.5)

## 2023-11-10 NOTE — PROGRESS NOTES
Anticoagulation Summary  As of 10/20/2023      INR goal:  2.0-3.0   TTR:  --   INR used for dosin.30 (2023)   Warfarin maintenance plan:  2.5 mg (5 mg x 0.5) every Mon; 5 mg (5 mg x 1) all other days   Weekly warfarin total:  32.5 mg   Plan last modified:  Parul Sahu (2023)   Next INR check:  2023   Priority:  Maintenance   Target end date:  Indefinite    Indications    Deep vein thrombosis [453.40] [I82.409]                 Anticoagulation Episode Summary       INR check location:  Home Draw    Preferred lab:      Send INR reminders to:      Comments:  Eloy MYRICK          Anticoagulation Care Providers       Provider Role Specialty Phone number    Bruna Ureña M.D. Referring Cardiovascular Disease (Cardiology) [phone]    [facility] Anticoagulation Services Responsible  [phone]    Jaquan Horne M.D.  Family Medicine [phone]          Anticoagulation Patient Findings          Left voicemail message to report a therapeutic INR of 2.3.  Pt to continue with current warfarin dosing regimen. Requested pt contact the clinic for any s/s of unusual bleeding, bruising, clotting or any changes to diet or medication. FU 2 weeks.    Osiel Pete, ClaudiaD

## 2023-11-17 NOTE — CARE PLAN
Problem: Pain Management  Goal: Pain level will decrease to patient's comfort goal  Outcome: PROGRESSING AS EXPECTED     Problem: Psychosocial Needs:  Goal: Level of anxiety will decrease  Outcome: PROGRESSING AS EXPECTED      no...

## 2023-11-28 ENCOUNTER — ANTICOAGULATION MONITORING (OUTPATIENT)
Dept: VASCULAR LAB | Facility: MEDICAL CENTER | Age: 58
End: 2023-11-28
Payer: COMMERCIAL

## 2023-11-28 DIAGNOSIS — I82.409 DEEP VEIN THROMBOSIS (DVT) OF LOWER EXTREMITY, UNSPECIFIED CHRONICITY, UNSPECIFIED LATERALITY, UNSPECIFIED VEIN (HCC): ICD-10-CM

## 2023-11-28 LAB — INR PPP: 2.1 (ref 2–3.5)

## 2023-11-29 NOTE — PROGRESS NOTES
Anticoagulation Summary  As of 2023      INR goal:  2.0-3.0   TTR:  64.1 % (8.5 y)   INR used for dosin.10 (2023)   Warfarin maintenance plan:  2.5 mg (5 mg x 0.5) every Mon; 5 mg (5 mg x 1) all other days   Weekly warfarin total:  32.5 mg   Plan last modified:  Parul Sahu (2023)   Next INR check:  2023   Priority:  Maintenance   Target end date:  Indefinite    Indications    Deep vein thrombosis [453.40] [I82.409]                 Anticoagulation Episode Summary       INR check location:  Home Draw    Preferred lab:      Send INR reminders to:      Comments:  Eloy MYRICK          Anticoagulation Care Providers       Provider Role Specialty Phone number    Bruna Ureña M.D. Referring Cardiovascular Disease (Cardiology) [phone]    [facility] Anticoagulation Services Responsible  [phone]    Jaquan Horne M.D.  Family Medicine [phone]          Anticoagulation Patient Findings    Left voicemail message to report a therapeutic INR of 2.1.  Requested pt contact the clinic for any s/s of unusual bleeding, bruising, clotting or any changes to diet or medication.   Pt is to continue with current warfarin dosing regimen.      FU 2 weeks.  Alexis Garcia, PharmD, BCACP

## 2024-01-03 ENCOUNTER — ANTICOAGULATION MONITORING (OUTPATIENT)
Dept: VASCULAR LAB | Facility: MEDICAL CENTER | Age: 59
End: 2024-01-03
Payer: COMMERCIAL

## 2024-01-03 DIAGNOSIS — I82.409 DEEP VEIN THROMBOSIS (DVT) OF LOWER EXTREMITY, UNSPECIFIED CHRONICITY, UNSPECIFIED LATERALITY, UNSPECIFIED VEIN (HCC): ICD-10-CM

## 2024-01-03 LAB — INR PPP: 1.7 (ref 2–3.5)

## 2024-01-04 NOTE — PROGRESS NOTES
Anticoagulation Summary  As of 1/3/2024      INR goal:  2.0-3.0   TTR:  63.6 % (8.6 y)   INR used for dosin.70 (1/3/2024)   Warfarin maintenance plan:  2.5 mg (5 mg x 0.5) every Mon; 5 mg (5 mg x 1) all other days   Weekly warfarin total:  32.5 mg   Plan last modified:  Parul Sahu (2023)   Next INR check:  2024   Priority:  Maintenance   Target end date:  Indefinite    Indications    Deep vein thrombosis [453.40] [I82.409]                 Anticoagulation Episode Summary       INR check location:  Home Draw    Preferred lab:      Send INR reminders to:      Comments:  Eloy MYRICK          Anticoagulation Care Providers       Provider Role Specialty Phone number    Bruna Ureña M.D. Referring Cardiovascular Disease (Cardiology) [phone]    [facility] Anticoagulation Services Responsible  [phone]    Jaquan Horne M.D.  Family Medicine [phone]          Anticoagulation Patient Findings      INR is subtherapeutic    Called and left VM for patient.    Requested patient to contact the clinic for any s/sx of unusual bleeding, bruising, clotting or any changes to diet or medications. Unless patient reports any changes that would warrant an adjustment to the plan:  Warfarin Plan: Bolus with 7.5mg tonight, then Continue regimen as listed above.    Next INR in 2 week(s).    Debi Lieberman, PharmD

## 2024-01-19 ENCOUNTER — ANTICOAGULATION MONITORING (OUTPATIENT)
Dept: VASCULAR LAB | Facility: MEDICAL CENTER | Age: 59
End: 2024-01-19
Payer: COMMERCIAL

## 2024-01-19 DIAGNOSIS — I82.409 DEEP VEIN THROMBOSIS (DVT) OF LOWER EXTREMITY, UNSPECIFIED CHRONICITY, UNSPECIFIED LATERALITY, UNSPECIFIED VEIN (HCC): ICD-10-CM

## 2024-01-19 LAB — INR PPP: 2.3 (ref 2–3.5)

## 2024-01-20 NOTE — PROGRESS NOTES
Anticoagulation Summary  As of 2024      INR goal:  2.0-3.0   TTR:  63.6 % (8.7 y)   INR used for dosin.30 (2024)   Warfarin maintenance plan:  2.5 mg (5 mg x 0.5) every Mon; 5 mg (5 mg x 1) all other days   Weekly warfarin total:  32.5 mg   Plan last modified:  Parul Sahu (2023)   Next INR check:  2024   Priority:  Maintenance   Target end date:  Indefinite    Indications    Deep vein thrombosis [453.40] [I82.409]                 Anticoagulation Episode Summary       INR check location:  Home Draw    Preferred lab:      Send INR reminders to:      Comments:  Eloy MYRICK          Anticoagulation Care Providers       Provider Role Specialty Phone number    Bruna Ureña M.D. Referring Cardiovascular Disease (Cardiology) [phone]    [facility] Anticoagulation Services Responsible  [phone]    Jaquan oHrne M.D.  Family Medicine [phone]          Anticoagulation Patient Findings      Left voicemail message to report a therapeutic INR of 2.3.    Will have pt continue on with current warfarin dosing regimen.   Requested pt contact the clinic for any s/s of unusual bleeding, bruising, clotting or any changes to diet or medication.    FU INR in 2 week(s).    Lazaro Cerda, PharmD, BCACP

## 2024-02-02 ENCOUNTER — ANTICOAGULATION MONITORING (OUTPATIENT)
Dept: VASCULAR LAB | Facility: MEDICAL CENTER | Age: 59
End: 2024-02-02
Payer: COMMERCIAL

## 2024-02-02 DIAGNOSIS — I82.409 DEEP VEIN THROMBOSIS (DVT) OF LOWER EXTREMITY, UNSPECIFIED CHRONICITY, UNSPECIFIED LATERALITY, UNSPECIFIED VEIN (HCC): ICD-10-CM

## 2024-02-02 LAB — INR PPP: 2.5 (ref 2–3.5)

## 2024-02-03 NOTE — PROGRESS NOTES
OP   Telephone Anticoagulation Service Note      Anticoagulation Summary  As of 2024      INR goal:  2.0-3.0   TTR:  63.7 % (8.7 y)   INR used for dosin.50 (2024)   Warfarin maintenance plan:  2.5 mg (5 mg x 0.5) every Mon; 5 mg (5 mg x 1) all other days   Weekly warfarin total:  32.5 mg   Plan last modified:  Parul Sahu (2023)   Next INR check:  2024   Priority:  Maintenance   Target end date:  Indefinite    Indications    Deep vein thrombosis [453.40] [I82.409]                 Anticoagulation Episode Summary       INR check location:  Home Draw    Preferred lab:      Send INR reminders to:      Comments:  Eloy MYRICK          Anticoagulation Care Providers       Provider Role Specialty Phone number    Bruna Ureña M.D. Referring Cardiovascular Disease (Cardiology) [phone]    [facility] Anticoagulation Services Responsible  [phone]    Jaquan Horne M.D.  Family Medicine [phone]          Anticoagulation Patient Findings    Left a message on the patient's voicemail today, informing the patient of a therapeutic INR of 2.5.  Instructed the patient to call the clinic with any changes to diet or medications, with any signs/sx of bleeding or clotting or with any questions or concerns.     Patient instructed to continue with the current warfarin dosing regimen, and asked to follow up again in 2 weeks.     Ed TaylorD

## 2024-02-16 ENCOUNTER — ANTICOAGULATION MONITORING (OUTPATIENT)
Dept: VASCULAR LAB | Facility: MEDICAL CENTER | Age: 59
End: 2024-02-16
Payer: COMMERCIAL

## 2024-02-16 ENCOUNTER — TELEPHONE (OUTPATIENT)
Dept: VASCULAR LAB | Facility: MEDICAL CENTER | Age: 59
End: 2024-02-16
Payer: COMMERCIAL

## 2024-02-16 DIAGNOSIS — I82.409 DEEP VEIN THROMBOSIS (DVT) OF LOWER EXTREMITY, UNSPECIFIED CHRONICITY, UNSPECIFIED LATERALITY, UNSPECIFIED VEIN (HCC): ICD-10-CM

## 2024-02-16 LAB — INR PPP: 1.3 (ref 2–3.5)

## 2024-02-16 NOTE — TELEPHONE ENCOUNTER
24 Hour Monitor / ABPM / Standing Order / INR / Other: INR    PT Name: Oumar Fisher      PT MRN: [id number]      Best Call Back Number: [phone]     What does the PT need to schedule?: n/a    Any special instructions: n/a    INR Result: 1.3    Thank you      -Brien MUSTAFA

## 2024-02-16 NOTE — PROGRESS NOTES
Anticoagulation Summary  As of 2024      INR goal:  2.0-3.0   TTR:  63.6% (8.7 y)   INR used for dosin.30 (2024)   Warfarin maintenance plan:  2.5 mg (5 mg x 0.5) every Mon; 5 mg (5 mg x 1) all other days   Weekly warfarin total:  32.5 mg   Plan last modified:  Parul Sahu (2023)   Next INR check:     Priority:  Maintenance   Target end date:  Indefinite    Indications    Deep vein thrombosis [453.40] [I82.409]                 Anticoagulation Episode Summary       INR check location:  Home Draw    Preferred lab:      Send INR reminders to:      Comments:  Eloy MYRICK          Anticoagulation Care Providers       Provider Role Specialty Phone number    Bruna Ureña M.D. Referring Cardiovascular Disease (Cardiology) [phone]    [facility] Anticoagulation Services Responsible  [phone]    Jaquan Horne M.D.  [facility] [phone]          Called and left  for patient. Requested patient to contact the clinic for any s/sx of unusual bleeding, bruising, clotting or any changes to diet or medications.    INR subtherapeutic.    Unless patient reports any changes that would warrant an adjustment to the plan:  Warfarin Plan: Take 7.5 mg today and tomorrow, then Continue regimen as listed above.    Next INR in 4 days(s).    Stephen Hernandez, PharmD, BCACP

## 2024-02-20 ENCOUNTER — TELEPHONE (OUTPATIENT)
Dept: VASCULAR LAB | Facility: MEDICAL CENTER | Age: 59
End: 2024-02-20
Payer: COMMERCIAL

## 2024-02-22 ENCOUNTER — ANTICOAGULATION MONITORING (OUTPATIENT)
Dept: MEDICAL GROUP | Facility: MEDICAL CENTER | Age: 59
End: 2024-02-22
Payer: COMMERCIAL

## 2024-02-22 DIAGNOSIS — I82.409 DEEP VEIN THROMBOSIS (DVT) OF LOWER EXTREMITY, UNSPECIFIED CHRONICITY, UNSPECIFIED LATERALITY, UNSPECIFIED VEIN (HCC): ICD-10-CM

## 2024-02-22 LAB — INR PPP: 2.3 (ref 2–3.5)

## 2024-02-23 NOTE — PROGRESS NOTES
Anticoagulation Summary  As of 2024      INR goal:  2.0-3.0   TTR:  63.6% (8.7 y)   INR used for dosin.30 (2024)   Warfarin maintenance plan:  2.5 mg (5 mg x 0.5) every Mon; 5 mg (5 mg x 1) all other days   Weekly warfarin total:  32.5 mg   Plan last modified:  Parul Sahu (2023)   Next INR check:  2024   Priority:  Maintenance   Target end date:  Indefinite    Indications    Deep vein thrombosis [453.40] [I82.409]                 Anticoagulation Episode Summary       INR check location:  Home Draw    Preferred lab:      Send INR reminders to:      Comments:  Eloy MYRICK          Anticoagulation Care Providers       Provider Role Specialty Phone number    Bruna Ureña M.D. Referring Cardiovascular Disease (Cardiology) [phone]    [facility] Responsible  [phone]    Jaquan Horne M.D.  Family Medicine [phone]          Anticoagulation Patient Findings      Left voicemail message to report a therapeutic INR.       Pt to continue with current warfarin dosing regimen. Requested pt contact the clinic for any s/s of unusual bleeding, bruising, clotting or any changes to diet or medication.    FU INR in 1 week(s).    Kiki Gupta, ClaudiaD

## 2024-02-28 ENCOUNTER — ANTICOAGULATION MONITORING (OUTPATIENT)
Dept: VASCULAR LAB | Facility: MEDICAL CENTER | Age: 59
End: 2024-02-28
Payer: COMMERCIAL

## 2024-02-28 DIAGNOSIS — I82.409 DEEP VEIN THROMBOSIS (DVT) OF LOWER EXTREMITY, UNSPECIFIED CHRONICITY, UNSPECIFIED LATERALITY, UNSPECIFIED VEIN (HCC): ICD-10-CM

## 2024-02-28 LAB — INR PPP: 2.9 (ref 2–3.5)

## 2024-02-29 NOTE — PROGRESS NOTES
Anticoagulation Summary  As of 2024      INR goal:  2.0-3.0   TTR:  63.6% (8.8 y)   INR used for dosin.90 (2024)   Warfarin maintenance plan:  2.5 mg (5 mg x 0.5) every Mon; 5 mg (5 mg x 1) all other days   Weekly warfarin total:  32.5 mg   Plan last modified:  Parul Sahu (2023)   Next INR check:  3/13/2024   Priority:  Maintenance   Target end date:  Indefinite    Indications    Deep vein thrombosis [453.40] [I82.409]                 Anticoagulation Episode Summary       INR check location:  Home Draw    Preferred lab:      Send INR reminders to:      Comments:  Eloy MYRICK          Anticoagulation Care Providers       Provider Role Specialty Phone number    Bruna Ureña M.D. Referring Cardiovascular Disease (Cardiology) [phone]    [facility]ticoagulation [facility] Responsible  [phone]    Jaquan Horne M.D.  Family Medicine [phone]          Anticoagulation Patient Findings      INR is therapeutic    Called and left VM for patient.    Requested patient to contact the clinic for any s/sx of unusual bleeding, bruising, clotting or any changes to diet or medications. Unless patient reports any changes that would warrant an adjustment to the plan:  Warfarin Plan: Continue regimen as listed above.    Next INR in 2 week(s).    Debi Lieberman, ClaudiaD

## 2024-03-15 ENCOUNTER — ANTICOAGULATION MONITORING (OUTPATIENT)
Dept: MEDICAL GROUP | Facility: PHYSICIAN GROUP | Age: 59
End: 2024-03-15
Payer: COMMERCIAL

## 2024-03-15 DIAGNOSIS — I82.409 DEEP VEIN THROMBOSIS (DVT) OF LOWER EXTREMITY, UNSPECIFIED CHRONICITY, UNSPECIFIED LATERALITY, UNSPECIFIED VEIN (HCC): ICD-10-CM

## 2024-03-15 LAB — INR PPP: 2.5 (ref 2–3.5)

## 2024-03-15 NOTE — PROGRESS NOTES
Anticoagulation Summary  As of 3/15/2024      INR goal:  2.0-3.0   TTR:  63.8% (8.8 y)   INR used for dosin.50 (3/15/2024)   Warfarin maintenance plan:  2.5 mg (5 mg x 0.5) every Mon; 5 mg (5 mg x 1) all other days   Weekly warfarin total:  32.5 mg   Plan last modified:  Parul Sahu (2023)   Next INR check:  3/27/2024   Priority:  Maintenance   Target end date:  Indefinite    Indications    Deep vein thrombosis [453.40] [I82.409]                 Anticoagulation Episode Summary       INR check location:  Home Draw    Preferred lab:      Send INR reminders to:      Comments:  Eloy          Anticoagulation Care Providers       Provider Role Specialty Phone number    [facility] Responsible  [phone]    Jaquan Horne M.D.  Family Medicine [phone]            Refer to Anticoagulation Patient Findings for HPI  Patient Findings       Negatives:  Signs/symptoms of thrombosis, Signs/symptoms of bleeding, Laboratory test error suspected, Change in health, Change in alcohol use, Change in activity, Upcoming invasive procedure, Emergency department visit, Upcoming dental procedure, Missed doses, Extra doses, Change in medications, Change in diet/appetite, Hospital admission, Bruising, Other complaints            Spoke with patient to report a therapeutic INR.      Pt is NOT on antiplatelet therapy     Pt instructed to continue with current warfarin dosing regimen, confirms dosing.   Will follow up in 2 week(s).     Claudia PedroD

## 2024-03-29 ENCOUNTER — ANTICOAGULATION MONITORING (OUTPATIENT)
Dept: VASCULAR LAB | Facility: MEDICAL CENTER | Age: 59
End: 2024-03-29
Payer: COMMERCIAL

## 2024-03-29 DIAGNOSIS — I82.409 DEEP VEIN THROMBOSIS (DVT) OF LOWER EXTREMITY, UNSPECIFIED CHRONICITY, UNSPECIFIED LATERALITY, UNSPECIFIED VEIN (HCC): ICD-10-CM

## 2024-03-29 LAB — INR PPP: 2.2 (ref 2–3.5)

## 2024-03-29 NOTE — PROGRESS NOTES
Anticoagulation Summary  As of 3/29/2024      INR goal:  2.0-3.0   TTR:  64.0% (8.8 y)   INR used for dosin.20 (3/29/2024)   Warfarin maintenance plan:  2.5 mg (5 mg x 0.5) every Mon; 5 mg (5 mg x 1) all other days   Weekly warfarin total:  32.5 mg   Plan last modified:  Parul Sahu (2023)   Next INR check:  2024   Priority:  Maintenance   Target end date:  Indefinite    Indications    Deep vein thrombosis [453.40] [I82.409]                 Anticoagulation Episode Summary       INR check location:  Home Draw    Preferred lab:      Send INR reminders to:      Comments:  Eloy          Anticoagulation Care Providers       Provider Role Specialty Phone number    [facility] Responsible  [phone]    Jaquan Horne M.D.  Family Medicine [phone]          Anticoagulation Patient Findings      Left voicemail message to report a therapeutic INR of 2.2.    Will have pt continue on with current warfarin dosing regimen.   Requested pt contact the clinic for any s/s of unusual bleeding, bruising, clotting or any changes to diet or medication.    FU INR in 2 week(s).    Lazaro Cerda, PharmD, BCACP

## 2024-04-12 ENCOUNTER — ANTICOAGULATION MONITORING (OUTPATIENT)
Dept: VASCULAR LAB | Facility: MEDICAL CENTER | Age: 59
End: 2024-04-12
Payer: COMMERCIAL

## 2024-04-12 DIAGNOSIS — I82.409 DEEP VEIN THROMBOSIS (DVT) OF LOWER EXTREMITY, UNSPECIFIED CHRONICITY, UNSPECIFIED LATERALITY, UNSPECIFIED VEIN (HCC): ICD-10-CM

## 2024-04-12 LAB — INR PPP: 2.4 (ref 2–3.5)

## 2024-04-13 NOTE — PROGRESS NOTES
Anticoagulation Summary  As of 2024      INR goal:  2.0-3.0   TTR:  64.1% (8.9 y)   INR used for dosin.40 (2024)   Warfarin maintenance plan:  2.5 mg (5 mg x 0.5) every Mon; 5 mg (5 mg x 1) all other days   Weekly warfarin total:  32.5 mg   Plan last modified:  Parul Sahu (2023)   Next INR check:  2024   Priority:  Maintenance   Target end date:  Indefinite    Indications    Deep vein thrombosis [453.40] [I82.409]                 Anticoagulation Episode Summary       INR check location:  Home Draw    Preferred lab:      Send INR reminders to:      Comments:  Eloy          Anticoagulation Care Providers       Provider Role Specialty Phone number    [facility] Responsible  [phone]    Jaquan Horne M.D.  Family Medicine [phone]          Anticoagulation Patient Findings      Left voicemail/MyChart message to report a therapeutic INR.      Pt to continue with current warfarin dosing regimen. Requested pt contact the clinic for any s/s of unusual bleeding, bruising, clotting or any changes to diet or medication.    FU INR in 2 week(s).    Kiki Gupta, ClaudiaD

## 2024-05-03 ENCOUNTER — ANTICOAGULATION MONITORING (OUTPATIENT)
Dept: VASCULAR LAB | Facility: MEDICAL CENTER | Age: 59
End: 2024-05-03
Payer: COMMERCIAL

## 2024-05-03 DIAGNOSIS — I82.409 DEEP VEIN THROMBOSIS (DVT) OF LOWER EXTREMITY, UNSPECIFIED CHRONICITY, UNSPECIFIED LATERALITY, UNSPECIFIED VEIN (HCC): ICD-10-CM

## 2024-05-03 LAB — INR PPP: 3 (ref 2–3.5)

## 2024-05-03 NOTE — PROGRESS NOTES
OP   Telephone Anticoagulation Service Note      Anticoagulation Summary  As of 5/3/2024      INR goal:  2.0-3.0   TTR:  64.4% (8.9 y)   INR used for dosing:  3.00 (5/3/2024)   Warfarin maintenance plan:  2.5 mg (5 mg x 0.5) every Mon; 5 mg (5 mg x 1) all other days   Weekly warfarin total:  32.5 mg   Plan last modified:  Parul Sahu (8/9/2023)   Next INR check:  5/17/2024   Priority:  Maintenance   Target end date:  Indefinite    Indications    Deep vein thrombosis [453.40] [I82.409]                 Anticoagulation Episode Summary       INR check location:  Home Draw    Preferred lab:      Send INR reminders to:      Comments:  Eloy          Anticoagulation Care Providers       Provider Role Specialty Phone number    [facility] Responsible  [phone]    Jaquan Horne M.D.  Family Medicine [phone]          Anticoagulation Patient Findings    Left a message on the patient's voicemail today, informing the patient of a therapeutic INR of 3.0.  Instructed the patient to call the clinic with any changes to diet or medications, with any signs/sx of bleeding or clotting or with any questions or concerns.     Patient instructed to continue with the current warfarin dosing regimen, and asked to follow up again in 2 weeks.     Ed TaylorD

## 2024-05-17 ENCOUNTER — ANTICOAGULATION MONITORING (OUTPATIENT)
Dept: VASCULAR LAB | Facility: MEDICAL CENTER | Age: 59
End: 2024-05-17
Payer: COMMERCIAL

## 2024-05-17 DIAGNOSIS — I82.409 DEEP VEIN THROMBOSIS (DVT) OF LOWER EXTREMITY, UNSPECIFIED CHRONICITY, UNSPECIFIED LATERALITY, UNSPECIFIED VEIN (HCC): ICD-10-CM

## 2024-05-17 LAB — INR PPP: 2.7 (ref 2–3.5)

## 2024-05-18 NOTE — PROGRESS NOTES
Anticoagulation Summary  As of 2024      INR goal:  2.0-3.0   TTR:  64.5% (9 y)   INR used for dosin.70 (2024)   Warfarin maintenance plan:  2.5 mg (5 mg x 0.5) every Mon; 5 mg (5 mg x 1) all other days   Weekly warfarin total:  32.5 mg   Plan last modified:  Parul Sahu (2023)   Next INR check:  2024   Priority:  Maintenance   Target end date:  Indefinite    Indications    Deep vein thrombosis [453.40] [I82.409]                 Anticoagulation Episode Summary       INR check location:  Home Draw    Preferred lab:      Send INR reminders to:      Comments:  Eloy          Anticoagulation Care Providers       Provider Role Specialty Phone number    [facility] Responsible  [phone]    Jaquan Horne M.D.  Family Medicine [phone]          Anticoagulation Patient Findings  Patient Findings       Negatives:  Signs/symptoms of thrombosis, Signs/symptoms of bleeding, Laboratory test error suspected, Change in health, Change in alcohol use, Change in activity, Upcoming invasive procedure, Emergency department visit, Upcoming dental procedure, Missed doses, Extra doses, Change in medications, Change in diet/appetite, Hospital admission, Bruising, Other complaints            Left voicemail message to report a therapeutic INR of 2.7.    Will have pt continue on with current warfarin dosing regimen.   Requested pt contact the clinic for any s/s of unusual bleeding, bruising, clotting or any changes to diet or medication.    FU INR in 2 week(s).    Lazaro Cerda, PharmD, BCACP

## 2024-05-31 ENCOUNTER — ANTICOAGULATION MONITORING (OUTPATIENT)
Dept: VASCULAR LAB | Facility: MEDICAL CENTER | Age: 59
End: 2024-05-31
Payer: COMMERCIAL

## 2024-05-31 DIAGNOSIS — I82.409 DEEP VEIN THROMBOSIS (DVT) OF LOWER EXTREMITY, UNSPECIFIED CHRONICITY, UNSPECIFIED LATERALITY, UNSPECIFIED VEIN (HCC): ICD-10-CM

## 2024-05-31 LAB — INR PPP: 1.5 (ref 2–3.5)

## 2024-05-31 NOTE — PROGRESS NOTES
Anticoagulation Summary  As of 2024      INR goal:  2.0-3.0   TTR:  64.5% (9 y)   INR used for dosin.50 (2024)   Warfarin maintenance plan:  2.5 mg (5 mg x 0.5) every Mon; 5 mg (5 mg x 1) all other days   Weekly warfarin total:  32.5 mg   Plan last modified:  Parul Sahu (2023)   Next INR check:  2024   Priority:  Maintenance   Target end date:  Indefinite    Indications    Deep vein thrombosis [453.40] [I82.409]                 Anticoagulation Episode Summary       INR check location:  Home Draw    Preferred lab:      Send INR reminders to:      Comments:  Eloy          Anticoagulation Care Providers       Provider Role Specialty Phone number    [facility] Responsible  [phone]    Jaquan Horne M.D.  Family Medicine [phone]          Anticoagulation Patient Findings      Left voicemail message to report a SUB therapeutic INR of 1.5.    Will have pt BOLUS x 1 dose today w/ 10 mg and then continue on with current warfarin dosing regimen.   Requested pt contact the clinic for any s/s of unusual bleeding, bruising, clotting or any changes to diet or medication.    FU INR in 1 week(s).    Lazaro Cerda, PharmD, BCACP

## 2024-06-07 ENCOUNTER — ANTICOAGULATION MONITORING (OUTPATIENT)
Dept: VASCULAR LAB | Facility: MEDICAL CENTER | Age: 59
End: 2024-06-07
Payer: COMMERCIAL

## 2024-06-07 DIAGNOSIS — I82.409 DEEP VEIN THROMBOSIS (DVT) OF LOWER EXTREMITY, UNSPECIFIED CHRONICITY, UNSPECIFIED LATERALITY, UNSPECIFIED VEIN (HCC): ICD-10-CM

## 2024-06-07 LAB — INR PPP: 2.6 (ref 2–3.5)

## 2024-06-07 NOTE — PROGRESS NOTES
Anticoagulation Summary  As of 2024      INR goal:  2.0-3.0   TTR:  64.5% (9 y)   INR used for dosin.60 (2024)   Warfarin maintenance plan:  2.5 mg (5 mg x 0.5) every Mon; 5 mg (5 mg x 1) all other days   Weekly warfarin total:  32.5 mg   Plan last modified:  Parul Sahu (2023)   Next INR check:  2024   Priority:  Maintenance   Target end date:  Indefinite    Indications    Deep vein thrombosis [453.40] [I82.409]                 Anticoagulation Episode Summary       INR check location:  Home Draw    Preferred lab:      Send INR reminders to:      Comments:  Eloy          Anticoagulation Care Providers       Provider Role Specialty Phone number    [facility] Responsible  [phone]    Jaquan Horne M.D.  Family Medicine [phone]          Anticoagulation Patient Findings      Left voicemail message to report a therapeutic INR of 2.6.    Will have pt continue on with current warfarin dosing regimen.   Requested pt contact the clinic for any s/s of unusual bleeding, bruising, clotting or any changes to diet or medication.    FU INR in 1 week(s).    Lazaro Cerda, PharmD, BCACP

## 2024-06-28 ENCOUNTER — ANTICOAGULATION MONITORING (OUTPATIENT)
Dept: VASCULAR LAB | Facility: MEDICAL CENTER | Age: 59
End: 2024-06-28
Payer: COMMERCIAL

## 2024-06-28 DIAGNOSIS — I82.409 DEEP VEIN THROMBOSIS (DVT) OF LOWER EXTREMITY, UNSPECIFIED CHRONICITY, UNSPECIFIED LATERALITY, UNSPECIFIED VEIN (HCC): ICD-10-CM

## 2024-06-28 LAB — INR PPP: 1.8 (ref 2–3.5)

## 2024-06-29 NOTE — PROGRESS NOTES
Anticoagulation Summary  As of 2024      INR goal:  2.0-3.0   TTR:  64.5% (9.1 y)   INR used for dosin.80 (2024)   Warfarin maintenance plan:  2.5 mg (5 mg x 0.5) every Mon; 5 mg (5 mg x 1) all other days   Weekly warfarin total:  32.5 mg   Plan last modified:  Parul Sahu (2023)   Next INR check:  2024   Priority:  Maintenance   Target end date:  Indefinite    Indications    Deep vein thrombosis [453.40] [I82.409]                 Anticoagulation Episode Summary       INR check location:  Home Draw    Preferred lab:      Send INR reminders to:      Comments:  Eloy          Anticoagulation Care Providers       Provider Role Specialty Phone number    [facility] Responsible  [phone]    Jaquan Horne M.D.  Family Medicine [phone]          Anticoagulation Patient Findings          Left voicemail message to report aSUB therapeutic INR of 1.8.  Pt to bolus 7.5 mg warfarin today then continue with current warfarin dosing regimen. Requested pt contact the clinic for any s/s of unusual bleeding, bruising, clotting or any changes to diet or medication. FU 2 weeks.    Pt needs a [facility]CP or [facility] provider for our clinic to provide over the phone warfarin dosing.  Pt may be seen in our clinic otherwise.     Osiel Pete, PharmD

## 2024-07-19 ENCOUNTER — ANTICOAGULATION MONITORING (OUTPATIENT)
Dept: MEDICAL GROUP | Facility: PHYSICIAN GROUP | Age: 59
End: 2024-07-19
Payer: COMMERCIAL

## 2024-07-19 DIAGNOSIS — I82.409 DEEP VEIN THROMBOSIS (DVT) OF LOWER EXTREMITY, UNSPECIFIED CHRONICITY, UNSPECIFIED LATERALITY, UNSPECIFIED VEIN (HCC): ICD-10-CM

## 2024-07-19 LAB — INR PPP: 1.9 (ref 2–3.5)

## 2024-07-26 ENCOUNTER — ANTICOAGULATION MONITORING (OUTPATIENT)
Dept: VASCULAR LAB | Facility: MEDICAL CENTER | Age: 59
End: 2024-07-26
Payer: COMMERCIAL

## 2024-07-26 DIAGNOSIS — I82.409 DEEP VEIN THROMBOSIS (DVT) OF LOWER EXTREMITY, UNSPECIFIED CHRONICITY, UNSPECIFIED LATERALITY, UNSPECIFIED VEIN (HCC): ICD-10-CM

## 2024-07-26 LAB
INR PPP: 1.7 (ref 2–3.5)
INR PPP: 1.7 (ref 2–3.5)

## 2024-08-02 ENCOUNTER — ANTICOAGULATION MONITORING (OUTPATIENT)
Dept: VASCULAR LAB | Facility: MEDICAL CENTER | Age: 59
End: 2024-08-02
Payer: COMMERCIAL

## 2024-08-02 DIAGNOSIS — I82.409 DEEP VEIN THROMBOSIS (DVT) OF LOWER EXTREMITY, UNSPECIFIED CHRONICITY, UNSPECIFIED LATERALITY, UNSPECIFIED VEIN (HCC): ICD-10-CM

## 2024-08-02 LAB — INR PPP: 1.3 (ref 2–3.5)

## 2024-08-02 NOTE — PROGRESS NOTES
Anticoagulation Summary  As of 2024      INR goal:  2.0-3.0   TTR:  63.9% (9.2 y)   INR used for dosin.30 (2024)   Warfarin maintenance plan:  10 mg (5 mg x 2) every Mon, Fri; 5 mg (5 mg x 1) all other days   Weekly warfarin total:  45 mg   Plan last modified:  Nedra Torres PharmD (2024)   Next INR check:  2024   Priority:  Maintenance   Target end date:  Indefinite    Indications    Deep vein thrombosis [453.40] [I82.409]                 Anticoagulation Episode Summary       INR check location:  Home Draw    Preferred lab:      Send INR reminders to:      Comments:  Eloy          Anticoagulation Care Providers       Provider Role Specialty Phone number    [facility] Responsible  [phone]    Jaquan Horne M.D.  Family Medicine [phone]            Refer to Anticoagulation Patient Findings for HPI  Patient Findings       Negatives:  Signs/symptoms of thrombosis, Signs/symptoms of bleeding, Laboratory test error suspected, Change in health, Change in alcohol use, Change in activity, Upcoming invasive procedure, Emergency department visit, Upcoming dental procedure, Missed doses, Extra doses, Change in medications, Change in diet/appetite, Hospital admission, Bruising, Other complaints            Spoke with patient.  INR is subtherapeutic at 1.3  Denies any missed doses or changes to medications/diet.     Pt verifies warfarin weekly dosing.     Patient is NOT on antiplatelet therapy    Will have pt INCREASE weekly dosage to 10mg  and , 5mg AOD  (12.5% increase)     Repeat INR in 1 week(s).     Discussed need for [facility] PCP to continue with telephone management, or to come in physically to the clinic to establish care. Patient is currently out of town but will return Monday and will either establish care or call to make appointment with the clinic.     Plan discussed with Osiel Pete, ClaudiaD.     Nedra Torres, ClaudiaD

## 2024-08-07 ENCOUNTER — ANTICOAGULATION MONITORING (OUTPATIENT)
Dept: VASCULAR LAB | Facility: MEDICAL CENTER | Age: 59
End: 2024-08-07
Payer: COMMERCIAL

## 2024-08-07 DIAGNOSIS — I82.409 DEEP VEIN THROMBOSIS (DVT) OF LOWER EXTREMITY, UNSPECIFIED CHRONICITY, UNSPECIFIED LATERALITY, UNSPECIFIED VEIN (HCC): ICD-10-CM

## 2024-08-07 LAB — INR PPP: 2.9 (ref 2–3.5)

## 2024-08-08 NOTE — PROGRESS NOTES
Anticoagulation Summary  As of 2024      INR goal:  2.0-3.0   TTR:  63.8% (9.2 y)   INR used for dosin.90 (2024)   Warfarin maintenance plan:  10 mg (5 mg x 2) every Mon, Fri; 5 mg (5 mg x 1) all other days   Weekly warfarin total:  45 mg   Plan last modified:  Claudia EllisD (2024)   Next INR check:  2024   Priority:  Routine   Target end date:  Indefinite    Indications    Deep vein thrombosis [453.40] [I82.409]                 Anticoagulation Episode Summary       INR check location:  Home Draw    Preferred lab:      Send INR reminders to:      Comments:  Eloy          Anticoagulation Care Providers       Provider Role Specialty Phone number    [facility] Responsible  [phone]    Jaquan Horne M.D.  Family Medicine [phone]          Anticoagulation Patient Findings      INR is therapeutic    Called and left VM for patient.    Pt is not on antiplatelet therapy.    Requested patient to contact the clinic for any s/sx of unusual bleeding, bruising, clotting or any changes to diet or medications. Unless patient reports any changes that would warrant an adjustment to the plan:  Warfarin Plan: Continue regimen as listed above.    Next INR in 1 week(s).    Debi Lieberman, PharmD

## 2024-08-15 ENCOUNTER — ANTICOAGULATION MONITORING (OUTPATIENT)
Dept: VASCULAR LAB | Facility: MEDICAL CENTER | Age: 59
End: 2024-08-15
Payer: COMMERCIAL

## 2024-08-15 DIAGNOSIS — I82.409 DEEP VEIN THROMBOSIS (DVT) OF LOWER EXTREMITY, UNSPECIFIED CHRONICITY, UNSPECIFIED LATERALITY, UNSPECIFIED VEIN (HCC): ICD-10-CM

## 2024-08-15 LAB — INR PPP: 4.9 (ref 2–3.5)

## 2024-08-15 NOTE — PROGRESS NOTES
Anticoagulation Summary  As of 8/15/2024      INR goal:  2.0-3.0   TTR:  63.7% (9.2 y)   INR used for dosin.90 (8/15/2024)   Warfarin maintenance plan:  7.5 mg (5 mg x 1.5) every Mon, Fri; 5 mg (5 mg x 1) all other days   Weekly warfarin total:  40 mg   Plan last modified:  Stephen Hernandez PharmD (8/15/2024)   Next INR check:  2024   Priority:  Routine   Target end date:  Indefinite    Indications    Deep vein thrombosis [453.40] [I82.409]                 Anticoagulation Episode Summary       INR check location:  Home Draw    Preferred lab:  --    Send INR reminders to:  --    Comments:  Eloy          Anticoagulation Care Providers       Provider Role Specialty Phone number    [facility] Responsible  [phone]    Jaquan Horne M.D.  Family Medicine [phone]          Anticoagulation Patient Findings  Patient Findings       Positives:  Missed doses (taking 7.5 mg on Mon/Fri instead of 10 mg)    Negatives:  Signs/symptoms of thrombosis, Signs/symptoms of bleeding, Laboratory test error suspected, Change in health, Change in alcohol use, Change in activity, Upcoming invasive procedure, Emergency department visit, Upcoming dental procedure, Extra doses, Change in medications, Change in diet/appetite, Hospital admission, Bruising, Other complaints            Called and spoke to patient .    INR supratherapeutic without obvious causes and despite pt taking lower doses than instructed    Warfarin Plan: Hold today and take 2.5 mg tomorrow, then Continue regimen as listed above.    Next INR in 1 week(s).    Stephen Hernandez PharmD, BCACP

## 2024-08-22 ENCOUNTER — ANTICOAGULATION MONITORING (OUTPATIENT)
Dept: VASCULAR LAB | Facility: MEDICAL CENTER | Age: 59
End: 2024-08-22
Payer: COMMERCIAL

## 2024-08-22 DIAGNOSIS — I82.409 DEEP VEIN THROMBOSIS (DVT) OF LOWER EXTREMITY, UNSPECIFIED CHRONICITY, UNSPECIFIED LATERALITY, UNSPECIFIED VEIN (HCC): ICD-10-CM

## 2024-08-22 LAB — INR PPP: 2.4 (ref 2–3.5)

## 2024-08-23 NOTE — PROGRESS NOTES
Anticoagulation Summary  As of 2024      INR goal:  2.0-3.0   TTR:  63.6% (9.2 y)   INR used for dosin.40 (2024)   Warfarin maintenance plan:  7.5 mg (5 mg x 1.5) every Mon, Fri; 5 mg (5 mg x 1) all other days   Weekly warfarin total:  40 mg   Plan last modified:  Claudia CazaresD (8/15/2024)   Next INR check:  2024   Priority:  Routine   Target end date:  Indefinite    Indications    Deep vein thrombosis [453.40] [I82.409]                 Anticoagulation Episode Summary       INR check location:  Home Draw    Preferred lab:  --    Send INR reminders to:  --    Comments:  Eloy          Anticoagulation Care Providers       Provider Role Specialty Phone number    [facility] Responsible  [phone]    Jaquan Horne M.D.  [facility] Medicine [phone]          Anticoagulation Patient Findings      Left voicemail message to report a therapeutic INR of 2.4 (TWD of 30 mg).    Will have pt continue on with current warfarin dosing regimen for now. If INR increases again we will consider reduced weekly regimen.   Requested pt contact the clinic for any s/s of unusual bleeding, bruising, clotting or any changes to diet or medication.    FU INR in 1 week(s).    Lazaro Cerda, PharmD, BCACP

## 2024-09-05 LAB — INR PPP: 3.2 (ref 2–3.5)

## 2024-09-06 ENCOUNTER — ANTICOAGULATION MONITORING (OUTPATIENT)
Dept: VASCULAR LAB | Facility: MEDICAL CENTER | Age: 59
End: 2024-09-06
Payer: COMMERCIAL

## 2024-09-06 DIAGNOSIS — I82.409 DEEP VEIN THROMBOSIS (DVT) OF LOWER EXTREMITY, UNSPECIFIED CHRONICITY, UNSPECIFIED LATERALITY, UNSPECIFIED VEIN (HCC): ICD-10-CM

## 2024-09-06 NOTE — PROGRESS NOTES
Anticoagulation Summary  As of 9/6/2024      INR goal:  2.0-3.0   TTR:  63.7% (9.3 y)   INR used for dosing:  3.20 (9/5/2024)   Warfarin maintenance plan:  7.5 mg (5 mg x 1.5) every Mon; 5 mg (5 mg x 1) all other days   Weekly warfarin total:  37.5 mg   Plan last modified:  Tiffani Kaur PharmD (9/6/2024)   Next INR check:  9/12/2024   Priority:  Routine   Target end date:  Indefinite    Indications    Deep vein thrombosis [453.40] [I82.409]                 Anticoagulation Episode Summary       INR check location:  Home Draw    Preferred lab:  --    Send INR reminders to:  --    Comments:  Eloy          Anticoagulation Care Providers       Provider Role Specialty Phone number    [facility] Responsible  [phone]    Jaquan Horne M.D.  [facility] Medicine [phone]          Anticoagulation Patient Findings      INR is slightly supratherapeutic  Reason(s) for out of range INR today: Determining dose requirements      Called and spoke to patient.    Pt is not on antiplatelet therapy.    Requested patient to contact the clinic for any s/sx of unusual bleeding, bruising, clotting or any changes to diet or medications.   Warfarin Plan: Decrease to 37.5 mg/week: 7.5 mg on M, and 5 mg ROW.     Next INR in 1 week(s).    Tiffani Kaur, ClaudiaD

## 2024-09-12 ENCOUNTER — ANTICOAGULATION MONITORING (OUTPATIENT)
Dept: VASCULAR LAB | Facility: MEDICAL CENTER | Age: 59
End: 2024-09-12
Payer: COMMERCIAL

## 2024-09-12 DIAGNOSIS — I82.409 DEEP VEIN THROMBOSIS (DVT) OF LOWER EXTREMITY, UNSPECIFIED CHRONICITY, UNSPECIFIED LATERALITY, UNSPECIFIED VEIN (HCC): ICD-10-CM

## 2024-09-12 LAB — INR PPP: 2.4 (ref 2–3.5)

## 2024-09-12 NOTE — PROGRESS NOTES
Anticoagulation Summary  As of 2024      INR goal:  2.0-3.0   TTR:  63.7% (9.3 y)   INR used for dosin.40 (2024)   Warfarin maintenance plan:  7.5 mg (5 mg x 1.5) every Mon; 5 mg (5 mg x 1) all other days   Weekly warfarin total:  37.5 mg   Plan last modified:  Claudia CrisostomoD (2024)   Next INR check:  2024   Priority:  Routine   Target end date:  Indefinite    Indications    Deep vein thrombosis [453.40] [I82.409]                 Anticoagulation Episode Summary       INR check location:  Home Draw    Preferred lab:  --    Send INR reminders to:  --    Comments:  Eloy          Anticoagulation Care Providers       Provider Role Specialty Phone number    [facility] Responsible  [phone]    Jaquan Horne M.D.  Family Medicine [phone]            Refer to Anticoagulation Patient Findings for HPI  Patient Findings       Negatives:  Signs/symptoms of thrombosis, Signs/symptoms of bleeding, Laboratory test error suspected, Change in health, Change in alcohol use, Change in activity, Upcoming invasive procedure, Emergency department visit, Upcoming dental procedure, Missed doses, Extra doses, Change in medications, Change in diet/appetite, Hospital admission, Bruising, Other complaints            Spoke with patient to report a therapeutic INR.      Pt instructed to continue with current warfarin dosing regimen, confirms dosing.     Will follow up in 2 week(s).     Lazaro Cerda, PharmD, BCACP

## 2024-09-26 LAB — INR PPP: 2.3 (ref 2–3.5)

## 2024-09-27 ENCOUNTER — ANTICOAGULATION MONITORING (OUTPATIENT)
Dept: VASCULAR LAB | Facility: MEDICAL CENTER | Age: 59
End: 2024-09-27
Payer: COMMERCIAL

## 2024-09-27 DIAGNOSIS — I82.409 DEEP VEIN THROMBOSIS (DVT) OF LOWER EXTREMITY, UNSPECIFIED CHRONICITY, UNSPECIFIED LATERALITY, UNSPECIFIED VEIN (HCC): ICD-10-CM

## 2024-09-27 NOTE — PROGRESS NOTES
Anticoagulation Summary  As of 2024      INR goal:  2.0-3.0   TTR:  63.8% (9.3 y)   INR used for dosin.30 (2024)   Warfarin maintenance plan:  7.5 mg (5 mg x 1.5) every Mon; 5 mg (5 mg x 1) all other days   Weekly warfarin total:  37.5 mg   Plan last modified:  Tiffani Kaur, PharmD (2024)   Next INR check:  10/11/2024   Priority:  Routine   Target end date:  Indefinite    Indications    Deep vein thrombosis [453.40] [I82.409]                 Anticoagulation Episode Summary       INR check location:  Home Draw    Preferred lab:  --    Send INR reminders to:  --    Comments:  Eloy          Anticoagulation Care Providers       Provider Role Specialty Phone number    [facility] Responsible  [phone]    Jaquan Horne M.D.  Family Medicine [phone]          Anticoagulation Patient Findings     Left voicemail message to report a therapeutic INR of 2.3.  Requested pt contact the clinic for any s/s of unusual bleeding, bruising, clotting or any changes to diet or medication.     Pt is to continue with current warfarin dosing regimen.    Pt is not on antiplatelet therapy    FU 2 weeks.    Mikayla De Santiago, Pharmacy Intern

## 2024-10-11 LAB — INR PPP: 2.3 (ref 2–3.5)

## 2024-10-14 ENCOUNTER — ANTICOAGULATION MONITORING (OUTPATIENT)
Dept: MEDICAL GROUP | Facility: PHYSICIAN GROUP | Age: 59
End: 2024-10-14
Payer: COMMERCIAL

## 2024-10-14 DIAGNOSIS — I82.409 DEEP VEIN THROMBOSIS (DVT) OF LOWER EXTREMITY, UNSPECIFIED CHRONICITY, UNSPECIFIED LATERALITY, UNSPECIFIED VEIN (HCC): ICD-10-CM

## 2024-11-04 ENCOUNTER — ANTICOAGULATION MONITORING (OUTPATIENT)
Dept: VASCULAR LAB | Facility: MEDICAL CENTER | Age: 59
End: 2024-11-04
Payer: COMMERCIAL

## 2024-11-04 DIAGNOSIS — I82.409 DEEP VEIN THROMBOSIS (DVT) OF LOWER EXTREMITY, UNSPECIFIED CHRONICITY, UNSPECIFIED LATERALITY, UNSPECIFIED VEIN (HCC): ICD-10-CM

## 2024-11-04 LAB — INR PPP: 2.2 (ref 2–3.5)

## 2024-11-05 NOTE — PROGRESS NOTES
Anticoagulation Summary  As of 2024      INR goal:  2.0-3.0   TTR:  64.3% (9.4 y)   INR used for dosin.20 (2024)   Warfarin maintenance plan:  7.5 mg (5 mg x 1.5) every Mon; 5 mg (5 mg x 1) all other days   Weekly warfarin total:  37.5 mg   Plan last modified:  Claudia CrisostomoD (2024)   Next INR check:  2024   Priority:  Routine   Target end date:  Indefinite    Indications    Deep vein thrombosis [453.40] [I82.409]                 Anticoagulation Episode Summary       INR check location:  Home Draw    Preferred lab:  --    Send INR reminders to:  --    Comments:  Eloy          Anticoagulation Care Providers       Provider Role Specialty Phone number    [facility] Responsible  [phone]    Jaquan Horne M.D.  Family Medicine [phone]            Refer to Anticoagulation Patient Findings for HPI  Patient Findings       Negatives:  Signs/symptoms of thrombosis, Signs/symptoms of bleeding, Laboratory test error suspected, Change in health, Change in alcohol use, Change in activity, Upcoming invasive procedure, Emergency department visit, Upcoming dental procedure, Missed doses, Extra doses, Change in medications, Change in diet/appetite, Hospital admission, Bruising, Other complaints            Spoke with patient to report a therapeutic INR.      Pt instructed to continue with current warfarin dosing regimen, confirms dosing.     Will follow up in 2 week(s).    F/u w/ pt regarding ongoing warfarin management given no [facility] Provider. She will s/w her PCP to see if he's amenable to monitoring INR/warfarin.     Lazaro Cerda, PharmD, BCACP

## 2024-11-19 ENCOUNTER — ANTICOAGULATION MONITORING (OUTPATIENT)
Dept: MEDICAL GROUP | Facility: PHYSICIAN GROUP | Age: 59
End: 2024-11-19
Payer: COMMERCIAL

## 2024-11-19 DIAGNOSIS — I82.409 DEEP VEIN THROMBOSIS (DVT) OF LOWER EXTREMITY, UNSPECIFIED CHRONICITY, UNSPECIFIED LATERALITY, UNSPECIFIED VEIN (HCC): ICD-10-CM

## 2024-11-19 LAB — INR PPP: 2.2 (ref 2–3.5)

## 2024-11-20 NOTE — PROGRESS NOTES
OP Anticoagulation Service Note    Date: 2024    Anticoagulation Summary  As of 2024      INR goal:  2.0-3.0   TTR:  64.4% (9.5 y)   INR used for dosin.20 (2024)   Warfarin maintenance plan:  7.5 mg (5 mg x 1.5) every Mon; 5 mg (5 mg x 1) all other days   Weekly warfarin total:  37.5 mg   Plan last modified:  Tiffani Kaur, PharmD (2024)   Next INR check:  --   Priority:  Routine   Target end date:  Indefinite    Indications    Deep vein thrombosis [453.40] [I82.409]                 Anticoagulation Episode Summary       INR check location:  Home Draw    Preferred lab:  --    Send INR reminders to:  --    Comments:  Eloy          Anticoagulation Care Providers       Provider Role Specialty Phone number    [facility] Responsible  [phone]    Jaquan Horne M.D.  [facility] Medicine [phone]          Anticoagulation Patient Findings        Patient's preferred phone number:  Oumar Fisher  [address] [facility] [address] [phone]        HPI:   The reason for today's call is to prevent morbidity and mortality from a blood clot and/or stroke and to reduce the risk of bleeding while on a anticoagulant.     PCP:  Jaquan Horne M.D.  [address] [facility] [id number]    Assessment:     INR  therapeutic.     Lab Results   Component Value Date/Time    BUN 22 2023 02:40 PM    CREATININE 0.81 2023 02:40 PM    CREATININE 0.83 2011 02:05 PM    BUNCREATRAT 16 2021 08:29 AM    BUNCREATRAT 8 (L) 2011 02:05 PM     Lab Results   Component Value Date/Time    HEMOGLOBIN 12.7 2023 02:40 PM    HEMATOCRIT 37.8 2023 02:40 PM    PLATELETCT 222 2023 02:40 PM    ALKPHOSPHAT 45 2023 02:40 PM    ASTSGOT 41 2023 02:40 PM    ALTSGPT 34 2023 02:40 PM          Current Outpatient Medications:     atorvastatin, 40 mg, Oral, DAILY    metoprolol SR, 100 mg, Oral, DAILY    spironolactone, 25 mg, Oral, DAILY     losartan, 100 mg, Oral, DAILY    warfarin, Take one-half to one tablet by mouth daily or as directed by anticoagulation clinic    levothyroxine, TAKE 1 TABLET BY MOUTH EVERY DAY    alendronate, Take  by mouth.      [id number] Stroke Risk Points: N/A   Values used to calculate this score:    Points  Metrics       0        Has Congestive Heart Failure: No       1        Has Hypertension: Yes       0        Age: 59       0        Has Diabetes: No       0        Had Stroke: No                 Had TIA: No                 Had Thromboembolism: No       0        Has Vascular Disease: No       1        Clinically Relevant Sex: Female     No data recorded     Plan:     Continue the same warfarin dose, as noted above.       Follow-up:     As seen above      Additional information discussed with patient:     Asked patient to please call the anticoagulation clinic if they have any signs/symptoms of bleeding and/or thrombosis or any changes to diet or medications.      National recommendations regarding anticoagulation therapy:     The CHEST guidelines recommends frequent INR monitoring at regular intervals (a few days up to a max of 12 weeks) to ensure patients are on the proper dose of warfarin, and patients are not having any complications from therapy.  INRs can dramatically change over a short time period due to diet, medications, and medical conditions.         [facility]  Phone: [phone]  Fax: [phone]  On call: [phone]  General scheduling/information [phone]  For emergencies please dial [phone]  Please do not use [employer] for urgent matters, call the phone numbers listed above.    This note was created using voice recognition software (Dragon). The accuracy of the dictation is limited by the abilities of the software. I have reviewed the note prior to signing, however some errors in grammar and context are still possible. If you have any questions related to this note  please do not hesitate to contact our office.

## 2024-12-11 ENCOUNTER — APPOINTMENT (OUTPATIENT)
Dept: VASCULAR LAB | Facility: MEDICAL CENTER | Age: 59
End: 2024-12-11
Attending: INTERNAL MEDICINE
Payer: COMMERCIAL

## 2024-12-17 NOTE — PROGRESS NOTES
Anticoagulation Summary  As of 12/3/2019    INR goal:   2.0-3.0   TTR:   60.3 % (4.5 y)   INR used for dosin.50! (12/3/2019)   Warfarin maintenance plan:   7.5 mg (5 mg x 1.5) every Wed, Sat; 5 mg (5 mg x 1) all other days   Weekly warfarin total:   40 mg   Plan last modified:   Karen Ignacio, PharmD (3/29/2019)   Next INR check:   2019   Priority:   Maintenance   Target end date:   Indefinite    Indications    Deep vein thrombosis [453.40] [I82.409]             Anticoagulation Episode Summary     INR check location:   Home Draw    Preferred lab:       Send INR reminders to:       Comments:   Eloy MYRICK      Anticoagulation Care Providers     Provider Role Specialty Phone number    Bruna Ureña M.D. Referring Cardiology [phone]    Babita Sarmiento Responsible      [facility] Responsible  [phone]        Anticoagulation Patient Findings          Left voicemail message to report a therapeutic INR of 1.5.   Instructed patient to bolus with 10 mg tonight, then continue with current warfarin dosing regimen.   Requested pt contact the clinic for any s/s of unusual bleeding, bruising, clotting or any changes to diet or medication.   Will follow up in 2 weeks.    Kiley Valencia, Pharmacy Intern     Name: Mike Perez      : 1962      MRN: [id number]  Encounter Provider: Annmarie Reynolds MD  Encounter Date: 2024   Encounter department: [facility] [address] [facility]    Assessment & Plan  Other hyperlipidemia  Well-controlled.  Continue on Crestor 5 mg daily.  We will continue to monitor.  Orders:  •  CBC and differential; Future  •  Comprehensive metabolic panel; Future  •  Lipid Panel with Direct LDL reflex; Future  •  TSH, 3rd generation with Free T4 reflex; Future    Hyperglycemia  Discussed about low-carb diet.  Continue monitor fasting glucose and A1c.  Orders:  •  Hemoglobin A1C; Future    Memory loss  Continue on Aricept 10 mg daily.  Continue to monitor.       Non-seasonal allergic rhinitis due to other allergic trigger  Stable on Allegra.  Continue same.  We will continue to monitor.            History of Present Illness     Is here today for follow-up multiple medical problems.  He has been taking his medications.  He denies any side effects to his medications.  He is due for blood work.  His last blood work in August came back stable.      Review of Systems   Constitutional:  Negative for chills and fever.   HENT:  Negative for trouble swallowing.    Eyes:  Negative for visual disturbance.   Respiratory:  Negative for cough and shortness of breath.    Cardiovascular:  Negative for chest pain and palpitations.   Gastrointestinal:  Negative for abdominal pain, blood in stool and vomiting.   Endocrine: Negative for cold intolerance and heat intolerance.   Genitourinary:  Negative for difficulty urinating and dysuria.   Musculoskeletal:  Negative for gait problem.   Skin:  Negative for rash.   Neurological:  Negative for dizziness, syncope and headaches.   Hematological:  Negative for adenopathy.   Psychiatric/Behavioral:  Negative for behavioral problems.      Past Medical History:   Diagnosis Date   • Brain trauma (HCC)      Past Surgical History:   Procedure Laterality Date   •  "KNEE SURGERY       Family History   Problem Relation Age of Onset   • Cancer Mother    • Breast cancer Mother    • Skin cancer Mother    • Cancer Father    • Depression Brother      Social History     Tobacco Use   • Smoking status: Former     Current packs/day: 1.00     Average packs/day: 1 pack/day for 25.0 years (25.0 ttl pk-yrs)     Types: Cigarettes   • Smokeless tobacco: Never   Vaping Use   • Vaping status: Never Used   Substance and Sexual Activity   • Alcohol use: Not Currently   • Drug use: Not Currently     Types: Marijuana, \"Crack\" cocaine     Comment: 30 years ago   • Sexual activity: Not on file     Current Outpatient Medications on File Prior to Visit   Medication Sig   • donepezil (ARICEPT) 10 mg tablet Take 1 tablet (10 mg total) by mouth daily at bedtime   • fexofenadine (ALLEGRA) 180 MG tablet Take 180 mg by mouth as needed in the morning.   • multivitamin (THERAGRAN) TABS Take 1 tablet by mouth in the morning.   • rosuvastatin (CRESTOR) 5 mg tablet TAKE 1 TABLET BY MOUTH EVERY DAY     Allergies   Allergen Reactions   • Horse-Derived Products Other (See Comments)     As child     • Pollen Extract Nasal Congestion     Seasonal     Immunization History   Administered Date(s) Administered   • COVID-19 PFIZER VACCINE 0.3 ML IM 04/20/2021, 05/11/2021, 12/22/2021   • COVID-19 Pfizer Vac BIVALENT Adolfo-sucrose 12 Yr+ IM 09/30/2022   • COVID-19 Pfizer mRNA vacc PF adolfo-sucrose 12 yr and older (Comirnaty) 11/15/2023, 10/28/2024   • H1N1, All Formulations 01/08/2010   • INFLUENZA 10/19/2006, 10/11/2007, 10/21/2008, 09/23/2009, 10/28/2010, 10/04/2011, 09/03/2013, 09/25/2015, 10/20/2016, 09/19/2017, 11/06/2018, 01/11/2021, 11/12/2021, 12/14/2022, 11/15/2023   • Influenza Quadrivalent Preservative Free 3 years and older IM 09/25/2015, 10/20/2016, 09/19/2017, 11/06/2018, 01/11/2021, 11/12/2021   • Influenza, recombinant, quadrivalent,injectable, preservative free 12/14/2022   • Pneumococcal Polysaccharide PPV23 " "11/06/2018   • Td (adult), Unspecified 01/01/2003   • Tdap 03/23/2024   • Zoster Vaccine Recombinant 01/11/2021, 03/30/2021     Objective   /80 (BP Location: Left arm, Patient Position: Sitting, Cuff Size: Standard)   Pulse 75   Temp (!) 95.8 °F (35.4 °C) (Tympanic)   Resp 16   Ht 5' 10\" (1.778 m)   Wt 65.1 kg (143 lb 9.6 oz)   SpO2 98%   BMI 20.60 kg/m²     Physical Exam  Vitals and nursing note reviewed.   Constitutional:       Appearance: He is well-developed.   HENT:      Head: Normocephalic and atraumatic.   Eyes:      Pupils: Pupils are equal, round, and reactive to light.   Cardiovascular:      Rate and Rhythm: Normal rate and regular rhythm.      Heart sounds: Normal heart sounds.   Pulmonary:      Effort: Pulmonary effort is normal.      Breath sounds: Normal breath sounds.   Abdominal:      General: Bowel sounds are normal.      Palpations: Abdomen is soft.   Musculoskeletal:      Cervical back: Normal range of motion and neck supple.   Lymphadenopathy:      Cervical: No cervical adenopathy.   Skin:     General: Skin is warm.      Findings: No rash.   Neurological:      Mental Status: He is alert and oriented to person, place, and time.         "

## 2024-12-20 ENCOUNTER — ANTICOAGULATION VISIT (OUTPATIENT)
Dept: VASCULAR LAB | Facility: MEDICAL CENTER | Age: 59
End: 2024-12-20
Attending: INTERNAL MEDICINE
Payer: COMMERCIAL

## 2024-12-20 DIAGNOSIS — Z79.01 CHRONIC ANTICOAGULATION: ICD-10-CM

## 2024-12-20 LAB — INR PPP: 2.2 (ref 2–3.5)

## 2024-12-20 PROCEDURE — 99213 OFFICE O/P EST LOW 20 MIN: CPT

## 2024-12-20 PROCEDURE — 85610 PROTHROMBIN TIME: CPT

## 2024-12-20 NOTE — Clinical Note
"Not sure for billing on this patient because she was scheduled as \"new\" but has been a home monitor patient we've followed for a long time. She just doesn't have a [facility] PCP. So I billed level 3 since she's \"new\" to in-person visits with us, but was realistically a ACR visit. "
Detail Level: Detailed

## 2024-12-20 NOTE — PROGRESS NOTES
Anticoagulation Summary  As of 2024      INR goal:  2.0-3.0   TTR:  64.7% (9.6 y)   INR used for dosin.20 (2024)   Warfarin maintenance plan:  7.5 mg (5 mg x 1.5) every Mon; 5 mg (5 mg x 1) all other days   Weekly warfarin total:  37.5 mg   Plan last modified:  Tiffani Kaur, PharmD (2024)   Next INR check:  2025   Priority:  Routine   Target end date:  Indefinite    Indications    Deep vein thrombosis [453.40] [I82.409]                 Anticoagulation Episode Summary       INR check location:  Home Draw    Preferred lab:  --    Send INR reminders to:  --    Comments:  Eloy          Anticoagulation Care Providers       Provider Role Specialty Phone number    [facility] Responsible  [phone]    Jaquan Horne M.D.  Family Medicine [phone]               Refer to Patient Findings for HPI:  Patient Findings       Negatives:  Signs/symptoms of thrombosis, Signs/symptoms of bleeding, Laboratory test error suspected, Change in health, Change in alcohol use, Change in activity, Upcoming invasive procedure, Emergency department visit, Upcoming dental procedure, Missed doses, Extra doses, Change in medications, Change in diet/appetite, Hospital admission, Bruising, Other complaints            There were no vitals filed for this visit.  Pt declined vitals    Verified current warfarin dosing schedule.    Medications reconciled.  Pt is not on antiplatelet therapy.      A/P   INR is therapeutic  Reason(s) for out of range INR today: N/A      Warfarin dosing recommendation: Continue regimen as listed above.    Of note: Patient is new to in-person clinic visits (managed long-term over the phone with home INR monitor), but not new to warfarin.  Patient may be establishing with a different clinic for ongoing INR/warfarin management pending affordability once her insurance changes at the new year.     Pt educated to contact our clinic with any changes in medications or s/s  of bleeding or thrombosis. Pt is aware to seek immediate medical attention for falls, head injury or deep cuts.    Request pt to return in 4 week(s). Pt agrees.    Gurdeep Webber, PharmD

## 2025-01-20 ENCOUNTER — ANTICOAGULATION VISIT (OUTPATIENT)
Dept: VASCULAR LAB | Facility: MEDICAL CENTER | Age: 60
End: 2025-01-20
Attending: INTERNAL MEDICINE
Payer: COMMERCIAL

## 2025-01-20 DIAGNOSIS — I82.409 DEEP VEIN THROMBOSIS (DVT) OF LOWER EXTREMITY, UNSPECIFIED CHRONICITY, UNSPECIFIED LATERALITY, UNSPECIFIED VEIN (HCC): ICD-10-CM

## 2025-01-20 LAB — INR PPP: 2.4 (ref 2–3.5)

## 2025-01-20 PROCEDURE — 99211 OFF/OP EST MAY X REQ PHY/QHP: CPT | Performed by: NURSE PRACTITIONER

## 2025-01-20 PROCEDURE — 85610 PROTHROMBIN TIME: CPT

## 2025-01-20 NOTE — PROGRESS NOTES
Anticoagulation Summary  As of 2025      INR goal:  2.0-3.0   TTR:  65.0% (9.7 y)   INR used for dosin.40 (2025)   Warfarin maintenance plan:  7.5 mg (5 mg x 1.5) every Mon; 5 mg (5 mg x 1) all other days   Weekly warfarin total:  37.5 mg   Plan last modified:  Tiffani Kaur, PharmD (2024)   Next INR check:  2025   Priority:  Routine   Target end date:  Indefinite    Indications    Deep vein thrombosis [453.40] [I82.409]                 Anticoagulation Episode Summary       INR check location:  Home Draw    Preferred lab:  --    Send INR reminders to:  --    Comments:  Eloy          Anticoagulation Care Providers       Provider Role Specialty Phone number    [facility] Responsible  [phone]    Jaquan oHrne M.D.  Family Medicine [phone]               Refer to Patient Findings for HPI:  Patient Findings       Negatives:  Signs/symptoms of thrombosis, Signs/symptoms of bleeding, Laboratory test error suspected, Change in health, Change in alcohol use, Change in activity, Upcoming invasive procedure, Emergency department visit, Upcoming dental procedure, Missed doses, Extra doses, Change in medications, Change in diet/appetite, Hospital admission, Bruising, Other complaints            There were no vitals filed for this visit.  Pt declined vitals    Verified current warfarin dosing schedule.    Medications reconciled.  Pt is not on antiplatelet therapy.      A/P   INR is therapeutic  Reason(s) for out of range INR today: N/A      Warfarin dosing recommendation: Continue regimen as listed above.    Of note: Patient has a home INR monitor but not a [facility] PCP. She is okay with continuing in clinic visits as long as copays remain affordable which they currently are.    Pt educated to contact our clinic with any changes in medications or s/s of bleeding or thrombosis. Pt is aware to seek immediate medical attention for falls, head injury or deep cuts.    Request  pt to return in 4 week(s). Pt agrees.    MAINOR Jung.

## 2025-02-18 ENCOUNTER — ANTICOAGULATION VISIT (OUTPATIENT)
Dept: VASCULAR LAB | Facility: MEDICAL CENTER | Age: 60
End: 2025-02-18
Attending: INTERNAL MEDICINE
Payer: COMMERCIAL

## 2025-02-18 DIAGNOSIS — I82.409 DEEP VEIN THROMBOSIS (DVT) OF LOWER EXTREMITY, UNSPECIFIED CHRONICITY, UNSPECIFIED LATERALITY, UNSPECIFIED VEIN (HCC): ICD-10-CM

## 2025-02-18 LAB — INR PPP: 2.6 (ref 2–3.5)

## 2025-02-18 PROCEDURE — 99211 OFF/OP EST MAY X REQ PHY/QHP: CPT

## 2025-02-18 PROCEDURE — 85610 PROTHROMBIN TIME: CPT

## 2025-02-18 NOTE — PROGRESS NOTES
Anticoagulation Summary  As of 2025      INR goal:  2.0-3.0   TTR:  65.3% (9.7 y)   INR used for dosin.60 (2025)   Warfarin maintenance plan:  7.5 mg (5 mg x 1.5) every Mon; 5 mg (5 mg x 1) all other days   Weekly warfarin total:  37.5 mg   Plan last modified:  Tiffani Kaur, PharmD (2024)   Next INR check:  3/17/2025   Priority:  Routine   Target end date:  Indefinite    Indications    Deep vein thrombosis [453.40] [I82.409]                 Anticoagulation Episode Summary       INR check location:  Anticoagulation Clinic    Preferred lab:  --    Send INR reminders to:  --    Comments:  Eloy but no [facility] PCP. Coming into anticoag clinic.          Anticoagulation Care Providers       Provider Role Specialty Phone number    [facility] Responsible  [phone]    Jaquan Horne M.D.  [facility] Medicine [phone]                Refer to Patient Findings for HPI:  Patient Findings       Negatives:  Signs/symptoms of thrombosis, Signs/symptoms of bleeding, Laboratory test error suspected, Change in health, Change in alcohol use, Change in activity, Upcoming invasive procedure, Emergency department visit, Upcoming dental procedure, Missed doses, Extra doses, Change in medications, Change in diet/appetite, Hospital admission, Bruising, Other complaints            Date Referral Placed: 25      Vitals:  There were no vitals filed for this visit.  Pt declined vitals    Verified current warfarin dosing schedule.    Medications reconciled.  Pt is not on antiplatelet therapy.      A/P   INR is therapeutic  Reason(s) for out of range INR today: N/A      Warfarin dosing recommendation: Continue regimen as listed above.    Pt educated to contact our clinic with any changes in medications or s/s of bleeding or thrombosis. Pt is aware to seek immediate medical attention for falls, head injury or deep cuts.    Request pt to return in 4 week(s). Pt agrees.    Stephen Hernandez  PharmD

## 2025-03-06 ENCOUNTER — TELEPHONE (OUTPATIENT)
Dept: VASCULAR LAB | Facility: MEDICAL CENTER | Age: 60
End: 2025-03-06
Payer: COMMERCIAL

## 2025-03-06 NOTE — TELEPHONE ENCOUNTER
Called and left message regarding imaging surveillance, and to call us back.    Caro Landrum, Med Ass't  [facility]  Ph. [phone]  Fx. [phone]          Maria Antonia Clayton R.N.       2/26/25  2:18 PM  Unsigned Note      Please call and see if pt has established elsewhere she is due for CTA complete imaging.mychart unread          Maria Antonia Clayton R.N. to Oumar Fisher      1/27/25  1:26 PM  Oumar,      I have you on the surveillance calendar for a CTA complete due in March 2025. I have notes that say you wanted to complete outside of [facility] due to insurance issues is that correct? Do you have a provider currently monitoring your aortic dissection?     Please let me know if you have any other questions or concerns.      Thank you,   Maria Antonia RAHMAN

## 2025-03-17 ENCOUNTER — ANTICOAGULATION VISIT (OUTPATIENT)
Dept: VASCULAR LAB | Facility: MEDICAL CENTER | Age: 60
End: 2025-03-17
Attending: INTERNAL MEDICINE
Payer: COMMERCIAL

## 2025-03-17 DIAGNOSIS — I82.409 DEEP VEIN THROMBOSIS (DVT) OF LOWER EXTREMITY, UNSPECIFIED CHRONICITY, UNSPECIFIED LATERALITY, UNSPECIFIED VEIN (HCC): ICD-10-CM

## 2025-03-17 LAB — INR PPP: 1.6 (ref 2–3.5)

## 2025-03-17 PROCEDURE — 85610 PROTHROMBIN TIME: CPT

## 2025-03-17 PROCEDURE — 99212 OFFICE O/P EST SF 10 MIN: CPT

## 2025-03-17 RX ORDER — TRAMADOL HYDROCHLORIDE 50 MG/1
50 TABLET ORAL 2 TIMES DAILY
COMMUNITY
Start: 2024-10-14

## 2025-03-17 NOTE — PROGRESS NOTES
Anticoagulation Summary  As of 3/17/2025      INR goal:  2.0-3.0   TTR:  65.3% (9.8 y)   INR used for dosin.60 (3/17/2025)   Warfarin maintenance plan:  7.5 mg (5 mg x 1.5) every Mon; 5 mg (5 mg x 1) all other days   Weekly warfarin total:  37.5 mg   Plan last modified:  Tiffani Kaur, PharmD (2024)   Next INR check:  4/15/2025   Priority:  Routine   Target end date:  Indefinite    Indications    Deep vein thrombosis [453.40] [I82.409]                 Anticoagulation Episode Summary       INR check location:  Anticoagulation Clinic    Preferred lab:  --    Send INR reminders to:  --    Comments:  Eloy but no [facility] PCP. Coming into anticoag clinic.          Anticoagulation Care Providers       Provider Role Specialty Phone number    [facility] Responsible  [phone]    Jaquan Horne M.D.  Family Medicine [phone]                Refer to Patient Findings for HPI:  Patient Findings       Positives:  Change in diet/appetite (increased greens lately)    Negatives:  Signs/symptoms of thrombosis, Signs/symptoms of bleeding, Laboratory test error suspected, Change in health, Change in alcohol use, Change in activity, Upcoming invasive procedure, Emergency department visit, Upcoming dental procedure, Missed doses, Extra doses, Change in medications, Hospital admission, Bruising, Other complaints            Date Referral Placed: 25      Vitals:  There were no vitals filed for this visit.  Pt declined vitals    Verified current warfarin dosing schedule.    Medications reconciled.  Pt is not on antiplatelet therapy.      A/P   INR is subtherapeutic  Reason(s) for out of range INR today: Increased vitamin K intake      Warfarin dosing recommendation: Bolus with 10 mg today, then Continue regimen as listed above.    Pt educated to contact our clinic with any changes in medications or s/s of bleeding or thrombosis. Pt is aware to seek immediate medical attention for falls, head  injury or deep cuts.    Request pt to return in 2 week(s). Pt declines despite warning of extending their follow up interval. Pt opts to RTC in 4 week(s).    Stephen Hernandez, ClaudiaD

## 2025-04-15 ENCOUNTER — ANTICOAGULATION VISIT (OUTPATIENT)
Dept: VASCULAR LAB | Facility: MEDICAL CENTER | Age: 60
End: 2025-04-15
Attending: INTERNAL MEDICINE
Payer: COMMERCIAL

## 2025-04-15 DIAGNOSIS — I82.409 DEEP VEIN THROMBOSIS (DVT) OF LOWER EXTREMITY, UNSPECIFIED CHRONICITY, UNSPECIFIED LATERALITY, UNSPECIFIED VEIN (HCC): ICD-10-CM

## 2025-04-15 LAB — INR PPP: 3.2 (ref 2–3.5)

## 2025-04-15 PROCEDURE — 85610 PROTHROMBIN TIME: CPT

## 2025-04-15 PROCEDURE — 99212 OFFICE O/P EST SF 10 MIN: CPT

## 2025-04-15 NOTE — PROGRESS NOTES
Anticoagulation Summary  As of 4/15/2025      INR goal:  2.0-3.0   TTR:  65.3% (9.9 y)   INR used for dosing:  3.20 (4/15/2025)   Warfarin maintenance plan:  7.5 mg (5 mg x 1.5) every Mon; 5 mg (5 mg x 1) all other days   Weekly warfarin total:  37.5 mg   Plan last modified:  Tiffani Kaur, PharmD (9/6/2024)   Next INR check:  5/12/2025   Priority:  Routine   Target end date:  Indefinite    Indications    Deep vein thrombosis [453.40] [I82.409]                 Anticoagulation Episode Summary       INR check location:  Anticoagulation Clinic    Preferred lab:  --    Send INR reminders to:  --    Comments:  Eloy but no [facility] PCP. Coming into anticoag clinic.          Anticoagulation Care Providers       Provider Role Specialty Phone number    [facility] Responsible  [phone]    Jaquan Horne M.D.  [facility] [phone]                Refer to Patient Findings for HPI:  Patient Findings       Negatives:  Signs/symptoms of thrombosis, Signs/symptoms of bleeding, Laboratory test error suspected, Change in health, Change in alcohol use, Change in activity, Upcoming invasive procedure, Emergency department visit, Upcoming dental procedure, Missed doses, Extra doses, Change in medications, Change in diet/appetite, Hospital admission, Bruising, Other complaints          Clinical Outcomes       Negatives:  Major bleeding event, Thromboembolic event, Anticoagulation-related hospital admission, Anticoagulation-related ED visit, Anticoagulation-related fatality            Date Referral Placed: 01/20/25      Vitals:  There were no vitals filed for this visit.  Pt declined vitals    Verified current warfarin dosing schedule.    Medications reconciled.  Pt is not on antiplatelet therapy.      A/P   INR is slightly supratherapeutic  Reason(s) for out of range INR today: No obvious causes      Warfarin dosing recommendation: Take 2.5 mg, then Continue regimen as listed above.    Pt educated to  contact our clinic with any changes in medications or s/s of bleeding or thrombosis. Pt is aware to seek immediate medical attention for falls, head injury or deep cuts.    Request pt to return in 2 week(s). Pt declines despite warning of extending their follow up interval. Pt opts to RTC in 4 week(s).    Stephen Hernandez, ClaudiaD

## 2025-05-12 ENCOUNTER — ANTICOAGULATION VISIT (OUTPATIENT)
Dept: VASCULAR LAB | Facility: MEDICAL CENTER | Age: 60
End: 2025-05-12
Attending: STUDENT IN AN ORGANIZED HEALTH CARE EDUCATION/TRAINING PROGRAM
Payer: COMMERCIAL

## 2025-05-12 DIAGNOSIS — Z79.01 CHRONIC ANTICOAGULATION: ICD-10-CM

## 2025-05-12 LAB — INR PPP: 1.2 (ref 2–3.5)

## 2025-05-12 PROCEDURE — 85610 PROTHROMBIN TIME: CPT

## 2025-05-12 PROCEDURE — 99212 OFFICE O/P EST SF 10 MIN: CPT

## 2025-05-12 NOTE — PROGRESS NOTES
Anticoagulation Summary  As of 2025      INR goal:  2.0-3.0   TTR:  65.2% (10 y)   INR used for dosin.20 (2025)   Warfarin maintenance plan:  7.5 mg (5 mg x 1.5) every Mon; 5 mg (5 mg x 1) all other days   Weekly warfarin total:  37.5 mg   Plan last modified:  Tiffani Kaur, PharmD (2024)   Next INR check:  2025   Priority:  Routine   Target end date:  Indefinite    Indications    Deep vein thrombosis [453.40] [I82.409]                 Anticoagulation Episode Summary       INR check location:  Anticoagulation Clinic    Preferred lab:  --    Send INR reminders to:  --    Comments:  [place] but no [facility] PCP. Coming into anticoag clinic.          Anticoagulation Care Providers       Provider Role Specialty Phone number    [facility] Responsible  [phone]    Jaquan Horne M.D.  [facility] [phone]                Refer to Patient Findings for HPI:  Patient Findings       Positives:  Change in health, Change in medications (flonase and mucinex for allergies.), Change in diet/appetite (More greens than usual due to recent vacation.)    Negatives:  Signs/symptoms of thrombosis, Signs/symptoms of bleeding, Laboratory test error suspected, Change in alcohol use, Change in activity, Upcoming invasive procedure, Emergency department visit, Upcoming dental procedure, Missed doses, Extra doses, Hospital admission, Bruising, Other complaints          Clinical Outcomes       Negatives:  Major bleeding event, Thromboembolic event, Anticoagulation-related hospital admission, Anticoagulation-related ED visit, Anticoagulation-related fatality            Date Referral Placed: 25      Vitals:  There were no vitals filed for this visit.  Pt declined vitals    Verified current warfarin dosing schedule.    Medications reconciled.  Pt is not on antiplatelet therapy.      A/P   INR is subtherapeutic  Reason(s) for out of range INR today: Increased vitamin K intake       Warfarin dosing recommendation: Bolus TODAY and TOMORROW with 10 mg, then resume current warfarin dosing regimen.     Pt educated to contact our clinic with any changes in medications or s/s of bleeding or thrombosis. Pt is aware to seek immediate medical attention for falls, head injury or deep cuts.    Request pt to return in 2 week(s). Pt agrees.    Gurdeep Webber, PharmD

## 2025-05-27 ENCOUNTER — ANTICOAGULATION VISIT (OUTPATIENT)
Dept: VASCULAR LAB | Facility: MEDICAL CENTER | Age: 60
End: 2025-05-27
Attending: STUDENT IN AN ORGANIZED HEALTH CARE EDUCATION/TRAINING PROGRAM
Payer: COMMERCIAL

## 2025-05-27 DIAGNOSIS — I82.409 DEEP VEIN THROMBOSIS (DVT) OF LOWER EXTREMITY, UNSPECIFIED CHRONICITY, UNSPECIFIED LATERALITY, UNSPECIFIED VEIN (HCC): Primary | ICD-10-CM

## 2025-05-27 LAB — INR PPP: 2.3 (ref 2–3.5)

## 2025-05-27 PROCEDURE — 85610 PROTHROMBIN TIME: CPT

## 2025-05-27 PROCEDURE — 99211 OFF/OP EST MAY X REQ PHY/QHP: CPT

## 2025-05-27 NOTE — PROGRESS NOTES
Anticoagulation Summary  As of 2025      INR goal:  2.0-3.0   TTR:  65.0% (10 y)   INR used for dosin.30 (2025)   Warfarin maintenance plan:  7.5 mg (5 mg x 1.5) every Mon; 5 mg (5 mg x 1) all other days   Weekly warfarin total:  37.5 mg   Plan last modified:  Tiffani Kaur, PharmD (2024)   Next INR check:  2025   Priority:  Routine   Target end date:  Indefinite    Indications    Deep vein thrombosis [453.40] [I82.409]                 Anticoagulation Episode Summary       INR check location:  Anticoagulation Clinic    Preferred lab:  --    Send INR reminders to:  --    Comments:  [place] but no [facility] PCP. Coming into anticoag clinic.          Anticoagulation Care Providers       Provider Role Specialty Phone number    [facility] Responsible  [phone]    Jaquan Horne M.D.  [facility] [phone]                Refer to Patient Findings for HPI:  Patient Findings       Negatives:  Signs/symptoms of thrombosis, Signs/symptoms of bleeding, Laboratory test error suspected, Change in health, Change in alcohol use, Change in activity, Upcoming invasive procedure, Emergency department visit, Upcoming dental procedure, Missed doses, Extra doses, Change in medications, Change in diet/appetite, Hospital admission, Bruising, Other complaints          Clinical Outcomes       Negatives:  Major bleeding event, Thromboembolic event, Anticoagulation-related hospital admission, Anticoagulation-related ED visit, Anticoagulation-related fatality            Date Referral Placed: 25      Vitals:  There were no vitals filed for this visit.  Pt declined vitals    Verified current warfarin dosing schedule.    Medications reconciled.  Pt is not on antiplatelet therapy.      A/P   INR is therapeutic  Reason(s) for out of range INR today: N/A      Warfarin dosing recommendation: Continue regimen as listed above.    Pt educated to contact our clinic with any changes in  medications or s/s of bleeding or thrombosis. Pt is aware to seek immediate medical attention for falls, head injury or deep cuts.    Request pt to return in 4 week(s). Pt agrees.    Claudia CazaresD

## 2025-06-16 NOTE — Clinical Note
REFERRAL APPROVAL NOTICE         Sent on June 16, 2025                   Oumar Fisher  [address] [facility] [address]                   Dear Ms. Fisher,    After a careful review of the medical information and benefit coverage, [facility] has processed your referral. See below for additional details.    If applicable, you must be actively enrolled with your insurance for coverage of the authorized service. If you have any questions regarding your coverage, please contact your insurance directly.    REFERRAL INFORMATION   Referral #:  [id number]  Referred-To Department    Referred-By Provider:  Wound Care    Chris Horne M.D.   [facility]      [address] [facility] [address]  [phone] [address]  [facility] [id number] [phone]    Referral Start Date:  06/16/2025  Referral End Date:   06/16/2026             SCHEDULING  If you do not already have an appointment, please call [phone] to make an appointment.     MORE INFORMATION  If you do not already have a [employer] account, sign up at: [email or web address]  You can access your medical information, make appointments, see lab results, billing information, and more.  If you have questions regarding this referral, please contact  the [facility] Referrals department at:             [phone]. Monday - Friday 8:00AM - 5:00PM.     Sincerely,    [facility]

## 2025-06-23 ENCOUNTER — OFFICE VISIT (OUTPATIENT)
Dept: WOUND CARE | Facility: MEDICAL CENTER | Age: 60
End: 2025-06-23
Attending: NURSE PRACTITIONER
Payer: COMMERCIAL

## 2025-06-23 ENCOUNTER — ANTICOAGULATION VISIT (OUTPATIENT)
Dept: VASCULAR LAB | Facility: MEDICAL CENTER | Age: 60
End: 2025-06-23
Attending: INTERNAL MEDICINE
Payer: COMMERCIAL

## 2025-06-23 DIAGNOSIS — R52 PAIN ASSOCIATED WITH WOUND: ICD-10-CM

## 2025-06-23 DIAGNOSIS — Z79.01 CHRONIC ANTICOAGULATION: Primary | ICD-10-CM

## 2025-06-23 DIAGNOSIS — I83.029 VENOUS STASIS ULCER OF LEFT LOWER LEG WITH EDEMA OF LEFT LOWER LEG (HCC): ICD-10-CM

## 2025-06-23 DIAGNOSIS — R60.0 VENOUS STASIS ULCER OF LEFT LOWER LEG WITH EDEMA OF LEFT LOWER LEG (HCC): ICD-10-CM

## 2025-06-23 DIAGNOSIS — I87.2 VENOUS INSUFFICIENCY OF BOTH LOWER EXTREMITIES: Primary | ICD-10-CM

## 2025-06-23 DIAGNOSIS — I83.892 VENOUS STASIS ULCER OF LEFT LOWER LEG WITH EDEMA OF LEFT LOWER LEG (HCC): ICD-10-CM

## 2025-06-23 DIAGNOSIS — T14.8XXA PAIN ASSOCIATED WITH WOUND: ICD-10-CM

## 2025-06-23 DIAGNOSIS — F17.200 NICOTINE USE DISORDER: ICD-10-CM

## 2025-06-23 DIAGNOSIS — L97.929 VENOUS STASIS ULCER OF LEFT LOWER LEG WITH EDEMA OF LEFT LOWER LEG (HCC): ICD-10-CM

## 2025-06-23 LAB — INR PPP: 2.6 (ref 2–3.5)

## 2025-06-23 PROCEDURE — 11042 DBRDMT SUBQ TIS 1ST 20SQCM/<: CPT

## 2025-06-23 PROCEDURE — 99406 BEHAV CHNG SMOKING 3-10 MIN: CPT | Performed by: NURSE PRACTITIONER

## 2025-06-23 PROCEDURE — 99214 OFFICE O/P EST MOD 30 MIN: CPT

## 2025-06-23 PROCEDURE — 11042 DBRDMT SUBQ TIS 1ST 20SQCM/<: CPT | Performed by: NURSE PRACTITIONER

## 2025-06-23 PROCEDURE — 99211 OFF/OP EST MAY X REQ PHY/QHP: CPT

## 2025-06-23 PROCEDURE — 85610 PROTHROMBIN TIME: CPT

## 2025-06-23 RX ORDER — VITAMIN B COMPLEX
2000 TABLET ORAL 3 TIMES DAILY
COMMUNITY

## 2025-06-23 ASSESSMENT — ENCOUNTER SYMPTOMS
VOMITING: 0
WEIGHT LOSS: 0
WHEEZING: 0
CHILLS: 0
MYALGIAS: 0
DIZZINESS: 0
DEPRESSION: 0
NERVOUS/ANXIOUS: 0
FEVER: 0
PALPITATIONS: 0
FALLS: 0
DIARRHEA: 0
COUGH: 0
SENSORY CHANGE: 0
HEADACHES: 0
NAUSEA: 0
SHORTNESS OF BREATH: 0
EYES NEGATIVE: 1
WEAKNESS: 0

## 2025-06-23 NOTE — PROGRESS NOTES
Provider Encounter- Lower Extremity Ulcer      HISTORY OF PRESENT ILLNESS  Wound History:    START OF CARE IN CLINIC: 6/23/25    REFERRING PROVIDER: Chris Horne      WOUND ETIOLOGY: venous   LOCATION: L medial lower leg     HISTORY: Patient developed ulcer around 5/11/2025.  Patient reports in the past she had a ruptured perforated vein and she believes this has occurred again.  She was previously had intervention by Dr. Dillon.  Currently patient was applying a honey dressing and then switched to silver sorb with honey alginate.  She does wear compression socks 20 to 30 mmHg that she changes approximately every 6 weeks.      Pertinent Medical History: DVT-on Coumadin.  Last INR 2.35 2725.,  Arthritis-taking tramadol, aortic dissection, Graves' disease, hypertension, protein S deficiency, nicotine dependence   ETIOLOGY HISTORY: Diagnosed: 35 years ago. Vascular Surgeon: Dr. Dillon. Previous vascular procedures: Ablation 2022. Compression: Compression socks 20 to 30 mmHg BLE. Varicose Veins: Yes        TOBACCO USE: Smokes 1/2ppd  Social drinking   Marijuana when performing wound care         Patient's problem list, allergies, and current medications reviewed and updated in Epic    Interval History:  6/23/2025: Initial wound evaluation, initial provider Clinic visit with Lisa SHEIKH, ALEX-BC, CHEERLLE, CFJAY.  Pt denies fevers, chills, nausea, vomiting.  Referral to Dr. Dillon placed.  Initiate 2 layer compression wrap.        REVIEW OF SYSTEMS:   Review of Systems   Constitutional:  Negative for chills, fever and weight loss.   HENT: Negative.     Eyes: Negative.    Respiratory:  Negative for cough, shortness of breath and wheezing.    Cardiovascular:  Positive for leg swelling. Negative for chest pain and palpitations.   Gastrointestinal:  Negative for diarrhea, nausea and vomiting.   Genitourinary: Negative.    Musculoskeletal:  Negative for falls and myalgias.   Skin:  Negative for itching.         Left inside ankle wound   Neurological:  Negative for dizziness, sensory change, weakness and headaches.   Psychiatric/Behavioral:  Negative for depression. The patient is not nervous/anxious.          PHYSICAL EXAMINATION:   LMP 06/27/2011     Physical Exam  Constitutional:       Appearance: Normal appearance.   Cardiovascular:      Rate and Rhythm: Normal rate.      Pulses: Normal pulses.      Comments: +2 DP/PT palpated bilaterally  Pulmonary:      Effort: Pulmonary effort is normal.   Musculoskeletal:         General: Swelling present.      Right lower leg: Edema present.      Left lower leg: Edema present.   Skin:     Comments: Venous stasis ulcer left medial distal lower leg: Full-thickness adherent scattered slough, new epithelium/scar tissue to periphery  No evidence of infection    Stable crust right medial ankle      Chronic BLE edema, Hemisiderin staining, skin changes and scarring- consistent with CVI.     Neurological:      Mental Status: She is alert.   Psychiatric:         Mood and Affect: Mood normal.         WOUND ASSESSMENT  Wound 06/23/25 Vascular Ulcer Leg Medial;Distal Left (Active)   Wound Image    06/23/25 0900   Site Assessment Red;Yellow;Painful 06/23/25 0900   Periwound Assessment Hemosiderin Staining;Fragile 06/23/25 0900   Margins Attached edges 06/23/25 0900   Closure Secondary intention 06/23/25 0900   Drainage Amount Moderate 06/23/25 0900   Drainage Description Serosanguineous 06/23/25 0900   Treatments Cleansed;Topical Lidocaine;Provider debridement;Site care 06/23/25 0900   Wound Cleansing Hypochlorus Acid 06/23/25 0900   Periwound Protectant TRIAD paste;Skin Moisturizer 06/23/25 0900   Dressing Status Old drainage 06/23/25 0900   Dressing Changed New 06/23/25 0900   Dressing Cleansing/Solutions Hypochlorous acid 06/23/25 0900   Dressing Options Hydrofiber Silver;Super Absorbent Pad;Compression Wrap Two Layer 06/23/25 0900   Dressing Change/Treatment Frequency Weekly, and As  Needed 06/23/25 0900   Wound Team Following Weekly 06/23/25 0900   Non-staged Wound Description Full thickness 06/23/25 0900   Wound Length (cm) 3.4 cm 06/23/25 0900   Wound Width (cm) 4 cm 06/23/25 0900   Wound Depth (cm) 0.1 cm 06/23/25 0900   Wound Surface Area (cm^2) 10.68 cm^2 06/23/25 0900   Wound Volume (cm^3) 0.712 cm^3 06/23/25 0900   Post-Procedure Length (cm) 2.5 cm 06/23/25 0900   Post-Procedure Width (cm) 3 cm 06/23/25 0900   Post-Procedure Depth (cm) 0.1 cm 06/23/25 0900   Post-Procedure Surface Area (cm^2) 5.89 cm^2 06/23/25 0900   Post-Procedure Volume (cm^3) 0.393 cm^3 06/23/25 0900   Tunneling (cm) 0 cm 06/23/25 0900   Undermining (cm) 0 cm 06/23/25 0900   Wound Odor None 06/23/25 0900   Pulses Left;2+;DP;PT 06/23/25 0900   Exposed Structures None 06/23/25 0900   Number of days: 1           PROCEDURE: Debridement of left medial lower leg ulcer  -2% viscous lidocaine applied topically to wound bed for approximately 10 minutes prior to debridement  -Curette used to excise nonviable tissue from wound bed.  Excisional debridement was performed to remove devitalized tissue until healthy, bleeding tissue was visualized.   Entire surface of wound, 5.89 cm2 debrided.  Tissue debrided into the subcutaneous layer.    -Bleeding controlled with manual pressure.   -Wound care completed by wound RN, refer to wound flowsheet   -Patient tolerated the procedure well, without c/o of significant pain or discomfort.       Pertinent Labs and Diagnostics:    Labs:   A1c:   Lab Results   Component Value Date/Time    HBA1C 5.6 02/06/2021 01:39 PM            IMAGING: None recent regarding leg wound    VASCULAR STUDIES:   No results found.    LAST  WOUND CULTURE:  DATE :    Lab Results   Component Value Date/Time    CULTRSULT No growth after 5 days of incubation. 02/06/2021 02:10 PM              ASSESSMENT AND PLAN:     1. Venous stasis ulcer of left lower leg with edema of left lower leg (HCC)  Noted by patient around  5/11/2025 6/23/2025: Initial assessment.  Adherent slough  - Excisional debridement performed today.  Medically necessary to promote wound healing.  -Patient to follow-up weekly at wound care clinic for assessment, debridement  - Previously seen and treated by Dr. Dillon.  Referral placed to Dr. Dillon for venous duplex, assess for perforators and reflux  -Initiate 2 layer compression wrap to control drainage.  Patient to keep wrap dry in between clinic appointments.  Recommend obtaining cast protector to keep wrap dry during showering.  If unable to tolerate wrap, patient to remove and apply Tubigrip which was provided to patient.    - Patient's next appointment is in 10 days as she is out of town.  If wrap stays in place okay to leave until her next appointment.  However if wrap begins to slip, patient to remove and apply her compression socks     Wound care:Hydrofiber Silver;Super Absorbent Pad;Compression Wrap Two Layer          2. Venous insufficiency of both lower extremities  6/23/2025: Discussed in detail the etiology, management and prevention of venous stasis/venous insufficiency/lymphedema.  See education section below.  -Measure and order for compression garments at future appointment once edema is controlled.       3. Pain associated with wound  6/23/2025: Tolerated excisional debridement with 2% viscous lidocaine  - Taking tramadol for pain control    4.  Nicotine use disorder  6/23/2025:Tobacco cessation education provided for 4 minutes, discussed options of nicotine patch, medical treatment with wellbutrin and chantix. Discussed the risks of smoking including increased risk of heart disease, stroke, cancer and COPD. Discussed the benefits of quitting smoking on wound healing and circulation system.  Patient is not interested in quitting at this time.          PATIENT EDUCATION  - Etiology of  venous stasis ulceration discussed with patient  - Importance of managing edema for healing of ulcer,  and for prevention of new ulcer development  -Need for lifelong compression of lower legs   -Elevate legs above the level of the heart periodically throughout the day.  - Importance of adequate nutrition for wound healing  -Advised to go to ER for any increased redness, swelling, drainage or odor, or if patient develops fever, chills, nausea or vomiting.      My total time spent caring for the patient on the day of the encounter was 30 minutes.   This does not include time spent on separately billable procedures/tests.       Please note that this note may have been created using voice recognition software. I have worked with technical experts from [employer] to optimize the interface.  I have made every reasonable attempt to correct obvious errors, but there may be errors of grammar and possibly     N

## 2025-06-23 NOTE — PROGRESS NOTES
Wound(s) cleansed, assessed, and photo(s) taken for chart. Hypochlorous Acid soak to wound bed(s) allowed to dwell for approximately 3 minutes.   Provider debridement visit. Topical Lidocaine 2% gel applied to wound bed allowed to dwell 5-10 mins before debridement. See flowsheet for full details.   Education provided regarding normal wound healing and products utilized this visit including reasons for use, application, how to clean wound, and recommended frequency of dressing change.   2 Layer Compression wrap applied to Left Lower Leg to reduce lower extremity edema and facilitate wound healing.     After visit summary includes education regarding dressing changes, general nutrition needs, compression, tobacco use, and basic wound care. Patient encouraged to ask questions and verbalized understanding of all teaching.

## 2025-06-23 NOTE — PROGRESS NOTES
Anticoagulation Summary  As of 2025      INR goal:  2.0-3.0   TTR:  65.3% (10.1 y)   INR used for dosin.60 (2025)   Warfarin maintenance plan:  7.5 mg (5 mg x 1.5) every Mon; 5 mg (5 mg x 1) all other days   Weekly warfarin total:  37.5 mg   Plan last modified:  Tiffani Kaur, PharmD (2024)   Next INR check:  2025   Priority:  Routine   Target end date:  Indefinite    Indications    Deep vein thrombosis [453.40] [I82.409]                 Anticoagulation Episode Summary       INR check location:  Anticoagulation Clinic    Preferred lab:  --    Send INR reminders to:  --    Comments:  [place] but no [facility] PCP. Coming into anticoag clinic.          Anticoagulation Care Providers       Provider Role Specialty Phone number    [facility] Responsible  [phone]    Jaquan Horne M.D.  [facility] [phone]                Refer to Patient Findings for HPI:        Date Referral Placed: 25      Vitals:  There were no vitals filed for this visit.  Pt declined vitals    Verified current warfarin dosing schedule.    Medications reconciled.  Pt is not on antiplatelet therapy.      A/P   INR is therapeutic  Reason(s) for out of range INR today: N/A      Warfarin dosing recommendation: Continue regimen as listed above.    Pt educated to contact our clinic with any changes in medications or s/s of bleeding or thrombosis. Pt is aware to seek immediate medical attention for falls, head injury or deep cuts.    Request pt to return in 4 week(s). Pt agrees.    Gurdeep Webber, PharmD

## 2025-06-23 NOTE — PATIENT INSTRUCTIONS
"The following information is a summary of the education provided in the clinic today. This is not an exhaustive list of the education provided during your appointment.     DRESSING CHANGES    Keep your wound dressing clean, dry, and intact.       You may not shower with the dressing(s) off. Please do not take baths, or swim in the ocean, lakes, rivers, pools, or hot tubs.      Wounds do not need to \"air out\" or \"breathe\". Gently dry your wound before placing a new dressing.     If you need to change your dressings at home, you should wash your wound. Use normal saline, wound cleanser, hypochlorous acid, or unscented soap and water. Do not use hydrogen peroxide or rubbing alcohol to clean your wounds. Hydrogen peroxide and rubbing alcohol will damage new cells and tissue. Do not use betadine or iodine unless told to by your wound care team.    Do not soak your wounds in epsom salt baths. This can worsen your wound(s) or delay wound healing. It can also lead to infection or maceration (tissue is too wet).     If you do not have home health, the clinic will give you with leftover supplies from your appointment. We do not give out extra dressing supplies. We will order you supplies through a [employer] company. Your insurance may or may not pay for all these supplies. The company will reach out to you if insurance does not cover supplies. These supplies will be sent to your home within a few days. If you do have home health, they will provide wound care supplies.     The dressings we use may change as your wound changes.     COMPRESSION   -Compression helps reduce swelling and helps wounds heal quicker. It is still important to elevate your feet several times daily to reduce swelling, even when you are wearing compression. It is important to wear compression every day, to help your wound heal, and to prevent new wounds from developing.      -Today, a compression sock was applied. If you have pain, extreme swelling, new " numbness or tingling in your feet, or a change in the color or temperature of your feet, please remove the garments. You should wear your compression garments every day. You can take them off at night, but they should be put back on every morning before you begin walking around.       GENERAL HEALTH ADVICE  -Smoking increases your risk of various health issues, such as heart disease, lung disease, cancer, and delayed wound healing. To help your wound heal, it is important to try and reduce or quit smoking.    -Nutrition is important for wound healing. Unless told otherwise by your doctor, eat more protein and consider supplementing with a multi-vitamin, zinc, and vitamin C.     -Please let your wound care team know if you are using illicit substances as these can delay wound healing. You were provided education on cessation of illicit substances today in clinic.    CLINIC INFORMATION  The clinic's hours are Monday-Friday, 7:30 AM to 5:00 PM. We are closed most holidays and on weekends. If you leave us a message, please allow 24 hours for someone to return your call. If you have concerns or are having a medical emergency, call 911 or go to the hospital emergency room.     You might not see the same nurse or provider every visit.     If you notice any large changes in your wound(s), or signs of infection (redness, swelling, localized heat, increased pain, fever > 101 F, chills, nausea/vomiting) or have any questions about your home care instructions, please call the wound center at [phone]. If it's after hours, contact your primary care physician or go to the hospital emergency room. If you are admitted to any hospital, you will need a new referral to come back to the wound clinic. Any wound care appointments that you already have may be cancelled.    If you are 5 or more minutes late for an appointment, we reserve the right to cancel and reschedule that appointment. For example, if your appointment is at 1:00  PM, and you arrive at 1:06 PM, you are more than five minutes late and might not be seen. If you are consistently late or not coming to your appointments (typically 3 late cancellations and/or no shows), we reserve the right to cancel your future appointments or discharge you from the clinic. It is then your responsibility to obtain a new referral if wound care is still needed.

## 2025-06-25 NOTE — Clinical Note
REFERRAL APPROVAL NOTICE         Sent on June 25, 2025                   Oumar Fisher  [address] [facility] [address]                   Dear MsDennise Fisher,    After a careful review of the medical information and benefit coverage, [facility] has processed your referral. See below for additional details.    If applicable, you must be actively enrolled with your insurance for coverage of the authorized service. If you have any questions regarding your coverage, please contact your insurance directly.    REFERRAL INFORMATION   Referral #:  [id number]  Referred-To Provider    Referred-By Provider:  Vascular Surgery    HOLLAND Anand JANET L      [address]  [facility] [address]  [facility] [address]  [phone] [address]   [address]  [facility] [id number] [phone]    Referral Start Date:  06/23/2025  Referral End Date:   06/23/2026             SCHEDULING  If you do not already have an appointment, please call [phone] to make an appointment.     MORE INFORMATION  If you do not already have a [email or web address] account, sign up at: [email or web address]  You can access your medical information, make appointments, see lab results, billing information, and more.  If you have questions regarding this referral, please contact  the [facility] Referrals department at:             [phone]. Monday - Friday 8:00AM - 5:00PM.     Sincerely,    [facility]

## 2025-07-03 ENCOUNTER — NON-PROVIDER VISIT (OUTPATIENT)
Dept: WOUND CARE | Facility: MEDICAL CENTER | Age: 60
End: 2025-07-03
Attending: NURSE PRACTITIONER
Payer: COMMERCIAL

## 2025-07-03 PROCEDURE — 99213 OFFICE O/P EST LOW 20 MIN: CPT

## 2025-07-03 PROCEDURE — 97597 DBRDMT OPN WND 1ST 20 CM/<: CPT

## 2025-07-03 NOTE — PATIENT INSTRUCTIONS
"The following information is a summary of the education provided in the clinic today. This is not an exhaustive list of the education provided during your appointment.       DRESSING CHANGES    Keep your wound dressing clean, dry, and intact. Change your dressing if the dressing becomes soiled, leaks, gets wet, or falls off.      You may not shower with the dressing(s) off. Please do not take baths, or swim in the ocean, lakes, rivers, pools, or hot tubs.      Wounds do not need to \"air out\" or \"breathe\". Gently dry your wound before placing a new dressing.     After you get out of the shower, wash the wound a second time (with soap and water, wound cleanser, or saline). Gently dry the wound before you place a new dressing.       If you need to change your dressings at home, you should wash your wound. Use normal saline, wound cleanser, hypochlorous acid, or unscented soap and water. Do not use hydrogen peroxide or rubbing alcohol to clean your wounds. Hydrogen peroxide and rubbing alcohol will damage new cells and tissue. Do not use betadine or iodine unless told to by your wound care team.    Do not soak your wounds in epsom salt baths. This can worsen your wound(s) or delay wound healing. It can also lead to infection or maceration (tissue is too wet).     If you do not have home health, the clinic will give you with leftover supplies from your appointment. We do not give out extra dressing supplies. We will order you supplies through a [employer] company. Your insurance may or may not pay for all these supplies. The company will reach out to you if insurance does not cover supplies. These supplies will be sent to your home within a few days. If you do have home health, they will provide wound care supplies.     The dressings we use may change as your wound changes.     COMPRESSION   -Compression helps reduce swelling and helps wounds heal quicker. It is still important to elevate your feet several times daily to reduce " swelling, even when you are wearing compression. It is important to wear compression every day, to help your wound heal, and to prevent new wounds from developing.      -Today, a 2 layer compression wrap was applied. Please take off the wrap if you have pain, extreme swelling, new numbness or tingling, a change in the color or temperature of your feet. Take off the wrap if the wrap slides down/bunches up Take off the wrap if it gets wet or leaks through.  If you have to take off the wrap, please do not cut it off with scissors or other sharp tools. Do not trim the wrap. Unravel the wrap one layer at a time. Place a clean and dry dressing over the wound. Do not leave wrap on for more than seven days at a time. You can wear a cast protector over your wrap so you can take a shower. Cast protectors can be found at most [employer] and [employer]. [employer] does not endorse any brand of cast protector.        CLINIC INFORMATION  The clinic's hours are Monday-Friday, 7:30 AM to 5:00 PM. We are closed most holidays and on weekends. If you leave us a message, please allow 24 hours for someone to return your call. If you have concerns or are having a medical emergency, call 911 or go to the hospital emergency room.     You might not see the same nurse or provider every visit.     If you notice any large changes in your wound(s), or signs of infection (redness, swelling, localized heat, increased pain, fever > 101 F, chills, nausea/vomiting) or have any questions about your home care instructions, please call the wound center at [phone]. If it's after hours, contact your primary care physician or go to the hospital emergency room. If you are admitted to any hospital, you will need a new referral to come back to the wound clinic. Any wound care appointments that you already have may be cancelled.    If you are 5 or more minutes late for an appointment, we reserve the right to cancel and reschedule that appointment. For  example, if your appointment is at 1:00 PM, and you arrive at 1:06 PM, you are more than five minutes late and might not be seen. If you are consistently late or not coming to your appointments (typically 3 late cancellations and/or no shows), we reserve the right to cancel your future appointments or discharge you from the clinic. It is then your responsibility to obtain a new referral if wound care is still needed.

## 2025-07-03 NOTE — PROGRESS NOTES
2% Viscous lidocaine applied to wound and periwound ~8 minutes dwell time.  CSWD using curette to remove approx. ~4.32cm2 of nonviable tissue from wound bed.  Education on importance of compression for wound healing and to elevate her legs as much as possible.

## 2025-07-11 ENCOUNTER — OFFICE VISIT (OUTPATIENT)
Dept: WOUND CARE | Facility: MEDICAL CENTER | Age: 60
End: 2025-07-11
Attending: NURSE PRACTITIONER
Payer: COMMERCIAL

## 2025-07-11 DIAGNOSIS — F17.200 NICOTINE USE DISORDER: ICD-10-CM

## 2025-07-11 DIAGNOSIS — R52 PAIN ASSOCIATED WITH WOUND: ICD-10-CM

## 2025-07-11 DIAGNOSIS — I83.892 VENOUS STASIS ULCER OF LEFT LOWER LEG WITH EDEMA OF LEFT LOWER LEG (HCC): Primary | ICD-10-CM

## 2025-07-11 DIAGNOSIS — T14.8XXA PAIN ASSOCIATED WITH WOUND: ICD-10-CM

## 2025-07-11 DIAGNOSIS — I87.2 VENOUS INSUFFICIENCY OF BOTH LOWER EXTREMITIES: ICD-10-CM

## 2025-07-11 DIAGNOSIS — L97.929 VENOUS STASIS ULCER OF LEFT LOWER LEG WITH EDEMA OF LEFT LOWER LEG (HCC): Primary | ICD-10-CM

## 2025-07-11 DIAGNOSIS — R60.0 VENOUS STASIS ULCER OF LEFT LOWER LEG WITH EDEMA OF LEFT LOWER LEG (HCC): Primary | ICD-10-CM

## 2025-07-11 DIAGNOSIS — I83.029 VENOUS STASIS ULCER OF LEFT LOWER LEG WITH EDEMA OF LEFT LOWER LEG (HCC): Primary | ICD-10-CM

## 2025-07-11 PROCEDURE — 99213 OFFICE O/P EST LOW 20 MIN: CPT

## 2025-07-11 PROCEDURE — 11042 DBRDMT SUBQ TIS 1ST 20SQCM/<: CPT | Performed by: NURSE PRACTITIONER

## 2025-07-11 PROCEDURE — 11042 DBRDMT SUBQ TIS 1ST 20SQCM/<: CPT

## 2025-07-11 PROCEDURE — 99213 OFFICE O/P EST LOW 20 MIN: CPT | Mod: 25 | Performed by: NURSE PRACTITIONER

## 2025-07-11 PROCEDURE — 29581 APPL MULTLAYER CMPRN SYS LEG: CPT

## 2025-07-11 NOTE — PATIENT INSTRUCTIONS
"The following information is a summary of the education provided in the clinic today. This is not an exhaustive list of the education provided during your appointment.       DRESSING CHANGES    Keep your wound dressing clean, dry, and intact. Change your dressing every only days if the dressing becomes soiled, leaks, gets wet, or falls off.      You may not shower with the dressing(s) off. Please do not take baths, or swim in the ocean, lakes, rivers, pools, or hot tubs.      Wounds do not need to \"air out\" or \"breathe\". Gently dry your wound before placing a new dressing.     After you get out of the shower, wash the wound a second time (with soap and water, wound cleanser, or saline). Gently dry the wound before you place a new dressing.       If you need to change your dressings at home, you should wash your wound. Use normal saline, wound cleanser, hypochlorous acid, or unscented soap and water. Do not use hydrogen peroxide or rubbing alcohol to clean your wounds. Hydrogen peroxide and rubbing alcohol will damage new cells and tissue. Do not use betadine or iodine unless told to by your wound care team.    Do not soak your wounds in epsom salt baths. This can worsen your wound(s) or delay wound healing. It can also lead to infection or maceration (tissue is too wet).     If you do not have home health, the clinic will give you with leftover supplies from your appointment. We do not give out extra dressing supplies. We will order you supplies through a [employer] company. Your insurance may or may not pay for all these supplies. The company will reach out to you if insurance does not cover supplies. These supplies will be sent to your home within a few days. If you do have home health, they will provide wound care supplies.     The dressings we use may change as your wound changes.       COMPRESSION   -Today, a 2 layer compression wrap was applied. Please take off the wrap if you have pain, extreme swelling, new " numbness or tingling, a change in the color or temperature of your feet. Take off the wrap if the wrap slides down/bunches up Take off the wrap if it gets wet or leaks through.  If you have to take off the wrap, please do not cut it off with scissors or other sharp tools. Do not trim the wrap. Unravel the wrap one layer at a time. Place a clean and dry dressing over the wound. Do not leave wrap on for more than seven days at a time. You can wear a cast protector over your wrap so you can take a shower. Cast protectors can be found at most [employer] and [employer]. [employer] does not endorse any brand of cast protector.        GENERAL HEALTH ADVICE  -Smoking increases your risk of various health issues, such as heart disease, lung disease, cancer, and delayed wound healing. To help your wound heal, it is important to try and reduce or quit smoking.       CLINIC INFORMATION  The clinic's hours are Monday-Friday, 7:30 AM to 5:00 PM. We are closed most holidays and on weekends. If you leave us a message, please allow 24 hours for someone to return your call. If you have concerns or are having a medical emergency, call 911 or go to the hospital emergency room.     You might not see the same nurse or provider every visit.     If you notice any large changes in your wound(s), or signs of infection (redness, swelling, localized heat, increased pain, fever > 101 F, chills, nausea/vomiting) or have any questions about your home care instructions, please call the wound center at [phone]. If it's after hours, contact your primary care physician or go to the hospital emergency room. If you are admitted to any hospital, you will need a new referral to come back to the wound clinic. Any wound care appointments that you already have may be cancelled.    If you are 5 or more minutes late for an appointment, we reserve the right to cancel and reschedule that appointment. For example, if your appointment is at 1:00 PM, and you  arrive at 1:06 PM, you are more than five minutes late and might not be seen. If you are consistently late or not coming to your appointments (typically 3 late cancellations and/or no shows), we reserve the right to cancel your future appointments or discharge you from the clinic. It is then your responsibility to obtain a new referral if wound care is still needed.

## 2025-07-11 NOTE — WOUND TEAM
Patient with c/o severe pain throughout the week related to hydrofiber silver dressing, switched to more moist dressing with hydrofera blue ready transfer sheet as primary contact layer.     Patient states that she has a follow up with Dr. Dillon on 7/17/25 and ultrasound. Alternate dressing provided for patient to apply after appointment since wrap will be cut off.     The following information is a summary of the education provided in the clinic today. This is not an exhaustive list of the education provided during your appointment.       DRESSING CHANGES    Keep your wound dressing clean, dry, and intact. Change your dressing every only days if the dressing becomes soiled, leaks, gets wet, or falls off.      You may not shower with the dressing(s) off. Please do not take baths, or swim in the ocean, lakes, rivers, pools, or hot tubs.      COMPRESSION   -Today, a 2 layer compression wrap was applied. Please take off the wrap if you have pain, extreme swelling, new numbness or tingling, a change in the color or temperature of your feet. Take off the wrap if the wrap slides down/bunches up Take off the wrap if it gets wet or leaks through.  If you have to take off the wrap, please do not cut it off with scissors or other sharp tools. Do not trim the wrap. Unravel the wrap one layer at a time. Place a clean and dry dressing over the wound. Do not leave wrap on for more than seven days at a time. You can wear a cast protector over your wrap so you can take a shower. Cast protectors can be found at most drug [employer] and [employer]. [employer] does not endorse any brand of cast protector.        GENERAL HEALTH ADVICE  -Smoking increases your risk of various health issues, such as heart disease, lung disease, cancer, and delayed wound healing. To help your wound heal, it is important to try and reduce or quit smoking.

## 2025-07-11 NOTE — PROGRESS NOTES
Provider Encounter- Lower Extremity Ulcer      HISTORY OF PRESENT ILLNESS  Wound History:    START OF CARE IN CLINIC: 6/23/25    REFERRING PROVIDER: Chris Horne      WOUND ETIOLOGY: venous   LOCATION: L medial lower leg     HISTORY: Patient developed ulcer around 5/11/2025.  Patient reports in the past she had a ruptured perforated vein and she believes this has occurred again.  She was previously had intervention by Dr. Dillon.  Currently patient was applying a honey dressing and then switched to silver sorb with honey alginate.  She does wear compression socks 20 to 30 mmHg that she changes approximately every 6 weeks.      Pertinent Medical History: DVT-on Coumadin.  Last INR 2.35 2725.,  Arthritis-taking tramadol, aortic dissection, Graves' disease, hypertension, protein S deficiency, nicotine dependence   ETIOLOGY HISTORY: Diagnosed: 35 years ago. Vascular Surgeon: Dr. Dillon. Previous vascular procedures: Ablation 2022. Compression: Compression socks 20 to 30 mmHg BLE. Varicose Veins: Yes        TOBACCO USE: Current everyday smoker, 1/2 pack/day    Patient's problem list, allergies, and current medications reviewed and updated in Epic    Interval History:  6/23/2025: Initial wound evaluation, initial provider Clinic visit with Lisa SHEIKH, BLANCHE, CHERELLE, CFJAY.  Pt denies fevers, chills, nausea, vomiting.  Referral to Dr. Dillon placed.  Initiate 2 layer compression wrap.    7/11/2025 : Clinic visit with KORI George, BLANCHE, OVIDION, CFJAY.   Patient is tearful and anxious today, states she is having quite a bit of pain from her wound despite taking tramadol about half an hour ago.  Unable to tolerate much debridement today due to pain.  She has an appointment to see vascular surgeon, Dr. Dillon, next week.    REVIEW OF SYSTEMS:   Unchanged from previous clinic visit on 6/23/2025, except as documented in interval history above       PHYSICAL EXAMINATION:   LMP 06/27/2011      Physical Exam  Constitutional:       Appearance: Normal appearance.   Cardiovascular:      Rate and Rhythm: Normal rate.      Pulses: Normal pulses.      Comments: +2 DP/PT palpated bilaterally  Pulmonary:      Effort: Pulmonary effort is normal.   Musculoskeletal:         General: Swelling present.      Right lower leg: Edema present.      Left lower leg: Edema present.   Skin:     Comments: Venous stasis ulcer left medial distal lower leg: Full-thickness.  Area has changed slightly since last assessment.  Diffuse edges.  Scattered areas of slough within wound bed.  Minimal serosanguineous drainage, no odor.  No periwound erythema or induration.        Chronic BLE edema, Hemisiderin staining, skin changes and scarring- consistent with CVI.     Neurological:      Mental Status: She is alert.   Psychiatric:         Mood and Affect: Mood normal.         WOUND ASSESSMENT  Wound 06/23/25 Vascular Ulcer Leg Medial;Distal Left (Active)   Wound Image    07/11/25 0800   Site Assessment Pink;Red;Yellow;Painful 07/11/25 0800   Periwound Assessment Edema;Hemosiderin Staining;Scar tissue 07/11/25 0800   Margins Attached edges 07/11/25 0800   Closure Secondary intention 06/23/25 0900   Drainage Amount Small 07/11/25 0800   Drainage Description Serosanguineous 07/11/25 0800   Treatments Cleansed;Topical Lidocaine;Provider debridement;Site care;Compression 07/11/25 0800   Wound Cleansing Foam Cleanser/Washcloth;Hypochlorus Acid 07/11/25 0800   Periwound Protectant TRIAD paste;Silicone barrier cream 07/11/25 0800   Dressing Status Old drainage 06/23/25 0900   Dressing Changed New 07/11/25 0800   Dressing Cleansing/Solutions Not Applicable 07/11/25 0800   Dressing Options HFBR Transfer;Beveled;Super Absorbent Pad;Compression Wrap Two Layer 07/11/25 0800   Dressing Change/Treatment Frequency Weekly, and As Needed 07/11/25 0800   Wound Team Following Weekly 07/11/25 0800   Non-staged Wound Description Full thickness 07/11/25 0800    Wound Length (cm) 2 cm 07/11/25 0800   Wound Width (cm) 2.5 cm 07/11/25 0800   Wound Depth (cm) 0.2 cm 07/11/25 0800   Wound Surface Area (cm^2) 3.93 cm^2 07/11/25 0800   Wound Volume (cm^3) 0.524 cm^3 07/11/25 0800   Post-Procedure Length (cm) 2 cm 07/11/25 0800   Post-Procedure Width (cm) 2.3 cm 07/11/25 0800   Post-Procedure Depth (cm) 0.2 cm 07/11/25 0800   Post-Procedure Surface Area (cm^2) 3.61 cm^2 07/11/25 0800   Post-Procedure Volume (cm^3) 0.482 cm^3 07/11/25 0800   Wound Healing % 26 07/11/25 0800   Tunneling (cm) 0 cm 07/11/25 0800   Undermining (cm) 0 cm 07/11/25 0800   Wound Odor None 07/11/25 0800   Pulses Left;DP;PT;Doppler 07/11/25 0800   Exposed Structures None 07/11/25 0800   Number of days: 18           PROCEDURE: Debridement of left medial lower leg ulcer  -2% viscous lidocaine applied topically to wound bed for approximately 10 minutes prior to debridement  -Curette used to excise nonviable tissue from wound bed.  Excisional debridement was performed to remove devitalized tissue until healthy, bleeding tissue was visualized.   Entire surface of wound, 3.61 cm²  debrided.  Tissue debrided into the subcutaneous layer.    -Bleeding controlled with manual pressure.   -Wound care completed by wound RN, refer to wound flowsheet   -Patient tolerated the procedure well, without c/o of significant pain or discomfort.       Pertinent Labs and Diagnostics:    Labs:   A1c:   Lab Results   Component Value Date/Time    HBA1C 5.6 02/06/2021 01:39 PM            IMAGING: None recent regarding leg wound    VASCULAR STUDIES:   No results found.    LAST  WOUND CULTURE:  DATE :    Lab Results   Component Value Date/Time    CULTRSULT No growth after 5 days of incubation. 02/06/2021 02:10 PM              ASSESSMENT AND PLAN:     1. Venous stasis ulcer of left lower leg with edema of left lower leg (HCC)  Noted by patient around 5/11/2025 7/11/2025: Wound area has decreased slightly since last assessment.  -  Excisional debridement performed today.  Medically necessary to promote wound healing.  Patient was not able to tolerate thorough debridement today, terminated prior to establishing 100% clean wound bed  -Patient to return to clinic weekly for assessment debridement  - Previously seen and treated by Dr. Dillon.  Referral placed to Dr. Dillon on initial visit.  Patient has an appointment to see Dr. Dillon next week  - Continue 2 layer compression wrap to control drainage.  Patient to keep wrap dry in between clinic appointments.  Recommend obtaining cast protector to keep wrap dry during showering.  If unable to tolerate wrap, patient to remove and apply Tubigrip which was provided to patient.      Wound care: Hydrofera Blue;Super Absorbent Pad;Compression Wrap Two Layer          2. Venous insufficiency of both lower extremities    7/11/2025: Briefly reviewed etiology, management and prevention of venous stasis/venous insufficiency/lymphedema.   -Measure and order for compression garments at future appointment once edema is controlled.       3. Pain associated with wound    7/11/2025: Patient tearful and anxious during today's visit, states she is having severe pain from her wound.  Tramadol prior to coming into clinic today.  - Unable to perform thorough debridement today due to patient discomfort.  Debridement terminated prior to establishing 100% clean wound bed  - Consider subcutaneous lidocaine with future appointments    4.  Nicotine use disorder    7/11/2025:Tobacco cessation education provided during initial visit.  - Patient unwilling to discuss smoking cessation today.  She states she will talk to her primary on Monday when she goes in to get more pain medication  - Complicating factor, impaired wound healing potential          PATIENT EDUCATION  - Etiology of  venous stasis ulceration discussed with patient  - Importance of managing edema for healing of ulcer, and for prevention of new ulcer  development  -Need for lifelong compression of lower legs   -Elevate legs above the level of the heart periodically throughout the day.  - Importance of adequate nutrition for wound healing  -Advised to go to ER for any increased redness, swelling, drainage or odor, or if patient develops fever, chills, nausea or vomiting.      My total time spent caring for the patient on the day of the encounter was  20 minutes.   This does not include time spent on separately billable procedures/tests.       Please note that this note may have been created using voice recognition software. I have worked with technical experts from [employer] to optimize the interface.  I have made every reasonable attempt to correct obvious errors, but there may be errors of grammar and possibly     N

## 2025-07-18 ENCOUNTER — OFFICE VISIT (OUTPATIENT)
Dept: WOUND CARE | Facility: MEDICAL CENTER | Age: 60
End: 2025-07-18
Attending: NURSE PRACTITIONER
Payer: COMMERCIAL

## 2025-07-18 DIAGNOSIS — R52 PAIN ASSOCIATED WITH WOUND: ICD-10-CM

## 2025-07-18 DIAGNOSIS — I83.029 VENOUS STASIS ULCER OF LEFT LOWER LEG WITH EDEMA OF LEFT LOWER LEG (HCC): Primary | ICD-10-CM

## 2025-07-18 DIAGNOSIS — I87.2 VENOUS INSUFFICIENCY OF BOTH LOWER EXTREMITIES: ICD-10-CM

## 2025-07-18 DIAGNOSIS — I83.892 VENOUS STASIS ULCER OF LEFT LOWER LEG WITH EDEMA OF LEFT LOWER LEG (HCC): Primary | ICD-10-CM

## 2025-07-18 DIAGNOSIS — F17.200 NICOTINE USE DISORDER: ICD-10-CM

## 2025-07-18 DIAGNOSIS — R60.0 VENOUS STASIS ULCER OF LEFT LOWER LEG WITH EDEMA OF LEFT LOWER LEG (HCC): Primary | ICD-10-CM

## 2025-07-18 DIAGNOSIS — L97.929 VENOUS STASIS ULCER OF LEFT LOWER LEG WITH EDEMA OF LEFT LOWER LEG (HCC): Primary | ICD-10-CM

## 2025-07-18 DIAGNOSIS — T14.8XXA PAIN ASSOCIATED WITH WOUND: ICD-10-CM

## 2025-07-18 PROCEDURE — 99213 OFFICE O/P EST LOW 20 MIN: CPT

## 2025-07-18 PROCEDURE — 11042 DBRDMT SUBQ TIS 1ST 20SQCM/<: CPT | Performed by: NURSE PRACTITIONER

## 2025-07-18 PROCEDURE — 11042 DBRDMT SUBQ TIS 1ST 20SQCM/<: CPT

## 2025-07-18 NOTE — PROGRESS NOTES
Provider Encounter- Lower Extremity Ulcer      HISTORY OF PRESENT ILLNESS  Wound History:    START OF CARE IN CLINIC: 6/23/25    REFERRING PROVIDER: Chris Horne      WOUND ETIOLOGY: venous   LOCATION: L medial lower leg     HISTORY: Patient developed ulcer around 5/11/2025.  Patient reports in the past she had a ruptured perforated vein and she believes this has occurred again.  She was previously had intervention by Dr. Dillon.  Currently patient was applying a honey dressing and then switched to silver sorb with honey alginate.  She does wear compression socks 20 to 30 mmHg that she changes approximately every 6 weeks.      Pertinent Medical History: DVT-on Coumadin.  Last INR 2.35 2725.,  Arthritis-taking tramadol, aortic dissection, Graves' disease, hypertension, protein S deficiency, nicotine dependence   ETIOLOGY HISTORY: Diagnosed: 35 years ago. Vascular Surgeon: Dr. Dillon. Previous vascular procedures: Ablation 2022. Compression: Compression socks 20 to 30 mmHg BLE. Varicose Veins: Yes        TOBACCO USE: Current everyday smoker, 1/2 pack/day    Patient's problem list, allergies, and current medications reviewed and updated in Epic    Interval History:  6/23/2025: Initial wound evaluation, initial provider Clinic visit with Lisa SHEIKH, BLANCHE, CHERELLE, LISA.  Pt denies fevers, chills, nausea, vomiting.  Referral to Dr. Dillon placed.  Initiate 2 layer compression wrap.    7/11/2025 : Clinic visit with KORI George, BLANCHE, TR, LISA.   Patient is tearful and anxious today, states she is having quite a bit of pain from her wound despite taking tramadol about half an hour ago.  Unable to tolerate much debridement today due to pain.  She has an appointment to see vascular surgeon, Dr. Dillon, next week.    7/18/2025 : Clinic visit with KORI George FNP-BC, TR, LISA.   Patient states she is feeling much better.  Pain much better controlled today.  She had vascular  studies completed at [facility], has an appointment for follow-up with Dr. Dillon towards the end of the month.  Her wound measures smaller today, less drainage.    REVIEW OF SYSTEMS:   Unchanged from previous clinic visit on 7/11/2025, except as documented in interval history above       PHYSICAL EXAMINATION:   [facility] 06/27/2011     Physical Exam  Constitutional:       Appearance: Normal appearance.   Cardiovascular:      Rate and Rhythm: Normal rate.      Pulses: Normal pulses.      Comments: +2 DP/PT palpated bilaterally  Pulmonary:      Effort: Pulmonary effort is normal.   Musculoskeletal:      Right lower leg: Edema present.      Left lower leg: Edema present.   Skin:     Comments: Venous stasis ulcer left medial distal lower leg: Full-thickness.  Wound area has decreased, less drainage.  Scattered areas of slough to wound bed.  Moderate serosanguineous drainage, no odor.  No periwound erythema or induration.        Chronic BLE edema, Hemisiderin staining, skin changes and scarring- consistent with CVI.     Neurological:      Mental Status: She is alert.   Psychiatric:         Mood and Affect: Mood normal.         WOUND ASSESSMENT  Wound 06/23/25 Vascular Ulcer Leg Medial;Distal Left (Active)   Wound Image    07/18/25 1330   Site Assessment Pink;Red;Early/partial granulation;Painful 07/18/25 1330   Periwound Assessment Hemosiderin Staining;Dark edges;Edema 07/18/25 1330   Margins Attached edges 07/18/25 1330   Closure Secondary intention 06/23/25 0900   Drainage Amount Moderate 07/18/25 1330   Drainage Description Serosanguineous 07/18/25 1330   Treatments Cleansed;Topical Lidocaine;Provider debridement;Site care;Compression 07/18/25 1330   Wound Cleansing Foam Cleanser/Washcloth;Hypochlorus Acid 07/18/25 1330   Periwound Protectant No-sting Skin Prep;TRIAD paste;Silicone barrier cream 07/18/25 1330   Dressing Status Old drainage 06/23/25 0900   Dressing Changed Changed 07/18/25 1330   Dressing  Cleansing/Solutions Not Applicable 07/18/25 1330   Dressing Options Hydrofera Blue Ready;Beveled;Super Absorbent Pad;Compression Wrap Two Layer 07/18/25 1330   Dressing Change/Treatment Frequency Weekly, and As Needed 07/18/25 1330   Wound Team Following Weekly 07/18/25 1330   Non-staged Wound Description Full thickness 07/18/25 1330   Wound Length (cm) 1.7 cm 07/18/25 1330   Wound Width (cm) 1.5 cm 07/18/25 1330   Wound Depth (cm) 0.2 cm 07/18/25 1330   Wound Surface Area (cm^2) 2 cm^2 07/18/25 1330   Wound Volume (cm^3) 0.267 cm^3 07/18/25 1330   Post-Procedure Length (cm) 2.1 cm 07/18/25 1330   Post-Procedure Width (cm) 1.7 cm 07/18/25 1330   Post-Procedure Depth (cm) 0.2 cm 07/18/25 1330   Post-Procedure Surface Area (cm^2) 2.8 cm^2 07/18/25 1330   Post-Procedure Volume (cm^3) 0.374 cm^3 07/18/25 1330   Wound Healing % 63 07/18/25 1330   Tunneling (cm) 0 cm 07/18/25 1330   Undermining (cm) 0 cm 07/18/25 1330   Wound Odor None 07/18/25 1330   Pulses Left;DP;PT;Doppler 07/11/25 0800   Exposed Structures None 07/18/25 1330   Number of days: 25           PROCEDURE: Debridement of left medial lower leg ulcer  -2% viscous lidocaine applied topically to wound bed for approximately 10 minutes prior to debridement  -Curette used to excise nonviable tissue from wound bed.  Excisional debridement was performed to remove devitalized tissue until healthy, bleeding tissue was visualized.   Entire surface of wound, 2.8 cm²  debrided.  Tissue debrided into the subcutaneous layer.    -Bleeding controlled with manual pressure.   -Wound care completed by wound RN, refer to wound flowsheet   -Patient tolerated the procedure well, without c/o of significant pain or discomfort.       Pertinent Labs and Diagnostics:    Labs:   A1c:   Lab Results   Component Value Date/Time    HBA1C 5.6 02/06/2021 01:39 PM            IMAGING: None recent regarding leg wound    VASCULAR STUDIES:   No results found.    LAST  WOUND CULTURE:  DATE :    Lab  Results   Component Value Date/Time    CULTRSULT No growth after 5 days of incubation. 02/06/2021 02:10 PM              ASSESSMENT AND PLAN:     1. Venous stasis ulcer of left lower leg with edema of left lower leg (HCC)  Noted by patient around 5/11/2025 7/19/2025: Wound area has decreased, less drainage.    - Excisional debridement performed today.  Medically necessary to promote wound healing.  Patient was able to tolerate significantly more debridement today than she has in the past.  -Patient to return to clinic weekly for assessment debridement  - Previously seen and treated by Dr. Dillon.  Referral placed to Dr. Dillon on initial visit.  She had vascular studies completed at [facility] last week, follow-up appoint with Dr. Dillon towards end of the month.  - Continue 2 layer compression wrap to control drainage.  Patient to keep wrap dry in between clinic appointments.  Recommend obtaining cast protector to keep wrap dry during showering.  If unable to tolerate wrap, patient to remove and apply Tubigrip which was provided to patient.      Wound care: Hydrofera Blue;Super Absorbent Pad;Compression Wrap Two Layer          2. Venous insufficiency of both lower extremities    7/18/2025: Etiology, management and prevention of venous stasis/venous insufficiency/lymphedema has been discussed on multiple previous visits  -Measure and order for compression garments at future appointment once edema is controlled.       3. Pain associated with wound    7/18/2025: Patient states she is having much less pain today.  Pain medications reordered by her physician.  - Patient was able to tolerate a significant amount of debridement today with use of topical lidocaine      4.  Nicotine use disorder    7/18/2025:Tobacco cessation education provided during initial visit.  - Patient states that she is now willing to consider tapering off on her smoking because her pain is under better control  - Total  cessation of all tobacco products recommended.          PATIENT EDUCATION  - Etiology of  venous stasis ulceration discussed with patient  - Importance of managing edema for healing of ulcer, and for prevention of new ulcer development  -Need for lifelong compression of lower legs   -Elevate legs above the level of the heart periodically throughout the day.  - Importance of adequate nutrition for wound healing  -Advised to go to ER for any increased redness, swelling, drainage or odor, or if patient develops fever, chills, nausea or vomiting.            Please note that this note may have been created using voice recognition software. I have worked with technical experts from [employer] to optimize the interface.  I have made every reasonable attempt to correct obvious errors, but there may be errors of grammar and possibly     N

## 2025-07-18 NOTE — WOUND TEAM
Patient states that she has a follow up with Dr. Dillon the first week of August to review results of ultrasound.     The following information is a summary of the education provided in the clinic today. This is not an exhaustive list of the education provided during your appointment.       DRESSING CHANGES    Keep your wound dressing clean, dry, and intact. Change your dressing every only days if the dressing becomes soiled, leaks, gets wet, or falls off.      You may not shower with the dressing(s) off. Please do not take baths, or swim in the ocean, lakes, rivers, pools, or hot tubs.      COMPRESSION   -Today, a 2 layer compression wrap was applied. Please take off the wrap if you have pain, extreme swelling, new numbness or tingling, a change in the color or temperature of your feet. Take off the wrap if the wrap slides down/bunches up Take off the wrap if it gets wet or leaks through.  If you have to take off the wrap, please do not cut it off with scissors or other sharp tools. Do not trim the wrap. Unravel the wrap one layer at a time. Place a clean and dry dressing over the wound. Do not leave wrap on for more than seven days at a time. You can wear a cast protector over your wrap so you can take a shower. Cast protectors can be found at most drug [employer] and [employer]. [employer] does not endorse any brand of cast protector.        GENERAL HEALTH ADVICE  -Smoking increases your risk of various health issues, such as heart disease, lung disease, cancer, and delayed wound healing. To help your wound heal, it is important to try and reduce or quit smoking.

## 2025-07-18 NOTE — PATIENT INSTRUCTIONS
"The following information is a summary of the education provided in the clinic today. This is not an exhaustive list of the education provided during your appointment.       DRESSING CHANGES    Keep your wound dressing clean, dry, and intact. Change your dressing every only if the dressing becomes soiled, leaks, gets wet, or falls off.      You may not shower with the dressing(s) off. Please do not take baths, or swim in the ocean, lakes, rivers, pools, or hot tubs.      Wounds do not need to \"air out\" or \"breathe\". Gently dry your wound before placing a new dressing.     After you get out of the shower, wash the wound a second time (with soap and water, wound cleanser, or saline). Gently dry the wound before you place a new dressing.       If you need to change your dressings at home, you should wash your wound. Use normal saline, wound cleanser, hypochlorous acid, or unscented soap and water. Do not use hydrogen peroxide or rubbing alcohol to clean your wounds. Hydrogen peroxide and rubbing alcohol will damage new cells and tissue. Do not use betadine or iodine unless told to by your wound care team.    Do not soak your wounds in epsom salt baths. This can worsen your wound(s) or delay wound healing. It can also lead to infection or maceration (tissue is too wet).     If you do not have home health, the clinic will give you with leftover supplies from your appointment. We do not give out extra dressing supplies. We will order you supplies through a [employer] company. Your insurance may or may not pay for all these supplies. The company will reach out to you if insurance does not cover supplies. These supplies will be sent to your home within a few days. If you do have home health, they will provide wound care supplies.     The dressings we use may change as your wound changes.       COMPRESSION   -Today, a 2 layer compression wrap was applied. Please take off the wrap if you have pain, extreme swelling, new numbness or " tingling, a change in the color or temperature of your feet. Take off the wrap if the wrap slides down/bunches up Take off the wrap if it gets wet or leaks through.  If you have to take off the wrap, please do not cut it off with scissors or other sharp tools. Do not trim the wrap. Unravel the wrap one layer at a time. Place a clean and dry dressing over the wound. Do not leave wrap on for more than seven days at a time. You can wear a cast protector over your wrap so you can take a shower. Cast protectors can be found at most [employer] and [employer]. [facility] does not endorse any brand of cast protector.        GENERAL HEALTH ADVICE  -Smoking increases your risk of various health issues, such as heart disease, lung disease, cancer, and delayed wound healing. To help your wound heal, it is important to try and reduce or quit smoking.    -Nutrition is important for wound healing. Unless told otherwise by your doctor, eat more protein and consider supplementing with a multi-vitamin, zinc, and vitamin C.         CLINIC INFORMATION  The clinic's hours are Monday-Friday, 7:30 AM to 5:00 PM. We are closed most holidays and on weekends. If you leave us a message, please allow 24 hours for someone to return your call. If you have concerns or are having a medical emergency, call 911 or go to the hospital emergency room.     You might not see the same nurse or provider every visit.     If you notice any large changes in your wound(s), or signs of infection (redness, swelling, localized heat, increased pain, fever > 101 F, chills, nausea/vomiting) or have any questions about your home care instructions, please call the wound center at [phone]. If it's after hours, contact your primary care physician or go to the hospital emergency room. If you are admitted to any hospital, you will need a new referral to come back to the wound clinic. Any wound care appointments that you already have may be cancelled.    If you are 5 or  more minutes late for an appointment, we reserve the right to cancel and reschedule that appointment. For example, if your appointment is at 1:00 PM, and you arrive at 1:06 PM, you are more than five minutes late and might not be seen. If you are consistently late or not coming to your appointments (typically 3 late cancellations and/or no shows), we reserve the right to cancel your future appointments or discharge you from the clinic. It is then your responsibility to obtain a new referral if wound care is still needed.

## 2025-07-21 ENCOUNTER — ANTICOAGULATION VISIT (OUTPATIENT)
Dept: VASCULAR LAB | Facility: MEDICAL CENTER | Age: 60
End: 2025-07-21
Attending: INTERNAL MEDICINE
Payer: COMMERCIAL

## 2025-07-21 DIAGNOSIS — I82.409 DEEP VEIN THROMBOSIS (DVT) OF LOWER EXTREMITY, UNSPECIFIED CHRONICITY, UNSPECIFIED LATERALITY, UNSPECIFIED VEIN (HCC): Primary | ICD-10-CM

## 2025-07-21 LAB — INR PPP: 2.1 (ref 2–3.5)

## 2025-07-21 PROCEDURE — 85610 PROTHROMBIN TIME: CPT

## 2025-07-21 PROCEDURE — 99213 OFFICE O/P EST LOW 20 MIN: CPT

## 2025-07-21 NOTE — PROGRESS NOTES
Anticoagulation Summary  As of 2025      INR goal:  2.0-3.0   TTR:  65.5% (10.2 y)   INR used for dosin.10 (2025)   Warfarin maintenance plan:  7.5 mg (5 mg x 1.5) every Mon; 5 mg (5 mg x 1) all other days   Weekly warfarin total:  37.5 mg   Plan last modified:  Tiffani Kaur, PharmD (2024)   Next INR check:  2025   Priority:  Routine   Target end date:  Indefinite    Indications    Deep vein thrombosis [453.40] [I82.409]                 Anticoagulation Episode Summary       INR check location:  Anticoagulation Clinic    Preferred lab:  --    Send INR reminders to:  --    Comments:  [facility] but no [facility] PCP. Coming into anticoag clinic.          Anticoagulation Care Providers       Provider Role Specialty Phone number    [facility] Responsible  [phone]    Jaquan Horne M.D.  [facility] [phone]                Refer to Patient Findings for HPI:  Patient Findings       Negatives:  Signs/symptoms of thrombosis, Signs/symptoms of bleeding, Laboratory test error suspected, Change in health, Change in alcohol use, Change in activity, Upcoming invasive procedure, Emergency department visit, Upcoming dental procedure, Missed doses, Extra doses, Change in medications, Change in diet/appetite, Hospital admission, Bruising, Other complaints          Clinical Outcomes       Negatives:  Major bleeding event, Thromboembolic event, Anticoagulation-related hospital admission, Anticoagulation-related ED visit, Anticoagulation-related fatality            Date Referral Placed: 25      Vitals:  There were no vitals filed for this visit.  Pt declined vitals    Verified current warfarin dosing schedule.    Medications reconciled.  Pt is not on antiplatelet therapy.      A/P   INR is therapeutic  Reason(s) for out of range INR today: N/A      Warfarin dosing recommendation: Continue regimen as listed above.    Pt educated to contact our clinic with any changes in  Goal Outcome Evaluation:  Plan of Care Reviewed With: patient, significant other        Progress: improving  Outcome Evaluation: PT eval completed.  Pt pleasant and agreeable to therapy.  Supportive SO present during visit.  Pt reports pain at chest tube site primarily.  Able to move B U/LEs actively w/out difficulty.  Performs tfers sit to/from stand w/ min assist.  Gait w/ min assist and followed w/ chair.  Demonstrates flexed posture, decrease gait speed step length, heel strike and required at least 4 standing rest w/ activity.  Pt education for pacing activity and improved deep breathing as well as sternal precautions while performing functional mobility.  Pt will benefit from continued PT services to improve knowledge.  to increase strength, balance and I and to progress mobility reducing fall risk.  Anticipate pt to return home w/ assist at discharge.  Will follow for progress and needs.      Anticipated Discharge Disposition (PT): home with assist                         medications or s/s of bleeding or thrombosis. Pt is aware to seek immediate medical attention for falls, head injury or deep cuts.    Request pt to return in 4 week(s). Pt agrees.    Claudia CazaresD

## 2025-07-25 ENCOUNTER — NON-PROVIDER VISIT (OUTPATIENT)
Dept: WOUND CARE | Facility: MEDICAL CENTER | Age: 60
End: 2025-07-25
Attending: NURSE PRACTITIONER
Payer: COMMERCIAL

## 2025-07-25 PROCEDURE — 99213 OFFICE O/P EST LOW 20 MIN: CPT

## 2025-07-25 PROCEDURE — 97602 WOUND(S) CARE NON-SELECTIVE: CPT

## 2025-07-25 NOTE — PATIENT INSTRUCTIONS
Keep dressings clean and dry. Change dressings if they become over saturated, soiled or fall off.     On the days you are not changing your dressings, avoid getting the dressings wet. It is not harmful to get your wound wet when you are washing your wound before applying a new dressing.    If you need to change your dressings at home: Wash your wound with normal saline, wound cleanser, or unscented soap and water prior to applying your new dressings. Please avoid cleansing with hydrogen peroxide or rubbing alcohol. Hydrogen peroxide and rubbing alcohol are toxic to new tissue and skin cells.    Avoid prolonged standing or sitting without elevating your legs.    Remove your compression garments if you have severe pain, severe swelling, numbness/color change/temperature change in your toes, if the wrap gets wet, or if the wrap slips down. If you need to remove your compression garments, do so by unrolling them. Do not cut the compression garments off, this is to prevent cutting yourself on accident.    Should you experience any significant changes in your wound, such as signs of infection (increasing redness, swelling, localized heat, increased pain, fever > 101 F, chills) or have any questions regarding your home care instructions, please contact the wound center at [phone]. If after hours, contact your primary care physician or go to the hospital emergency room.     If you are admitted to any hospital, you will need a new referral to come back to the wound clinic and any scheduled appointments that you already have, may be cancelled.     If you are 5 or more minutes late for an appointment, we reserve the right to cancel and reschedule that appointment. Additionally, if you are habitually late or not showing (3 late cancellations and/or no shows), we reserve the right to cancel your remaining appointments and it will be your responsibility to obtain a new referral if services are still needed.

## 2025-07-25 NOTE — PROGRESS NOTES
RN Wound Care Procedures 7/25/2025:  Hydrofera Blue dressing soaked with warm water for approximately 3 minutes prior to dressing removal.  Viscous Lidocaine 2% applied to the left medial/distal lower leg wound for approximately 3 minutes prior to procedure.  Nonselective debridement with Puracyn moistened gauze to remove biofilm from the left medial/distal lower leg wound. Patient tolerated the procedure well.  Scabbing at inferior edge of wound left intact per patient preference.    Patient reports that she has had her ultrasound completed at [facility], but she has not yet had a follow up with Dr. Dillon. She is hoping that her office can get her in next week for a follow up visit.    Reinforced education to patient regarding rationale for dressing selections, and when compression wrap should be removed. Patient verbalized understanding of instructions.

## 2025-08-01 ENCOUNTER — NON-PROVIDER VISIT (OUTPATIENT)
Dept: WOUND CARE | Facility: MEDICAL CENTER | Age: 60
End: 2025-08-01
Attending: NURSE PRACTITIONER
Payer: COMMERCIAL

## 2025-08-01 DIAGNOSIS — I83.029 VENOUS STASIS ULCER OF LEFT LOWER LEG WITH EDEMA OF LEFT LOWER LEG (HCC): Primary | ICD-10-CM

## 2025-08-01 DIAGNOSIS — I83.892 VENOUS STASIS ULCER OF LEFT LOWER LEG WITH EDEMA OF LEFT LOWER LEG (HCC): Primary | ICD-10-CM

## 2025-08-01 DIAGNOSIS — R60.0 VENOUS STASIS ULCER OF LEFT LOWER LEG WITH EDEMA OF LEFT LOWER LEG (HCC): Primary | ICD-10-CM

## 2025-08-01 DIAGNOSIS — F17.200 NICOTINE USE DISORDER: ICD-10-CM

## 2025-08-01 DIAGNOSIS — L97.929 VENOUS STASIS ULCER OF LEFT LOWER LEG WITH EDEMA OF LEFT LOWER LEG (HCC): Primary | ICD-10-CM

## 2025-08-01 PROCEDURE — 99213 OFFICE O/P EST LOW 20 MIN: CPT

## 2025-08-01 PROCEDURE — 97597 DBRDMT OPN WND 1ST 20 CM/<: CPT

## 2025-08-01 ASSESSMENT — PAIN SCALES - GENERAL: PAIN_LEVEL: 2

## 2025-08-08 ENCOUNTER — OFFICE VISIT (OUTPATIENT)
Dept: WOUND CARE | Facility: MEDICAL CENTER | Age: 60
End: 2025-08-08
Attending: NURSE PRACTITIONER
Payer: COMMERCIAL

## 2025-08-08 DIAGNOSIS — T14.8XXA PAIN ASSOCIATED WITH WOUND: ICD-10-CM

## 2025-08-08 DIAGNOSIS — L97.929 VENOUS STASIS ULCER OF LEFT LOWER LEG WITH EDEMA OF LEFT LOWER LEG (HCC): Primary | ICD-10-CM

## 2025-08-08 DIAGNOSIS — I83.029 VENOUS STASIS ULCER OF LEFT LOWER LEG WITH EDEMA OF LEFT LOWER LEG (HCC): Primary | ICD-10-CM

## 2025-08-08 DIAGNOSIS — R52 PAIN ASSOCIATED WITH WOUND: ICD-10-CM

## 2025-08-08 DIAGNOSIS — F17.200 NICOTINE USE DISORDER: ICD-10-CM

## 2025-08-08 DIAGNOSIS — I87.2 VENOUS INSUFFICIENCY OF BOTH LOWER EXTREMITIES: ICD-10-CM

## 2025-08-08 DIAGNOSIS — I83.892 VENOUS STASIS ULCER OF LEFT LOWER LEG WITH EDEMA OF LEFT LOWER LEG (HCC): Primary | ICD-10-CM

## 2025-08-08 DIAGNOSIS — R60.0 VENOUS STASIS ULCER OF LEFT LOWER LEG WITH EDEMA OF LEFT LOWER LEG (HCC): Primary | ICD-10-CM

## 2025-08-08 PROCEDURE — 99213 OFFICE O/P EST LOW 20 MIN: CPT

## 2025-08-08 PROCEDURE — 11042 DBRDMT SUBQ TIS 1ST 20SQCM/<: CPT

## 2025-08-08 PROCEDURE — 11042 DBRDMT SUBQ TIS 1ST 20SQCM/<: CPT | Performed by: NURSE PRACTITIONER

## 2025-08-08 PROCEDURE — 99213 OFFICE O/P EST LOW 20 MIN: CPT | Mod: 25 | Performed by: NURSE PRACTITIONER

## 2025-08-15 ENCOUNTER — NON-PROVIDER VISIT (OUTPATIENT)
Dept: WOUND CARE | Facility: MEDICAL CENTER | Age: 60
End: 2025-08-15
Attending: NURSE PRACTITIONER
Payer: COMMERCIAL

## 2025-08-15 PROCEDURE — 99213 OFFICE O/P EST LOW 20 MIN: CPT

## 2025-08-15 PROCEDURE — 97597 DBRDMT OPN WND 1ST 20 CM/<: CPT

## 2025-08-18 ENCOUNTER — ANTICOAGULATION VISIT (OUTPATIENT)
Dept: VASCULAR LAB | Facility: MEDICAL CENTER | Age: 60
End: 2025-08-18
Attending: INTERNAL MEDICINE
Payer: COMMERCIAL

## 2025-08-18 DIAGNOSIS — I82.409 DEEP VEIN THROMBOSIS (DVT) OF LOWER EXTREMITY, UNSPECIFIED CHRONICITY, UNSPECIFIED LATERALITY, UNSPECIFIED VEIN (HCC): Primary | ICD-10-CM

## 2025-08-18 LAB — INR PPP: 2.3 (ref 2–3.5)

## 2025-08-18 PROCEDURE — 85610 PROTHROMBIN TIME: CPT

## 2025-08-18 PROCEDURE — 99213 OFFICE O/P EST LOW 20 MIN: CPT | Performed by: NURSE PRACTITIONER

## 2025-08-21 ENCOUNTER — NON-PROVIDER VISIT (OUTPATIENT)
Dept: WOUND CARE | Facility: MEDICAL CENTER | Age: 60
End: 2025-08-21
Attending: NURSE PRACTITIONER
Payer: COMMERCIAL

## 2025-08-21 PROCEDURE — 99213 OFFICE O/P EST LOW 20 MIN: CPT

## 2025-08-21 PROCEDURE — 29581 APPL MULTLAYER CMPRN SYS LEG: CPT

## 2025-08-29 ENCOUNTER — OFFICE VISIT (OUTPATIENT)
Dept: WOUND CARE | Facility: MEDICAL CENTER | Age: 60
End: 2025-08-29
Attending: NURSE PRACTITIONER
Payer: COMMERCIAL

## 2025-08-29 DIAGNOSIS — I83.892 VENOUS STASIS ULCER OF LEFT LOWER LEG WITH EDEMA OF LEFT LOWER LEG (HCC): Primary | ICD-10-CM

## 2025-08-29 DIAGNOSIS — T14.8XXA PAIN ASSOCIATED WITH WOUND: ICD-10-CM

## 2025-08-29 DIAGNOSIS — R52 PAIN ASSOCIATED WITH WOUND: ICD-10-CM

## 2025-08-29 DIAGNOSIS — I83.029 VENOUS STASIS ULCER OF LEFT LOWER LEG WITH EDEMA OF LEFT LOWER LEG (HCC): Primary | ICD-10-CM

## 2025-08-29 DIAGNOSIS — I87.2 VENOUS INSUFFICIENCY OF BOTH LOWER EXTREMITIES: ICD-10-CM

## 2025-08-29 DIAGNOSIS — F17.200 NICOTINE USE DISORDER: ICD-10-CM

## 2025-08-29 DIAGNOSIS — L97.929 VENOUS STASIS ULCER OF LEFT LOWER LEG WITH EDEMA OF LEFT LOWER LEG (HCC): Primary | ICD-10-CM

## 2025-08-29 DIAGNOSIS — R60.0 VENOUS STASIS ULCER OF LEFT LOWER LEG WITH EDEMA OF LEFT LOWER LEG (HCC): Primary | ICD-10-CM

## 2025-08-29 PROCEDURE — 99213 OFFICE O/P EST LOW 20 MIN: CPT

## 2025-08-29 PROCEDURE — 11042 DBRDMT SUBQ TIS 1ST 20SQCM/<: CPT

## 2025-08-29 PROCEDURE — 29581 APPL MULTLAYER CMPRN SYS LEG: CPT
